# Patient Record
Sex: FEMALE | Race: WHITE | NOT HISPANIC OR LATINO | Employment: OTHER | URBAN - METROPOLITAN AREA
[De-identification: names, ages, dates, MRNs, and addresses within clinical notes are randomized per-mention and may not be internally consistent; named-entity substitution may affect disease eponyms.]

---

## 2017-02-08 ENCOUNTER — OFFICE VISIT (OUTPATIENT)
Dept: ORTHOPEDIC SURGERY | Facility: CLINIC | Age: 57
End: 2017-02-08

## 2017-02-08 VITALS — HEIGHT: 63 IN | BODY MASS INDEX: 42.88 KG/M2 | WEIGHT: 242 LBS

## 2017-02-08 DIAGNOSIS — M25.561 CHRONIC PAIN OF RIGHT KNEE: Primary | ICD-10-CM

## 2017-02-08 DIAGNOSIS — M17.11 ARTHRITIS OF RIGHT KNEE: ICD-10-CM

## 2017-02-08 DIAGNOSIS — G89.29 CHRONIC PAIN OF RIGHT KNEE: Primary | ICD-10-CM

## 2017-02-08 PROCEDURE — 73562 X-RAY EXAM OF KNEE 3: CPT | Performed by: ORTHOPAEDIC SURGERY

## 2017-02-08 RX ORDER — AMOXICILLIN 500 MG/1
CAPSULE ORAL
COMMUNITY
Start: 2017-02-04 | End: 2017-06-27

## 2017-02-13 PROCEDURE — 20610 DRAIN/INJ JOINT/BURSA W/O US: CPT | Performed by: ORTHOPAEDIC SURGERY

## 2017-02-13 RX ORDER — BUPIVACAINE HYDROCHLORIDE 5 MG/ML
2 INJECTION, SOLUTION PERINEURAL
Status: COMPLETED | OUTPATIENT
Start: 2017-02-13 | End: 2017-02-13

## 2017-02-13 RX ORDER — METHYLPREDNISOLONE ACETATE 80 MG/ML
80 INJECTION, SUSPENSION INTRA-ARTICULAR; INTRALESIONAL; INTRAMUSCULAR; SOFT TISSUE
Status: COMPLETED | OUTPATIENT
Start: 2017-02-13 | End: 2017-02-13

## 2017-02-13 RX ORDER — LIDOCAINE HYDROCHLORIDE 10 MG/ML
2 INJECTION, SOLUTION INFILTRATION; PERINEURAL
Status: COMPLETED | OUTPATIENT
Start: 2017-02-13 | End: 2017-02-13

## 2017-02-13 RX ADMIN — BUPIVACAINE HYDROCHLORIDE 2 ML: 5 INJECTION, SOLUTION PERINEURAL at 14:43

## 2017-02-13 RX ADMIN — METHYLPREDNISOLONE ACETATE 80 MG: 80 INJECTION, SUSPENSION INTRA-ARTICULAR; INTRALESIONAL; INTRAMUSCULAR; SOFT TISSUE at 14:43

## 2017-02-13 RX ADMIN — LIDOCAINE HYDROCHLORIDE 2 ML: 10 INJECTION, SOLUTION INFILTRATION; PERINEURAL at 14:43

## 2017-02-13 NOTE — PROGRESS NOTES
Knee Joint Injection      Patient: Kamini Orta  YOB: 1960    Chief Complaints: Knee pain right    Subjective:  This problem is not new to this examiner.     History of Present Illness: Pt gets intermittent  injections with good relief. Is here for repeat injection. Understands options. The pain is a generalized joint line tenderness.  It has been progressive in nature but remains intermittent.  Worsened by prolonged standing or walking and squatting activities. Has had improvement in the past with ice/heat, rest, and injections. TIMING:  The pain is described as CHRONIC.  LOCATION: medial joint line tenderness. AGGRAVATING FACTORS:  Is worsened by prolonged standing, sitting, walking and squatting activities.  ASSOCIATED SYMPTOMS:  swelling, tenderness, and aching.      Allergies:   Allergies   Allergen Reactions   • Codeine    • Erythromycin        Medications:   Home Medications:  Current Outpatient Prescriptions on File Prior to Visit   Medication Sig   • ALPRAZolam (XANAX) 0.5 MG tablet    • ALPRAZolam (XANAX) 0.5 MG tablet Take 0.5 mg by mouth Daily.   • atorvastatin (LIPITOR) 20 MG tablet    • clidinium-chlordiazepoxide (LIBRAX) 5-2.5 MG per capsule Take  by mouth.   • cyclobenzaprine (FLEXERIL) 10 MG tablet Take 10 mg by mouth 3 (Three) Times a Day.   • esomeprazole (NexIUM) 40 MG capsule Take 40 mg by mouth.   • estradiol (MINIVELLE, VIVELLE-DOT) 0.05 MG/24HR patch Place 1 patch on the skin.   • estradiol (MINIVELLE, VIVELLE-DOT) 0.075 MG/24HR patch    • HYDROcodone-acetaminophen (NORCO) 5-325 MG per tablet    • HYDROcodone-acetaminophen (NORCO) 7.5-325 MG per tablet TAKE ONE TABLET BY MOUTH TWICE DAILY AS NEEDED   • HYDROcodone-acetaminophen (VICODIN) 5-500 MG per tablet Take 1 tablet by mouth Every 6 (Six) Hours.   • levothyroxine (SYNTHROID, LEVOTHROID) 75 MCG tablet    • lidocaine (LIDODERM) 5 % Place 1 patch on the skin Daily.   • lidocaine (LIDODERM) 5 % APPLY 1 PATCH EVERY DAY AS NEEDED  "12 HOURS AND 12 HOURS OFF   • meloxicam (MOBIC) 15 MG tablet Take 15 mg by mouth.   • NEXIUM 40 MG capsule    • rosuvastatin (CRESTOR) 10 MG tablet Take 10 mg by mouth.   • sertraline (ZOLOFT) 100 MG tablet Take 100 mg by mouth.   • traMADol-acetaminophen (ULTRACET) 37.5-325 MG per tablet Take 1 tablet by mouth Every 6 (Six) Hours.   • venlafaxine XR (EFFEXOR-XR) 150 MG 24 hr capsule      No current facility-administered medications on file prior to visit.      Current Medications:  Scheduled Meds:  Continuous Infusions:  No current facility-administered medications for this visit.   PRN Meds:.    I have reviewed the patient's medical history in detail and updated the computerized patient record.  Review and summarization of old records include:    Past Medical History   Diagnosis Date   • Depression    • Diverticulitis    • Thyroid disease         Past Surgical History   Procedure Laterality Date   • Cholecystectomy     •  section       ALSO    • Hysterectomy     • Knee arthroscopy w/ meniscal repair Right    • Knee arthroscopy Right      \"CLEAN UP\"        Social History     Occupational History   • Not on file.     Social History Main Topics   • Smoking status: Former Smoker   • Smokeless tobacco: Not on file   • Alcohol use No   • Drug use: No   • Sexual activity: Not on file    Social History     Social History Narrative        Family History   Problem Relation Age of Onset   • Heart disease Other    • Lung disease Other        ROS: 14 point review of systems was performed and was negative except for documented findings in HPI and today's encounter.     Allergies:   Allergies   Allergen Reactions   • Codeine    • Erythromycin      Constitutional:  Denies fever, shaking or chills   Eyes:  Denies change in visual acuity   HENT:  Denies nasal congestion or sore throat   Respiratory:  Denies cough or shortness of breath   Cardiovascular:  Denies chest pain or severe LE edema   GI:  " Denies abdominal pain, nausea, vomiting, bloody stools or diarrhea   Musculoskeletal:  Numbness, tingling, or loss of motor function only as noted above in history of present illness.  : Denies painful urination or hematuria  Integument:  Denies rash, lesion or ulceration   Neurologic:  Denies headache or focal weakness  Endocrine:  Denies lymphadenopathy  Psych:  Denies confusion or change in mental status   Hem:  Denies active bleeding    Physical Exam:  Body mass index is 42.87 kg/(m^2).  There were no vitals filed for this visit.  Vital Signs:  reviewed  Constitutional: Awake alert and oriented x3, well developed, no acute distress, non-toxic appearance.  EYES: symmetric, sclera clear  ENT:  Normocephalic, Atraumatic.   Respiratory:  No respiratory distress, No wheezing  CV: pulse regular, no palpitations or pallor.  GI:  Abdomen soft, non-tender.   Vascular:  Intact distal pulses, No cyanosis, no signs or symptoms of DVT.  Neurologic: Sensation grossly intact to the involved extremity, No focal deficits noted.   Neck: No tenderness, Supple.  Integument: warm, dry, no ulcerations.   Psychiatric:  Oriented, no pathological affect.  Musculoskeletal:    Affected knee(s):  Painful gait with a subtle limp, positive for synovitis, swelling, joint effusion with crepitation.  Lachman negative  Posterior drawer negative  Glenn's negative  Patellofemoral grind +  Sensation grossly intact to light touch throughout the lower extremity  Skin is intact  Distal pulses are palpable  No signs or symptoms of DVT        Diagnostic Data:     Imaging was done today and discussed at length with the patient:    Indication: pain related symptoms,  Views: 3V AP, LAT & 40 degree PA right knee(s)   Findings: advanced arthritis  Comparison views: viewed last xray done in the office.     Procedure:  Large Joint Arthrocentesis  Date/Time: 2/13/2017 2:43 PM  Consent given by: patient  Site marked: site marked  Timeout: Immediately prior  to procedure a time out was called to verify the correct patient, procedure, equipment, support staff and site/side marked as required   Supporting Documentation  Indications: pain and joint swelling   Procedure Details  Location: knee - R knee  Preparation: Patient was prepped and draped in the usual sterile fashion  Needle size: 22 G  Approach: anterolateral  Medications administered: 2 mL bupivacaine; 2 mL lidocaine 1 %; 80 mg methylPREDNISolone acetate 80 MG/ML  Patient tolerance: patient tolerated the procedure well with no immediate complications          Assessment. Severe osteoarthritis right knee(s).      Plan: Is to proceed with injection  Follow up as indicated.  Ice, elevate, and rest as needed.  Additional interventions include: Cortisone Injection. See procedure note.    2/13/2017

## 2017-05-09 ENCOUNTER — CLINICAL SUPPORT (OUTPATIENT)
Dept: ORTHOPEDIC SURGERY | Facility: CLINIC | Age: 57
End: 2017-05-09

## 2017-05-09 VITALS — WEIGHT: 238.6 LBS | TEMPERATURE: 98.6 F | HEIGHT: 63 IN | BODY MASS INDEX: 42.28 KG/M2

## 2017-05-09 DIAGNOSIS — M17.0 ARTHRITIS OF BOTH KNEES: Primary | ICD-10-CM

## 2017-05-09 PROCEDURE — 20610 DRAIN/INJ JOINT/BURSA W/O US: CPT | Performed by: NURSE PRACTITIONER

## 2017-05-09 RX ORDER — BUPIVACAINE HYDROCHLORIDE 2.5 MG/ML
2 INJECTION, SOLUTION INFILTRATION; PERINEURAL
Status: COMPLETED | OUTPATIENT
Start: 2017-05-09 | End: 2017-05-09

## 2017-05-09 RX ORDER — LIDOCAINE HYDROCHLORIDE 10 MG/ML
2 INJECTION, SOLUTION INFILTRATION; PERINEURAL
Status: COMPLETED | OUTPATIENT
Start: 2017-05-09 | End: 2017-05-09

## 2017-05-09 RX ORDER — METHYLPREDNISOLONE ACETATE 80 MG/ML
80 INJECTION, SUSPENSION INTRA-ARTICULAR; INTRALESIONAL; INTRAMUSCULAR; SOFT TISSUE
Status: COMPLETED | OUTPATIENT
Start: 2017-05-09 | End: 2017-05-09

## 2017-05-09 RX ADMIN — METHYLPREDNISOLONE ACETATE 80 MG: 80 INJECTION, SUSPENSION INTRA-ARTICULAR; INTRALESIONAL; INTRAMUSCULAR; SOFT TISSUE at 13:45

## 2017-05-09 RX ADMIN — LIDOCAINE HYDROCHLORIDE 2 ML: 10 INJECTION, SOLUTION INFILTRATION; PERINEURAL at 13:45

## 2017-05-09 RX ADMIN — BUPIVACAINE HYDROCHLORIDE 2 ML: 2.5 INJECTION, SOLUTION INFILTRATION; PERINEURAL at 13:45

## 2017-06-27 ENCOUNTER — CLINICAL SUPPORT (OUTPATIENT)
Dept: ORTHOPEDIC SURGERY | Facility: CLINIC | Age: 57
End: 2017-06-27

## 2017-06-27 VITALS — TEMPERATURE: 97.4 F | WEIGHT: 225 LBS | BODY MASS INDEX: 39.87 KG/M2 | HEIGHT: 63 IN

## 2017-06-27 DIAGNOSIS — M25.561 CHRONIC PAIN OF RIGHT KNEE: Primary | ICD-10-CM

## 2017-06-27 DIAGNOSIS — G89.29 CHRONIC PAIN OF RIGHT KNEE: Primary | ICD-10-CM

## 2017-06-27 DIAGNOSIS — M17.11 ARTHRITIS OF RIGHT KNEE: ICD-10-CM

## 2017-06-27 PROCEDURE — 20610 DRAIN/INJ JOINT/BURSA W/O US: CPT | Performed by: NURSE PRACTITIONER

## 2017-06-27 PROCEDURE — 99212 OFFICE O/P EST SF 10 MIN: CPT | Performed by: NURSE PRACTITIONER

## 2017-06-27 RX ORDER — LIDOCAINE HYDROCHLORIDE 10 MG/ML
4 INJECTION, SOLUTION INFILTRATION; PERINEURAL
Status: COMPLETED | OUTPATIENT
Start: 2017-06-27 | End: 2017-06-27

## 2017-06-27 RX ADMIN — LIDOCAINE HYDROCHLORIDE 4 ML: 10 INJECTION, SOLUTION INFILTRATION; PERINEURAL at 13:34

## 2017-06-27 NOTE — PROGRESS NOTES
Knee Follow Up Visit      Patient: Kamini Orta  YOB: 1960    Chief Complaints: Knee pain    Subjective:    History of Present Illness: Pt gets intermittent  injections with good relief. Is here for repeat injection. Understands options. The pain is a generalized joint line tenderness.  It has been progressive in nature but remains intermittent.  Worsened by prolonged standing or walking and squatting activities. Has had improvement in the past with ice/heat, rest, and injections. KNEE: TIMING:  The pain is described as ACUTE on CHRONIC.  LOCATION: medial joint line tenderness. AGGRAVATING FACTORS:  Is worsened by prolonged standing, sitting, walking and squatting activities.  ASSOCIATED SYMPTOMS:  swelling, tenderness, and aching. OTHER SYMPTOMS denies popping, locking or catching. RELIEVING FACTORS:  Previous treatment has included cortisone injections  viscosupplementation  prescription NSAIDS  activtiy modification  ice/heat/rest.    This problem is not new to this examiner.     Allergies:   Allergies   Allergen Reactions   • Codeine    • Erythromycin        Medications:   Home Medications:  Current Outpatient Prescriptions on File Prior to Visit   Medication Sig   • ALPRAZolam (XANAX) 0.5 MG tablet Take 0.5 mg by mouth Daily.   • atorvastatin (LIPITOR) 20 MG tablet Take 20 mg by mouth Daily.   • esomeprazole (NexIUM) 40 MG capsule Take 40 mg by mouth.   • estradiol (MINIVELLE, VIVELLE-DOT) 0.075 MG/24HR patch    • HYDROcodone-acetaminophen (NORCO) 7.5-325 MG per tablet TAKE ONE TABLET BY MOUTH TWICE DAILY AS NEEDED   • levothyroxine (SYNTHROID, LEVOTHROID) 75 MCG tablet Take 75 mcg by mouth Daily.   • lidocaine (LIDODERM) 5 % APPLY 1 PATCH EVERY DAY AS NEEDED 12 HOURS AND 12 HOURS OFF   • venlafaxine XR (EFFEXOR-XR) 150 MG 24 hr capsule Take 150 mg by mouth Daily.   • [DISCONTINUED] ALPRAZolam (XANAX) 0.5 MG tablet    • [DISCONTINUED] amoxicillin (AMOXIL) 500 MG capsule    • [DISCONTINUED]  "clidinium-chlordiazepoxide (LIBRAX) 5-2.5 MG per capsule Take  by mouth.   • [DISCONTINUED] cyclobenzaprine (FLEXERIL) 10 MG tablet Take 10 mg by mouth 3 (Three) Times a Day.   • [DISCONTINUED] estradiol (MINIVELLE, VIVELLE-DOT) 0.05 MG/24HR patch Place 1 patch on the skin.   • [DISCONTINUED] HYDROcodone-acetaminophen (NORCO) 5-325 MG per tablet    • [DISCONTINUED] HYDROcodone-acetaminophen (VICODIN) 5-500 MG per tablet Take 1 tablet by mouth Every 6 (Six) Hours.   • [DISCONTINUED] lidocaine (LIDODERM) 5 % Place 1 patch on the skin Daily.   • [DISCONTINUED] meloxicam (MOBIC) 15 MG tablet Take 15 mg by mouth.   • [DISCONTINUED] NEXIUM 40 MG capsule    • [DISCONTINUED] rosuvastatin (CRESTOR) 10 MG tablet Take 10 mg by mouth.   • [DISCONTINUED] sertraline (ZOLOFT) 100 MG tablet Take 100 mg by mouth.   • [DISCONTINUED] traMADol-acetaminophen (ULTRACET) 37.5-325 MG per tablet Take 1 tablet by mouth Every 6 (Six) Hours.     No current facility-administered medications on file prior to visit.      Current Medications:  Scheduled Meds:  Continuous Infusions:  No current facility-administered medications for this visit.   PRN Meds:.    I have reviewed the patient's medical history in detail and updated the computerized patient record.  Review and summarization of old records include:    Past Medical History:   Diagnosis Date   • Depression    • Diverticulitis    • Thyroid disease         Past Surgical History:   Procedure Laterality Date   •  SECTION      ALSO    • CHOLECYSTECTOMY     • HYSTERECTOMY     • KNEE ARTHROSCOPY Right     \"CLEAN UP\"   • KNEE ARTHROSCOPY W/ MENISCAL REPAIR Right         Social History     Occupational History   • Not on file.     Social History Main Topics   • Smoking status: Former Smoker   • Smokeless tobacco: Never Used   • Alcohol use No   • Drug use: No   • Sexual activity: Not on file    Social History     Social History Narrative        Family History   Problem " Relation Age of Onset   • Heart disease Other    • Lung disease Other        ROS: 14 point review of systems was performed and was negative except for documented findings in HPI and today's encounter.     Allergies:   Allergies   Allergen Reactions   • Codeine    • Erythromycin      Constitutional:  Denies fever, shaking or chills   Eyes:  Denies change in visual acuity   HENT:  Denies nasal congestion or sore throat   Respiratory:  Denies cough or shortness of breath   Cardiovascular:  Denies chest pain or severe LE edema   GI:  Denies abdominal pain, nausea, vomiting, bloody stools or diarrhea   Musculoskeletal:  Numbness, tingling, or loss of motor function only as noted above in history of present illness.  : Denies painful urination or hematuria  Integument:  Denies rash, lesion or ulceration   Neurologic:  Denies headache or focal weakness  Endocrine:  Denies lymphadenopathy  Psych:  Denies confusion or change in mental status   Hem:  Denies active bleeding    Physical Exam:  Body mass index is 39.86 kg/(m^2).  Vitals:    06/27/17 1323   Temp: 97.4 °F (36.3 °C)     Vital Signs:  reviewed  Constitutional: Awake alert and oriented x3, well developed, no acute distress, non-toxic appearance.  EYES: symmetric, sclera clear  ENT:  Normocephalic, Atraumatic.   Respiratory:  No respiratory distress, No wheezing  CV: pulse regular, no palpitations or pallor.  GI:  Abdomen soft, non-tender.   Vascular:  Intact distal pulses, No cyanosis, no signs or symptoms of DVT.  Neurologic: Sensation grossly intact to the involved extremity, No focal deficits noted.   Neck: No tenderness, Supple.  Integument: warm, dry, no ulcerations.   Psychiatric:  Oriented, no pathological affect.  Musculoskeletal:    Affected knee(s):  Painful gait with a subtle limp, positive for synovitis, swelling, joint effusion with crepitation.  Lachman negative  Posterior drawer negative  Glenn's negative  Patellofemoral grind +  Sensation grossly  intact to light touch throughout the lower extremity  Skin is intact  Distal pulses are palpable  No signs or symptoms of DVT        Diagnostic Data:     Imaging was done today and discussed at length with the patient:    Indication: pain related symptoms,  Views: 3V AP, LAT & 40 degree PA bilateral knee(s)   Findings: severe end-stage arthritis (bone on bone, subchondral sclerosis/cysts, osteophytes)  Comparison views: viewed last xray done in the office.     Procedure:  Large Joint Arthrocentesis  Date/Time: 6/27/2017 1:34 PM  Consent given by: patient  Site marked: site marked  Timeout: Immediately prior to procedure a time out was called to verify the correct patient, procedure, equipment, support staff and site/side marked as required   Supporting Documentation  Indications: pain   Procedure Details  Location: knee - R knee  Needle size: 25 G  Approach: anteromedial  Medications administered: 4 mL lidocaine 1 %; 48 mg hylan 48 MG/6ML            Assessment. Severe arthritis right knee(s).      Plan: Is to proceed with injection  Follow up as indicated.  Ice, elevate, and rest as needed.  Additional interventions include: Biomechanics of pertinent body area discussed.  Risks, benefits, alternatives, comparisons, and complications of accepted medicines, injections, recommendations, surgical procedures, and therapies explained and education provided in laymen's terms. The patient was given the opportunity to ask questions and they were answerved to their satisfaction.   Natural history and expected course discussed. Questions answered.  Advice on benefits of, and types of regular/moderate exercise.  Lifestyle measures for weight loss with biomechanical explanations for weight loss and how this affects orthopedic condition.  OTC analgesics as needed with dosage warning and instructions given.  Cryotherapy/brachy therapy as indicated with instructions.   10 min spent face to face with patient >5 min spent counseling  about natural history and expected course of assessed complaint. Questions answered.  Viscosupplementation.  See procedure note.    6/27/2017  RACHEL

## 2017-06-27 NOTE — PATIENT INSTRUCTIONS
[facility]    KNEE HOME EXERCISES      It is important that you maintain and possibly improve your strength and range of motion of your knee.      •  Walk frequently for short intervals  •  Using a cane or walker to allow you to walk safely if needed  •  Avoid sitting for long periods of time to avoid cramping and swelling of your leg   •  Do exercises either on a bed or in a chair.  •  If any of the exercises cause you pain, either eliminate that exercise or decrease          the motion or repetitions      Start exercises with 10 repetitions and increase to 30 repetitions.  Do two sets of each exercise each day    ANKLE PUMPS    1. Move your feet up and down as if you are pumping on a gas pedal       STRAIGHT LEG RAISES    1. Lie flat with your injured leg straight.  Keep the other leg bent.  2. Tighten the injured leg's thigh muscle.  3. Lift leg, with knee locked in the straight position no higher than the other knee for  seconds  4. Lower leg slowly. Rest for 5 seconds      SHORT ARC QUAD    1. Lie with both knees bent over a pillow  2. Straighten both knees  3. Hold 5 seconds  4. Bend knees back to starting position and repeat sequence       QUADRICEPS     1. Lie flat with your injured let straight.  Keep the other leg bent.  2. Tighten the injured leg's thigh muscle by trying to move your kneecap toward the  thigh.  3. Make your leg as stiff as you can.  4. Hold for 5 seconds and relax for 5 seconds        HAMSTRING STRETCH    1. Lie flat with your injured leg straight. Keep the other leg bent  2. Press your heel of your injured leg into the floor for 5 seconds       OR  1. Sit with injured leg out straight.   2. Loop a sheet around the ball of foot and toes.  3. Gently pull on the sheet, keeping the leg straight  4. Hold for 10-30 seconds      KNEE FLEXION-EXTENSION SITTING     1. Sit with injured let bent as shown  2. Straighten leg at the knee  3. Hold 5 seconds  4. Bend knee back  to starting position   5. Rest for 2-5 seconds and repeat sequence       HEEL SLIDES     1. Lie on back with legs straight  2. Slide heels toward buttocks  3. Return to starting position       HEEL SLIDS WITH SHEET    1. Lie on your back with a sheet wrapped around your foot  2. Pull on the sheet to assist you in bending your knee and sliding your heel toward  your buttocks  3. Hold for 5 seconds        KNEE FLEXION STRETCH    1. Sit in a chair  2. Bend bad knee back as far as you can   3. Hold stretch for 5-10 seconds      CHAIR PUSH UPS    1. Sit in a chair with arm rests  2. Place hands on armrests  3. Straighten arms, raising buttocks up off of chair         WALL SLIDES    1. Stand with your back and head against a wall.    2. Keep your feet shoulder width apart and 6-12 inches from the wall  3. Slowly slide down the wall until your knees are slightly bent.    4. Hold for 5 seconds and then slowly slide back up  5. As you get stronger, then you can slowly increase the bend in your knees, but do  not drop your buttocks below the level of your knees       STEP UPS    1. Stand with your foot on your injured leg on a 3-5 inch support (such as a block of  wood)  2. Place other foot on the floor  3. Shift your weight onto the foot on the block and try to straighten the knee and  raising the other foot off of the floor  4. Slowly lower your foot until only the heel touches the floor

## 2017-12-11 ENCOUNTER — OFFICE VISIT (OUTPATIENT)
Dept: ORTHOPEDIC SURGERY | Facility: CLINIC | Age: 57
End: 2017-12-11

## 2017-12-11 VITALS — WEIGHT: 224 LBS | TEMPERATURE: 97.6 F | HEIGHT: 63 IN | BODY MASS INDEX: 39.69 KG/M2

## 2017-12-11 DIAGNOSIS — M25.561 CHRONIC PAIN OF RIGHT KNEE: Primary | ICD-10-CM

## 2017-12-11 DIAGNOSIS — M17.11 ARTHRITIS OF RIGHT KNEE: ICD-10-CM

## 2017-12-11 DIAGNOSIS — G89.29 CHRONIC PAIN OF RIGHT KNEE: Primary | ICD-10-CM

## 2017-12-11 PROCEDURE — 20610 DRAIN/INJ JOINT/BURSA W/O US: CPT | Performed by: ORTHOPAEDIC SURGERY

## 2017-12-11 PROCEDURE — 99212 OFFICE O/P EST SF 10 MIN: CPT | Performed by: ORTHOPAEDIC SURGERY

## 2017-12-11 PROCEDURE — 73562 X-RAY EXAM OF KNEE 3: CPT | Performed by: ORTHOPAEDIC SURGERY

## 2017-12-11 RX ORDER — LIDOCAINE HYDROCHLORIDE 10 MG/ML
2 INJECTION, SOLUTION EPIDURAL; INFILTRATION; INTRACAUDAL; PERINEURAL
Status: COMPLETED | OUTPATIENT
Start: 2017-12-11 | End: 2017-12-11

## 2017-12-11 RX ORDER — BUPIVACAINE HYDROCHLORIDE 5 MG/ML
2 INJECTION, SOLUTION EPIDURAL; INTRACAUDAL
Status: COMPLETED | OUTPATIENT
Start: 2017-12-11 | End: 2017-12-11

## 2017-12-11 RX ORDER — METHYLPREDNISOLONE ACETATE 80 MG/ML
80 INJECTION, SUSPENSION INTRA-ARTICULAR; INTRALESIONAL; INTRAMUSCULAR; SOFT TISSUE
Status: COMPLETED | OUTPATIENT
Start: 2017-12-11 | End: 2017-12-11

## 2017-12-11 RX ADMIN — BUPIVACAINE HYDROCHLORIDE 2 ML: 5 INJECTION, SOLUTION EPIDURAL; INTRACAUDAL at 10:21

## 2017-12-11 RX ADMIN — METHYLPREDNISOLONE ACETATE 80 MG: 80 INJECTION, SUSPENSION INTRA-ARTICULAR; INTRALESIONAL; INTRAMUSCULAR; SOFT TISSUE at 10:21

## 2017-12-11 RX ADMIN — LIDOCAINE HYDROCHLORIDE 2 ML: 10 INJECTION, SOLUTION EPIDURAL; INFILTRATION; INTRACAUDAL; PERINEURAL at 10:21

## 2017-12-11 NOTE — PROGRESS NOTES
"  Patient: Kamini Orta  YOB: 1960    Chief Complaints:  right knee pain    Subjective:    History of Present Illness: Here today for knee pain. The pain is a generalized joint tenderness.  It has been progressive in nature but remains intermittent.  Worsened by prolonged standing or walking and squatting activities. Has had improvement in the past with ice/heat, rest, and injections.     This problem is not new to this examiner.     Allergies:   Allergies   Allergen Reactions   • Codeine    • Erythromycin        Medications:   Home Medications:  Current Outpatient Prescriptions on File Prior to Visit   Medication Sig   • ALPRAZolam (XANAX) 0.5 MG tablet Take 0.5 mg by mouth Daily.   • atorvastatin (LIPITOR) 20 MG tablet Take 20 mg by mouth Daily.   • esomeprazole (NexIUM) 40 MG capsule Take 40 mg by mouth.   • estradiol (MINIVELLE, VIVELLE-DOT) 0.075 MG/24HR patch    • HYDROcodone-acetaminophen (NORCO) 7.5-325 MG per tablet TAKE ONE TABLET BY MOUTH TWICE DAILY AS NEEDED   • levothyroxine (SYNTHROID, LEVOTHROID) 75 MCG tablet Take 75 mcg by mouth Daily.   • lidocaine (LIDODERM) 5 % APPLY 1 PATCH EVERY DAY AS NEEDED 12 HOURS AND 12 HOURS OFF   • venlafaxine XR (EFFEXOR-XR) 150 MG 24 hr capsule Take 150 mg by mouth Daily.     No current facility-administered medications on file prior to visit.      Current Medications:  Scheduled Meds:  Continuous Infusions:  No current facility-administered medications for this visit.   PRN Meds:.    I have reviewed the patient's medical history in detail and updated the computerized patient record.  Review and summarization of old records include:    Past Medical History:   Diagnosis Date   • Depression    • Diverticulitis    • Thyroid disease         Past Surgical History:   Procedure Laterality Date   •  SECTION      ALSO    • CHOLECYSTECTOMY     • HYSTERECTOMY     • KNEE ARTHROSCOPY Right     \"CLEAN UP\"   • KNEE ARTHROSCOPY W/ MENISCAL " REPAIR Right 2004        Social History     Occupational History   • Not on file.     Social History Main Topics   • Smoking status: Former Smoker   • Smokeless tobacco: Never Used   • Alcohol use No   • Drug use: No   • Sexual activity: Not on file      Social History     Social History Narrative        Family History   Problem Relation Age of Onset   • Heart disease Other    • Lung disease Other        ROS: 14 point review of systems was performed and was negative except for documented findings in HPI and today's encounter.     Allergies:   Allergies   Allergen Reactions   • Codeine    • Erythromycin      Constitutional:  Denies fever, shaking or chills   Eyes:  Denies change in visual acuity   HENT:  Denies nasal congestion or sore throat   Respiratory:  Denies cough or shortness of breath   Cardiovascular:  Denies chest pain or severe LE edema   GI:  Denies abdominal pain, nausea, vomiting, bloody stools or diarrhea   Musculoskeletal:  Numbness, tingling, or loss of motor function only as noted above in history of present illness.  : Denies painful urination or hematuria  Integument:  Denies rash, lesion or ulceration   Neurologic:  Denies headache or focal weakness  Endocrine:  Denies lymphadenopathy  Psych:  Denies confusion or change in mental status   Hem:  Denies active bleeding    Physical Exam:  Body mass index is 39.68 kg/(m^2).  Vitals:    12/11/17 1016   Temp: 97.6 °F (36.4 °C)     Vital Signs:  reviewed  Constitutional: Awake alert and oriented x3, well developed, no acute distress, non-toxic appearance.  EYES: symmetric, sclera clear  ENT:  Normocephalic, Atraumatic.   Respiratory:  No respiratory distress, No wheezing  CV: pulse regular, no palpitations or pallor.  GI:  Abdomen soft, non-tender.   Vascular:  Intact distal pulses, No cyanosis, no signs or symptoms of DVT.  Neurologic: Sensation grossly intact to the involved extremity, No focal deficits noted.   Neck: No tenderness,  Supple.  Integument: warm, dry, no ulcerations.   Psychiatric:  Oriented, no pathological affect.  Musculoskeletal:    Affected knee(s):  Painful gait with a subtle limp, positive for synovitis, swelling, joint effusion with crepitation.  Lachman negative  Posterior drawer negative  Glenn's negative  Patellofemoral grind +  Sensation grossly intact to light touch throughout the lower extremity  Skin is intact  Distal pulses are palpable  No signs or symptoms of DVT        Diagnostic Data:     Imaging was done today, images were personally viewed and discussed at length with the patient:    Indication: pain related symptoms,  Views: 3V AP, LAT & 40 degree PA right knee(s)   Findings: severe end-stage arthritis (bone on bone, subchondral sclerosis/cysts, osteophytes)  Comparison views: viewed last xray done in the office.     Procedure:  Large Joint Arthrocentesis  Date/Time: 12/11/2017 10:21 AM  Consent given by: patient  Site marked: site marked  Timeout: Immediately prior to procedure a time out was called to verify the correct patient, procedure, equipment, support staff and site/side marked as required   Supporting Documentation  Indications: pain and joint swelling   Procedure Details  Location: knee - R knee  Preparation: Patient was prepped and draped in the usual sterile fashion  Needle size: 22 G  Approach: anterolateral  Medications administered: 80 mg methylPREDNISolone acetate 80 MG/ML; 2 mL bupivacaine (PF) 0.5 %; 2 mL lidocaine PF 1% 1 %  Patient tolerance: patient tolerated the procedure well with no immediate complications          Assessment:     ICD-10-CM ICD-9-CM   1. Chronic pain of right knee M25.561 719.46    G89.29 338.29   2. Arthritis of right knee M17.11 716.96           Plan: Is to proceed with injection  Follow up as indicated.  Ice, elevate, and rest as needed.  Additional interventions include:  15 min spent face to face with patient 9 min spent counseling about natural history and  expected course of assessed complaint and reviewed treatment options that have been tried and not tried and those currently available. Questions answered.    Natural history and expected course of this patient's diagnosis discussed along with evaluation of therapies. Questions answered.  Advice on benefits of, and types of regular/moderate exercise including biomechanical forces involved as it pertains to this complaint, with a goal of 5 min at least 7 times a week.    Address and update of wt loss, physical exercises, use of assistive devices, and monitoring of Medications per orders to address ortho complaints; Evaluation and discussion of safety, precautions, side effects, and warnings given especially of long term NSAID therapy.  Lifestyle measures for weight loss, suggest starting at 10lbs, with biomechanical explanations for weight loss and how this affects orthopedic condition.  Cortisone Injection. See procedure note.  OTC analgesics as needed with dosage warning and instructions given.  Cryotherapy/brachy therapy as indicated with instructions.   Advised on strengthening exercises, stressed importance of these with progression of arthritis, demonstrated exercises to patient with repeat demonstration and verb of understanding.     12/11/2017  RACHEL

## 2017-12-28 ENCOUNTER — CONVERSION ENCOUNTER (OUTPATIENT)
Dept: GENERAL RADIOLOGY | Facility: HOSPITAL | Age: 57
End: 2017-12-28

## 2018-03-14 ENCOUNTER — CLINICAL SUPPORT (OUTPATIENT)
Dept: ORTHOPEDIC SURGERY | Facility: CLINIC | Age: 58
End: 2018-03-14

## 2018-03-14 VITALS — BODY MASS INDEX: 39.69 KG/M2 | WEIGHT: 224 LBS | HEIGHT: 63 IN | TEMPERATURE: 97.7 F

## 2018-03-14 DIAGNOSIS — M17.0 ARTHRITIS OF BOTH KNEES: Primary | ICD-10-CM

## 2018-03-14 PROCEDURE — 99212 OFFICE O/P EST SF 10 MIN: CPT | Performed by: NURSE PRACTITIONER

## 2018-03-14 PROCEDURE — 20610 DRAIN/INJ JOINT/BURSA W/O US: CPT | Performed by: NURSE PRACTITIONER

## 2018-03-14 RX ORDER — LIDOCAINE HYDROCHLORIDE 10 MG/ML
4 INJECTION, SOLUTION EPIDURAL; INFILTRATION; INTRACAUDAL; PERINEURAL
Status: COMPLETED | OUTPATIENT
Start: 2018-03-14 | End: 2018-03-14

## 2018-03-14 RX ORDER — METHYLPREDNISOLONE ACETATE 80 MG/ML
80 INJECTION, SUSPENSION INTRA-ARTICULAR; INTRALESIONAL; INTRAMUSCULAR; SOFT TISSUE
Status: COMPLETED | OUTPATIENT
Start: 2018-03-14 | End: 2018-03-14

## 2018-03-14 RX ADMIN — METHYLPREDNISOLONE ACETATE 80 MG: 80 INJECTION, SUSPENSION INTRA-ARTICULAR; INTRALESIONAL; INTRAMUSCULAR; SOFT TISSUE at 13:25

## 2018-03-14 RX ADMIN — METHYLPREDNISOLONE ACETATE 80 MG: 80 INJECTION, SUSPENSION INTRA-ARTICULAR; INTRALESIONAL; INTRAMUSCULAR; SOFT TISSUE at 13:26

## 2018-03-14 RX ADMIN — LIDOCAINE HYDROCHLORIDE 4 ML: 10 INJECTION, SOLUTION EPIDURAL; INFILTRATION; INTRACAUDAL; PERINEURAL at 13:25

## 2018-03-14 RX ADMIN — LIDOCAINE HYDROCHLORIDE 4 ML: 10 INJECTION, SOLUTION EPIDURAL; INFILTRATION; INTRACAUDAL; PERINEURAL at 13:26

## 2018-03-14 NOTE — PROGRESS NOTES
Patient Name: Kamini Orta   YOB: 1960  Referring Primary Care Physician: Davin Hall MD  BMI: Body mass index is 39.68 kg/m².    Chief Complaint:    Chief Complaint   Patient presents with   • Left Knee - Follow-up, Pain   • Right Knee - Follow-up, Pain        Subjective:    HPI:   Kamini Orta is a pleasant 57 y.o. year old who presents today for evaluation of   Chief Complaint   Patient presents with   • Left Knee - Follow-up, Pain   • Right Knee - Follow-up, Pain   .  KNEE: TIMING:  The pain is described as ACUTE on CHRONIC.  LOCATION: medial joint line tenderness. AGGRAVATING FACTORS:  Is worsened by prolonged standing, sitting, walking and squatting activities.  ASSOCIATED SYMPTOMS:  swelling, tenderness, and aching. OTHER SYMPTOMS denies popping, locking or catching. RELIEVING FACTORS:  Previous treatment has included cortisone injections  OTC Tylenol  activtiy modification  weight loss  ice/heat/rest.    This problem is not new to this examiner.     Medications:   Home Medications:  Current Outpatient Prescriptions on File Prior to Visit   Medication Sig   • ALPRAZolam (XANAX) 0.5 MG tablet Take 0.5 mg by mouth Daily.   • atorvastatin (LIPITOR) 20 MG tablet Take 20 mg by mouth Daily.   • diclofenac sodium (VOTAREN XR) 100 MG 24 hr tablet TAKE ONE TABLET BY MOUTH EVERY DAY   • esomeprazole (NexIUM) 40 MG capsule Take 40 mg by mouth.   • estradiol (MINIVELLE, VIVELLE-DOT) 0.075 MG/24HR patch    • HYDROcodone-acetaminophen (NORCO) 7.5-325 MG per tablet TAKE ONE TABLET BY MOUTH TWICE DAILY AS NEEDED   • levothyroxine (SYNTHROID, LEVOTHROID) 75 MCG tablet Take 75 mcg by mouth Daily.   • lidocaine (LIDODERM) 5 % APPLY 1 PATCH EVERY DAY AS NEEDED 12 HOURS AND 12 HOURS OFF   • venlafaxine XR (EFFEXOR-XR) 150 MG 24 hr capsule Take 150 mg by mouth Daily.     No current facility-administered medications on file prior to visit.      Current Medications:  Scheduled Meds:  Continuous Infusions:  No  "current facility-administered medications for this visit.   PRN Meds:.    I have reviewed the patient's medical history in detail and updated the computerized patient record.  Review and summarization of old records includes:    Past Medical History:   Diagnosis Date   • Depression    • Diverticulitis    • Thyroid disease         Past Surgical History:   Procedure Laterality Date   •  SECTION      ALSO    • CHOLECYSTECTOMY     • HYSTERECTOMY     • KNEE ARTHROSCOPY Right     \"CLEAN UP\"   • KNEE ARTHROSCOPY W/ MENISCAL REPAIR Right         Social History     Occupational History   • Not on file.     Social History Main Topics   • Smoking status: Former Smoker   • Smokeless tobacco: Never Used   • Alcohol use No   • Drug use: No   • Sexual activity: Not on file    Social History     Social History Narrative   • No narrative on file        Family History   Problem Relation Age of Onset   • Heart disease Other    • Lung disease Other        ROS: 14 point review of systems was performed and all other systems were reviewed and are negative except for documented findings in HPI and today's encounter.     Allergies:   Allergies   Allergen Reactions   • Codeine    • Erythromycin      Constitutional:  Denies fever, shaking or chills   Eyes:  Denies change in visual acuity   HENT:  Denies nasal congestion or sore throat   Respiratory:  Denies cough or shortness of breath   Cardiovascular:  Denies chest pain or severe LE edema   GI:  Denies abdominal pain, nausea, vomiting, bloody stools or diarrhea   Musculoskeletal:  Numbness, tingling, pain, or loss of motor function only as noted above in history of present illness.  : Denies painful urination or hematuria  Integument:  Denies rash, lesion or ulceration   Neurologic:  Denies headache or focal weakness  Endocrine:  Denies lymphadenopathy  Psych:  Denies confusion or change in mental status   Hem:  Denies active bleeding    Subjective " "    Objective:    Physical Exam: 57 y.o. female  Wt Readings from Last 3 Encounters:   03/14/18 102 kg (224 lb)   12/11/17 102 kg (224 lb)   06/27/17 102 kg (225 lb)     Ht Readings from Last 3 Encounters:   03/14/18 160 cm (63\")   12/11/17 160 cm (63\")   06/27/17 160 cm (63\")     Body mass index is 39.68 kg/m².  Facility age limit for growth percentiles is 20 years.  Vitals:    03/14/18 1314   Temp: 97.7 °F (36.5 °C)       Vital signs reviewed.   General Appearance:    Alert, cooperative, in no acute distress                  Eyes: conjunctiva clear  ENT: external ears and nose atraumatic  CV: no peripheral edema  Resp: normal respiratory effort  Skin: no rashes or wounds; normal turgor  Psych: mood and affect appropriate  Lymph: no nodes appreciated  Neuro: gross sensation intact  Vascular:  Palpable peripheral pulse in noted extremity  Musculoskeletal Extremities: KNEE Exam: medial joint line tenderness with crepitation, synovitis, swelling, and joint effusion bilateral knee.    Radiology:   Imaging Results (last 72 hours)     ** No results found for the last 72 hours. **        Imaging done previously in the office, images were personally viewed and discussed at length with the patient:    Indication: pain related symptoms,  Views: 3V AP, LAT & 40 degree PA right knee(s)   Findings: severe end-stage arthritis (bone on bone, subchondral sclerosis/cysts, osteophytes)  Comparison views: viewed last xray done in the office.       Assessment:     ICD-10-CM ICD-9-CM   1. Arthritis of both knees M17.0 716.96        Large Joint Arthrocentesis  Date/Time: 3/14/2018 1:25 PM  Consent given by: patient  Site marked: site marked  Timeout: Immediately prior to procedure a time out was called to verify the correct patient, procedure, equipment, support staff and site/side marked as required   Supporting Documentation  Indications: pain and joint swelling   Procedure Details  Location: knee - L knee  Preparation: Patient was " prepped and draped in the usual sterile fashion  Needle size: 22 G  Approach: anterolateral  Medications administered: 80 mg methylPREDNISolone acetate 80 MG/ML; 4 mL lidocaine PF 1% 1 %  Patient tolerance: patient tolerated the procedure well with no immediate complications    Large Joint Arthrocentesis  Date/Time: 3/14/2018 1:26 PM  Consent given by: patient  Site marked: site marked  Timeout: Immediately prior to procedure a time out was called to verify the correct patient, procedure, equipment, support staff and site/side marked as required   Supporting Documentation  Indications: pain and joint swelling   Procedure Details  Location: knee - R knee  Preparation: Patient was prepped and draped in the usual sterile fashion  Needle size: 22 G  Approach: anterolateral  Medications administered: 80 mg methylPREDNISolone acetate 80 MG/ML; 4 mL lidocaine PF 1% 1 %  Patient tolerance: patient tolerated the procedure well with no immediate complications             Plan:  15 min spent face to face with patient 11 min spent counseling about natural history and expected course of assessed complaint and reviewed treatment options that have been tried and not tried and those currently available. Questions answered.    Natural history and expected course of this patient's diagnosis discussed along with evaluation of therapies. Questions answered.  Advice on benefits of, and types of regular/moderate exercise including biomechanical forces involved as it pertains to this complaint, with a goal of 5 min at least 7 times a week.    Address and update of wt loss, physical exercises, use of assistive devices, and monitoring of Medications per orders to address ortho complaints; Evaluation and discussion of safety, precautions, side effects, and warnings given especially of long term NSAID therapy.  Lifestyle measures for weight loss, suggest starting at 10lbs, with biomechanical explanations for weight loss and how this affects  orthopedic condition.  Cortisone Injection. See procedure note.  OTC analgesics as needed with dosage warning and instructions given.  Cryotherapy/brachy therapy as indicated with instructions.   Advised on strengthening exercises, stressed importance of these with progression of arthritis, demonstrated exercises to patient with repeat demonstration and verb of understanding.   Is working on wt loss and diet and is doing thigh exercises and has quit smoking this past year.  Is getting ready to go to [place] to visit her brother Ricky.     3/14/2018   RACHEL

## 2018-05-23 ENCOUNTER — TELEPHONE (OUTPATIENT)
Dept: ORTHOPEDIC SURGERY | Facility: CLINIC | Age: 58
End: 2018-05-23

## 2018-05-23 DIAGNOSIS — M19.90 ARTHRITIS: Primary | ICD-10-CM

## 2018-05-29 ENCOUNTER — TELEPHONE (OUTPATIENT)
Dept: ORTHOPEDIC SURGERY | Facility: CLINIC | Age: 58
End: 2018-05-29

## 2018-05-29 NOTE — TELEPHONE ENCOUNTER
CALLED AND LEFT MSG FOR PATIENT TO CALL ME ABOUT THE SYNVISC ONE INJECTION BECAUSE [facility] NO LONGER WILL COVER/DM

## 2018-05-29 NOTE — TELEPHONE ENCOUNTER
PATIENT CALLED ME BACK AND HAS BEEN MADE AWARE THAT SHE CANNOT HAVE SYNVISC ONE AT THIS TIME AND SHE IS GOING TO HAVE CORTISONE INSTEAD/DM

## 2018-06-08 ENCOUNTER — CLINICAL SUPPORT (OUTPATIENT)
Dept: ORTHOPEDIC SURGERY | Facility: CLINIC | Age: 58
End: 2018-06-08

## 2018-06-08 VITALS — HEIGHT: 63 IN | WEIGHT: 239.6 LBS | BODY MASS INDEX: 42.45 KG/M2 | TEMPERATURE: 98.5 F

## 2018-06-08 DIAGNOSIS — M17.11 PRIMARY OSTEOARTHRITIS OF RIGHT KNEE: Primary | ICD-10-CM

## 2018-06-08 PROCEDURE — 99212 OFFICE O/P EST SF 10 MIN: CPT | Performed by: NURSE PRACTITIONER

## 2018-06-08 PROCEDURE — 73562 X-RAY EXAM OF KNEE 3: CPT | Performed by: NURSE PRACTITIONER

## 2018-06-08 PROCEDURE — 20610 DRAIN/INJ JOINT/BURSA W/O US: CPT | Performed by: NURSE PRACTITIONER

## 2018-06-08 RX ORDER — METHYLPREDNISOLONE ACETATE 80 MG/ML
80 INJECTION, SUSPENSION INTRA-ARTICULAR; INTRALESIONAL; INTRAMUSCULAR; SOFT TISSUE
Status: COMPLETED | OUTPATIENT
Start: 2018-06-08 | End: 2018-06-08

## 2018-06-08 RX ORDER — LIDOCAINE HYDROCHLORIDE 20 MG/ML
4 INJECTION, SOLUTION EPIDURAL; INFILTRATION; INTRACAUDAL; PERINEURAL
Status: COMPLETED | OUTPATIENT
Start: 2018-06-08 | End: 2018-06-08

## 2018-06-08 RX ADMIN — METHYLPREDNISOLONE ACETATE 80 MG: 80 INJECTION, SUSPENSION INTRA-ARTICULAR; INTRALESIONAL; INTRAMUSCULAR; SOFT TISSUE at 11:12

## 2018-06-08 RX ADMIN — LIDOCAINE HYDROCHLORIDE 4 ML: 20 INJECTION, SOLUTION EPIDURAL; INFILTRATION; INTRACAUDAL; PERINEURAL at 11:12

## 2018-06-08 NOTE — PROGRESS NOTES
Patient: Kamini Orta  YOB: 1960    Chief Complaints:  right knee pain    Subjective:    History of Present Illness: Here today for badly flared knee pain. The pain is a generalized joint tenderness.  It has been progressive in nature but remains intermittent.  Worsened by prolonged standing or walking and squatting activities. Has had improvement in the past with ice/heat, rest, and injections.     This problem is not new to this examiner.     Allergies:   Allergies   Allergen Reactions   • Codeine    • Erythromycin        Medications:   Home Medications:  Current Outpatient Prescriptions on File Prior to Visit   Medication Sig   • ALPRAZolam (XANAX) 0.5 MG tablet Take 0.5 mg by mouth Daily.   • atorvastatin (LIPITOR) 20 MG tablet Take 20 mg by mouth Daily.   • diclofenac sodium (VOTAREN XR) 100 MG 24 hr tablet TAKE ONE TABLET BY MOUTH EVERY DAY   • esomeprazole (NexIUM) 40 MG capsule Take 40 mg by mouth.   • estradiol (MINIVELLE, VIVELLE-DOT) 0.075 MG/24HR patch    • HYDROcodone-acetaminophen (NORCO) 7.5-325 MG per tablet TAKE ONE TABLET BY MOUTH TWICE DAILY AS NEEDED   • levothyroxine (SYNTHROID, LEVOTHROID) 75 MCG tablet Take 75 mcg by mouth Daily.   • lidocaine (LIDODERM) 5 % APPLY 1 PATCH EVERY DAY AS NEEDED 12 HOURS AND 12 HOURS OFF   • venlafaxine XR (EFFEXOR-XR) 150 MG 24 hr capsule Take 150 mg by mouth Daily.     No current facility-administered medications on file prior to visit.      Current Medications:  Scheduled Meds:  Continuous Infusions:  No current facility-administered medications for this visit.   PRN Meds:.    I have reviewed the patient's medical history in detail and updated the computerized patient record.  Review and summarization of old records include:    Past Medical History:   Diagnosis Date   • Depression    • Diverticulitis    • Thyroid disease         Past Surgical History:   Procedure Laterality Date   •  SECTION      ALSO    • CHOLECYSTECTOMY    "  • HYSTERECTOMY  1994   • KNEE ARTHROSCOPY Right 2012    \"CLEAN UP\"   • KNEE ARTHROSCOPY W/ MENISCAL REPAIR Right 2004        Social History     Occupational History   • Not on file.     Social History Main Topics   • Smoking status: Former Smoker   • Smokeless tobacco: Never Used   • Alcohol use No   • Drug use: No   • Sexual activity: Not on file      Social History     Social History Narrative   • No narrative on file        Family History   Problem Relation Age of Onset   • Heart disease Other    • Lung disease Other        ROS: 14 point review of systems was performed and was negative except for documented findings in HPI and today's encounter.     Allergies:   Allergies   Allergen Reactions   • Codeine    • Erythromycin      Constitutional:  Denies fever, shaking or chills   Eyes:  Denies change in visual acuity   HENT:  Denies nasal congestion or sore throat   Respiratory:  Denies cough or shortness of breath   Cardiovascular:  Denies chest pain or severe LE edema   GI:  Denies abdominal pain, nausea, vomiting, bloody stools or diarrhea   Musculoskeletal:  Numbness, tingling, or loss of motor function only as noted above in history of present illness.  : Denies painful urination or hematuria  Integument:  Denies rash, lesion or ulceration   Neurologic:  Denies headache or focal weakness  Endocrine:  Denies lymphadenopathy  Psych:  Denies confusion or change in mental status   Hem:  Denies active bleeding    Physical Exam:  Wt Readings from Last 3 Encounters:   06/08/18 109 kg (239 lb 9.6 oz)   03/14/18 102 kg (224 lb)   12/11/17 102 kg (224 lb)     Ht Readings from Last 3 Encounters:   06/08/18 160 cm (63\")   03/14/18 160 cm (63\")   12/11/17 160 cm (63\")     Body mass index is 42.44 kg/m².  Facility age limit for growth percentiles is 20 years.  Vitals:    06/08/18 1150   Temp: 98.5 °F (36.9 °C)     Vital Signs:  reviewed  Constitutional: Awake alert and oriented x3, well developed, no acute distress, " non-toxic appearance.  EYES: symmetric, sclera clear  ENT:  Normocephalic, Atraumatic.   Respiratory:  No respiratory distress, No wheezing  CV: pulse regular, no palpitations or pallor.  GI:  Abdomen soft, non-tender.   Vascular:  Intact distal pulses, No cyanosis, no signs or symptoms of DVT.  Neurologic: Sensation grossly intact to the involved extremity, No focal deficits noted.   Neck: No tenderness, Supple.  Integument: warm, dry, no ulcerations.   Psychiatric:  Oriented, no pathological affect.  Musculoskeletal:    Affected knee(s):  Painful gait with a subtle limp, positive for synovitis, swelling, joint effusion with crepitation.  Lachman negative  Posterior drawer negative  Glenn's negative  Patellofemoral grind +  Sensation grossly intact to light touch throughout the lower extremity  Skin is intact  Distal pulses are palpable  No signs or symptoms of DVT        Diagnostic Data:     Imaging was done today, images were personally viewed and discussed with the patient:    Indication: pain related symptoms,  Views: 3V AP, LAT & 40 degree PA right knee(s)   Findings: severe end-stage arthritis (bone on bone, subchondral sclerosis/cysts, osteophytes)  Comparison views: viewed last xray done in the office.     Procedure:  Large Joint Arthrocentesis  Date/Time: 6/8/2018 11:12 AM  Consent given by: patient  Site marked: site marked  Timeout: Immediately prior to procedure a time out was called to verify the correct patient, procedure, equipment, support staff and site/side marked as required   Supporting Documentation  Indications: pain and joint swelling   Procedure Details  Location: knee - R knee  Preparation: Patient was prepped and draped in the usual sterile fashion  Needle size: 22 G  Approach: anterolateral  Medications administered: 80 mg methylPREDNISolone acetate 80 MG/ML; 4 mL lidocaine PF 2% 2 %  Patient tolerance: patient tolerated the procedure well with no immediate  complications          Assessment:     ICD-10-CM ICD-9-CM   1. Primary osteoarthritis of right knee M17.11 715.16           Plan: Is to proceed with injection  Follow up as indicated.  Ice, elevate, and rest as needed.  Additional interventions include:  15 min spent face to face with patient 9 min spent counseling about natural history and expected course of assessed complaint and reviewed treatment options that have been tried and not tried and those currently available. Questions answered.    Natural history and expected course of this patient's diagnosis discussed along with evaluation of therapies. Questions answered.  Advice on benefits of, and types of regular/moderate exercise including biomechanical forces involved as it pertains to this complaint, with a goal of 5 min at least 7 times a week.    Address and update of wt loss, physical exercises, use of assistive devices, and monitoring of Medications per orders to address ortho complaints; Evaluation and discussion of safety, precautions, side effects, and warnings given especially of long term NSAID therapy.  Lifestyle measures for weight loss, suggest starting at 10lbs, with biomechanical explanations for weight loss and how this affects orthopedic condition.  Cortisone Injection. See procedure note.  OTC analgesics as needed with dosage warning and instructions given.  Cryotherapy/brachy therapy as indicated with instructions.   Advised on strengthening exercises, stressed importance of these with progression of arthritis, demonstrated exercises to patient with repeat demonstration and verb of understanding.   Is starting on wt watchers on Monday and wants to get 80lbs off.   6/8/2018  MJR

## 2018-09-14 ENCOUNTER — CLINICAL SUPPORT (OUTPATIENT)
Dept: ORTHOPEDIC SURGERY | Facility: CLINIC | Age: 58
End: 2018-09-14

## 2018-09-14 VITALS — TEMPERATURE: 98.5 F | BODY MASS INDEX: 38.77 KG/M2 | WEIGHT: 218.8 LBS | HEIGHT: 63 IN

## 2018-09-14 DIAGNOSIS — M17.11 ARTHRITIS OF RIGHT KNEE: Primary | ICD-10-CM

## 2018-09-14 PROCEDURE — 20610 DRAIN/INJ JOINT/BURSA W/O US: CPT | Performed by: ORTHOPAEDIC SURGERY

## 2018-09-14 PROCEDURE — 99213 OFFICE O/P EST LOW 20 MIN: CPT | Performed by: ORTHOPAEDIC SURGERY

## 2018-09-14 RX ORDER — METHYLPREDNISOLONE ACETATE 80 MG/ML
80 INJECTION, SUSPENSION INTRA-ARTICULAR; INTRALESIONAL; INTRAMUSCULAR; SOFT TISSUE
Status: COMPLETED | OUTPATIENT
Start: 2018-09-14 | End: 2018-09-14

## 2018-09-14 RX ORDER — LIDOCAINE HYDROCHLORIDE 20 MG/ML
4 INJECTION, SOLUTION EPIDURAL; INFILTRATION; INTRACAUDAL; PERINEURAL
Status: COMPLETED | OUTPATIENT
Start: 2018-09-14 | End: 2018-09-14

## 2018-09-14 RX ADMIN — METHYLPREDNISOLONE ACETATE 80 MG: 80 INJECTION, SUSPENSION INTRA-ARTICULAR; INTRALESIONAL; INTRAMUSCULAR; SOFT TISSUE at 11:38

## 2018-09-14 RX ADMIN — LIDOCAINE HYDROCHLORIDE 4 ML: 20 INJECTION, SOLUTION EPIDURAL; INFILTRATION; INTRACAUDAL; PERINEURAL at 11:38

## 2018-09-14 NOTE — PROGRESS NOTES
Patient: Kamini Orta  YOB: 1960    Chief Complaints:  right knee pain    Subjective:    History of Present Illness: Here today for knee pain. The pain is a generalized joint tenderness.  It has been progressive in nature but remains intermittent.  Worsened by prolonged standing or walking and squatting activities. Has had improvement in the past with ice/heat, rest, and injections. Joined  and has lost 22 lbs.  Execising.  bmi at 38.8- discussed.  Does raised bed gardening.  Contemplating right TKA next yr and discussed    This problem is not new to this examiner.     Allergies:   Allergies   Allergen Reactions   • Codeine    • Erythromycin        Medications:   Home Medications:  Current Outpatient Prescriptions on File Prior to Visit   Medication Sig   • ALPRAZolam (XANAX) 0.5 MG tablet Take 0.5 mg by mouth Daily.   • atorvastatin (LIPITOR) 20 MG tablet Take 20 mg by mouth Daily.   • diclofenac sodium (VOTAREN XR) 100 MG 24 hr tablet TAKE ONE TABLET BY MOUTH EVERY DAY   • esomeprazole (NexIUM) 40 MG capsule Take 40 mg by mouth.   • estradiol (MINIVELLE, VIVELLE-DOT) 0.075 MG/24HR patch    • HYDROcodone-acetaminophen (NORCO) 7.5-325 MG per tablet TAKE ONE TABLET BY MOUTH TWICE DAILY AS NEEDED   • levothyroxine (SYNTHROID, LEVOTHROID) 75 MCG tablet Take 75 mcg by mouth Daily.   • lidocaine (LIDODERM) 5 % APPLY 1 PATCH EVERY DAY AS NEEDED 12 HOURS AND 12 HOURS OFF   • venlafaxine XR (EFFEXOR-XR) 150 MG 24 hr capsule Take 150 mg by mouth Daily.     No current facility-administered medications on file prior to visit.      Current Medications:  Scheduled Meds:  Continuous Infusions:  No current facility-administered medications for this visit.   PRN Meds:.    I have reviewed the patient's medical history in detail and updated the computerized patient record.  Review and summarization of old records include:    Past Medical History:   Diagnosis Date   • Depression    • Diverticulitis    • Thyroid  "disease         Past Surgical History:   Procedure Laterality Date   •  SECTION      ALSO 1987   • CHOLECYSTECTOMY     • HYSTERECTOMY     • KNEE ARTHROSCOPY Right     \"CLEAN UP\"   • KNEE ARTHROSCOPY W/ MENISCAL REPAIR Right         Social History     Occupational History   • Not on file.     Social History Main Topics   • Smoking status: Former Smoker   • Smokeless tobacco: Never Used   • Alcohol use No   • Drug use: No   • Sexual activity: Not on file      Social History     Social History Narrative   • No narrative on file        Family History   Problem Relation Age of Onset   • Heart disease Other    • Lung disease Other        ROS: 14 point review of systems was performed and was negative except for documented findings in HPI and today's encounter.     Allergies:   Allergies   Allergen Reactions   • Codeine    • Erythromycin      Constitutional:  Denies fever, shaking or chills   Eyes:  Denies change in visual acuity   HENT:  Denies nasal congestion or sore throat   Respiratory:  Denies cough or shortness of breath   Cardiovascular:  Denies chest pain or severe LE edema   GI:  Denies abdominal pain, nausea, vomiting, bloody stools or diarrhea   Musculoskeletal:  Numbness, tingling, or loss of motor function only as noted above in history of present illness.  : Denies painful urination or hematuria  Integument:  Denies rash, lesion or ulceration   Neurologic:  Denies headache or focal weakness  Endocrine:  Denies lymphadenopathy  Psych:  Denies confusion or change in mental status   Hem:  Denies active bleeding    Physical Exam:  Wt Readings from Last 3 Encounters:   18 99.2 kg (218 lb 12.8 oz)   18 109 kg (239 lb 9.6 oz)   18 102 kg (224 lb)     Ht Readings from Last 3 Encounters:   18 160 cm (63\")   18 160 cm (63\")   18 160 cm (63\")     Body mass index is 38.76 kg/m².  Facility age limit for growth percentiles is 20 years.  Vitals:    18 " 1136   Temp: 98.5 °F (36.9 °C)     Vital Signs:  reviewed  Constitutional: Awake alert and oriented x3, well developed, no acute distress, non-toxic appearance.  EYES: symmetric, sclera clear  ENT:  Normocephalic, Atraumatic.   Respiratory:  No respiratory distress, No wheezing  CV: pulse regular, no palpitations or pallor.  GI:  Abdomen soft, non-tender.   Vascular:  Intact distal pulses, No cyanosis, no signs or symptoms of DVT.  Neurologic: Sensation grossly intact to the involved extremity, No focal deficits noted.   Neck: No tenderness, Supple.  Integument: warm, dry, no ulcerations.   Psychiatric:  Oriented, no pathological affect.  Musculoskeletal:    Affected knee(s):  Painful gait with a subtle limp, positive for synovitis, swelling, joint effusion with crepitation.  Lachman negative  Posterior drawer negative  Glenn's negative  Patellofemoral grind +  Sensation grossly intact to light touch throughout the lower extremity  Skin is intact  Distal pulses are palpable  No signs or symptoms of DVT        Diagnostic Data:     Imaging was done previously in the office and discussed with the patient:    Indication: pain related symptoms,  Views: 3V AP, LAT & 40 degree PA right knee(s)   Findings: severe end-stage arthritis (bone on bone, subchondral sclerosis/cysts, osteophytes)  Comparison views: viewed last xray done in the office.     Procedure:  Large Joint Arthrocentesis  Date/Time: 9/14/2018 11:38 AM  Consent given by: patient  Site marked: site marked  Timeout: Immediately prior to procedure a time out was called to verify the correct patient, procedure, equipment, support staff and site/side marked as required   Supporting Documentation  Indications: pain and joint swelling   Procedure Details  Location: knee - R knee  Preparation: Patient was prepped and draped in the usual sterile fashion  Needle size: 22 G  Approach: anterolateral  Medications administered: 4 mL lidocaine PF 2% 2 %; 80 mg  methylPREDNISolone acetate 80 MG/ML  Patient tolerance: patient tolerated the procedure well with no immediate complications          Assessment:     ICD-10-CM ICD-9-CM   1. Arthritis of right knee M17.11 716.96           Plan: Is to proceed with injection  Follow up as indicated.  Ice, elevate, and rest as needed.  Additional interventions include: Biomechanics of pertinent body area discussed.  Risks, benefits, alternatives, comparisons, and complications of accepted medicines, injections, recommendations, surgical procedures, and therapies explained and education provided in laymen's terms. The patient was given the opportunity to ask questions and they were answerved to their satisfaction.   Natural history and expected course of this patient's diagnosis discussed along with evaluation of therapies. Questions answered.  Cortisone Injection. See procedure note.  Rest, ice, compression, and elevation (RICE) therapy.  Biomechanics and proper use of ambulatory aids:  crutches, walker, cane, &/or trekking poles.    9/14/2018

## 2018-12-18 ENCOUNTER — CLINICAL SUPPORT (OUTPATIENT)
Dept: ORTHOPEDIC SURGERY | Facility: CLINIC | Age: 58
End: 2018-12-18

## 2018-12-18 VITALS — BODY MASS INDEX: 37.17 KG/M2 | TEMPERATURE: 97.6 F | WEIGHT: 209.8 LBS | HEIGHT: 63 IN

## 2018-12-18 DIAGNOSIS — G89.29 CHRONIC PAIN OF RIGHT KNEE: Primary | ICD-10-CM

## 2018-12-18 DIAGNOSIS — M17.11 PRIMARY OSTEOARTHRITIS OF RIGHT KNEE: ICD-10-CM

## 2018-12-18 DIAGNOSIS — M25.561 CHRONIC PAIN OF RIGHT KNEE: Primary | ICD-10-CM

## 2018-12-18 PROCEDURE — 99213 OFFICE O/P EST LOW 20 MIN: CPT | Performed by: ORTHOPAEDIC SURGERY

## 2018-12-18 PROCEDURE — 20610 DRAIN/INJ JOINT/BURSA W/O US: CPT | Performed by: ORTHOPAEDIC SURGERY

## 2018-12-18 PROCEDURE — 73562 X-RAY EXAM OF KNEE 3: CPT | Performed by: ORTHOPAEDIC SURGERY

## 2018-12-18 RX ORDER — LIDOCAINE HYDROCHLORIDE 20 MG/ML
4 INJECTION, SOLUTION EPIDURAL; INFILTRATION; INTRACAUDAL; PERINEURAL
Status: COMPLETED | OUTPATIENT
Start: 2018-12-18 | End: 2018-12-18

## 2018-12-18 RX ORDER — METHYLPREDNISOLONE ACETATE 80 MG/ML
80 INJECTION, SUSPENSION INTRA-ARTICULAR; INTRALESIONAL; INTRAMUSCULAR; SOFT TISSUE
Status: COMPLETED | OUTPATIENT
Start: 2018-12-18 | End: 2018-12-18

## 2018-12-18 RX ADMIN — LIDOCAINE HYDROCHLORIDE 4 ML: 20 INJECTION, SOLUTION EPIDURAL; INFILTRATION; INTRACAUDAL; PERINEURAL at 14:01

## 2018-12-18 RX ADMIN — METHYLPREDNISOLONE ACETATE 80 MG: 80 INJECTION, SUSPENSION INTRA-ARTICULAR; INTRALESIONAL; INTRAMUSCULAR; SOFT TISSUE at 14:01

## 2018-12-18 NOTE — PROGRESS NOTES
Patient: Kamini Orta  YOB: 1960    Chief Complaints:  right knee pain    Subjective:    History of Present Illness: Here today for knee pain. The pain is a generalized joint tenderness.  It has been progressive in nature but remains intermittent.  Worsened by prolonged standing or walking and squatting activities. Has had improvement in the past with ice/heat, rest, and injections. Has lost 35 lbs on [employer].  Went over options    This problem is not new to this examiner.     Allergies:   Allergies   Allergen Reactions   • Codeine    • Erythromycin        Medications:   Home Medications:  Current Outpatient Medications on File Prior to Visit   Medication Sig   • ALPRAZolam (XANAX) 0.5 MG tablet Take 0.5 mg by mouth Daily.   • atorvastatin (LIPITOR) 20 MG tablet Take 20 mg by mouth Daily.   • diclofenac sodium (VOTAREN XR) 100 MG 24 hr tablet TAKE ONE TABLET BY MOUTH EVERY DAY   • esomeprazole (NexIUM) 40 MG capsule Take 40 mg by mouth.   • estradiol (MINIVELLE, VIVELLE-DOT) 0.075 MG/24HR patch    • HYDROcodone-acetaminophen (NORCO) 7.5-325 MG per tablet TAKE ONE TABLET BY MOUTH TWICE DAILY AS NEEDED   • levothyroxine (SYNTHROID, LEVOTHROID) 75 MCG tablet Take 75 mcg by mouth Daily.   • lidocaine (LIDODERM) 5 % APPLY 1 PATCH EVERY DAY AS NEEDED 12 HOURS AND 12 HOURS OFF   • venlafaxine XR (EFFEXOR-XR) 150 MG 24 hr capsule Take 150 mg by mouth Daily.     No current facility-administered medications on file prior to visit.      Current Medications:  Scheduled Meds:  Continuous Infusions:  No current facility-administered medications for this visit.   PRN Meds:.    I have reviewed the patient's medical history in detail and updated the computerized patient record.  Review and summarization of old records include:    Past Medical History:   Diagnosis Date   • Depression    • Diverticulitis    • Thyroid disease         Past Surgical History:   Procedure Laterality Date   •  SECTION      ALSO   "  • CHOLECYSTECTOMY  1986   • HYSTERECTOMY  1994   • KNEE ARTHROSCOPY Right 2012    \"CLEAN UP\"   • KNEE ARTHROSCOPY W/ MENISCAL REPAIR Right 2004        Social History     Occupational History   • Not on file   Tobacco Use   • Smoking status: Former Smoker   • Smokeless tobacco: Never Used   Substance and Sexual Activity   • Alcohol use: No   • Drug use: No   • Sexual activity: Not on file    Social History     Social History Narrative   • Not on file        Family History   Problem Relation Age of Onset   • Heart disease Other    • Lung disease Other        ROS: 14 point review of systems was performed and was negative except for documented findings in HPI and today's encounter.     Allergies:   Allergies   Allergen Reactions   • Codeine    • Erythromycin      Constitutional:  Denies fever, shaking or chills   Eyes:  Denies change in visual acuity   HENT:  Denies nasal congestion or sore throat   Respiratory:  Denies cough or shortness of breath   Cardiovascular:  Denies chest pain or severe LE edema   GI:  Denies abdominal pain, nausea, vomiting, bloody stools or diarrhea   Musculoskeletal:  Numbness, tingling, or loss of motor function only as noted above in history of present illness.  : Denies painful urination or hematuria  Integument:  Denies rash, lesion or ulceration   Neurologic:  Denies headache or focal weakness  Endocrine:  Denies lymphadenopathy  Psych:  Denies confusion or change in mental status   Hem:  Denies active bleeding    Physical Exam:  Wt Readings from Last 3 Encounters:   12/18/18 95.2 kg (209 lb 12.8 oz)   09/14/18 99.2 kg (218 lb 12.8 oz)   06/08/18 109 kg (239 lb 9.6 oz)     Ht Readings from Last 3 Encounters:   12/18/18 160 cm (63\")   09/14/18 160 cm (63\")   06/08/18 160 cm (63\")     Body mass index is 37.16 kg/m².  Facility age limit for growth percentiles is 20 years.  Vitals:    12/18/18 1358   Temp: 97.6 °F (36.4 °C)     Vital Signs:  reviewed  Constitutional: Awake alert and " oriented x3, well developed, no acute distress, non-toxic appearance.  EYES: symmetric, sclera clear  ENT:  Normocephalic, Atraumatic.   Respiratory:  No respiratory distress, No wheezing  CV: pulse regular, no palpitations or pallor.  GI:  Abdomen soft, non-tender.   Vascular:  Intact distal pulses, No cyanosis, no signs or symptoms of DVT.  Neurologic: Sensation grossly intact to the involved extremity, No focal deficits noted.   Neck: No tenderness, Supple.  Integument: warm, dry, no ulcerations.   Psychiatric:  Oriented, no pathological affect.  Musculoskeletal:    Affected knee(s):  Painful gait with a subtle limp, positive for synovitis, swelling, joint effusion with crepitation.  Lachman negative  Posterior drawer negative  Glenn's negative  Patellofemoral grind +  Sensation grossly intact to light touch throughout the lower extremity  Skin is intact  Distal pulses are palpable  No signs or symptoms of DVT        Diagnostic Data:     Imaging was done today and discussed with the patient:    Indication: pain related symptoms,  Views: 3V AP, LAT & 40 degree PA right knee(s)   Findings: severe end-stage arthritis (bone on bone, subchondral sclerosis/cysts, osteophytes)  Comparison views: viewed last xray done in the office.     Procedure:  Large Joint Arthrocentesis: R knee  Date/Time: 12/18/2018 2:01 PM  Consent given by: patient  Site marked: site marked  Timeout: Immediately prior to procedure a time out was called to verify the correct patient, procedure, equipment, support staff and site/side marked as required   Supporting Documentation  Indications: pain and joint swelling   Procedure Details  Location: knee - R knee  Preparation: Patient was prepped and draped in the usual sterile fashion  Needle size: 22 G  Approach: anterolateral  Medications administered: 80 mg methylPREDNISolone acetate 80 MG/ML; 4 mL lidocaine PF 2% 2 %  Patient tolerance: patient tolerated the procedure well with no immediate  complications          Assessment:     ICD-10-CM ICD-9-CM   1. Chronic pain of right knee M25.561 719.46    G89.29 338.29   2. Primary osteoarthritis of right knee M17.11 715.16           Plan: Is to proceed with injection  Follow up as indicated.  Ice, elevate, and rest as needed.  Additional interventions include: Biomechanics of pertinent body area discussed.  Risks, benefits, alternatives, comparisons, and complications of accepted medicines, injections, recommendations, surgical procedures, and therapies explained and education provided in laymen's terms. The patient was given the opportunity to ask questions and they were answerved to their satisfaction.   Natural history and expected course of this patient's diagnosis discussed along with evaluation of therapies. Questions answered.  Cortisone Injection. See procedure note.  Cryotherapy/brachy therapy as indicated with instructions.   Advised on strengthening exercises, stressed importance of these with progression of arthritis, demonstrated exercises to patient with repeat demonstration and verb of understanding.   Biomechanics and proper use of ambulatory aids:  crutches, walker, cane, &/or trekking poles.   Has arachnoiditis in back and therapy flares    12/18/2018

## 2018-12-31 ENCOUNTER — CONVERSION ENCOUNTER (OUTPATIENT)
Dept: GENERAL RADIOLOGY | Facility: HOSPITAL | Age: 58
End: 2018-12-31

## 2019-02-15 ENCOUNTER — HOSPITAL ENCOUNTER (OUTPATIENT)
Dept: LAB | Facility: HOSPITAL | Age: 59
Discharge: HOME | End: 2019-02-15
Attending: INTERNAL MEDICINE

## 2019-02-15 LAB
ALBUMIN SERPL-MCNC: 4 G/DL (ref 3.5–5)
ALBUMIN/GLOB SERPL: 1.4 {RATIO} (ref 1.4–2.6)
ALP SERPL-CCNC: 74 U/L (ref 53–141)
ALT SERPL-CCNC: 17 U/L (ref 10–40)
ANION GAP SERPL CALC-SCNC: 16 MMOL/L (ref 8–19)
AST SERPL-CCNC: 24 U/L (ref 15–50)
BASOPHILS # BLD AUTO: 0.05 10*3/UL (ref 0–0.2)
BASOPHILS NFR BLD AUTO: 0.5 % (ref 0–3)
BILIRUB SERPL-MCNC: 0.25 MG/DL (ref 0.2–1.3)
BUN SERPL-MCNC: 10 MG/DL (ref 5–25)
BUN/CREAT SERPL: 14 {RATIO} (ref 6–20)
CALCIUM SERPL-MCNC: 9.2 MG/DL (ref 8.7–10.4)
CHLORIDE SERPL-SCNC: 105 MMOL/L (ref 99–111)
CONV ABS IMM GRAN: 0.04 10*3/UL (ref 0–0.2)
CONV CO2: 28 MMOL/L (ref 22–32)
CONV IMMATURE GRAN: 0.4 % (ref 0–1.8)
CONV TOTAL PROTEIN: 6.9 G/DL (ref 6.3–8.2)
CREAT UR-MCNC: 0.71 MG/DL (ref 0.5–0.9)
DEPRECATED RDW RBC AUTO: 47.3 FL (ref 36.4–46.3)
EOSINOPHIL # BLD AUTO: 0.23 10*3/UL (ref 0–0.7)
EOSINOPHIL # BLD AUTO: 2.4 % (ref 0–7)
ERYTHROCYTE [DISTWIDTH] IN BLOOD BY AUTOMATED COUNT: 14 % (ref 11.7–14.4)
EST. AVERAGE GLUCOSE BLD GHB EST-MCNC: 117 MG/DL
GFR SERPLBLD BASED ON 1.73 SQ M-ARVRAT: >60 ML/MIN/{1.73_M2}
GLOBULIN UR ELPH-MCNC: 2.9 G/DL (ref 2–3.5)
GLUCOSE SERPL-MCNC: 81 MG/DL (ref 65–99)
HBA1C MFR BLD: 13.3 G/DL (ref 12–16)
HBA1C MFR BLD: 5.7 % (ref 3.5–5.7)
HCT VFR BLD AUTO: 42.7 % (ref 37–47)
LYMPHOCYTES # BLD AUTO: 3.95 10*3/UL (ref 1–5)
MCH RBC QN AUTO: 28.3 PG (ref 27–31)
MCHC RBC AUTO-ENTMCNC: 31.1 G/DL (ref 33–37)
MCV RBC AUTO: 90.9 FL (ref 81–99)
MONOCYTES # BLD AUTO: 0.52 10*3/UL (ref 0.2–1.2)
MONOCYTES NFR BLD AUTO: 5.5 % (ref 3–10)
NEUTROPHILS # BLD AUTO: 4.73 10*3/UL (ref 2–8)
NEUTROPHILS NFR BLD AUTO: 49.7 % (ref 30–85)
NRBC CBCN: 0 % (ref 0–0.7)
OSMOLALITY SERPL CALC.SUM OF ELEC: 296 MOSM/KG (ref 273–304)
PLATELET # BLD AUTO: 366 10*3/UL (ref 130–400)
PMV BLD AUTO: 10.6 FL (ref 9.4–12.3)
POTASSIUM SERPL-SCNC: 4.5 MMOL/L (ref 3.5–5.3)
RBC # BLD AUTO: 4.7 10*6/UL (ref 4.2–5.4)
SODIUM SERPL-SCNC: 144 MMOL/L (ref 135–147)
VARIANT LYMPHS NFR BLD MANUAL: 41.5 % (ref 20–45)
WBC # BLD AUTO: 9.52 10*3/UL (ref 4.8–10.8)

## 2019-03-26 ENCOUNTER — CLINICAL SUPPORT (OUTPATIENT)
Dept: ORTHOPEDIC SURGERY | Facility: CLINIC | Age: 59
End: 2019-03-26

## 2019-03-26 VITALS — TEMPERATURE: 98 F | HEIGHT: 63 IN | WEIGHT: 195 LBS | BODY MASS INDEX: 34.55 KG/M2

## 2019-03-26 DIAGNOSIS — M25.561 CHRONIC PAIN OF RIGHT KNEE: Primary | ICD-10-CM

## 2019-03-26 DIAGNOSIS — M17.0 ARTHRITIS OF BOTH KNEES: ICD-10-CM

## 2019-03-26 DIAGNOSIS — G89.29 CHRONIC PAIN OF RIGHT KNEE: Primary | ICD-10-CM

## 2019-03-26 PROCEDURE — 99212 OFFICE O/P EST SF 10 MIN: CPT | Performed by: NURSE PRACTITIONER

## 2019-03-26 PROCEDURE — 20610 DRAIN/INJ JOINT/BURSA W/O US: CPT | Performed by: NURSE PRACTITIONER

## 2019-03-26 RX ORDER — METHYLPREDNISOLONE ACETATE 80 MG/ML
80 INJECTION, SUSPENSION INTRA-ARTICULAR; INTRALESIONAL; INTRAMUSCULAR; SOFT TISSUE
Status: COMPLETED | OUTPATIENT
Start: 2019-03-26 | End: 2019-03-26

## 2019-03-26 RX ADMIN — METHYLPREDNISOLONE ACETATE 80 MG: 80 INJECTION, SUSPENSION INTRA-ARTICULAR; INTRALESIONAL; INTRAMUSCULAR; SOFT TISSUE at 14:58

## 2019-03-26 NOTE — PROGRESS NOTES
Patient: Kamini Orta  YOB: 1960    Chief Complaints:  bilateral knee pain    Subjective:    History of Present Illness: Here today for knee pain. Has lost 50lbs on [employer] since June 2018 and  has lost 65. This has helped her knee pain significantly.  The pain is a generalized joint tenderness.  It has been progressive in nature but remains intermittent.  Worsened by prolonged standing or walking and squatting activities. Has had improvement in the past with ice/heat, rest, and injections.       Allergies:   Allergies   Allergen Reactions   • Codeine    • Erythromycin        Medications:   Home Medications:  Current Outpatient Medications on File Prior to Visit   Medication Sig   • ALPRAZolam (XANAX) 0.5 MG tablet Take 0.5 mg by mouth Daily.   • atorvastatin (LIPITOR) 20 MG tablet Take 20 mg by mouth Daily.   • diclofenac sodium (VOTAREN XR) 100 MG 24 hr tablet TAKE ONE TABLET BY MOUTH EVERY DAY   • esomeprazole (NexIUM) 40 MG capsule Take 40 mg by mouth.   • estradiol (MINIVELLE, VIVELLE-DOT) 0.075 MG/24HR patch    • HYDROcodone-acetaminophen (NORCO) 7.5-325 MG per tablet TAKE ONE TABLET BY MOUTH TWICE DAILY AS NEEDED   • levothyroxine (SYNTHROID, LEVOTHROID) 75 MCG tablet Take 75 mcg by mouth Daily.   • lidocaine (LIDODERM) 5 % APPLY 1 PATCH EVERY DAY AS NEEDED 12 HOURS AND 12 HOURS OFF   • venlafaxine XR (EFFEXOR-XR) 150 MG 24 hr capsule Take 150 mg by mouth Daily.     No current facility-administered medications on file prior to visit.      Current Medications:  Scheduled Meds:  Continuous Infusions:  No current facility-administered medications for this visit.   PRN Meds:.    I have reviewed the patient's medical history in detail and updated the computerized patient record.  Review and summarization of old records include:    Past Medical History:   Diagnosis Date   • Depression    • Diverticulitis    • Thyroid disease         Past Surgical History:   Procedure Laterality Date   •  " SECTION      ALSO    • CHOLECYSTECTOMY     • HYSTERECTOMY     • KNEE ARTHROSCOPY Right     \"CLEAN UP\"   • KNEE ARTHROSCOPY W/ MENISCAL REPAIR Right         Social History     Occupational History   • Not on file   Tobacco Use   • Smoking status: Former Smoker   • Smokeless tobacco: Never Used   Substance and Sexual Activity   • Alcohol use: No   • Drug use: No   • Sexual activity: Not on file      Social History     Social History Narrative   • Not on file        Family History   Problem Relation Age of Onset   • Heart disease Other    • Lung disease Other        ROS: 14 point review of systems was performed and was negative except for documented findings in HPI and today's encounter.     Allergies:   Allergies   Allergen Reactions   • Codeine    • Erythromycin      Constitutional:  Denies fever, shaking or chills   Eyes:  Denies change in visual acuity   HENT:  Denies nasal congestion or sore throat   Respiratory:  Denies cough or shortness of breath   Cardiovascular:  Denies chest pain or severe LE edema   GI:  Denies abdominal pain, nausea, vomiting, bloody stools or diarrhea   Musculoskeletal:  Numbness, tingling, or loss of motor function only as noted above in history of present illness.  : Denies painful urination or hematuria  Integument:  Denies rash, lesion or ulceration   Neurologic:  Denies headache or focal weakness  Endocrine:  Denies lymphadenopathy  Psych:  Denies confusion or change in mental status   Hem:  Denies active bleeding    Physical Exam:  Wt Readings from Last 3 Encounters:   19 88.5 kg (195 lb)   18 95.2 kg (209 lb 12.8 oz)   18 99.2 kg (218 lb 12.8 oz)     Ht Readings from Last 3 Encounters:   19 160 cm (63\")   18 160 cm (63\")   18 160 cm (63\")     Body mass index is 34.54 kg/m².  Facility age limit for growth percentiles is 20 years.  Vitals:    19 1456   Temp: 98 °F (36.7 °C)     Vital Signs:  " reviewed  Constitutional: Awake alert and oriented x3, well developed, no acute distress, non-toxic appearance.  EYES: symmetric, sclera clear  ENT:  Normocephalic, Atraumatic.   Respiratory:  No respiratory distress, No wheezing  CV: pulse regular, no palpitations or pallor.  GI:  Abdomen soft, non-tender.   Vascular:  Intact distal pulses, No cyanosis, no signs or symptoms of DVT.  Neurologic: Sensation grossly intact to the involved extremity, No focal deficits noted.   Neck: No tenderness, Supple.  Integument: warm, dry, no ulcerations.   Psychiatric:  Oriented, no pathological affect.  Musculoskeletal:    Affected knee(s):  Painful gait with a subtle limp, positive for synovitis, swelling, joint effusion with crepitation.  Lachman negative  Posterior drawer negative  Glenn's negative  Patellofemoral grind +  Sensation grossly intact to light touch throughout the lower extremity  Skin is intact  Distal pulses are palpable  No signs or symptoms of DVT        Diagnostic Data:     Imaging was done previously in the office, images were personally viewed and discussed with the patient:    Indication: pain related symptoms,  Views: 3V AP, LAT & 40 degree PA bilateral knee(s)   Findings: severe end-stage arthritis (bone on bone, subchondral sclerosis/cysts, osteophytes)  Comparison views: viewed last xray done in the office.     Procedure:  Large Joint Arthrocentesis: R knee  Date/Time: 3/26/2019 2:58 PM  Consent given by: patient  Site marked: site marked  Timeout: Immediately prior to procedure a time out was called to verify the correct patient, procedure, equipment, support staff and site/side marked as required   Supporting Documentation  Indications: pain   Procedure Details  Location: knee - R knee  Needle size: 25 G  Approach: anteromedial  Medications administered: 80 mg methylPREDNISolone acetate 80 MG/ML; 2 mL lidocaine (cardiac) 20 MG/ML  Patient tolerance: patient tolerated the procedure well with no  immediate complications          Assessment:     ICD-10-CM ICD-9-CM   1. Chronic pain of right knee M25.561 719.46    G89.29 338.29   2. Arthritis of both knees M17.0 716.96           Plan: Is to proceed with injection  Follow up as indicated.  Ice, elevate, and rest as needed.  Additional interventions include:  15 min spent face to face with patient 11 min spent counseling about:  Biomechanics of pertinent body area discussed.  Risks, benefits, alternatives, comparisons, and complications of accepted medicines, injections, recommendations, surgical procedures, and therapies explained and education provided in laymen's terms. Natural history and expected course of this patient's diagnosis discussed along with evaluation of therapies. Questions answered. When appropriate I also discussed proper use of cane, walker, trekking poles.   BMI:  The concept of BMI body mass index and its importance and implications discussed.  BMI suggested to be < 40 or as low as possible. Lifestyle measures for weight loss and how this affects orthopedic condition.  EXERCISES:  Advice on benefits of, and types of regular/moderate exercise including biomechanical forces involved as it pertains to this complaint.  RICE: Rest, ice, compression, and elevation therapy, Cryotherapy/brachy therapy, and or OTC linaments as indicated with instructions.   Cortisone Injection. See procedure note.  has lost 50lbs on "[employer] since June 2018  3/26/2019  Carol Lopez, APRN

## 2019-06-14 ENCOUNTER — HOSPITAL ENCOUNTER (OUTPATIENT)
Dept: LAB | Facility: HOSPITAL | Age: 59
Discharge: HOME | End: 2019-06-14
Attending: INTERNAL MEDICINE

## 2019-06-14 LAB
ALBUMIN SERPL-MCNC: 4.1 G/DL (ref 3.5–5)
ALBUMIN/GLOB SERPL: 1.5 {RATIO} (ref 1.4–2.6)
ALP SERPL-CCNC: 61 U/L (ref 53–141)
ALT SERPL-CCNC: 21 U/L (ref 10–40)
ANION GAP SERPL CALC-SCNC: 16 MMOL/L (ref 8–19)
AST SERPL-CCNC: 26 U/L (ref 15–50)
BILIRUB SERPL-MCNC: 0.18 MG/DL (ref 0.2–1.3)
BUN SERPL-MCNC: 11 MG/DL (ref 5–25)
BUN/CREAT SERPL: 15 {RATIO} (ref 6–20)
CALCIUM SERPL-MCNC: 9 MG/DL (ref 8.7–10.4)
CHLORIDE SERPL-SCNC: 103 MMOL/L (ref 99–111)
CHOLEST SERPL-MCNC: 103 MG/DL (ref 107–200)
CHOLEST/HDLC SERPL: 2.6 {RATIO} (ref 3–6)
CONV CO2: 26 MMOL/L (ref 22–32)
CONV TOTAL PROTEIN: 6.9 G/DL (ref 6.3–8.2)
CREAT UR-MCNC: 0.72 MG/DL (ref 0.5–0.9)
GFR SERPLBLD BASED ON 1.73 SQ M-ARVRAT: >60 ML/MIN/{1.73_M2}
GLOBULIN UR ELPH-MCNC: 2.8 G/DL (ref 2–3.5)
GLUCOSE SERPL-MCNC: 96 MG/DL (ref 65–99)
HDLC SERPL-MCNC: 39 MG/DL (ref 40–60)
LDLC SERPL CALC-MCNC: 45 MG/DL (ref 70–100)
OSMOLALITY SERPL CALC.SUM OF ELEC: 291 MOSM/KG (ref 273–304)
POTASSIUM SERPL-SCNC: 4.3 MMOL/L (ref 3.5–5.3)
SODIUM SERPL-SCNC: 141 MMOL/L (ref 135–147)
T4 FREE SERPL-MCNC: 1.5 NG/DL (ref 0.9–1.8)
TRIGL SERPL-MCNC: 95 MG/DL (ref 40–150)
TSH SERPL-ACNC: 0.79 M[IU]/L (ref 0.27–4.2)
VLDLC SERPL-MCNC: 19 MG/DL (ref 5–37)

## 2019-07-16 ENCOUNTER — CLINICAL SUPPORT (OUTPATIENT)
Dept: ORTHOPEDIC SURGERY | Facility: CLINIC | Age: 59
End: 2019-07-16

## 2019-07-16 VITALS — WEIGHT: 195 LBS | HEIGHT: 63 IN | TEMPERATURE: 98.1 F | BODY MASS INDEX: 34.55 KG/M2

## 2019-07-16 DIAGNOSIS — G89.29 CHRONIC PAIN OF RIGHT KNEE: Primary | ICD-10-CM

## 2019-07-16 DIAGNOSIS — M25.561 CHRONIC PAIN OF RIGHT KNEE: Primary | ICD-10-CM

## 2019-07-16 DIAGNOSIS — M17.11 ARTHRITIS OF RIGHT KNEE: ICD-10-CM

## 2019-07-16 PROCEDURE — 73562 X-RAY EXAM OF KNEE 3: CPT | Performed by: NURSE PRACTITIONER

## 2019-07-16 PROCEDURE — 20610 DRAIN/INJ JOINT/BURSA W/O US: CPT | Performed by: NURSE PRACTITIONER

## 2019-07-16 PROCEDURE — 99213 OFFICE O/P EST LOW 20 MIN: CPT | Performed by: NURSE PRACTITIONER

## 2019-07-16 RX ORDER — METHYLPREDNISOLONE ACETATE 80 MG/ML
80 INJECTION, SUSPENSION INTRA-ARTICULAR; INTRALESIONAL; INTRAMUSCULAR; SOFT TISSUE
Status: COMPLETED | OUTPATIENT
Start: 2019-07-16 | End: 2019-07-16

## 2019-07-16 RX ORDER — FLUOXETINE 10 MG/1
10 CAPSULE ORAL DAILY
COMMUNITY
End: 2022-03-02

## 2019-07-16 RX ADMIN — METHYLPREDNISOLONE ACETATE 80 MG: 80 INJECTION, SUSPENSION INTRA-ARTICULAR; INTRALESIONAL; INTRAMUSCULAR; SOFT TISSUE at 13:24
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
## 2019-09-06 ENCOUNTER — CONVERSION ENCOUNTER (OUTPATIENT)
Dept: GASTROENTEROLOGY | Facility: CLINIC | Age: 59
End: 2019-09-06
Attending: INTERNAL MEDICINE

## 2019-10-15 ENCOUNTER — HOSPITAL ENCOUNTER (OUTPATIENT)
Dept: LAB | Facility: HOSPITAL | Age: 59
Discharge: HOME | End: 2019-10-15
Attending: INTERNAL MEDICINE

## 2019-10-15 LAB
ALBUMIN SERPL-MCNC: 4.3 G/DL (ref 3.5–5)
ALBUMIN/GLOB SERPL: 1.5 {RATIO} (ref 1.4–2.6)
ALP SERPL-CCNC: 71 U/L (ref 53–141)
ALT SERPL-CCNC: 14 U/L (ref 10–40)
AMPHETAMINES UR QL SCN: NEGATIVE
ANION GAP SERPL CALC-SCNC: 23 MMOL/L (ref 8–19)
AST SERPL-CCNC: 21 U/L (ref 15–50)
BARBITURATES UR QL SCN: NEGATIVE
BASOPHILS # BLD AUTO: 0.05 10*3/UL (ref 0–0.2)
BASOPHILS NFR BLD AUTO: 0.5 % (ref 0–3)
BENZODIAZ UR QL SCN: POSITIVE
BILIRUB SERPL-MCNC: 0.25 MG/DL (ref 0.2–1.3)
BUN SERPL-MCNC: 14 MG/DL (ref 5–25)
BUN/CREAT SERPL: 21 {RATIO} (ref 6–20)
CALCIUM SERPL-MCNC: 9.5 MG/DL (ref 8.7–10.4)
CHLORIDE SERPL-SCNC: 101 MMOL/L (ref 99–111)
CHOLEST SERPL-MCNC: 213 MG/DL (ref 107–200)
CHOLEST/HDLC SERPL: 4.5 {RATIO} (ref 3–6)
CONV ABS IMM GRAN: 0.03 10*3/UL (ref 0–0.2)
CONV CO2: 21 MMOL/L (ref 22–32)
CONV COCAINE, UR: NEGATIVE
CONV IMMATURE GRAN: 0.3 % (ref 0–1.8)
CONV TOTAL PROTEIN: 7.1 G/DL (ref 6.3–8.2)
CREAT UR-MCNC: 0.67 MG/DL (ref 0.5–0.9)
DEPRECATED RDW RBC AUTO: 46.8 FL (ref 36.4–46.3)
EOSINOPHIL # BLD AUTO: 0.22 10*3/UL (ref 0–0.7)
EOSINOPHIL # BLD AUTO: 2 % (ref 0–7)
ERYTHROCYTE [DISTWIDTH] IN BLOOD BY AUTOMATED COUNT: 14.1 % (ref 11.7–14.4)
GFR SERPLBLD BASED ON 1.73 SQ M-ARVRAT: >60 ML/MIN/{1.73_M2}
GLOBULIN UR ELPH-MCNC: 2.8 G/DL (ref 2–3.5)
GLUCOSE SERPL-MCNC: 89 MG/DL (ref 65–99)
HCT VFR BLD AUTO: 39 % (ref 37–47)
HDLC SERPL-MCNC: 47 MG/DL (ref 40–60)
HGB BLD-MCNC: 12.7 G/DL (ref 12–16)
LDLC SERPL CALC-MCNC: 134 MG/DL (ref 70–100)
LYMPHOCYTES # BLD AUTO: 2.97 10*3/UL (ref 1–5)
LYMPHOCYTES NFR BLD AUTO: 27.1 % (ref 20–45)
MCH RBC QN AUTO: 29.3 PG (ref 27–31)
MCHC RBC AUTO-ENTMCNC: 32.6 G/DL (ref 33–37)
MCV RBC AUTO: 90.1 FL (ref 81–99)
METHADONE UR QL SCN: NEGATIVE
MONOCYTES # BLD AUTO: 0.64 10*3/UL (ref 0.2–1.2)
MONOCYTES NFR BLD AUTO: 5.8 % (ref 3–10)
NEUTROPHILS # BLD AUTO: 7.06 10*3/UL (ref 2–8)
NEUTROPHILS NFR BLD AUTO: 64.3 % (ref 30–85)
NRBC CBCN: 0 % (ref 0–0.7)
OPIATES TESTED UR SCN: NEGATIVE
OSMOLALITY SERPL CALC.SUM OF ELEC: 292 MOSM/KG (ref 273–304)
OXYCODONE UR QL SCN: NEGATIVE
PCP UR QL: NEGATIVE
PLATELET # BLD AUTO: 282 10*3/UL (ref 130–400)
PMV BLD AUTO: 10.6 FL (ref 9.4–12.3)
POTASSIUM SERPL-SCNC: 4.4 MMOL/L (ref 3.5–5.3)
RBC # BLD AUTO: 4.33 10*6/UL (ref 4.2–5.4)
SODIUM SERPL-SCNC: 141 MMOL/L (ref 135–147)
T4 FREE SERPL-MCNC: 1.3 NG/DL (ref 0.9–1.8)
THC SERPLBLD CFM-MCNC: POSITIVE NG/ML
TRIGL SERPL-MCNC: 158 MG/DL (ref 40–150)
TSH SERPL-ACNC: 1.35 M[IU]/L (ref 0.27–4.2)
VLDLC SERPL-MCNC: 32 MG/DL (ref 5–37)
WBC # BLD AUTO: 10.97 10*3/UL (ref 4.8–10.8)

## 2019-10-16 ENCOUNTER — CLINICAL SUPPORT (OUTPATIENT)
Dept: ORTHOPEDIC SURGERY | Facility: CLINIC | Age: 59
End: 2019-10-16

## 2019-10-16 VITALS — BODY MASS INDEX: 33.1 KG/M2 | TEMPERATURE: 98.4 F | WEIGHT: 186.8 LBS | HEIGHT: 63 IN

## 2019-10-16 DIAGNOSIS — M17.11 ARTHRITIS OF RIGHT KNEE: Primary | ICD-10-CM

## 2019-10-16 PROCEDURE — 99213 OFFICE O/P EST LOW 20 MIN: CPT | Performed by: NURSE PRACTITIONER

## 2019-10-16 PROCEDURE — 20610 DRAIN/INJ JOINT/BURSA W/O US: CPT | Performed by: NURSE PRACTITIONER

## 2019-10-16 RX ORDER — MECLIZINE HYDROCHLORIDE 25 MG/1
TABLET ORAL
Refills: 0 | COMMUNITY
Start: 2019-09-11

## 2019-10-16 RX ORDER — METHYLPREDNISOLONE ACETATE 80 MG/ML
80 INJECTION, SUSPENSION INTRA-ARTICULAR; INTRALESIONAL; INTRAMUSCULAR; SOFT TISSUE
Status: COMPLETED | OUTPATIENT
Start: 2019-10-16 | End: 2019-10-16

## 2019-10-16 RX ADMIN — METHYLPREDNISOLONE ACETATE 80 MG: 80 INJECTION, SUSPENSION INTRA-ARTICULAR; INTRALESIONAL; INTRAMUSCULAR; SOFT TISSUE at 08:09

## 2019-10-16 NOTE — PROGRESS NOTES
Patient: Kamini Orta  YOB: 1960    Chief Complaints:  right knee pain    Subjective:    History of Present Illness: Here today for knee pain. The pain is a generalized joint tenderness.  It has been progressive in nature but remains intermittent.  Worsened by prolonged standing or walking and squatting activities. Has had improvement in the past with ice/heat, rest, and injections.     This problem is not new to this examiner.     Allergies:   Allergies   Allergen Reactions   • Codeine    • Erythromycin        Medications:   Home Medications:  Current Outpatient Medications on File Prior to Visit   Medication Sig   • ALPRAZolam (XANAX) 0.5 MG tablet Take 0.5 mg by mouth Daily.   • diclofenac sodium (VOTAREN XR) 100 MG 24 hr tablet TAKE ONE TABLET BY MOUTH EVERY DAY   • esomeprazole (NexIUM) 40 MG capsule Take 40 mg by mouth.   • estradiol (MINIVELLE, VIVELLE-DOT) 0.075 MG/24HR patch    • FLUoxetine (PROzac) 10 MG capsule Take 10 mg by mouth Daily.   • HYDROcodone-acetaminophen (NORCO) 7.5-325 MG per tablet TAKE ONE TABLET BY MOUTH TWICE DAILY AS NEEDED   • levothyroxine (SYNTHROID, LEVOTHROID) 75 MCG tablet Take 75 mcg by mouth Daily.   • lidocaine (LIDODERM) 5 % APPLY 1 PATCH EVERY DAY AS NEEDED 12 HOURS AND 12 HOURS OFF   • meclizine (ANTIVERT) 25 MG tablet TAKE ONE TABLET BY MOUTH THREE TIMES DAILY AS NEEDED FOR dizziness     No current facility-administered medications on file prior to visit.      Current Medications:  Scheduled Meds:  Continuous Infusions:  No current facility-administered medications for this visit.   PRN Meds:.    I have reviewed the patient's medical history in detail and updated the computerized patient record.  Review and summarization of old records include:    Past Medical History:   Diagnosis Date   • Depression    • Diverticulitis    • Thyroid disease         Past Surgical History:   Procedure Laterality Date   •  SECTION      ALSO    • CHOLECYSTECTOMY   "1986   • HYSTERECTOMY  1994   • KNEE ARTHROSCOPY Right 2012    \"CLEAN UP\"   • KNEE ARTHROSCOPY W/ MENISCAL REPAIR Right 2004        Social History     Occupational History   • Not on file   Tobacco Use   • Smoking status: Former Smoker   • Smokeless tobacco: Never Used   Substance and Sexual Activity   • Alcohol use: No   • Drug use: No   • Sexual activity: Defer      Social History     Social History Narrative   • Not on file        Family History   Problem Relation Age of Onset   • Heart disease Other    • Lung disease Other        ROS: 14 point review of systems was performed and was negative except for documented findings in HPI and today's encounter.     Allergies:   Allergies   Allergen Reactions   • Codeine    • Erythromycin      Constitutional:  Denies fever, shaking or chills   Eyes:  Denies change in visual acuity   HENT:  Denies nasal congestion or sore throat   Respiratory:  Denies cough or shortness of breath   Cardiovascular:  Denies chest pain or severe LE edema   GI:  Denies abdominal pain, nausea, vomiting, bloody stools or diarrhea   Musculoskeletal:  Numbness, tingling, or loss of motor function only as noted above in history of present illness.  : Denies painful urination or hematuria  Integument:  Denies rash, lesion or ulceration   Neurologic:  Denies headache or focal weakness  Endocrine:  Denies lymphadenopathy  Psych:  Denies confusion or change in mental status   Hem:  Denies active bleeding    Physical Exam: 58 y.o. female  Wt Readings from Last 3 Encounters:   10/16/19 84.7 kg (186 lb 12.8 oz)   07/16/19 88.5 kg (195 lb)   03/26/19 88.5 kg (195 lb)     Ht Readings from Last 1 Encounters:   10/16/19 160 cm (63\")     Body mass index is 33.09 kg/m².  Vitals:    10/16/19 1345   Temp: 98.4 °F (36.9 °C)     Vital signs reviewed.   Constitutional: Awake alert and oriented x3, well developed, no acute distress, non-toxic appearance.  EYES: symmetric, sclera clear  ENT:  Normocephalic, " Atraumatic.   Respiratory:  No respiratory distress, No wheezing  CV: pulse regular, no palpitations or pallor.  GI:  Abdomen soft, non-tender.   Vascular:  Intact distal pulses, No cyanosis, no signs or symptoms of DVT.  Neurologic: Sensation grossly intact to the involved extremity, No focal deficits noted.   Neck: No tenderness, Supple.  Integument: warm, dry, no ulcerations.   Psychiatric:  Oriented, no pathological affect.  Musculoskeletal:    Affected knee(s):  Painful gait with a subtle limp, positive for synovitis, swelling, joint effusion with crepitation.  Lachman negative  Posterior drawer negative  Glenn's negative  Patellofemoral grind +  Sensation grossly intact to light touch throughout the lower extremity  Skin is intact  Distal pulses are palpable  No signs or symptoms of DVT        Diagnostic Data:     Imaging was done previously in the office, images were personally viewed and discussed with the patient:    Indication: pain related symptoms,  Views: 3V AP, LAT & 40 degree PA right knee(s)   Findings: severe end-stage arthritis (bone on bone, subchondral sclerosis/cysts, osteophytes)  Comparison views: viewed last xray done in the office.     Procedure:  Large Joint Arthrocentesis: R knee  Date/Time: 10/16/2019 8:09 AM  Consent given by: patient  Site marked: site marked  Timeout: Immediately prior to procedure a time out was called to verify the correct patient, procedure, equipment, support staff and site/side marked as required   Supporting Documentation  Indications: pain and joint swelling   Procedure Details  Location: knee - R knee  Preparation: Patient was prepped and draped in the usual sterile fashion  Needle gauge: 21.  Approach: anterolateral  Medications administered: 4 mL lidocaine (cardiac); 80 mg methylPREDNISolone acetate 80 MG/ML  Patient tolerance: patient tolerated the procedure well with no immediate complications          Assessment:     ICD-10-CM ICD-9-CM   1. Arthritis of  right knee M17.11 716.96           Plan: Is to proceed with injection  Follow up as indicated.  Ice, elevate, and rest as needed.  Additional interventions include:  15 min spent face to face with patient 11 min spent counseling about:  Biomechanics of pertinent body area discussed.  Risks, benefits, alternatives, comparisons, and complications of accepted medicines, injections, recommendations, surgical procedures, and therapies explained and education provided in laymen's terms. Natural history and expected course of this patient's diagnosis discussed along with evaluation of therapies. Questions answered. When appropriate I also discussed proper use of cane, walker, trekking poles.   BMI:  The concept of BMI body mass index and its importance and implications discussed.  BMI suggested to be < 40 or as low as possible. Lifestyle measures for weight loss and how this affects orthopedic condition.  EXERCISES:  Advice on benefits of, and types of regular/moderate exercise including biomechanical forces involved as it pertains to this complaint.  RICE: Rest, ice, compression, and elevation therapy, Cryotherapy/brachy therapy, and or OTC linaments as indicated with instructions.   Cortisone Injection. See procedure note.  Her brother was just dx with colon CA and is just going to have surgery for this in [place].   She has lost 60lbs now with WW. Enc to continue.   10/16/2019  Carol Lopez, APRN

## 2019-11-19 ENCOUNTER — HOSPITAL ENCOUNTER (OUTPATIENT)
Dept: GASTROENTEROLOGY | Facility: HOSPITAL | Age: 59
Setting detail: HOSPITAL OUTPATIENT SURGERY
Discharge: HOME | End: 2019-11-19
Attending: INTERNAL MEDICINE

## 2020-01-14 ENCOUNTER — HOSPITAL ENCOUNTER (OUTPATIENT)
Dept: GENERAL RADIOLOGY | Facility: HOSPITAL | Age: 60
Discharge: HOME | End: 2020-01-14
Attending: OBSTETRICS & GYNECOLOGY

## 2020-01-17 ENCOUNTER — CLINICAL SUPPORT (OUTPATIENT)
Dept: ORTHOPEDIC SURGERY | Facility: CLINIC | Age: 60
End: 2020-01-17

## 2020-01-17 VITALS — HEIGHT: 63 IN | TEMPERATURE: 97.3 F | BODY MASS INDEX: 32.96 KG/M2 | WEIGHT: 186 LBS

## 2020-01-17 DIAGNOSIS — M17.11 ARTHRITIS OF RIGHT KNEE: Primary | ICD-10-CM

## 2020-01-17 PROCEDURE — 99213 OFFICE O/P EST LOW 20 MIN: CPT | Performed by: NURSE PRACTITIONER

## 2020-01-17 PROCEDURE — 20610 DRAIN/INJ JOINT/BURSA W/O US: CPT | Performed by: NURSE PRACTITIONER

## 2020-01-17 RX ORDER — ESTRADIOL 0.05 MG/D
FILM, EXTENDED RELEASE TRANSDERMAL
COMMUNITY
Start: 2017-05-11 | End: 2021-10-21 | Stop reason: SDUPTHER

## 2020-01-17 RX ORDER — ESOMEPRAZOLE MAGNESIUM 40 MG/1
CAPSULE, DELAYED RELEASE ORAL EVERY 24 HOURS
COMMUNITY
Start: 2017-06-23 | End: 2021-09-03 | Stop reason: ALTCHOICE

## 2020-01-17 RX ORDER — METHYLPREDNISOLONE ACETATE 80 MG/ML
80 INJECTION, SUSPENSION INTRA-ARTICULAR; INTRALESIONAL; INTRAMUSCULAR; SOFT TISSUE
Status: COMPLETED | OUTPATIENT
Start: 2020-01-17 | End: 2020-01-17

## 2020-01-17 RX ORDER — LEVOTHYROXINE SODIUM 75 UG/1
CAPSULE ORAL
COMMUNITY
End: 2021-10-21 | Stop reason: SDUPTHER

## 2020-01-17 RX ORDER — HYDROCODONE BITARTRATE AND ACETAMINOPHEN 5; 325 MG/1; MG/1
TABLET ORAL EVERY 12 HOURS SCHEDULED
COMMUNITY
Start: 2019-12-20 | End: 2021-10-21 | Stop reason: SDUPTHER

## 2020-01-17 RX ORDER — FLUOXETINE HYDROCHLORIDE 40 MG/1
40 CAPSULE ORAL DAILY
COMMUNITY
Start: 2019-12-28 | End: 2021-10-21 | Stop reason: SDUPTHER

## 2020-01-17 RX ORDER — FLUTICASONE PROPIONATE 50 MCG
2 SPRAY, SUSPENSION (ML) NASAL DAILY
COMMUNITY
Start: 2019-11-22 | End: 2021-11-09

## 2020-01-17 RX ADMIN — METHYLPREDNISOLONE ACETATE 80 MG: 80 INJECTION, SUSPENSION INTRA-ARTICULAR; INTRALESIONAL; INTRAMUSCULAR; SOFT TISSUE at 07:58

## 2020-01-17 NOTE — PROGRESS NOTES
Patient: Kamini Orta  YOB: 1960    Chief Complaints:  right knee pain    Subjective:    History of Present Illness: Here today for knee pain. Going to [place] tomorrow to help her brother with colon CA in [place] and help family. The pain is a generalized joint tenderness.  It has been progressive in nature but remains intermittent.  Worsened by prolonged standing or walking and squatting activities. Has had improvement in the past with ice/heat, rest, and injections.     This problem is not new to this examiner.     Allergies:   Allergies   Allergen Reactions   • Codeine Anaphylaxis     Chest pains   • Erythromycin Diarrhea     Cramps       Medications:   Home Medications:  Current Outpatient Medications on File Prior to Visit   Medication Sig   • ALPRAZolam (XANAX) 0.5 MG tablet Take 0.5 mg by mouth Daily.   • chlordiazePOXIDE-Clidinium (LIBRAX PO) prn   • diclofenac sodium (VOTAREN XR) 100 MG 24 hr tablet TAKE ONE TABLET BY MOUTH EVERY DAY   • diclofenac sodium (VOTAREN XR) 100 MG 24 hr tablet TAKE ONE TABLET BY MOUTH EVERY DAY   • esomeprazole (NexIUM) 40 MG capsule Take 40 mg by mouth.   • esomeprazole (nexIUM) 40 MG capsule Daily.   • estradiol (MINIVELLE, VIVELLE-DOT) 0.075 MG/24HR patch    • estradiol (VIVELLE-DOT) 0.05 MG/24HR patch one film extended release transdermal 2 times a week   • FLUoxetine (PROzac) 10 MG capsule Take 10 mg by mouth Daily.   • FLUoxetine (PROzac) 40 MG capsule Take 40 mg by mouth Daily.   • fluticasone (FLONASE) 50 MCG/ACT nasal spray 2 sprays by Each Nare route Daily.   • HYDROcodone-acetaminophen (NORCO) 5-325 MG per tablet Every 12 (Twelve) Hours.   • levothyroxine (SYNTHROID, LEVOTHROID) 75 MCG tablet Take 75 mcg by mouth Daily.   • levothyroxine sodium (TIROSINT) 75 MCG capsule once a day   • lidocaine (LIDODERM) 5 % APPLY 1 PATCH EVERY DAY AS NEEDED 12 HOURS AND 12 HOURS OFF   • meclizine (ANTIVERT) 25 MG tablet TAKE ONE TABLET BY MOUTH THREE TIMES DAILY AS  "NEEDED FOR dizziness   • HYDROcodone-acetaminophen (NORCO) 7.5-325 MG per tablet TAKE ONE TABLET BY MOUTH TWICE DAILY AS NEEDED     No current facility-administered medications on file prior to visit.      Current Medications:  Scheduled Meds:  Continuous Infusions:  No current facility-administered medications for this visit.   PRN Meds:.    I have reviewed the patient's medical history in detail and updated the computerized patient record.  Review and summarization of old records include:    Past Medical History:   Diagnosis Date   • Depression    • Diverticulitis    • Thyroid disease         Past Surgical History:   Procedure Laterality Date   •  SECTION      ALSO    • CHOLECYSTECTOMY     • HYSTERECTOMY     • KNEE ARTHROSCOPY Right     \"CLEAN UP\"   • KNEE ARTHROSCOPY W/ MENISCAL REPAIR Right         Social History     Occupational History   • Not on file   Tobacco Use   • Smoking status: Former Smoker   • Smokeless tobacco: Never Used   Substance and Sexual Activity   • Alcohol use: No   • Drug use: No   • Sexual activity: Defer      Social History     Social History Narrative   • Not on file        Family History   Problem Relation Age of Onset   • Heart disease Other    • Lung disease Other        ROS: 14 point review of systems was performed and was negative except for documented findings in HPI and today's encounter.     Allergies:   Allergies   Allergen Reactions   • Codeine Anaphylaxis     Chest pains   • Erythromycin Diarrhea     Cramps     Constitutional:  Denies fever, shaking or chills   Eyes:  Denies change in visual acuity   HENT:  Denies nasal congestion or sore throat   Respiratory:  Denies cough or shortness of breath   Cardiovascular:  Denies chest pain or severe LE edema   GI:  Denies abdominal pain, nausea, vomiting, bloody stools or diarrhea   Musculoskeletal:  Numbness, tingling, or loss of motor function only as noted above in history of present illness.  : " "Denies painful urination or hematuria  Integument:  Denies rash, lesion or ulceration   Neurologic:  Denies headache or focal weakness  Endocrine:  Denies lymphadenopathy  Psych:  Denies confusion or change in mental status   Hem:  Denies active bleeding    Physical Exam: 59 y.o. female  Wt Readings from Last 3 Encounters:   01/17/20 84.4 kg (186 lb)   10/16/19 84.7 kg (186 lb 12.8 oz)   07/16/19 88.5 kg (195 lb)     Ht Readings from Last 1 Encounters:   01/17/20 160 cm (63\")     Body mass index is 32.95 kg/m².  Vitals:    01/17/20 1109   Temp: 97.3 °F (36.3 °C)     Vital signs reviewed.   Constitutional: Awake alert and oriented x3, well developed, no acute distress, non-toxic appearance.  EYES: symmetric, sclera clear  ENT:  Normocephalic, Atraumatic.   Respiratory:  No respiratory distress, No wheezing  CV: pulse regular, no palpitations or pallor.  GI:  Abdomen soft, non-tender.   Vascular:  Intact distal pulses, No cyanosis, no signs or symptoms of DVT.  Neurologic: Sensation grossly intact to the involved extremity, No focal deficits noted.   Neck: No tenderness, Supple.  Integument: warm, dry, no ulcerations.   Psychiatric:  Oriented, no pathological affect.  Musculoskeletal:    Affected knee(s):  Painful gait with a subtle limp, positive for synovitis, swelling, joint effusion with crepitation.  Lachman negative  Posterior drawer negative  Glenn's negative  Patellofemoral grind +  Sensation grossly intact to light touch throughout the lower extremity  Skin is intact  Distal pulses are palpable  No signs or symptoms of DVT        Diagnostic Data:     Imaging was done previously in the office, images were personally viewed and discussed with the patient:    Indication: pain related symptoms,  Views: 3V AP, LAT & 40 degree PA right knee(s)   Findings: severe end-stage arthritis (bone on bone, subchondral sclerosis/cysts, osteophytes)  Comparison views: viewed last xray done in the office. "     Procedure:  Large Joint Arthrocentesis: R knee  Date/Time: 1/17/2020 7:58 AM  Consent given by: patient  Site marked: site marked  Timeout: Immediately prior to procedure a time out was called to verify the correct patient, procedure, equipment, support staff and site/side marked as required   Supporting Documentation  Indications: pain   Procedure Details  Location: knee - R knee  Preparation: Patient was prepped and draped in the usual sterile fashion  Needle gauge: 21.  Approach: anterolateral  Medications administered: 4 mL lidocaine (cardiac); 80 mg methylPREDNISolone acetate 80 MG/ML  Patient tolerance: patient tolerated the procedure well with no immediate complications          Assessment:     ICD-10-CM ICD-9-CM   1. Arthritis of right knee M17.11 716.96           Plan: Is to proceed with injection  Follow up as indicated.  Ice, elevate, and rest as needed.  Additional interventions include:  15 min spent face to face with patient 11 min spent counseling about:  Biomechanics of pertinent body area discussed.  Risks, benefits, alternatives, comparisons, and complications of accepted medicines, injections, recommendations, surgical procedures, and therapies explained and education provided in laymen's terms. Natural history and expected course of this patient's diagnosis discussed along with evaluation of therapies. Questions answered. When appropriate I also discussed proper use of cane, walker, trekking poles.   BMI:  The concept of BMI body mass index and its importance and implications discussed.  BMI suggested to be < 40 or as low as possible. Lifestyle measures for weight loss and how this affects orthopedic condition.  EXERCISES:  Advice on benefits of, and types of regular/moderate exercise including biomechanical forces involved as it pertains to this complaint.  RICE: Rest, ice, compression, and elevation therapy, Cryotherapy/brachy therapy, and or OTC linaments as indicated with instructions.    Cortisone Injection. See procedure note.  Keep exercising and working on wt loss, enc to continue.  Also recommend ankle pumps and support sox for her plane flight tomorrow.   1/17/2020  CAROLINE Chan

## 2020-02-14 ENCOUNTER — HOSPITAL ENCOUNTER (OUTPATIENT)
Dept: LAB | Facility: HOSPITAL | Age: 60
Discharge: HOME | End: 2020-02-14
Attending: INTERNAL MEDICINE

## 2020-02-14 LAB
ALBUMIN SERPL-MCNC: 3.9 G/DL (ref 3.5–5)
ALBUMIN/GLOB SERPL: 1.3 {RATIO} (ref 1.4–2.6)
ALP SERPL-CCNC: 65 U/L (ref 53–141)
ALT SERPL-CCNC: 19 U/L (ref 10–40)
AMPHETAMINES UR QL SCN: NEGATIVE
ANION GAP SERPL CALC-SCNC: 18 MMOL/L (ref 8–19)
AST SERPL-CCNC: 23 U/L (ref 15–50)
BARBITURATES UR QL SCN: NEGATIVE
BASOPHILS # BLD AUTO: 0.08 10*3/UL (ref 0–0.2)
BASOPHILS NFR BLD AUTO: 0.9 % (ref 0–3)
BENZODIAZ UR QL SCN: NEGATIVE
BILIRUB SERPL-MCNC: 0.3 MG/DL (ref 0.2–1.3)
BUN SERPL-MCNC: 15 MG/DL (ref 5–25)
BUN/CREAT SERPL: 21 {RATIO} (ref 6–20)
CALCIUM SERPL-MCNC: 9.3 MG/DL (ref 8.7–10.4)
CHLORIDE SERPL-SCNC: 98 MMOL/L (ref 99–111)
CONV ABS IMM GRAN: 0.02 10*3/UL (ref 0–0.2)
CONV CO2: 25 MMOL/L (ref 22–32)
CONV COCAINE, UR: NEGATIVE
CONV IMMATURE GRAN: 0.2 % (ref 0–1.8)
CONV TOTAL PROTEIN: 6.8 G/DL (ref 6.3–8.2)
CREAT UR-MCNC: 0.73 MG/DL (ref 0.5–0.9)
DEPRECATED RDW RBC AUTO: 47.9 FL (ref 36.4–46.3)
EOSINOPHIL # BLD AUTO: 0.18 10*3/UL (ref 0–0.7)
EOSINOPHIL # BLD AUTO: 2 % (ref 0–7)
ERYTHROCYTE [DISTWIDTH] IN BLOOD BY AUTOMATED COUNT: 14.4 % (ref 11.7–14.4)
EST. AVERAGE GLUCOSE BLD GHB EST-MCNC: 117 MG/DL
GFR SERPLBLD BASED ON 1.73 SQ M-ARVRAT: >60 ML/MIN/{1.73_M2}
GLOBULIN UR ELPH-MCNC: 2.9 G/DL (ref 2–3.5)
GLUCOSE SERPL-MCNC: 85 MG/DL (ref 65–99)
HBA1C MFR BLD: 5.7 % (ref 3.5–5.7)
HCT VFR BLD AUTO: 40 % (ref 37–47)
HGB BLD-MCNC: 12.8 G/DL (ref 12–16)
LYMPHOCYTES # BLD AUTO: 3.04 10*3/UL (ref 1–5)
LYMPHOCYTES NFR BLD AUTO: 34 % (ref 20–45)
MCH RBC QN AUTO: 29.1 PG (ref 27–31)
MCHC RBC AUTO-ENTMCNC: 32 G/DL (ref 33–37)
MCV RBC AUTO: 90.9 FL (ref 81–99)
METHADONE UR QL SCN: NEGATIVE
MONOCYTES # BLD AUTO: 0.65 10*3/UL (ref 0.2–1.2)
MONOCYTES NFR BLD AUTO: 7.3 % (ref 3–10)
NEUTROPHILS # BLD AUTO: 4.96 10*3/UL (ref 2–8)
NEUTROPHILS NFR BLD AUTO: 55.6 % (ref 30–85)
NRBC CBCN: 0 % (ref 0–0.7)
OPIATES TESTED UR SCN: NEGATIVE
OSMOLALITY SERPL CALC.SUM OF ELEC: 284 MOSM/KG (ref 273–304)
OXYCODONE UR QL SCN: NEGATIVE
PCP UR QL: NEGATIVE
PLATELET # BLD AUTO: 315 10*3/UL (ref 130–400)
PMV BLD AUTO: 10.3 FL (ref 9.4–12.3)
POTASSIUM SERPL-SCNC: 4.2 MMOL/L (ref 3.5–5.3)
RBC # BLD AUTO: 4.4 10*6/UL (ref 4.2–5.4)
SODIUM SERPL-SCNC: 137 MMOL/L (ref 135–147)
THC SERPLBLD CFM-MCNC: POSITIVE NG/ML
WBC # BLD AUTO: 8.93 10*3/UL (ref 4.8–10.8)

## 2020-05-11 ENCOUNTER — CLINICAL SUPPORT (OUTPATIENT)
Dept: ORTHOPEDIC SURGERY | Facility: CLINIC | Age: 60
End: 2020-05-11

## 2020-05-11 VITALS — WEIGHT: 205 LBS | BODY MASS INDEX: 36.32 KG/M2 | HEIGHT: 63 IN | TEMPERATURE: 98.2 F

## 2020-05-11 DIAGNOSIS — M17.11 ARTHRITIS OF RIGHT KNEE: Primary | ICD-10-CM

## 2020-05-11 PROCEDURE — 99212 OFFICE O/P EST SF 10 MIN: CPT | Performed by: ORTHOPAEDIC SURGERY

## 2020-05-11 PROCEDURE — 20610 DRAIN/INJ JOINT/BURSA W/O US: CPT | Performed by: ORTHOPAEDIC SURGERY

## 2020-05-11 RX ORDER — METHYLPREDNISOLONE ACETATE 80 MG/ML
80 INJECTION, SUSPENSION INTRA-ARTICULAR; INTRALESIONAL; INTRAMUSCULAR; SOFT TISSUE
Status: COMPLETED | OUTPATIENT
Start: 2020-05-11 | End: 2020-05-11

## 2020-05-11 RX ADMIN — METHYLPREDNISOLONE ACETATE 80 MG: 80 INJECTION, SUSPENSION INTRA-ARTICULAR; INTRALESIONAL; INTRAMUSCULAR; SOFT TISSUE at 08:39

## 2020-05-11 NOTE — PROGRESS NOTES
Patient Name: Kamini Orta   YOB: 1960  Referring Primary Care Physician: Davin Hall MD  BMI: Body mass index is 36.31 kg/m².    Chief Complaint:    Chief Complaint   Patient presents with   • Right Knee - Follow-up        HPI:     Kamini Orta is a 59 y.o. female who presents today for evaluation of   Chief Complaint   Patient presents with   • Right Knee - Follow-up   .  Patient follows up today on her right knee.  And has acute on chronic relapsing swelling stiffness crepitation and pain.  She is acting as a  to remodel her parents house for relative across the street.  She brings me a cigar today as a gift.    This problem is not new to this examiner.     Subjective   Medications:   Home Medications:  Current Outpatient Medications on File Prior to Visit   Medication Sig   • ALPRAZolam (XANAX) 0.5 MG tablet Take 0.5 mg by mouth Daily.   • chlordiazePOXIDE-Clidinium (LIBRAX PO) prn   • diclofenac sodium (VOTAREN XR) 100 MG 24 hr tablet TAKE ONE TABLET BY MOUTH EVERY DAY   • diclofenac sodium (VOTAREN XR) 100 MG 24 hr tablet TAKE ONE TABLET BY MOUTH EVERY DAY   • esomeprazole (NexIUM) 40 MG capsule Take 40 mg by mouth.   • esomeprazole (nexIUM) 40 MG capsule Daily.   • estradiol (MINIVELLE, VIVELLE-DOT) 0.075 MG/24HR patch    • estradiol (VIVELLE-DOT) 0.05 MG/24HR patch one film extended release transdermal 2 times a week   • FLUoxetine (PROzac) 10 MG capsule Take 10 mg by mouth Daily.   • FLUoxetine (PROzac) 40 MG capsule Take 40 mg by mouth Daily.   • fluticasone (FLONASE) 50 MCG/ACT nasal spray 2 sprays by Each Nare route Daily.   • HYDROcodone-acetaminophen (NORCO) 5-325 MG per tablet Every 12 (Twelve) Hours.   • HYDROcodone-acetaminophen (NORCO) 7.5-325 MG per tablet TAKE ONE TABLET BY MOUTH TWICE DAILY AS NEEDED   • levothyroxine (SYNTHROID, LEVOTHROID) 75 MCG tablet Take 75 mcg by mouth Daily.   • levothyroxine sodium (TIROSINT) 75 MCG capsule once a day   •  "lidocaine (LIDODERM) 5 % APPLY 1 PATCH EVERY DAY AS NEEDED 12 HOURS AND 12 HOURS OFF   • meclizine (ANTIVERT) 25 MG tablet TAKE ONE TABLET BY MOUTH THREE TIMES DAILY AS NEEDED FOR dizziness     No current facility-administered medications on file prior to visit.      Current Medications:  Scheduled Meds:  Continuous Infusions:  No current facility-administered medications for this visit.   PRN Meds:.    I have reviewed the patient's medical history in detail and updated the computerized patient record.  Review and summarization of old records includes:    Past Medical History:   Diagnosis Date   • Depression    • Diverticulitis    • Thyroid disease         Past Surgical History:   Procedure Laterality Date   •  SECTION      ALSO    • CHOLECYSTECTOMY     • HYSTERECTOMY     • KNEE ARTHROSCOPY Right     \"CLEAN UP\"   • KNEE ARTHROSCOPY W/ MENISCAL REPAIR Right         Social History     Occupational History   • Not on file   Tobacco Use   • Smoking status: Former Smoker   • Smokeless tobacco: Never Used   Substance and Sexual Activity   • Alcohol use: No   • Drug use: No   • Sexual activity: Defer      Social History     Social History Narrative   • Not on file        Family History   Problem Relation Age of Onset   • Heart disease Other    • Lung disease Other        ROS: 14 point review of systems was performed and all other systems were reviewed and are negative except for documented findings in HPI and today's encounter.     Allergies:   Allergies   Allergen Reactions   • Codeine Anaphylaxis     Chest pains   • Erythromycin Diarrhea     Cramps     Constitutional:  Denies fever, shaking or chills   Eyes:  Denies change in visual acuity   HENT:  Denies nasal congestion or sore throat   Respiratory:  Denies cough or shortness of breath   Cardiovascular:  Denies chest pain or severe LE edema   GI:  Denies abdominal pain, nausea, vomiting, bloody stools or diarrhea   Musculoskeletal:  " "Numbness, tingling, pain, or loss of motor function only as noted above in history of present illness.  : Denies painful urination or hematuria  Integument:  Denies rash, lesion or ulceration   Neurologic:  Denies headache or focal weakness  Endocrine:  Denies lymphadenopathy  Psych:  Denies confusion or change in mental status   Hem:  Denies active bleeding    OBJECTIVE:  Physical Exam: 59 y.o. female  Wt Readings from Last 3 Encounters:   05/11/20 93 kg (205 lb)   01/17/20 84.4 kg (186 lb)   10/16/19 84.7 kg (186 lb 12.8 oz)     Ht Readings from Last 1 Encounters:   05/11/20 160 cm (63\")     Body mass index is 36.31 kg/m².  Vitals:    05/11/20 1111   Temp: 98.2 °F (36.8 °C)     Vital signs reviewed.     General Appearance:    Alert, cooperative, in no acute distress                  Eyes: conjunctiva clear  ENT: external ears and nose atraumatic  CV: no peripheral edema  Resp: normal respiratory effort  Skin: no rashes or wounds; normal turgor  Psych: mood and affect appropriate  Lymph: no nodes appreciated  Neuro: gross sensation intact  Vascular:  Palpable peripheral pulse in noted extremity  Musculoskeletal Extremities: Exam today shows knee has swelling crepitation synovitis joint line tenderness with a slight start up limp and reduced range of motion    Radiology:   AP lateral 40 agree PA x-ray right knee from before reviewed at this time with comparison view shows arthritis    Assessment:     ICD-10-CM ICD-9-CM   1. Arthritis of right knee M17.11 716.96        Large Joint Arthrocentesis: R knee  Date/Time: 5/11/2020 8:39 AM  Consent given by: patient  Site marked: site marked  Timeout: Immediately prior to procedure a time out was called to verify the correct patient, procedure, equipment, support staff and site/side marked as required   Supporting Documentation  Indications: pain   Procedure Details  Location: knee - R knee  Preparation: Patient was prepped and draped in the usual sterile fashion  Needle " gauge: 21.  Approach: anterolateral  Medications administered: 4 mL lidocaine (cardiac); 80 mg methylPREDNISolone acetate 80 MG/ML  Patient tolerance: patient tolerated the procedure well with no immediate complications             Plan: RICE: Rest, ice, compression, and elevation therapy, Cryotherapy/brachy therapy, and or OTC linaments as indicated with instructions.   Cortisone Injection. See procedure note.  Mark recommendations answer questions and see her back in 3 months with x-rays if she needs an injection    5/11/2020    Much of this encounter note is an electronic transcription/translation of spoken language to printed text. The electronic translation of spoken language may permit erroneous, or at times, nonsensical words or phrases to be inadvertently transcribed; Although I have reviewed the note for such errors, some may still exist

## 2020-06-15 ENCOUNTER — HOSPITAL ENCOUNTER (OUTPATIENT)
Dept: LAB | Facility: HOSPITAL | Age: 60
Discharge: HOME | End: 2020-06-15
Attending: INTERNAL MEDICINE

## 2020-06-15 LAB
ALBUMIN SERPL-MCNC: 3.9 G/DL (ref 3.5–5)
ALBUMIN/GLOB SERPL: 1.3 {RATIO} (ref 1.4–2.6)
ALP SERPL-CCNC: 63 U/L (ref 53–141)
ALT SERPL-CCNC: 15 U/L (ref 10–40)
ANION GAP SERPL CALC-SCNC: 15 MMOL/L (ref 8–19)
AST SERPL-CCNC: 22 U/L (ref 15–50)
BILIRUB SERPL-MCNC: 0.23 MG/DL (ref 0.2–1.3)
BUN SERPL-MCNC: 14 MG/DL (ref 5–25)
BUN/CREAT SERPL: 18 {RATIO} (ref 6–20)
CALCIUM SERPL-MCNC: 9.4 MG/DL (ref 8.7–10.4)
CHLORIDE SERPL-SCNC: 100 MMOL/L (ref 99–111)
CHOLEST SERPL-MCNC: 270 MG/DL (ref 107–200)
CHOLEST/HDLC SERPL: 6.9 {RATIO} (ref 3–6)
CONV CO2: 27 MMOL/L (ref 22–32)
CONV TOTAL PROTEIN: 6.9 G/DL (ref 6.3–8.2)
CREAT UR-MCNC: 0.77 MG/DL (ref 0.5–0.9)
EST. AVERAGE GLUCOSE BLD GHB EST-MCNC: 126 MG/DL
GFR SERPLBLD BASED ON 1.73 SQ M-ARVRAT: >60 ML/MIN/{1.73_M2}
GLOBULIN UR ELPH-MCNC: 3 G/DL (ref 2–3.5)
GLUCOSE SERPL-MCNC: 92 MG/DL (ref 65–99)
HBA1C MFR BLD: 6 % (ref 3.5–5.7)
HDLC SERPL-MCNC: 39 MG/DL (ref 40–60)
LDLC SERPL CALC-MCNC: 179 MG/DL (ref 70–100)
OSMOLALITY SERPL CALC.SUM OF ELEC: 286 MOSM/KG (ref 273–304)
POTASSIUM SERPL-SCNC: 3.8 MMOL/L (ref 3.5–5.3)
SODIUM SERPL-SCNC: 138 MMOL/L (ref 135–147)
TRIGL SERPL-MCNC: 258 MG/DL (ref 40–150)
TSH SERPL-ACNC: 2.6 M[IU]/L (ref 0.27–4.2)
VLDLC SERPL-MCNC: 52 MG/DL (ref 5–37)

## 2020-09-21 ENCOUNTER — CLINICAL SUPPORT (OUTPATIENT)
Dept: ORTHOPEDIC SURGERY | Facility: CLINIC | Age: 60
End: 2020-09-21

## 2020-09-21 VITALS — BODY MASS INDEX: 33.66 KG/M2 | HEIGHT: 63 IN | TEMPERATURE: 97.7 F | WEIGHT: 190 LBS

## 2020-09-21 DIAGNOSIS — M25.561 CHRONIC PAIN OF RIGHT KNEE: Primary | ICD-10-CM

## 2020-09-21 DIAGNOSIS — G89.29 CHRONIC PAIN OF RIGHT KNEE: Primary | ICD-10-CM

## 2020-09-21 DIAGNOSIS — M17.11 PRIMARY OSTEOARTHRITIS OF RIGHT KNEE: ICD-10-CM

## 2020-09-21 PROCEDURE — 73562 X-RAY EXAM OF KNEE 3: CPT | Performed by: NURSE PRACTITIONER

## 2020-09-21 PROCEDURE — 20610 DRAIN/INJ JOINT/BURSA W/O US: CPT | Performed by: NURSE PRACTITIONER

## 2020-09-21 PROCEDURE — 99213 OFFICE O/P EST LOW 20 MIN: CPT | Performed by: NURSE PRACTITIONER

## 2020-09-21 RX ORDER — METHYLPREDNISOLONE ACETATE 80 MG/ML
80 INJECTION, SUSPENSION INTRA-ARTICULAR; INTRALESIONAL; INTRAMUSCULAR; SOFT TISSUE
Status: COMPLETED | OUTPATIENT
Start: 2020-09-21 | End: 2020-09-21

## 2020-09-21 RX ORDER — ATORVASTATIN CALCIUM 10 MG/1
10 TABLET, FILM COATED ORAL DAILY
COMMUNITY
Start: 2020-09-03 | End: 2020-12-30 | Stop reason: ALTCHOICE

## 2020-09-21 RX ADMIN — METHYLPREDNISOLONE ACETATE 80 MG: 80 INJECTION, SUSPENSION INTRA-ARTICULAR; INTRALESIONAL; INTRAMUSCULAR; SOFT TISSUE at 10:05

## 2020-09-21 NOTE — PROGRESS NOTES
Patient Name: Kamini Orta   YOB: 1960  Referring Primary Care Physician: Davin Hall MD  BMI: Body mass index is 33.66 kg/m².    Chief Complaint:    Chief Complaint   Patient presents with   • Right Knee - Follow-up        HPI: Pt of [facility] her for right knee pain wanting cortisone injections in the right knee they work for her and last about 3 months.  Mask were worn by everyone for the duration of the visit    Kamini Orta is a 59 y.o. female who presents today for evaluation of   Chief Complaint   Patient presents with   • Right Knee - Follow-up       This problem is new to this examiner.     Subjective   Medications:   Home Medications:  Current Outpatient Medications on File Prior to Visit   Medication Sig   • ALPRAZolam (XANAX) 0.5 MG tablet Take 0.5 mg by mouth Daily.   • atorvastatin (LIPITOR) 10 MG tablet Take 10 mg by mouth Daily.   • chlordiazePOXIDE-Clidinium (LIBRAX PO) prn   • diclofenac sodium (VOTAREN XR) 100 MG 24 hr tablet TAKE ONE TABLET BY MOUTH EVERY DAY   • esomeprazole (nexIUM) 40 MG capsule Daily.   • estradiol (VIVELLE-DOT) 0.05 MG/24HR patch one film extended release transdermal 2 times a week   • FLUoxetine (PROzac) 40 MG capsule Take 40 mg by mouth Daily.   • fluticasone (FLONASE) 50 MCG/ACT nasal spray 2 sprays by Each Nare route Daily.   • HYDROcodone-acetaminophen (NORCO) 5-325 MG per tablet Every 12 (Twelve) Hours.   • levothyroxine (SYNTHROID, LEVOTHROID) 75 MCG tablet Take 75 mcg by mouth Daily.   • lidocaine (LIDODERM) 5 % APPLY 1 PATCH EVERY DAY AS NEEDED 12 HOURS AND 12 HOURS OFF   • meclizine (ANTIVERT) 25 MG tablet TAKE ONE TABLET BY MOUTH THREE TIMES DAILY AS NEEDED FOR dizziness   • diclofenac sodium (VOTAREN XR) 100 MG 24 hr tablet TAKE ONE TABLET BY MOUTH EVERY DAY   • esomeprazole (NexIUM) 40 MG capsule Take 40 mg by mouth.   • estradiol (MINIVELLE, VIVELLE-DOT) 0.075 MG/24HR patch    • FLUoxetine (PROzac) 10 MG capsule Take 10 mg by mouth Daily.   •  "HYDROcodone-acetaminophen (NORCO) 7.5-325 MG per tablet TAKE ONE TABLET BY MOUTH TWICE DAILY AS NEEDED   • levothyroxine sodium (TIROSINT) 75 MCG capsule once a day     No current facility-administered medications on file prior to visit.      Current Medications:  Scheduled Meds:  Continuous Infusions:No current facility-administered medications for this visit.     PRN Meds:.    I have reviewed the patient's medical history in detail and updated the computerized patient record.  Review and summarization of old records includes:    Past Medical History:   Diagnosis Date   • Depression    • Diverticulitis    • Thyroid disease         Past Surgical History:   Procedure Laterality Date   •  SECTION      ALSO    • CHOLECYSTECTOMY     • HYSTERECTOMY     • KNEE ARTHROSCOPY Right     \"CLEAN UP\"   • KNEE ARTHROSCOPY W/ MENISCAL REPAIR Right         Social History     Occupational History   • Not on file   Tobacco Use   • Smoking status: Former Smoker   • Smokeless tobacco: Never Used   Substance and Sexual Activity   • Alcohol use: No   • Drug use: No   • Sexual activity: Defer      Social History     Social History Narrative   • Not on file        Family History   Problem Relation Age of Onset   • Heart disease Other    • Lung disease Other        ROS: 14 point review of systems was performed and all other systems were reviewed and are negative except for documented findings in HPI and today's encounter.     Allergies:   Allergies   Allergen Reactions   • Codeine Anaphylaxis     Chest pains   • Erythromycin Diarrhea     Cramps     Constitutional:  Denies fever, shaking or chills   Eyes:  Denies change in visual acuity   HENT:  Denies nasal congestion or sore throat   Respiratory:  Denies cough or shortness of breath   Cardiovascular:  Denies chest pain or severe LE edema   GI:  Denies abdominal pain, nausea, vomiting, bloody stools or diarrhea   Musculoskeletal:  Numbness, tingling, pain, or " "loss of motor function only as noted above in history of present illness.  : Denies painful urination or hematuria  Integument:  Denies rash, lesion or ulceration   Neurologic:  Denies headache or focal weakness  Endocrine:  Denies lymphadenopathy  Psych:  Denies confusion or change in mental status   Hem:  Denies active bleeding    OBJECTIVE:  Physical Exam:   Temp 97.7 °F (36.5 °C)   Ht 160 cm (63\")   Wt 86.2 kg (190 lb)   BMI 33.66 kg/m²     General Appearance:    Alert, cooperative, in no acute distress                  Eyes: conjunctiva clear  ENT: external ears and nose atraumatic  CV: no peripheral edema  Resp: normal respiratory effort  Skin: no rashes or wounds; normal turgor  Psych: mood and affect appropriate  Lymph: no nodes appreciated  Neuro: gross sensation intact  Vascular:  Palpable peripheral pulse in noted extremity  Musculoskeletal Extremities: Skin is warm dry and intact with good pulses and sensation right knee has medial joint line tenderness with effusion calves are soft and nontender ambulates without any assistive devices    Radiology:   3 views right knee with tricompartmental osteoarthritis with osteophytes comparison views 7/19 no change and medial DJD severe     Assessment:     ICD-10-CM ICD-9-CM   1. Chronic pain of right knee  M25.561 719.46    G89.29 338.29   2. Primary osteoarthritis of right knee  M17.11 715.16        Large Joint Arthrocentesis: R knee  Date/Time: 9/21/2020 10:05 AM  Consent given by: patient  Site marked: site marked  Timeout: Immediately prior to procedure a time out was called to verify the correct patient, procedure, equipment, support staff and site/side marked as required   Supporting Documentation  Indications: pain and joint swelling   Procedure Details  Location: knee - R knee  Preparation: Patient was prepped and draped in the usual sterile fashion  Needle gauge: 21 G.  Approach: anterolateral  Medications administered: 2 mL lidocaine (cardiac); 80 " mg methylPREDNISolone acetate 80 MG/ML  Patient tolerance: patient tolerated the procedure well with no immediate complications             Plan: Biomechanics of pertinent body area discussed.  Risks, benefits, alternatives, comparisons, and complications of accepted medicines, injections, recommendations, surgical procedures, and therapies explained and education provided in laymen's terms. Natural history and expected course of this patient's diagnosis discussed along with evaluation of therapies. Questions answered. When appropriate I also discussed proper use of cane, walker, trekking poles.   BMI:  The concept of BMI body mass index and its importance and implications discussed.  BMI suggested to be < 40 or as low as possible. Lifestyle measures for weight loss and how this affects orthopedic condition.  EXERCISES:  Advice on benefits of, and types of regular/moderate exercise including biomechanical forces involved as it pertains to this complaint.  MEDICATIONS:  Prescription, OTC and Monitoring of Medications per orders to address ortho complaints; Evaluation and discussion of safety, precautions, side effects, and warnings given especially of long term NSAID or steroid therapy.    RICE: Rest, ice, compression, and elevation therapy, Cryotherapy/brachy therapy, and or OTC linaments as indicated with instructions.   Cortisone Injection. See procedure note.      9/21/2020    Much of this encounter note is an electronic transcription/translation of spoken language to printed text. The electronic translation of spoken language may permit erroneous, or at times, nonsensical words or phrases to be inadvertently transcribed; Although I have reviewed the note for such errors, some may still exist

## 2020-10-16 ENCOUNTER — HOSPITAL ENCOUNTER (OUTPATIENT)
Dept: LAB | Facility: HOSPITAL | Age: 60
Discharge: HOME | End: 2020-10-16
Attending: INTERNAL MEDICINE

## 2020-10-16 LAB
ALBUMIN SERPL-MCNC: 4.1 G/DL (ref 3.5–5)
ALBUMIN/GLOB SERPL: 1.5 {RATIO} (ref 1.4–2.6)
ALP SERPL-CCNC: 71 U/L (ref 53–141)
ALT SERPL-CCNC: 18 U/L (ref 10–40)
AMPHETAMINES UR QL SCN: NEGATIVE
ANION GAP SERPL CALC-SCNC: 17 MMOL/L (ref 8–19)
AST SERPL-CCNC: 25 U/L (ref 15–50)
BARBITURATES UR QL SCN: NEGATIVE
BENZODIAZ UR QL SCN: NEGATIVE
BILIRUB SERPL-MCNC: 0.23 MG/DL (ref 0.2–1.3)
BUN SERPL-MCNC: 11 MG/DL (ref 5–25)
BUN/CREAT SERPL: 14 {RATIO} (ref 6–20)
CALCIUM SERPL-MCNC: 9.8 MG/DL (ref 8.7–10.4)
CHLORIDE SERPL-SCNC: 99 MMOL/L (ref 99–111)
CHOLEST SERPL-MCNC: 259 MG/DL (ref 107–200)
CHOLEST/HDLC SERPL: 7.4 {RATIO} (ref 3–6)
CONV CO2: 27 MMOL/L (ref 22–32)
CONV COCAINE, UR: NEGATIVE
CONV TOTAL PROTEIN: 6.9 G/DL (ref 6.3–8.2)
CREAT UR-MCNC: 0.79 MG/DL (ref 0.5–0.9)
EST. AVERAGE GLUCOSE BLD GHB EST-MCNC: 123 MG/DL
GFR SERPLBLD BASED ON 1.73 SQ M-ARVRAT: >60 ML/MIN/{1.73_M2}
GLOBULIN UR ELPH-MCNC: 2.8 G/DL (ref 2–3.5)
GLUCOSE SERPL-MCNC: 93 MG/DL (ref 65–99)
HBA1C MFR BLD: 5.9 % (ref 3.5–5.7)
HDLC SERPL-MCNC: 35 MG/DL (ref 40–60)
LDLC SERPL CALC-MCNC: 96 MG/DL (ref 70–100)
METHADONE UR QL SCN: NEGATIVE
OPIATES TESTED UR SCN: POSITIVE
OSMOLALITY SERPL CALC.SUM OF ELEC: 285 MOSM/KG (ref 273–304)
OXYCODONE UR QL SCN: NEGATIVE
PCP UR QL: NEGATIVE
POTASSIUM SERPL-SCNC: 4.6 MMOL/L (ref 3.5–5.3)
SODIUM SERPL-SCNC: 138 MMOL/L (ref 135–147)
THC SERPLBLD CFM-MCNC: POSITIVE NG/ML
TRIGL SERPL-MCNC: 585 MG/DL (ref 40–150)

## 2020-10-26 ENCOUNTER — TELEPHONE (OUTPATIENT)
Dept: ORTHOPEDIC SURGERY | Facility: CLINIC | Age: 60
End: 2020-10-26

## 2020-10-26 NOTE — TELEPHONE ENCOUNTER
Caller: LATASHA KURTZ    Relationship to patient: SELF    Best call back number: [phone]    Patient is needing: PT RECEIVED A CALL ABOUT NEEDING TO RESCHEDULE WITH DR. SELF AS HE WILL BE OUT OF TOWN DURING THE TIME OF THE PT APPT. WAS NOT ABLE TO FIND AN APPT IN THE TIME FRAME (3 MO INJECTION) PLEASE ADVISE.

## 2020-10-26 NOTE — TELEPHONE ENCOUNTER
Caller: LATASHA KURTZ     Relationship to patient: SELF     Best call back number: [phone]     Patient is needing: PT RECEIVED A CALL ABOUT NEEDING TO RESCHEDULE WITH DR. SELF AS HE WILL BE OUT OF TOWN DURING THE TIME OF THE PT APPT. WAS NOT ABLE TO FIND AN APPT IN THE TIME FRAME (3 MO INJECTION) PLEASE ADVISE.

## 2020-12-08 ENCOUNTER — OFFICE VISIT CONVERTED (OUTPATIENT)
Dept: GASTROENTEROLOGY | Facility: CLINIC | Age: 60
End: 2020-12-08
Attending: NURSE PRACTITIONER

## 2020-12-21 ENCOUNTER — HOSPITAL ENCOUNTER (OUTPATIENT)
Dept: GENERAL RADIOLOGY | Facility: HOSPITAL | Age: 60
Discharge: HOME | End: 2020-12-21
Attending: INTERNAL MEDICINE

## 2020-12-21 ENCOUNTER — HOSPITAL ENCOUNTER (OUTPATIENT)
Dept: LAB | Facility: HOSPITAL | Age: 60
Discharge: HOME | End: 2020-12-21
Attending: INTERNAL MEDICINE

## 2020-12-21 LAB
CRP SERPL HS-MCNC: 0.56 MG/DL (ref 0–0.5)
ERYTHROCYTE [SEDIMENTATION RATE] IN BLOOD: 12 MM/H (ref 0–30)

## 2020-12-22 LAB
ASO AB SERPL-ACNC: 34 [IU]/ML (ref 0–200)
CONV RHEUMATOID FACTOR IGM: <10 [IU]/ML (ref 0–14)
CRP SERPL-MCNC: 6 MG/L (ref 0–5)
DSDNA AB SER-ACNC: NEGATIVE [IU]/ML
ENA AB SER IA-ACNC: NEGATIVE {RATIO}
HCV AB SER DONR QL: <0.1 S/CO RATIO (ref 0–0.9)
URATE SERPL-MCNC: 4.2 MG/DL (ref 2.5–7.5)

## 2020-12-23 LAB — CCP IGA+IGG SERPL IA-ACNC: 6 UNITS (ref 0–19)

## 2020-12-30 ENCOUNTER — CLINICAL SUPPORT (OUTPATIENT)
Dept: ORTHOPEDIC SURGERY | Facility: CLINIC | Age: 60
End: 2020-12-30

## 2020-12-30 VITALS — HEIGHT: 63 IN | BODY MASS INDEX: 35.44 KG/M2 | WEIGHT: 200 LBS | TEMPERATURE: 96 F

## 2020-12-30 DIAGNOSIS — M17.11 PRIMARY OSTEOARTHRITIS OF RIGHT KNEE: Primary | ICD-10-CM

## 2020-12-30 PROCEDURE — 99213 OFFICE O/P EST LOW 20 MIN: CPT | Performed by: NURSE PRACTITIONER

## 2020-12-30 PROCEDURE — 20610 DRAIN/INJ JOINT/BURSA W/O US: CPT | Performed by: NURSE PRACTITIONER

## 2020-12-30 RX ORDER — METHYLPREDNISOLONE ACETATE 80 MG/ML
80 INJECTION, SUSPENSION INTRA-ARTICULAR; INTRALESIONAL; INTRAMUSCULAR; SOFT TISSUE
Status: COMPLETED | OUTPATIENT
Start: 2020-12-30 | End: 2020-12-30

## 2020-12-30 RX ORDER — ROSUVASTATIN CALCIUM 10 MG/1
10 TABLET, COATED ORAL DAILY
COMMUNITY
Start: 2020-12-12

## 2020-12-30 RX ADMIN — METHYLPREDNISOLONE ACETATE 80 MG: 80 INJECTION, SUSPENSION INTRA-ARTICULAR; INTRALESIONAL; INTRAMUSCULAR; SOFT TISSUE at 10:05

## 2020-12-30 NOTE — PROGRESS NOTES
Patient Name: Kamini Orta   YOB: 1960  Referring Primary Care Physician: Davin Hall MD  BMI: Body mass index is 35.43 kg/m².    Chief Complaint:    Chief Complaint   Patient presents with   • Right Knee - Follow-up        HPI: Pt of [facility] her for right knee pain wanting cortisone injections in the right knee they work for her and last about 3 months.  Mask were worn by everyone for the duration of the visit       Kamini Orta is a 60 y.o. female who presents today for evaluation of   Chief Complaint   Patient presents with   • Right Knee - Follow-up         Subjective   Medications:   Home Medications:  Current Outpatient Medications on File Prior to Visit   Medication Sig   • ALPRAZolam (XANAX) 0.5 MG tablet Take 0.5 mg by mouth Daily.   • chlordiazePOXIDE-Clidinium (LIBRAX PO) prn   • diclofenac sodium (VOTAREN XR) 100 MG 24 hr tablet TAKE ONE TABLET BY MOUTH EVERY DAY   • diclofenac sodium (VOTAREN XR) 100 MG 24 hr tablet TAKE ONE TABLET BY MOUTH EVERY DAY   • esomeprazole (NexIUM) 40 MG capsule Take 40 mg by mouth.   • esomeprazole (nexIUM) 40 MG capsule Daily.   • estradiol (MINIVELLE, VIVELLE-DOT) 0.075 MG/24HR patch    • estradiol (VIVELLE-DOT) 0.05 MG/24HR patch one film extended release transdermal 2 times a week   • FLUoxetine (PROzac) 40 MG capsule Take 40 mg by mouth Daily.   • fluticasone (FLONASE) 50 MCG/ACT nasal spray 2 sprays by Each Nare route Daily.   • HYDROcodone-acetaminophen (NORCO) 5-325 MG per tablet Every 12 (Twelve) Hours.   • levothyroxine (SYNTHROID, LEVOTHROID) 75 MCG tablet Take 75 mcg by mouth Daily.   • levothyroxine sodium (TIROSINT) 75 MCG capsule once a day   • lidocaine (LIDODERM) 5 % APPLY 1 PATCH EVERY DAY AS NEEDED 12 HOURS AND 12 HOURS OFF   • meclizine (ANTIVERT) 25 MG tablet TAKE ONE TABLET BY MOUTH THREE TIMES DAILY AS NEEDED FOR dizziness   • rosuvastatin (CRESTOR) 10 MG tablet Take 10 mg by mouth Daily.   • FLUoxetine (PROzac) 10 MG capsule Take 10  "mg by mouth Daily.   • HYDROcodone-acetaminophen (NORCO) 7.5-325 MG per tablet TAKE ONE TABLET BY MOUTH TWICE DAILY AS NEEDED   • [DISCONTINUED] atorvastatin (LIPITOR) 10 MG tablet Take 10 mg by mouth Daily.     No current facility-administered medications on file prior to visit.      Current Medications:  Scheduled Meds:  Continuous Infusions:No current facility-administered medications for this visit.     PRN Meds:.    I have reviewed the patient's medical history in detail and updated the computerized patient record.  Review and summarization of old records includes:    Past Medical History:   Diagnosis Date   • Depression    • Diverticulitis    • Thyroid disease         Past Surgical History:   Procedure Laterality Date   •  SECTION      ALSO    • CHOLECYSTECTOMY     • HYSTERECTOMY     • KNEE ARTHROSCOPY Right     \"CLEAN UP\"   • KNEE ARTHROSCOPY W/ MENISCAL REPAIR Right         Social History     Occupational History   • Not on file   Tobacco Use   • Smoking status: Former Smoker   • Smokeless tobacco: Never Used   Substance and Sexual Activity   • Alcohol use: No   • Drug use: No   • Sexual activity: Defer      Social History     Social History Narrative   • Not on file        Family History   Problem Relation Age of Onset   • Heart disease Other    • Lung disease Other        ROS: 14 point review of systems was performed and all other systems were reviewed and are negative except for documented findings in HPI and today's encounter.     Allergies:   Allergies   Allergen Reactions   • Codeine Anaphylaxis     Chest pains   • Erythromycin Diarrhea     Cramps     Constitutional:  Denies fever, shaking or chills   Eyes:  Denies change in visual acuity   HENT:  Denies nasal congestion or sore throat   Respiratory:  Denies cough or shortness of breath   Cardiovascular:  Denies chest pain or severe LE edema   GI:  Denies abdominal pain, nausea, vomiting, bloody stools or diarrhea " "  Musculoskeletal:  Numbness, tingling, pain, or loss of motor function only as noted above in history of present illness.  : Denies painful urination or hematuria  Integument:  Denies rash, lesion or ulceration   Neurologic:  Denies headache or focal weakness  Endocrine:  Denies lymphadenopathy  Psych:  Denies confusion or change in mental status   Hem:  Denies active bleeding    OBJECTIVE:  Physical Exam: 60 y.o. female  Wt Readings from Last 3 Encounters:   12/30/20 90.7 kg (200 lb)   09/21/20 86.2 kg (190 lb)   05/11/20 93 kg (205 lb)     Ht Readings from Last 1 Encounters:   12/30/20 160 cm (63\")     Body mass index is 35.43 kg/m².  Vitals:    12/30/20 1005   Temp: 96 °F (35.6 °C)     Vital signs reviewed.     General Appearance:    Alert, cooperative, in no acute distress                  Eyes: conjunctiva clear  ENT: external ears and nose atraumatic  CV: no peripheral edema  Resp: normal respiratory effort  Skin: no rashes or wounds; normal turgor  Psych: mood and affect appropriate  Lymph: no nodes appreciated  Neuro: gross sensation intact  Vascular:  Palpable peripheral pulse in noted extremity  Musculoskeletal Extremities: Skin is warm dry and intact with good pulses and sensation right knee has medial joint line tenderness with effusion calves are soft and nontender ambulates without any assistive devices    Radiology:   X-rays reviewed from 9/21/2020 tricompartmental osteoarthritic changes    Assessment: No diagnosis found.     Large Joint Arthrocentesis: R knee  Date/Time: 12/30/2020 10:05 AM  Consent given by: patient  Site marked: site marked  Timeout: Immediately prior to procedure a time out was called to verify the correct patient, procedure, equipment, support staff and site/side marked as required   Supporting Documentation  Indications: pain   Procedure Details  Location: knee - R knee  Needle size: 25 G  Approach: anteromedial  Medications administered: 4 mL lidocaine (cardiac); 80 mg " methylPREDNISolone acetate 80 MG/ML  Patient tolerance: patient tolerated the procedure well with no immediate complications             Plan: The diagnosis(es), natural history, pathophysiology and treatment for diagnosis(es) were discussed. Opportunity given and questions answered.  Biomechanics of pertinent body areas discussed.  When appropriate, the use of ambulatory aids discussed.  BMI:  The concept of BMI body mass index and its importance and implications discussed.    EXERCISES:  Advice on benefits of, and types of regular/moderate exercise pertaining to orthopedic diagnosis(es).  MEDICATIONS:  The risks, benefits, warnings,side effects and alternatives of medications discussed.  Inflammation/pain control; with cold, heat, elevation and/or liniments discussed as appropriate  Cortisone Injection. See procedure note.  MEDICAL RECORDS reviewed from other provider(s) for past and current medical history pertinent to this complaint.      12/30/2020    Much of this encounter note is an electronic transcription/translation of spoken language to printed text. The electronic translation of spoken language may permit erroneous, or at times, nonsensical words or phrases to be inadvertently transcribed; Although I have reviewed the note for such errors, some may still exist

## 2020-12-30 NOTE — PATIENT INSTRUCTIONS
Knee Injection  A knee injection is a procedure to get medicine into your knee joint to relieve the pain, swelling, and stiffness of arthritis. Your health care provider uses a needle to inject medicine, which may also help to lubricate and cushion your knee joint. You may need more than one injection.  Tell a health care provider about:  · Any allergies you have.  · All medicines you are taking, including vitamins, herbs, eye drops, creams, and over-the-counter medicines.  · Any problems you or family members have had with anesthetic medicines.  · Any blood disorders you have.  · Any surgeries you have had.  · Any medical conditions you have.  · Whether you are pregnant or may be pregnant.  What are the risks?  Generally, this is a safe procedure. However, problems may occur, including:  · Infection.  · Bleeding.  · Symptoms that get worse.  · Damage to the area around your knee.  · Allergic reaction to any of the medicines.  · Skin reactions from repeated injections.  What happens before the procedure?  · Ask your health care provider about changing or stopping your regular medicines. This is especially important if you are taking diabetes medicines or blood thinners.  · Plan to have someone take you home from the hospital or clinic.  What happens during the procedure?    · You will sit or lie down in a position for your knee to be treated.  · The skin over your kneecap will be cleaned with a germ-killing soap.  · You will be given a medicine that numbs the area (local anesthetic). You may feel some stinging.  · The medicine will be injected into your knee. The needle is carefully placed between your kneecap and your knee. The medicine is injected into the joint space.  · The needle will be removed at the end of the procedure.  · A bandage (dressing) may be placed over the injection site.  The procedure may vary among health care providers and hospitals.  What can I expect after the procedure?  · Your blood  pressure, heart rate, breathing rate, and blood oxygen level will be monitored until you leave the hospital or clinic.  · You may have to move your knee through its full range of motion. This helps to get all the medicine into your joint space.  · You will be watched to make sure that you do not have a reaction to the injected medicine.  · You may feel more pain, swelling, and warmth than you did before the injection. This reaction may last about 1-2 days.  Follow these instructions at home:  Medicines  · Take over-the-counter and prescription medicines only as told by your doctor.  · Do not drive or use heavy machinery while taking prescription pain medicine.  · Do not take medicines such as aspirin and ibuprofen unless your health care provider tells you to take them.  Injection site care  · Follow instructions from your health care provider about:  ? How to take care of your puncture site.  ? When and how you should change your dressing.  ? When you should remove your dressing.  · Check your injection area every day for signs of infection. Check for:  ? More redness, swelling, or pain after 2 days.  ? Fluid or blood.  ? Pus or a bad smell.  ? Warmth.  Managing pain, stiffness, and swelling    · If directed, put ice on the injection area:  ? Put ice in a plastic bag.  ? Place a towel between your skin and the bag.  ? Leave the ice on for 20 minutes, 2-3 times per day.  · Do not apply heat to your knee.  · Raise (elevate) the injection area above the level of your heart while you are sitting or lying down.  General instructions  · If you were given a dressing, keep it dry until your health care provider says it can be removed. Ask your health care provider when you can start showering or taking a bath.  · Avoid strenuous activities for as long as directed by your health care provider. Ask your health care provider when you can return to your normal activities.  · Keep all follow-up visits as told by your health  care provider. This is important. You may need more injections.  Contact a health care provider if you have:  · A fever.  · Warmth in your injection area.  · Fluid, blood, or pus coming from your injection site.  · Symptoms at your injection site that last longer than 2 days after your procedure.  Get help right away if:  · Your knee:  ? Turns very red.  ? Becomes very swollen.  ? Is in severe pain.  Summary  · A knee injection is a procedure to get medicine into your knee joint to relieve the pain, swelling, and stiffness of arthritis.  · A needle is carefully placed between your kneecap and your knee to inject medicine into the joint space.  · Before the procedure, ask your health care provider about changing or stopping your regular medicines, especially if you are taking diabetes medicines or blood thinners.  · Contact your health care provider if you have any problems or questions after your procedure.  This information is not intended to replace advice given to you by your health care provider. Make sure you discuss any questions you have with your health care provider.  Document Revised: 01/07/2019 Document Reviewed: 01/07/2019  Elsevier Patient Education © 2020 [employer]

## 2021-02-09 ENCOUNTER — OFFICE VISIT (OUTPATIENT)
Dept: ORTHOPEDIC SURGERY | Facility: CLINIC | Age: 61
End: 2021-02-09

## 2021-02-09 VITALS — BODY MASS INDEX: 35.44 KG/M2 | WEIGHT: 200 LBS | HEIGHT: 63 IN | TEMPERATURE: 97.2 F

## 2021-02-09 DIAGNOSIS — R52 PAIN: ICD-10-CM

## 2021-02-09 DIAGNOSIS — M17.12 ARTHRITIS OF LEFT KNEE: Primary | ICD-10-CM

## 2021-02-09 PROCEDURE — 20610 DRAIN/INJ JOINT/BURSA W/O US: CPT | Performed by: NURSE PRACTITIONER

## 2021-02-09 PROCEDURE — 73562 X-RAY EXAM OF KNEE 3: CPT | Performed by: NURSE PRACTITIONER

## 2021-02-09 PROCEDURE — 99213 OFFICE O/P EST LOW 20 MIN: CPT | Performed by: NURSE PRACTITIONER

## 2021-02-09 RX ORDER — METHYLPREDNISOLONE ACETATE 80 MG/ML
80 INJECTION, SUSPENSION INTRA-ARTICULAR; INTRALESIONAL; INTRAMUSCULAR; SOFT TISSUE
Status: COMPLETED | OUTPATIENT
Start: 2021-02-09 | End: 2021-02-09

## 2021-02-09 RX ADMIN — METHYLPREDNISOLONE ACETATE 80 MG: 80 INJECTION, SUSPENSION INTRA-ARTICULAR; INTRALESIONAL; INTRAMUSCULAR; SOFT TISSUE at 11:15

## 2021-02-09 NOTE — PROGRESS NOTES
Patient Name: Kamini Orta   YOB: 1960  Referring Primary Care Physician: Davin Hall MD  BMI: Body mass index is 35.43 kg/m².    Chief Complaint:    Chief Complaint   Patient presents with   • Left Knee - Initial Evaluation, Pain        HPI:     Kamini Orta is a 60 y.o. female who presents today for evaluation of   Chief Complaint   Patient presents with   • Left Knee - Initial Evaluation, Pain   .  Patient reports for evaluation of acute left knee pain. She has been receiving cortisone injections in her right knee for osteoarthritis every 3 months but her left knee has not bothered her.  She was recently in [place] helping take care of her brother who was diagnosed with cancer.  On Friday when she flew home and was walking longer distances in the airport, etc her left knee started hurting.  She reports an achy, stiffness throughout the entire knee.  It intermittently swells.  She has been using heat and compression with minimal relief.  She is also taking 2 Aleve twice a day.  She denies any acute injury/trauma.  She denies any locking or catching of the knee.      Subjective   Medications:   Home Medications:  Current Outpatient Medications on File Prior to Visit   Medication Sig   • ALPRAZolam (XANAX) 0.5 MG tablet Take 0.5 mg by mouth Daily.   • chlordiazePOXIDE-Clidinium (LIBRAX PO) prn   • diclofenac sodium (VOTAREN XR) 100 MG 24 hr tablet TAKE ONE TABLET BY MOUTH EVERY DAY   • diclofenac sodium (VOTAREN XR) 100 MG 24 hr tablet TAKE ONE TABLET BY MOUTH EVERY DAY   • esomeprazole (NexIUM) 40 MG capsule Take 40 mg by mouth.   • esomeprazole (nexIUM) 40 MG capsule Daily.   • estradiol (MINIVELLE, VIVELLE-DOT) 0.075 MG/24HR patch    • estradiol (VIVELLE-DOT) 0.05 MG/24HR patch one film extended release transdermal 2 times a week   • FLUoxetine (PROzac) 10 MG capsule Take 10 mg by mouth Daily.   • FLUoxetine (PROzac) 40 MG capsule Take 40 mg by mouth Daily.   • fluticasone (FLONASE) 50  "MCG/ACT nasal spray 2 sprays by Each Nare route Daily.   • HYDROcodone-acetaminophen (NORCO) 5-325 MG per tablet Every 12 (Twelve) Hours.   • levothyroxine (SYNTHROID, LEVOTHROID) 75 MCG tablet Take 75 mcg by mouth Daily.   • levothyroxine sodium (TIROSINT) 75 MCG capsule once a day   • lidocaine (LIDODERM) 5 % APPLY 1 PATCH EVERY DAY AS NEEDED 12 HOURS AND 12 HOURS OFF   • meclizine (ANTIVERT) 25 MG tablet TAKE ONE TABLET BY MOUTH THREE TIMES DAILY AS NEEDED FOR dizziness   • rosuvastatin (CRESTOR) 10 MG tablet Take 10 mg by mouth Daily.   • HYDROcodone-acetaminophen (NORCO) 7.5-325 MG per tablet TAKE ONE TABLET BY MOUTH TWICE DAILY AS NEEDED     No current facility-administered medications on file prior to visit.      Current Medications:  Scheduled Meds:  Continuous Infusions:No current facility-administered medications for this visit.     PRN Meds:.    I have reviewed the patient's medical history in detail and updated the computerized patient record.  Review and summarization of old records includes:    Past Medical History:   Diagnosis Date   • Depression    • Diverticulitis    • Thyroid disease         Past Surgical History:   Procedure Laterality Date   •  SECTION      ALSO    • CHOLECYSTECTOMY     • HYSTERECTOMY     • KNEE ARTHROSCOPY Right     \"CLEAN UP\"   • KNEE ARTHROSCOPY W/ MENISCAL REPAIR Right         Social History     Occupational History   • Not on file   Tobacco Use   • Smoking status: Former Smoker   • Smokeless tobacco: Never Used   Substance and Sexual Activity   • Alcohol use: No   • Drug use: No   • Sexual activity: Defer      Social History     Social History Narrative   • Not on file        Family History   Problem Relation Age of Onset   • Heart disease Other    • Lung disease Other        ROS: 14 point review of systems was performed and all other systems were reviewed and are negative except for documented findings in HPI and today's encounter. " "    Allergies:   Allergies   Allergen Reactions   • Codeine Anaphylaxis     Chest pains   • Erythromycin Diarrhea     Cramps     Constitutional:  Denies fever, shaking or chills   Eyes:  Denies change in visual acuity   HENT:  Denies nasal congestion or sore throat   Respiratory:  Denies cough or shortness of breath   Cardiovascular:  Denies chest pain or severe LE edema   GI:  Denies abdominal pain, nausea, vomiting, bloody stools or diarrhea   Musculoskeletal:  Numbness, tingling, pain, or loss of motor function only as noted above in history of present illness.  : Denies painful urination or hematuria  Integument:  Denies rash, lesion or ulceration   Neurologic:  Denies headache or focal weakness  Endocrine:  Denies lymphadenopathy  Psych:  Denies confusion or change in mental status   Hem:  Denies active bleeding    OBJECTIVE:  Physical Exam: 60 y.o. female  Wt Readings from Last 3 Encounters:   02/09/21 90.7 kg (200 lb)   12/30/20 90.7 kg (200 lb)   09/21/20 86.2 kg (190 lb)     Ht Readings from Last 1 Encounters:   02/09/21 160 cm (63\")     Body mass index is 35.43 kg/m².  Vitals:    02/09/21 1101   Temp: 97.2 °F (36.2 °C)     Vital signs reviewed.     General Appearance:    Alert, cooperative, in no acute distress                  Eyes: conjunctiva clear  ENT: external ears and nose atraumatic  CV: no peripheral edema  Resp: normal respiratory effort  Skin: no rashes or wounds; normal turgor  Psych: mood and affect appropriate  Lymph: no nodes appreciated  Neuro: gross sensation intact  Vascular:  Palpable peripheral pulse in noted extremity  Musculoskeletal Extremities: Swelling, synovitis, medial lateral joint line tenderness.  Small Wilkinson's cyst.  Glenn negative.  No ligamentous instability.  Calf soft, nontender.  Skin clean, intact with no rash or lesions.    Radiology:   AP, lateral, 40 degree PA of left knee obtained in office today due to pain with prior comparison shows moderate arthritis of " left knee with no acute fracture    Assessment:     ICD-10-CM ICD-9-CM   1. Arthritis of left knee  M17.12 716.96   2. Pain  R52 780.96        Large Joint Arthrocentesis: L knee  Date/Time: 2/9/2021 11:15 AM  Consent given by: patient  Site marked: site marked  Timeout: Immediately prior to procedure a time out was called to verify the correct patient, procedure, equipment, support staff and site/side marked as required   Supporting Documentation  Indications: pain   Procedure Details  Location: knee - L knee  Preparation: Patient was prepped and draped in the usual sterile fashion  Needle gauge: 21G.  Approach: anterolateral  Medications administered: 4 mL lidocaine (cardiac); 80 mg methylPREDNISolone acetate 80 MG/ML               Plan: The diagnosis(es), natural history, pathophysiology and treatment for diagnosis(es) were discussed. Opportunity given and questions answered.  Biomechanics of pertinent body areas discussed.  When appropriate, the use of ambulatory aids discussed.  EXERCISES:  Advice on benefits of, and types of regular/moderate exercise pertaining to orthopedic diagnosis(es).  MEDICATIONS:  The risks, benefits, warnings,side effects and alternatives of medications discussed.  Inflammation/pain control; with cold, heat, elevation and/or liniments discussed as appropriate  Cortisone Injection. See procedure note.  Discussed with patient that her pain is likely due to an arthritic flare.  Proceeded with cortisone injection today.  Encouraged to use ice, elevation, and topical liniments as needed.  Continue Aleve twice daily.  Instructed to schedule follow-up in 4 to 6 weeks for reevaluation.  If she is doing okay at that point she can cancel appointment and follow-up as needed.    2/9/2021    Much of this encounter note is an electronic transcription/translation of spoken language to printed text. The electronic translation of spoken language may permit erroneous, or at times, nonsensical words or  phrases to be inadvertently transcribed; Although I have reviewed the note for such errors, some may still exist

## 2021-03-18 ENCOUNTER — HOSPITAL ENCOUNTER (OUTPATIENT)
Dept: PREADMISSION TESTING | Facility: HOSPITAL | Age: 61
Discharge: HOME | End: 2021-03-18
Attending: INTERNAL MEDICINE

## 2021-03-19 ENCOUNTER — OFFICE VISIT (OUTPATIENT)
Dept: ORTHOPEDIC SURGERY | Facility: CLINIC | Age: 61
End: 2021-03-19

## 2021-03-19 VITALS — BODY MASS INDEX: 35.44 KG/M2 | HEIGHT: 63 IN | WEIGHT: 200 LBS | TEMPERATURE: 97.2 F

## 2021-03-19 DIAGNOSIS — M17.12 ARTHRITIS OF LEFT KNEE: Primary | ICD-10-CM

## 2021-03-19 DIAGNOSIS — M17.0 ARTHRITIS OF BOTH KNEES: ICD-10-CM

## 2021-03-19 LAB — SARS-COV-2 RNA SPEC QL NAA+PROBE: NOT DETECTED

## 2021-03-19 PROCEDURE — 99213 OFFICE O/P EST LOW 20 MIN: CPT | Performed by: ORTHOPAEDIC SURGERY

## 2021-03-19 RX ORDER — HYDROCODONE BITARTRATE AND ACETAMINOPHEN 5; 325 MG/1; MG/1
TABLET ORAL EVERY 12 HOURS SCHEDULED
COMMUNITY
Start: 2021-02-17 | End: 2021-10-21 | Stop reason: SDUPTHER

## 2021-03-19 RX ORDER — METHYLPREDNISOLONE 4 MG/1
TABLET ORAL
Qty: 21 TABLET | Refills: 0 | Status: SHIPPED | OUTPATIENT
Start: 2021-03-19 | End: 2021-10-21

## 2021-03-19 NOTE — PROGRESS NOTES
Patient Name: Kamini Orta   YOB: 1960  Referring Primary Care Physician: Davin Hall MD  BMI: Body mass index is 35.43 kg/m².    Chief Complaint:    Chief Complaint   Patient presents with   • Left Knee - Follow-up, Pain        HPI:     Kamini Orta is a 60 y.o. female who presents today for evaluation of   Chief Complaint   Patient presents with   • Left Knee - Follow-up, Pain   . Ms. Orta presents today for left knee pain. Approximately 5 weeks ago, she received a cortisone injection which provided relief for approximately 4 weeks. However, the pain has since returned. She denies experiencing right knee pain today. She describes a significant pop in her knee as well as a shifting sensation yesterday, and is concerned she may have dislocated her knee. The patient reports that she is unable to bear weight on it. Ms. Orta has been working on exercises at home; however, she is not participating in formal physical therapy. The patient reports that she has been travelling back and forth to [place] every 3 weeks to help take care of her ailing brother, and is concerned that this may interfere with her ability to participate in formal physical therapy.     Ms. Orta has a neurological disease and her back flares with exercise. She denies any history of diabetes. She was previously taking a double dose of Aleve; however, she was instructed to stop taking so much Aleve. She has been prescribed a Medrol Dosepak in the past.       Subjective   Medications:   Home Medications:  Current Outpatient Medications on File Prior to Visit   Medication Sig   • ALPRAZolam (XANAX) 0.5 MG tablet Take 0.5 mg by mouth Daily.   • diclofenac sodium (VOTAREN XR) 100 MG 24 hr tablet TAKE ONE TABLET BY MOUTH EVERY DAY   • estradiol (VIVELLE-DOT) 0.05 MG/24HR patch one film extended release transdermal 2 times a week   • FLUoxetine (PROzac) 40 MG capsule Take 40 mg by mouth Daily.   • HYDROcodone-acetaminophen (NORCO) 5-325  "MG per tablet Every 12 (Twelve) Hours.   • levothyroxine (SYNTHROID, LEVOTHROID) 75 MCG tablet Take 75 mcg by mouth Daily.   • lidocaine (LIDODERM) 5 % APPLY 1 PATCH EVERY DAY AS NEEDED 12 HOURS AND 12 HOURS OFF   • meclizine (ANTIVERT) 25 MG tablet TAKE ONE TABLET BY MOUTH THREE TIMES DAILY AS NEEDED FOR dizziness   • rosuvastatin (CRESTOR) 10 MG tablet Take 10 mg by mouth Daily.   • chlordiazePOXIDE-Clidinium (LIBRAX PO) prn   • diclofenac sodium (VOTAREN XR) 100 MG 24 hr tablet TAKE ONE TABLET BY MOUTH EVERY DAY   • esomeprazole (NexIUM) 40 MG capsule Take 40 mg by mouth.   • esomeprazole (nexIUM) 40 MG capsule Daily.   • estradiol (MINIVELLE, VIVELLE-DOT) 0.075 MG/24HR patch    • FLUoxetine (PROzac) 10 MG capsule Take 10 mg by mouth Daily.   • fluticasone (FLONASE) 50 MCG/ACT nasal spray 2 sprays by Each Nare route Daily.   • HYDROcodone-acetaminophen (NORCO) 5-325 MG per tablet Every 12 (Twelve) Hours.   • HYDROcodone-acetaminophen (NORCO) 7.5-325 MG per tablet TAKE ONE TABLET BY MOUTH TWICE DAILY AS NEEDED   • levothyroxine sodium (TIROSINT) 75 MCG capsule once a day     No current facility-administered medications on file prior to visit.     Current Medications:  Scheduled Meds:  Continuous Infusions:No current facility-administered medications for this visit.    PRN Meds:.    I have reviewed the patient's medical history in detail and updated the computerized patient record.  Review and summarization of old records includes:    Past Medical History:   Diagnosis Date   • Depression    • Diverticulitis    • Thyroid disease         Past Surgical History:   Procedure Laterality Date   •  SECTION      ALSO    • CHOLECYSTECTOMY     • HYSTERECTOMY     • KNEE ARTHROSCOPY Right     \"CLEAN UP\"   • KNEE ARTHROSCOPY W/ MENISCAL REPAIR Right         Social History     Occupational History   • Not on file   Tobacco Use   • Smoking status: Former Smoker   • Smokeless tobacco: Never Used " "  Substance and Sexual Activity   • Alcohol use: No   • Drug use: No   • Sexual activity: Defer      Social History     Social History Narrative   • Not on file        Family History   Problem Relation Age of Onset   • Heart disease Other    • Lung disease Other        ROS: 14 point review of systems was performed and all other systems were reviewed and are negative except for documented findings in HPI and today's encounter.     Allergies:   Allergies   Allergen Reactions   • Codeine Anaphylaxis     Chest pains   • Erythromycin Diarrhea     Cramps     Constitutional:  Denies fever, shaking or chills   Eyes:  Denies change in visual acuity   HENT:  Denies nasal congestion or sore throat   Respiratory:  Denies cough or shortness of breath   Cardiovascular:  Denies chest pain or severe LE edema   GI:  Denies abdominal pain, nausea, vomiting, bloody stools or diarrhea   Musculoskeletal:  Numbness, tingling, pain, or loss of motor function only as noted above in history of present illness.  : Denies painful urination or hematuria  Integument:  Denies rash, lesion or ulceration   Neurologic:  Denies headache or focal weakness  Endocrine:  Denies lymphadenopathy  Psych:  Denies confusion or change in mental status   Hem:  Denies active bleeding    OBJECTIVE:  Physical Exam: 60 y.o. female  Wt Readings from Last 3 Encounters:   03/19/21 90.7 kg (200 lb)   02/09/21 90.7 kg (200 lb)   12/30/20 90.7 kg (200 lb)     Ht Readings from Last 1 Encounters:   03/19/21 160 cm (63\")     Body mass index is 35.43 kg/m².  Vitals:    03/19/21 0836   Temp: 97.2 °F (36.2 °C)     Vital signs reviewed.     General Appearance:    Alert, cooperative, in no acute distress                  Eyes: conjunctiva clear  ENT: external ears and nose atraumatic  CV: no peripheral edema  Resp: normal respiratory effort  Skin: no rashes or wounds; normal turgor  Psych: mood and affect appropriate  Lymph: no nodes appreciated  Neuro: gross sensation " intact  Vascular:  Palpable peripheral pulse in noted extremity  Musculoskeletal Extremities:  Left knee- Extension is to -5 degrees. She is very slow with flexion. She is globally tender and stiff. There is synovitis and an effusion present. No real erythema noted. She has a Baker cyst.     Radiology:   None today      Procedures      Assessment:     ICD-10-CM ICD-9-CM   1. Arthritis of left knee  M17.12 716.96   2. Arthritis of both knees  M17.0 716.96        MDM/Plan:   The diagnosis(es), natural history, pathophysiology and treatment for diagnosis(es) were discussed. Opportunity given and questions answered.  Biomechanics of pertinent body areas discussed.  When appropriate, the use of ambulatory aids discussed.    Ms. Orta presented today for left knee pain. Her left knee is globally tender and stiff with flexion with synovitis and effusion present. I recommended using ice, heat, and liniments for pain relief. I have provided her with a Medrol Dosepak. We discussed initiating formal physical therapy; however, the patient is concerned that her frequent trips to [place] will interfere with her ability to participate in and benefit from formal physical therapy at this time. I have recommended that she continue working on her exercises at home.         Inflammation/pain control; with cold, heat, elevation and/or liniments discussed as appropriate    3/19/2021    Scribed for Fabian Moore MD by KAREN MAGANA.  03/19/21   15:33 EDT    I have personally performed the services described in this document as scribed by the above individual, and it is both accurate and complete.  Fabian Moore MD  3/22/2021  12:47 EDT

## 2021-03-23 ENCOUNTER — HOSPITAL ENCOUNTER (OUTPATIENT)
Dept: GASTROENTEROLOGY | Facility: HOSPITAL | Age: 61
Setting detail: HOSPITAL OUTPATIENT SURGERY
Discharge: HOME | End: 2021-03-23
Attending: INTERNAL MEDICINE

## 2021-04-02 ENCOUNTER — CLINICAL SUPPORT (OUTPATIENT)
Dept: ORTHOPEDIC SURGERY | Facility: CLINIC | Age: 61
End: 2021-04-02

## 2021-04-02 VITALS — BODY MASS INDEX: 35.44 KG/M2 | WEIGHT: 200 LBS | TEMPERATURE: 98.4 F | HEIGHT: 63 IN

## 2021-04-02 DIAGNOSIS — M25.561 RIGHT KNEE PAIN, UNSPECIFIED CHRONICITY: ICD-10-CM

## 2021-04-02 DIAGNOSIS — M17.11 ARTHRITIS OF RIGHT KNEE: Primary | ICD-10-CM

## 2021-04-02 PROCEDURE — 73562 X-RAY EXAM OF KNEE 3: CPT | Performed by: NURSE PRACTITIONER

## 2021-04-02 PROCEDURE — 20610 DRAIN/INJ JOINT/BURSA W/O US: CPT | Performed by: NURSE PRACTITIONER

## 2021-04-02 RX ORDER — DICYCLOMINE HYDROCHLORIDE 10 MG/1
10 CAPSULE ORAL 2 TIMES DAILY
COMMUNITY
Start: 2021-03-19 | End: 2022-05-17

## 2021-04-02 RX ORDER — PANTOPRAZOLE SODIUM 40 MG/1
TABLET, DELAYED RELEASE ORAL
COMMUNITY
Start: 2021-03-23 | End: 2021-09-03

## 2021-04-02 RX ORDER — METHYLPREDNISOLONE ACETATE 80 MG/ML
80 INJECTION, SUSPENSION INTRA-ARTICULAR; INTRALESIONAL; INTRAMUSCULAR; SOFT TISSUE
Status: COMPLETED | OUTPATIENT
Start: 2021-04-02 | End: 2021-04-02

## 2021-04-02 RX ADMIN — METHYLPREDNISOLONE ACETATE 80 MG: 80 INJECTION, SUSPENSION INTRA-ARTICULAR; INTRALESIONAL; INTRAMUSCULAR; SOFT TISSUE at 10:51

## 2021-04-02 NOTE — PROGRESS NOTES
4/2/2021    Kamini Orta is here today for worsening knee pain. Pt has undergone injection of the knee in the past with good resolution of symptoms. Pt is requesting a repeat injection.   Radiology: AP, lateral, 40 degree PA of right knee obtained in office today due to pain with prior comparison shows severe, end-stage osteoarthritis of the right knee, worse in medial and patellofemoral compartments with varus pattern    KNEE Injection Procedure Note:  Large Joint Arthrocentesis: R knee  Date/Time: 4/2/2021 10:51 AM  Consent given by: patient  Site marked: site marked  Timeout: Immediately prior to procedure a time out was called to verify the correct patient, procedure, equipment, support staff and site/side marked as required   Supporting Documentation  Indications: pain   Procedure Details  Location: knee - R knee  Preparation: Patient was prepped and draped in the usual sterile fashion  Needle gauge: 21G.  Approach: anterolateral  Medications administered: 80 mg methylPREDNISolone acetate 80 MG/ML; 4 mL lidocaine (cardiac)  Patient tolerance: patient tolerated the procedure well with no immediate complications            At the conclusion of the injection I discussed the importance of continued quad strengthening exercises on a daily basis. I will see the patient back if the symptoms should fail to improve or worsen.    CAROLINE Hein  4/2/2021

## 2021-04-23 ENCOUNTER — TELEPHONE (OUTPATIENT)
Dept: ORTHOPEDIC SURGERY | Facility: CLINIC | Age: 61
End: 2021-04-23

## 2021-04-23 NOTE — TELEPHONE ENCOUNTER
Provider:  DR. COLIN SELF    Caller: KARRIE THOMSON PA-C OFFICE    Relationship to Patient: PCP      Phone Number:[phone]    Reason for Call: PRISCA THOMSON PA-C IS ASKING FOR SPM TO LOOK AT THE LEFT KNEE MRI SCANNED IN [employer]. DONE AT [facility] ON 4/21/21. ASKING FOR SPM TO SEE THIS TODAY.

## 2021-04-26 ENCOUNTER — HOSPITAL ENCOUNTER (OUTPATIENT)
Dept: GENERAL RADIOLOGY | Facility: HOSPITAL | Age: 61
Discharge: HOME | End: 2021-04-26
Attending: OBSTETRICS & GYNECOLOGY

## 2021-04-26 ENCOUNTER — TELEPHONE (OUTPATIENT)
Dept: ORTHOPEDIC SURGERY | Facility: CLINIC | Age: 61
End: 2021-04-26

## 2021-04-26 NOTE — TELEPHONE ENCOUNTER
Provider: COLIN SELF/RYAN GOLDEN  Caller: KARRIE AT PRISCA THOMSON'S OFFICE  Relationship to Patient: ANTONIETA    Phone Number: [phone]  Reason for Call:   PRISCA THOMSON PA-C IS ASKING FOR SPM TO LOOK AT THE LEFT KNEE MRI SCANNED IN [place]. DONE AT [facility] ON 4/21/21.   RYAN GOLDEN RETURNED CALL AND LEFT MESSAGE.  KARRIE WITH PRISCA THOMSON'S OFFICE CALLED AND SAID IF RYAN GOLDEN OR DR SELF COULD PLEASE CALL TODAY 1:00 TO 1:15 OR AFTER 2 PM PRISCA THOMSON WOULD BE AVAILABLE

## 2021-04-26 NOTE — TELEPHONE ENCOUNTER
I discussed MRI results with patients KAM Andrade. Also spoke with patient and she would like to scheduled an appointment with [facility] to discuss TKA. She will call the office to schedule when her and her  are both available. We briefly discussed surgery and I informed her that she would have to schedule 3 months out from last cortisone injection.

## 2021-05-14 VITALS
WEIGHT: 211 LBS | HEIGHT: 63 IN | BODY MASS INDEX: 37.39 KG/M2 | HEART RATE: 60 BPM | SYSTOLIC BLOOD PRESSURE: 156 MMHG | TEMPERATURE: 98.1 F | DIASTOLIC BLOOD PRESSURE: 93 MMHG

## 2021-06-21 ENCOUNTER — TRANSCRIBE ORDERS (OUTPATIENT)
Dept: LAB | Facility: HOSPITAL | Age: 61
End: 2021-06-21

## 2021-06-21 ENCOUNTER — LAB (OUTPATIENT)
Dept: LAB | Facility: HOSPITAL | Age: 61
End: 2021-06-21

## 2021-06-21 DIAGNOSIS — E03.9 HYPOTHYROIDISM, UNSPECIFIED TYPE: ICD-10-CM

## 2021-06-21 DIAGNOSIS — R73.01 IMPAIRED FASTING GLUCOSE: ICD-10-CM

## 2021-06-21 DIAGNOSIS — Z79.899 ENCOUNTER FOR LONG-TERM (CURRENT) USE OF OTHER MEDICATIONS: ICD-10-CM

## 2021-06-21 DIAGNOSIS — R73.01 IMPAIRED FASTING GLUCOSE: Primary | ICD-10-CM

## 2021-06-21 DIAGNOSIS — E78.5 HYPERLIPIDEMIA, UNSPECIFIED HYPERLIPIDEMIA TYPE: ICD-10-CM

## 2021-06-21 LAB
ALBUMIN SERPL-MCNC: 4.2 G/DL (ref 3.5–5.2)
ALBUMIN/GLOB SERPL: 1.8 G/DL
ALP SERPL-CCNC: 62 U/L (ref 39–117)
ALT SERPL W P-5'-P-CCNC: 21 U/L (ref 1–33)
AMPHET+METHAMPHET UR QL: NEGATIVE
ANION GAP SERPL CALCULATED.3IONS-SCNC: 7.5 MMOL/L (ref 5–15)
AST SERPL-CCNC: 27 U/L (ref 1–32)
BARBITURATES UR QL SCN: NEGATIVE
BASOPHILS # BLD AUTO: 0.07 10*3/MM3 (ref 0–0.2)
BASOPHILS NFR BLD AUTO: 0.9 % (ref 0–1.5)
BENZODIAZ UR QL SCN: POSITIVE
BILIRUB SERPL-MCNC: 0.2 MG/DL (ref 0–1.2)
BUN SERPL-MCNC: 11 MG/DL (ref 6–20)
BUN/CREAT SERPL: 15.9 (ref 7–25)
CALCIUM SPEC-SCNC: 8.8 MG/DL (ref 8.6–10.5)
CANNABINOIDS SERPL QL: POSITIVE
CHLORIDE SERPL-SCNC: 102 MMOL/L (ref 98–107)
CHOLEST SERPL-MCNC: 163 MG/DL (ref 0–200)
CO2 SERPL-SCNC: 26.5 MMOL/L (ref 22–29)
COCAINE UR QL: NEGATIVE
CREAT SERPL-MCNC: 0.69 MG/DL (ref 0.57–1)
DEPRECATED RDW RBC AUTO: 46.6 FL (ref 37–54)
EOSINOPHIL # BLD AUTO: 0.27 10*3/MM3 (ref 0–0.4)
EOSINOPHIL NFR BLD AUTO: 3.5 % (ref 0.3–6.2)
ERYTHROCYTE [DISTWIDTH] IN BLOOD BY AUTOMATED COUNT: 14.1 % (ref 12.3–15.4)
GFR SERPL CREATININE-BSD FRML MDRD: 87 ML/MIN/1.73
GLOBULIN UR ELPH-MCNC: 2.3 GM/DL
GLUCOSE SERPL-MCNC: 88 MG/DL (ref 65–99)
HBA1C MFR BLD: 5.69 % (ref 4.8–5.6)
HCT VFR BLD AUTO: 41.6 % (ref 34–46.6)
HDLC SERPL-MCNC: 35 MG/DL (ref 40–60)
HGB BLD-MCNC: 13.4 G/DL (ref 12–15.9)
LDLC SERPL CALC-MCNC: 74 MG/DL (ref 0–100)
LDLC/HDLC SERPL: 1.75 {RATIO}
LYMPHOCYTES # BLD AUTO: 3.05 10*3/MM3 (ref 0.7–3.1)
LYMPHOCYTES NFR BLD AUTO: 40 % (ref 19.6–45.3)
MCH RBC QN AUTO: 29.1 PG (ref 26.6–33)
MCHC RBC AUTO-ENTMCNC: 32.2 G/DL (ref 31.5–35.7)
MCV RBC AUTO: 90.2 FL (ref 79–97)
METHADONE UR QL SCN: NEGATIVE
MONOCYTES # BLD AUTO: 0.52 10*3/MM3 (ref 0.1–0.9)
MONOCYTES NFR BLD AUTO: 6.8 % (ref 5–12)
NEUTROPHILS NFR BLD AUTO: 3.69 10*3/MM3 (ref 1.7–7)
NEUTROPHILS NFR BLD AUTO: 48.4 % (ref 42.7–76)
OPIATES UR QL: POSITIVE
OXYCODONE UR QL SCN: NEGATIVE
PLATELET # BLD AUTO: 284 10*3/MM3 (ref 140–450)
PMV BLD AUTO: 10.4 FL (ref 6–12)
POTASSIUM SERPL-SCNC: 4.2 MMOL/L (ref 3.5–5.2)
PROT SERPL-MCNC: 6.5 G/DL (ref 6–8.5)
RBC # BLD AUTO: 4.61 10*6/MM3 (ref 3.77–5.28)
SODIUM SERPL-SCNC: 136 MMOL/L (ref 136–145)
T4 FREE SERPL-MCNC: 1.34 NG/DL (ref 0.93–1.7)
TRIGL SERPL-MCNC: 334 MG/DL (ref 0–150)
TSH SERPL DL<=0.05 MIU/L-ACNC: 1.64 UIU/ML (ref 0.27–4.2)
VLDLC SERPL-MCNC: 54 MG/DL (ref 5–40)
WBC # BLD AUTO: 7.63 10*3/MM3 (ref 3.4–10.8)

## 2021-06-21 PROCEDURE — 84443 ASSAY THYROID STIM HORMONE: CPT

## 2021-06-21 PROCEDURE — 80307 DRUG TEST PRSMV CHEM ANLYZR: CPT

## 2021-06-21 PROCEDURE — 36415 COLL VENOUS BLD VENIPUNCTURE: CPT

## 2021-06-21 PROCEDURE — 84439 ASSAY OF FREE THYROXINE: CPT

## 2021-06-21 PROCEDURE — 83036 HEMOGLOBIN GLYCOSYLATED A1C: CPT

## 2021-06-21 PROCEDURE — 85025 COMPLETE CBC W/AUTO DIFF WBC: CPT

## 2021-06-21 PROCEDURE — 80061 LIPID PANEL: CPT

## 2021-06-21 PROCEDURE — 80053 COMPREHEN METABOLIC PANEL: CPT

## 2021-06-25 ENCOUNTER — OFFICE VISIT (OUTPATIENT)
Dept: ORTHOPEDIC SURGERY | Facility: CLINIC | Age: 61
End: 2021-06-25

## 2021-06-25 ENCOUNTER — PREP FOR SURGERY (OUTPATIENT)
Dept: OTHER | Facility: HOSPITAL | Age: 61
End: 2021-06-25

## 2021-06-25 VITALS — BODY MASS INDEX: 35.44 KG/M2 | WEIGHT: 200 LBS | TEMPERATURE: 98.4 F | HEIGHT: 63 IN

## 2021-06-25 DIAGNOSIS — M17.11 ARTHRITIS OF RIGHT KNEE: Primary | ICD-10-CM

## 2021-06-25 DIAGNOSIS — M17.0 ARTHRITIS OF BOTH KNEES: Primary | ICD-10-CM

## 2021-06-25 PROCEDURE — 20610 DRAIN/INJ JOINT/BURSA W/O US: CPT | Performed by: NURSE PRACTITIONER

## 2021-06-25 PROCEDURE — 99214 OFFICE O/P EST MOD 30 MIN: CPT | Performed by: NURSE PRACTITIONER

## 2021-06-25 RX ORDER — METHYLPREDNISOLONE ACETATE 80 MG/ML
80 INJECTION, SUSPENSION INTRA-ARTICULAR; INTRALESIONAL; INTRAMUSCULAR; SOFT TISSUE
Status: COMPLETED | OUTPATIENT
Start: 2021-06-25 | End: 2021-06-25

## 2021-06-25 RX ORDER — ACETAMINOPHEN 500 MG
1000 TABLET ORAL ONCE
Status: CANCELLED | OUTPATIENT
Start: 2022-02-15 | End: 2021-06-25

## 2021-06-25 RX ORDER — PREGABALIN 75 MG/1
150 CAPSULE ORAL ONCE
Status: CANCELLED | OUTPATIENT
Start: 2022-02-15 | End: 2021-06-25

## 2021-06-25 RX ORDER — CEFAZOLIN SODIUM 2 G/100ML
2 INJECTION, SOLUTION INTRAVENOUS ONCE
Status: CANCELLED | OUTPATIENT
Start: 2022-02-15 | End: 2021-06-25

## 2021-06-25 RX ORDER — MELOXICAM 15 MG/1
15 TABLET ORAL ONCE
Status: CANCELLED | OUTPATIENT
Start: 2022-02-15 | End: 2021-06-25

## 2021-06-25 RX ORDER — CHLORHEXIDINE GLUCONATE 500 MG/1
1 CLOTH TOPICAL TAKE AS DIRECTED
Status: CANCELLED | OUTPATIENT
Start: 2021-06-25

## 2021-06-25 RX ADMIN — METHYLPREDNISOLONE ACETATE 80 MG: 80 INJECTION, SUSPENSION INTRA-ARTICULAR; INTRALESIONAL; INTRAMUSCULAR; SOFT TISSUE at 13:53

## 2021-07-26 RX ORDER — LEVOTHYROXINE SODIUM 0.07 MG/1
TABLET ORAL
Qty: 90 TABLET | Refills: 1 | Status: SHIPPED | OUTPATIENT
Start: 2021-07-26 | End: 2022-01-17

## 2021-08-03 DIAGNOSIS — M54.50 CHRONIC BILATERAL LOW BACK PAIN, UNSPECIFIED WHETHER SCIATICA PRESENT: Primary | ICD-10-CM

## 2021-08-03 DIAGNOSIS — G89.29 CHRONIC BILATERAL LOW BACK PAIN, UNSPECIFIED WHETHER SCIATICA PRESENT: Primary | ICD-10-CM

## 2021-08-03 RX ORDER — HYDROCODONE BITARTRATE AND ACETAMINOPHEN 5; 325 MG/1; MG/1
TABLET ORAL
Qty: 60 TABLET | Refills: 0 | Status: SHIPPED | OUTPATIENT
Start: 2021-08-03 | End: 2021-09-01

## 2021-08-03 RX ORDER — ALPRAZOLAM 0.5 MG/1
TABLET ORAL
Qty: 60 TABLET | Refills: 0 | Status: SHIPPED | OUTPATIENT
Start: 2021-08-03 | End: 2021-09-01

## 2021-09-01 DIAGNOSIS — G89.29 CHRONIC BILATERAL LOW BACK PAIN, UNSPECIFIED WHETHER SCIATICA PRESENT: ICD-10-CM

## 2021-09-01 DIAGNOSIS — M54.50 CHRONIC BILATERAL LOW BACK PAIN, UNSPECIFIED WHETHER SCIATICA PRESENT: ICD-10-CM

## 2021-09-01 RX ORDER — ALPRAZOLAM 0.5 MG/1
TABLET ORAL
Qty: 60 TABLET | Refills: 5 | Status: SHIPPED | OUTPATIENT
Start: 2021-09-01 | End: 2022-02-14

## 2021-09-01 RX ORDER — HYDROCODONE BITARTRATE AND ACETAMINOPHEN 5; 325 MG/1; MG/1
TABLET ORAL
Qty: 60 TABLET | Refills: 0 | Status: SHIPPED | OUTPATIENT
Start: 2021-09-01 | End: 2021-10-21 | Stop reason: SDUPTHER

## 2021-09-03 RX ORDER — PANTOPRAZOLE SODIUM 40 MG/1
TABLET, DELAYED RELEASE ORAL
Qty: 30 TABLET | Refills: 0 | Status: SHIPPED | OUTPATIENT
Start: 2021-09-03 | End: 2021-10-28

## 2021-10-12 ENCOUNTER — LAB (OUTPATIENT)
Dept: LAB | Facility: HOSPITAL | Age: 61
End: 2021-10-12

## 2021-10-12 ENCOUNTER — TRANSCRIBE ORDERS (OUTPATIENT)
Dept: LAB | Facility: HOSPITAL | Age: 61
End: 2021-10-12

## 2021-10-12 DIAGNOSIS — Z11.52 ENCOUNTER FOR SCREENING FOR COVID-19: Primary | ICD-10-CM

## 2021-10-12 DIAGNOSIS — Z11.52 ENCOUNTER FOR SCREENING FOR COVID-19: ICD-10-CM

## 2021-10-12 PROCEDURE — U0004 COV-19 TEST NON-CDC HGH THRU: HCPCS

## 2021-10-12 PROCEDURE — C9803 HOPD COVID-19 SPEC COLLECT: HCPCS

## 2021-10-13 LAB — SARS-COV-2 RNA NOSE QL NAA+PROBE: NOT DETECTED

## 2021-10-21 ENCOUNTER — OFFICE VISIT (OUTPATIENT)
Dept: INTERNAL MEDICINE | Facility: CLINIC | Age: 61
End: 2021-10-21

## 2021-10-21 ENCOUNTER — HOSPITAL ENCOUNTER (OUTPATIENT)
Dept: GENERAL RADIOLOGY | Facility: HOSPITAL | Age: 61
Discharge: HOME | End: 2021-10-21

## 2021-10-21 ENCOUNTER — LAB (OUTPATIENT)
Dept: LAB | Facility: HOSPITAL | Age: 61
End: 2021-10-21

## 2021-10-21 VITALS
DIASTOLIC BLOOD PRESSURE: 72 MMHG | OXYGEN SATURATION: 98 % | TEMPERATURE: 97.5 F | SYSTOLIC BLOOD PRESSURE: 120 MMHG | HEART RATE: 71 BPM | WEIGHT: 209.8 LBS | BODY MASS INDEX: 37.17 KG/M2 | RESPIRATION RATE: 16 BRPM | HEIGHT: 63 IN

## 2021-10-21 DIAGNOSIS — R06.02 SHORTNESS OF BREATH: ICD-10-CM

## 2021-10-21 DIAGNOSIS — J01.40 ACUTE NON-RECURRENT PANSINUSITIS: Primary | ICD-10-CM

## 2021-10-21 DIAGNOSIS — J01.40 ACUTE NON-RECURRENT PANSINUSITIS: ICD-10-CM

## 2021-10-21 PROCEDURE — U0004 COV-19 TEST NON-CDC HGH THRU: HCPCS

## 2021-10-21 PROCEDURE — 99213 OFFICE O/P EST LOW 20 MIN: CPT

## 2021-10-21 PROCEDURE — 71046 X-RAY EXAM CHEST 2 VIEWS: CPT

## 2021-10-21 RX ORDER — HYDROCODONE BITARTRATE AND ACETAMINOPHEN 5; 325 MG/1; MG/1
1 TABLET ORAL 2 TIMES DAILY PRN
COMMUNITY
End: 2021-11-09

## 2021-10-21 RX ORDER — AMOXICILLIN AND CLAVULANATE POTASSIUM 875; 125 MG/1; MG/1
1 TABLET, FILM COATED ORAL 2 TIMES DAILY
Qty: 20 TABLET | Refills: 0 | Status: SHIPPED | OUTPATIENT
Start: 2021-10-21 | End: 2022-03-02

## 2021-10-21 RX ORDER — FLUCONAZOLE 100 MG/1
TABLET ORAL
Qty: 4 TABLET | Refills: 0 | OUTPATIENT
Start: 2021-10-21

## 2021-10-21 RX ORDER — FLUCONAZOLE 150 MG/1
150 TABLET ORAL ONCE
Qty: 2 TABLET | Refills: 0 | Status: SHIPPED | OUTPATIENT
Start: 2021-10-21 | End: 2021-10-21

## 2021-10-21 RX ORDER — PREDNISONE 20 MG/1
TABLET ORAL
Qty: 11 TABLET | Refills: 0 | Status: SHIPPED | OUTPATIENT
Start: 2021-10-21 | End: 2022-03-02

## 2021-10-21 NOTE — PROGRESS NOTES
"Chief Complaint  Cough (congestion, ears, headache, can feel it in chest. had rapid test for COVId and it was negative.)    Subjective          History of Present Illness  Kamini Orta presents to [facility]  Patient has been feeling bad for about a week and a half. Patient has a cough, productive yellow-green sputum, sinus pressure, congestion, headaches, drainage, body aches, a little SOA.    Rapid Covid was negative, completed last week.      No nausea, fevers, vomiting or diarrhea. Admits to a loss of smell/taste for a couple of days. Taste and smell have returned.     Has taken mucinex DM, flonase, and zyrtec.  Flonase caused nose bleeds.     Objective   Vital Signs:   /72 (BP Location: Right arm, Patient Position: Sitting, Cuff Size: Adult)   Pulse 71   Temp 97.5 °F (36.4 °C) (Temporal)   Resp 16   Ht 160 cm (63\")   Wt 95.2 kg (209 lb 12.8 oz)   SpO2 98%   BMI 37.16 kg/m²     Physical Exam  Constitutional:       Appearance: She is normal weight. She is ill-appearing.   HENT:      Right Ear: A middle ear effusion is present.      Left Ear: A middle ear effusion is present.      Nose:      Right Turbinates: Enlarged.      Left Turbinates: Enlarged.      Right Sinus: Maxillary sinus tenderness and frontal sinus tenderness present.      Left Sinus: Maxillary sinus tenderness and frontal sinus tenderness present.      Mouth/Throat:      Mouth: Mucous membranes are moist.      Pharynx: Posterior oropharyngeal erythema present. No oropharyngeal exudate.   Eyes:      Extraocular Movements: Extraocular movements intact.      Conjunctiva/sclera: Conjunctivae normal.   Cardiovascular:      Rate and Rhythm: Normal rate and regular rhythm.   Pulmonary:      Effort: Pulmonary effort is normal. No respiratory distress.      Breath sounds: Rhonchi present. No wheezing or rales.   Musculoskeletal:         General: Normal range of motion.      Cervical back: Normal range of " motion.   Skin:     General: Skin is warm and dry.   Neurological:      General: No focal deficit present.   Psychiatric:         Mood and Affect: Mood normal.         Behavior: Behavior normal.         Thought Content: Thought content normal.         Judgment: Judgment normal.        Result Review :                 Assessment and Plan    Diagnoses and all orders for this visit:    1. Acute non-recurrent pansinusitis (Primary)  Assessment & Plan:  Patient has been complaining of chest congestion, sinus pressure, prod cough, body aches, headaches, nasal drainage, body aches, and some mild soa for the last week and a half. She states she was tested for covid last week and it was negative. She denies any fever, nausea, vomiting, diarrhea. She is on zyrtec, mucinex, and has taken flonase some but it caused nose bleeds.  Patient does have maxillary and frontal sinus tenderness to palpation. Lungs sound tight, rhonchi with cough.   Plan: Start augmentin, prednisone. Chest x-ray and recheck covid test. Continue zyrtec, mucinex and flonase if tolerated. Discussed adding in a saline nasal spray as well if she does the flonase.     Orders:  -     COVID-19 PCR, [facility], NP SWAB IN [facility]/ORAL SWISH 24-30 HR TAT - Swab, Nasopharynx; Future    2. Shortness of breath  -     COVID-19 PCR, [facility], NP SWAB IN [facility]/ORAL SWISH 24-30 HR TAT - Swab, Nasopharynx; Future  -     XR Chest PA & Lateral    Other orders  -     predniSONE (DELTASONE) 20 MG tablet; Take 2 tabs daily for 3 days, then take 1 tab daily for 3 days, then take 1/2 tab daily for 3 days  Dispense: 11 tablet; Refill: 0  -     amoxicillin-clavulanate (Augmentin) 875-125 MG per tablet; Take 1 tablet by mouth 2 (Two) Times a Day.  Dispense: 20 tablet; Refill: 0  -     fluconazole (Diflucan) 150 MG tablet; Take 1 tablet by mouth 1 (One) Time for 1 dose. Then may take another dose in 3 days if needed.  Dispense: 2 tablet;  Refill: 0      Follow Up   Return if symptoms worsen or fail to improve.  Patient was given instructions and counseling regarding her condition or for health maintenance advice. Please see specific information pulled into the AVS if appropriate.

## 2021-10-21 NOTE — ASSESSMENT & PLAN NOTE
Patient has been complaining of chest congestion, sinus pressure, prod cough, body aches, headaches, nasal drainage, body aches, and some mild soa for the last week and a half. She states she was tested for covid last week and it was negative. She denies any fever, nausea, vomiting, diarrhea. She is on zyrtec, mucinex, and has taken flonase some but it caused nose bleeds.  Patient does have maxillary and frontal sinus tenderness to palpation. Lungs sound tight, rhonchi with cough.   Plan: Start augmentin, prednisone. Chest x-ray and recheck covid test. Continue zyrtec, mucinex and flonase if tolerated. Discussed adding in a saline nasal spray as well if she does the flonase.

## 2021-10-21 NOTE — TELEPHONE ENCOUNTER
This patient saw you this morning and forgot to tell you that any time she takes an antibiotic she gets an UTI, wanting to know if you can prescribe her something different. I think she recommended declofenac

## 2021-10-22 LAB — SARS-COV-2 RNA NOSE QL NAA+PROBE: NOT DETECTED

## 2021-10-25 RX ORDER — PANTOPRAZOLE SODIUM 40 MG/1
TABLET, DELAYED RELEASE ORAL
Qty: 30 TABLET | Refills: 0 | OUTPATIENT
Start: 2021-10-25

## 2021-10-26 RX ORDER — PANTOPRAZOLE SODIUM 40 MG/1
TABLET, DELAYED RELEASE ORAL
Qty: 30 TABLET | Refills: 0 | OUTPATIENT
Start: 2021-10-26

## 2021-10-26 NOTE — TELEPHONE ENCOUNTER
Patient has not been seen here since December 2020 and has no follow-up here, she should get refills from her primary care

## 2021-10-28 RX ORDER — PANTOPRAZOLE SODIUM 40 MG/1
TABLET, DELAYED RELEASE ORAL
Qty: 90 TABLET | Refills: 3 | Status: SHIPPED | OUTPATIENT
Start: 2021-10-28 | End: 2022-02-23

## 2021-10-28 NOTE — TELEPHONE ENCOUNTER
----- Message from Jade Torres sent at 10/28/2021 11:48 AM EDT -----  Regarding: FW: Pantoprazole Sod DR 40 mg tablet  Incorrect pool  ----- Message -----  From: Kamini Orta  Sent: 10/28/2021  11:12 AM EDT  To: [facility]  Subject: Pantoprazole Sod DR 40 mg tablet                 Dr. Siddiqi,  My prescription ran out on my Protonix, and I really, really need it. It was from   Dr. Tejada's office, they prescribed it after that surgery on my throat and I havent been able to go back for a follow-up, that's when I was coming and going to [place] monthly. Please, Dr. Devang Orta

## 2021-11-03 ENCOUNTER — TELEPHONE (OUTPATIENT)
Dept: ORTHOPEDIC SURGERY | Facility: CLINIC | Age: 61
End: 2021-11-03

## 2021-11-03 NOTE — TELEPHONE ENCOUNTER
Caller: LATASHA KURTZ    Relationship to patient: SELF     Best call back number:     Chief complaint: RIGHT KNEE PAIN    Type of visit: CORTISONE INJECTION    Requested date: ASAP    If rescheduling, when is the original appointment:     Additional notes: THE PATIENT WOULD LIKE TO SCHEDULE SURGERY, BUT IF SHE WONT BE ABLE TO GET HER KNEE REPLACED WITHIN THE NEXT COUPLE MONTHS, SHE WOULD LIKE TO HAVE INJECTIONS NOW BECAUSE SHE IS IN A LOT OF PAIN

## 2021-11-08 ENCOUNTER — CLINICAL SUPPORT (OUTPATIENT)
Dept: ORTHOPEDIC SURGERY | Facility: CLINIC | Age: 61
End: 2021-11-08

## 2021-11-08 VITALS — BODY MASS INDEX: 35.44 KG/M2 | WEIGHT: 200 LBS | HEIGHT: 63 IN | TEMPERATURE: 96.9 F

## 2021-11-08 DIAGNOSIS — M17.11 PRIMARY OSTEOARTHRITIS OF RIGHT KNEE: ICD-10-CM

## 2021-11-08 DIAGNOSIS — M47.26 OSTEOARTHRITIS OF SPINE WITH RADICULOPATHY, LUMBAR REGION: ICD-10-CM

## 2021-11-08 DIAGNOSIS — M25.561 RIGHT KNEE PAIN, UNSPECIFIED CHRONICITY: Primary | ICD-10-CM

## 2021-11-08 DIAGNOSIS — M54.50 LUMBAR SPINE PAIN: ICD-10-CM

## 2021-11-08 PROCEDURE — 99214 OFFICE O/P EST MOD 30 MIN: CPT | Performed by: NURSE PRACTITIONER

## 2021-11-08 PROCEDURE — 20610 DRAIN/INJ JOINT/BURSA W/O US: CPT | Performed by: NURSE PRACTITIONER

## 2021-11-08 PROCEDURE — 72100 X-RAY EXAM L-S SPINE 2/3 VWS: CPT | Performed by: NURSE PRACTITIONER

## 2021-11-08 PROCEDURE — 73562 X-RAY EXAM OF KNEE 3: CPT | Performed by: NURSE PRACTITIONER

## 2021-11-08 RX ADMIN — METHYLPREDNISOLONE ACETATE 80 MG: 80 INJECTION, SUSPENSION INTRA-ARTICULAR; INTRALESIONAL; INTRAMUSCULAR; SOFT TISSUE at 14:35

## 2021-11-08 NOTE — PROGRESS NOTES
Patient Name: Kamini Orta   YOB: 1960  Referring Primary Care Physician: Davin Hall MD  BMI: Body mass index is 35.43 kg/m².    Chief Complaint:    Chief Complaint   Patient presents with   • Right Knee - Follow-up        HPI: Pt of SPM that has severe DJD to her knee and also low back pain. She was helping her brother move in July  and had low back pain and it has progressed to radiating down her left leg. Right knee with known arthritis and conservative treatment helps and desires TKA next year.   Pt not vaccinated for covid. She [place] from [place] and wanted her back checked out as well. She desires conservative treatment of her knee.     Kamini Orta is a 60 y.o. female who presents today for evaluation of   Chief Complaint   Patient presents with   • Right Knee - Follow-up       This problem is new to this examiner.     Subjective   Medications:   Home Medications:  Current Outpatient Medications on File Prior to Visit   Medication Sig   • ALPRAZolam (XANAX) 0.5 MG tablet TAKE ONE TABLET BY MOUTH TWICE DAILY   • amoxicillin-clavulanate (Augmentin) 875-125 MG per tablet Take 1 tablet by mouth 2 (Two) Times a Day.   • dicyclomine (BENTYL) 10 MG capsule Take 10 mg by mouth 2 (Two) Times a Day.   • estradiol (MINIVELLE, VIVELLE-DOT) 0.075 MG/24HR patch    • FLUoxetine (PROzac) 10 MG capsule Take 10 mg by mouth Daily.   • levothyroxine (SYNTHROID, LEVOTHROID) 75 MCG tablet TAKE ONE TABLET BY MOUTH EVERY DAY   • meclizine (ANTIVERT) 25 MG tablet TAKE ONE TABLET BY MOUTH THREE TIMES DAILY AS NEEDED FOR dizziness   • pantoprazole (PROTONIX) 40 MG EC tablet TAKE ONE TABLET BY MOUTH EVERY DAY   • predniSONE (DELTASONE) 20 MG tablet Take 2 tabs daily for 3 days, then take 1 tab daily for 3 days, then take 1/2 tab daily for 3 days   • rosuvastatin (CRESTOR) 10 MG tablet Take 10 mg by mouth Daily.     No current facility-administered medications on file prior to visit.     Current  "Medications:  Scheduled Meds:  Continuous Infusions:No current facility-administered medications for this visit.    PRN Meds:.    I have reviewed the patient's medical history in detail and updated the computerized patient record.  Review and summarization of old records includes:    Past Medical History:   Diagnosis Date   • Depression    • Diverticulitis    • Thyroid disease         Past Surgical History:   Procedure Laterality Date   •  SECTION      ALSO    • CHOLECYSTECTOMY     • HYSTERECTOMY     • KNEE ARTHROSCOPY Right     \"CLEAN UP\"   • KNEE ARTHROSCOPY W/ MENISCAL REPAIR Right         Social History     Occupational History   • Not on file   Tobacco Use   • Smoking status: Former Smoker   • Smokeless tobacco: Never Used   Vaping Use   • Vaping Use: Never used   Substance and Sexual Activity   • Alcohol use: No   • Drug use: No   • Sexual activity: Defer      Social History     Social History Narrative   • Not on file        Family History   Problem Relation Age of Onset   • Heart disease Other    • Lung disease Other        ROS: 14 point review of systems was performed and all other systems were reviewed and are negative except for documented findings in HPI and today's encounter.     Allergies:   Allergies   Allergen Reactions   • Codeine Anaphylaxis     Chest pains   • Erythromycin Diarrhea     Cramps     Constitutional:  Denies fever, shaking or chills   Eyes:  Denies change in visual acuity   HENT:  Denies nasal congestion or sore throat   Respiratory:  Denies cough or shortness of breath   Cardiovascular:  Denies chest pain or severe LE edema   GI:  Denies abdominal pain, nausea, vomiting, bloody stools or diarrhea   Musculoskeletal:  Numbness, tingling, pain, or loss of motor function only as noted above in history of present illness.  : Denies painful urination or hematuria  Integument:  Denies rash, lesion or ulceration   Neurologic:  Denies headache or focal " "weakness  Endocrine:  Denies lymphadenopathy  Psych:  Denies confusion or change in mental status   Hem:  Denies active bleeding    OBJECTIVE:  Physical Exam:   Temp 96.9 °F (36.1 °C) (Temporal)   Ht 160 cm (63\")   Wt 90.7 kg (200 lb)   BMI 35.43 kg/m²     General Appearance:    Alert, cooperative, in no acute distress                  Eyes: conjunctiva clear  ENT: external ears and nose atraumatic  CV: no peripheral edema  Resp: normal respiratory effort  Skin: no rashes or wounds; normal turgor  Psych: mood and affect appropriate  Lymph: no nodes appreciated  Neuro: gross sensation intact  Vascular:  Palpable peripheral pulse in noted extremity  Musculoskeletal Extremities: skin warm dry intact with medial joint line tenderness and patella femoral crepitation. Diffuse ttp lumbar spine and straight leg raise + right lower leg, radicular pain to right buttocks.     Radiology:   LS spine 3 views done for pain with no comparison views that show degenerative changes at L4-L5   Right knee 3 views done for pain with comparison views that shows tricompartmental DJD     Assessment:     ICD-10-CM ICD-9-CM   1. Right knee pain, unspecified chronicity  M25.561 719.46   2. Lumbar spine pain  M54.50 724.2   3. Primary osteoarthritis of right knee  M17.11 715.16   4. Osteoarthritis of spine with radiculopathy, lumbar region  M47.26 721.3        Large Joint Arthrocentesis: R knee  Date/Time: 11/8/2021 2:35 PM  Consent given by: patient  Site marked: site marked  Timeout: Immediately prior to procedure a time out was called to verify the correct patient, procedure, equipment, support staff and site/side marked as required   Supporting Documentation  Indications: pain and joint swelling   Procedure Details  Location: knee - R knee  Preparation: Patient was prepped and draped in the usual sterile fashion  Needle gauge: 21 G.  Approach: anterolateral  Medications administered: 2 mL lidocaine (cardiac); 80 mg methylPREDNISolone " acetate 80 MG/ML  Patient tolerance: patient tolerated the procedure well with no immediate complications             Plan: The diagnosis(es), natural history, pathophysiology and treatment for diagnosis(es) were discussed. Opportunity given and questions answered.  Biomechanics of pertinent body areas discussed.  When appropriate, the use of ambulatory aids discussed.  BMI:  The concept of BMI body mass index and its importance and implications discussed.    EXERCISES:  Advice on benefits of, and types of regular/moderate exercise pertaining to orthopedic diagnosis(es).  MEDICATIONS:  The risks, benefits, warnings,side effects and alternatives of medications discussed.  Inflammation/pain control; with cold, heat, elevation and/or liniments discussed as appropriate  Cortisone Injection. See procedure note.  PT referral.  HOME EXERCISE/PT program encouraged  SPECIALTY REFERRAL  MRI.  MEDICAL RECORDS reviewed from other provider(s) for past and current medical history pertinent to this complaint.      11/10/2021    Much of this encounter note is an electronic transcription/translation of spoken language to printed text. The electronic translation of spoken language may permit erroneous, or at times, nonsensical words or phrases to be inadvertently transcribed; Although I have reviewed the note for such errors, some may still exist

## 2021-11-09 DIAGNOSIS — G89.29 CHRONIC BILATERAL LOW BACK PAIN WITHOUT SCIATICA: Primary | ICD-10-CM

## 2021-11-09 DIAGNOSIS — M54.50 CHRONIC BILATERAL LOW BACK PAIN WITHOUT SCIATICA: Primary | ICD-10-CM

## 2021-11-09 RX ORDER — FLUTICASONE PROPIONATE 50 MCG
SPRAY, SUSPENSION (ML) NASAL
Qty: 16 G | Refills: 0 | Status: SHIPPED | OUTPATIENT
Start: 2021-11-09 | End: 2022-02-23

## 2021-11-09 RX ORDER — HYDROCODONE BITARTRATE AND ACETAMINOPHEN 5; 325 MG/1; MG/1
TABLET ORAL
Qty: 60 TABLET | Refills: 0 | Status: SHIPPED | OUTPATIENT
Start: 2021-11-09 | End: 2022-01-03

## 2021-11-10 RX ORDER — METHYLPREDNISOLONE ACETATE 80 MG/ML
80 INJECTION, SUSPENSION INTRA-ARTICULAR; INTRALESIONAL; INTRAMUSCULAR; SOFT TISSUE
Status: COMPLETED | OUTPATIENT
Start: 2021-11-08 | End: 2021-11-08

## 2021-11-17 ENCOUNTER — TELEPHONE (OUTPATIENT)
Dept: ORTHOPEDIC SURGERY | Facility: CLINIC | Age: 61
End: 2021-11-17

## 2021-11-17 NOTE — TELEPHONE ENCOUNTER
Pt informed of results & recommendations.  She is already scheduled for PT and will follow up with JJAYLONW as scheduled.

## 2021-11-17 NOTE — TELEPHONE ENCOUNTER
----- Message from CAROLINE Gonzalez sent at 11/17/2021 12:00 PM EST -----  L5S1 disc bulge/herniation- followup with [facility] and I can set up PT   ----- Message -----  From: Kota Campos Incoming  Sent: 11/16/2021   2:53 PM EST  To: CAROLINE Gonzalez

## 2021-12-01 ENCOUNTER — APPOINTMENT (OUTPATIENT)
Dept: CT IMAGING | Facility: HOSPITAL | Age: 61
End: 2021-12-01

## 2021-12-01 ENCOUNTER — APPOINTMENT (OUTPATIENT)
Dept: MRI IMAGING | Facility: HOSPITAL | Age: 61
End: 2021-12-01

## 2021-12-01 ENCOUNTER — HOSPITAL ENCOUNTER (EMERGENCY)
Facility: HOSPITAL | Age: 61
Discharge: HOME | End: 2021-12-01
Attending: EMERGENCY MEDICINE | Admitting: EMERGENCY MEDICINE

## 2021-12-01 ENCOUNTER — APPOINTMENT (OUTPATIENT)
Dept: GENERAL RADIOLOGY | Facility: HOSPITAL | Age: 61
End: 2021-12-01

## 2021-12-01 VITALS
HEART RATE: 75 BPM | TEMPERATURE: 97.8 F | HEIGHT: 63 IN | BODY MASS INDEX: 35.44 KG/M2 | DIASTOLIC BLOOD PRESSURE: 67 MMHG | WEIGHT: 200 LBS | RESPIRATION RATE: 18 BRPM | OXYGEN SATURATION: 92 % | SYSTOLIC BLOOD PRESSURE: 146 MMHG

## 2021-12-01 DIAGNOSIS — R42 VERTIGO: Primary | ICD-10-CM

## 2021-12-01 DIAGNOSIS — I10 UNCONTROLLED HYPERTENSION: ICD-10-CM

## 2021-12-01 LAB
ALBUMIN SERPL-MCNC: 4 G/DL (ref 3.5–5.2)
ALBUMIN/GLOB SERPL: 1.7 G/DL
ALP SERPL-CCNC: 58 U/L (ref 39–117)
ALT SERPL W P-5'-P-CCNC: 16 U/L (ref 1–33)
ANION GAP SERPL CALCULATED.3IONS-SCNC: 13.4 MMOL/L (ref 5–15)
AST SERPL-CCNC: 25 U/L (ref 1–32)
BASOPHILS # BLD AUTO: 0.04 10*3/MM3 (ref 0–0.2)
BASOPHILS NFR BLD AUTO: 0.5 % (ref 0–1.5)
BILIRUB SERPL-MCNC: 0.2 MG/DL (ref 0–1.2)
BILIRUB UR QL STRIP: NEGATIVE
BUN SERPL-MCNC: 14 MG/DL (ref 6–20)
BUN/CREAT SERPL: 15.2 (ref 7–25)
CALCIUM SPEC-SCNC: 8.9 MG/DL (ref 8.6–10.5)
CHLORIDE SERPL-SCNC: 100 MMOL/L (ref 98–107)
CLARITY UR: CLEAR
CO2 SERPL-SCNC: 23.6 MMOL/L (ref 22–29)
COLOR UR: YELLOW
CREAT SERPL-MCNC: 0.92 MG/DL (ref 0.57–1)
DEPRECATED RDW RBC AUTO: 47.3 FL (ref 37–54)
EOSINOPHIL # BLD AUTO: 0.18 10*3/MM3 (ref 0–0.4)
EOSINOPHIL NFR BLD AUTO: 2.3 % (ref 0.3–6.2)
ERYTHROCYTE [DISTWIDTH] IN BLOOD BY AUTOMATED COUNT: 14.6 % (ref 12.3–15.4)
GFR SERPL CREATININE-BSD FRML MDRD: 62 ML/MIN/1.73
GLOBULIN UR ELPH-MCNC: 2.3 GM/DL
GLUCOSE SERPL-MCNC: 136 MG/DL (ref 65–99)
GLUCOSE UR STRIP-MCNC: NEGATIVE MG/DL
HCT VFR BLD AUTO: 35.4 % (ref 34–46.6)
HGB BLD-MCNC: 12.1 G/DL (ref 12–15.9)
HGB UR QL STRIP.AUTO: NEGATIVE
HOLD SPECIMEN: NORMAL
HOLD SPECIMEN: NORMAL
IMM GRANULOCYTES # BLD AUTO: 0.02 10*3/MM3 (ref 0–0.05)
IMM GRANULOCYTES NFR BLD AUTO: 0.3 % (ref 0–0.5)
KETONES UR QL STRIP: NEGATIVE
LEUKOCYTE ESTERASE UR QL STRIP.AUTO: NEGATIVE
LYMPHOCYTES # BLD AUTO: 3.35 10*3/MM3 (ref 0.7–3.1)
LYMPHOCYTES NFR BLD AUTO: 42.5 % (ref 19.6–45.3)
MAGNESIUM SERPL-MCNC: 1.9 MG/DL (ref 1.6–2.6)
MCH RBC QN AUTO: 30 PG (ref 26.6–33)
MCHC RBC AUTO-ENTMCNC: 34.2 G/DL (ref 31.5–35.7)
MCV RBC AUTO: 87.8 FL (ref 79–97)
MONOCYTES # BLD AUTO: 0.74 10*3/MM3 (ref 0.1–0.9)
MONOCYTES NFR BLD AUTO: 9.4 % (ref 5–12)
NEUTROPHILS NFR BLD AUTO: 3.56 10*3/MM3 (ref 1.7–7)
NEUTROPHILS NFR BLD AUTO: 45 % (ref 42.7–76)
NITRITE UR QL STRIP: NEGATIVE
NRBC BLD AUTO-RTO: 0 /100 WBC (ref 0–0.2)
PH UR STRIP.AUTO: 7 [PH] (ref 5–8)
PLATELET # BLD AUTO: 240 10*3/MM3 (ref 140–450)
PMV BLD AUTO: 9.6 FL (ref 6–12)
POTASSIUM SERPL-SCNC: 3.1 MMOL/L (ref 3.5–5.2)
PROT SERPL-MCNC: 6.3 G/DL (ref 6–8.5)
PROT UR QL STRIP: NEGATIVE
RBC # BLD AUTO: 4.03 10*6/MM3 (ref 3.77–5.28)
SODIUM SERPL-SCNC: 137 MMOL/L (ref 136–145)
SP GR UR STRIP: 1.01 (ref 1–1.03)
TROPONIN T SERPL-MCNC: <0.01 NG/ML (ref 0–0.03)
UROBILINOGEN UR QL STRIP: NORMAL
WBC NRBC COR # BLD: 7.89 10*3/MM3 (ref 3.4–10.8)
WHOLE BLOOD HOLD SPECIMEN: NORMAL
WHOLE BLOOD HOLD SPECIMEN: NORMAL

## 2021-12-01 PROCEDURE — 96376 TX/PRO/DX INJ SAME DRUG ADON: CPT

## 2021-12-01 PROCEDURE — 25010000002 ONDANSETRON PER 1 MG: Performed by: EMERGENCY MEDICINE

## 2021-12-01 PROCEDURE — 96375 TX/PRO/DX INJ NEW DRUG ADDON: CPT

## 2021-12-01 PROCEDURE — 85025 COMPLETE CBC W/AUTO DIFF WBC: CPT | Performed by: EMERGENCY MEDICINE

## 2021-12-01 PROCEDURE — 96374 THER/PROPH/DIAG INJ IV PUSH: CPT

## 2021-12-01 PROCEDURE — 25010000002 DIAZEPAM PER 5 MG: Performed by: EMERGENCY MEDICINE

## 2021-12-01 PROCEDURE — 80053 COMPREHEN METABOLIC PANEL: CPT | Performed by: EMERGENCY MEDICINE

## 2021-12-01 PROCEDURE — 81003 URINALYSIS AUTO W/O SCOPE: CPT | Performed by: EMERGENCY MEDICINE

## 2021-12-01 PROCEDURE — 83735 ASSAY OF MAGNESIUM: CPT | Performed by: EMERGENCY MEDICINE

## 2021-12-01 PROCEDURE — 71045 X-RAY EXAM CHEST 1 VIEW: CPT

## 2021-12-01 PROCEDURE — 84484 ASSAY OF TROPONIN QUANT: CPT | Performed by: EMERGENCY MEDICINE

## 2021-12-01 PROCEDURE — 93005 ELECTROCARDIOGRAM TRACING: CPT | Performed by: EMERGENCY MEDICINE

## 2021-12-01 PROCEDURE — 99284 EMERGENCY DEPT VISIT MOD MDM: CPT

## 2021-12-01 PROCEDURE — 93010 ELECTROCARDIOGRAM REPORT: CPT | Performed by: INTERNAL MEDICINE

## 2021-12-01 PROCEDURE — 70551 MRI BRAIN STEM W/O DYE: CPT

## 2021-12-01 PROCEDURE — 70450 CT HEAD/BRAIN W/O DYE: CPT

## 2021-12-01 RX ORDER — MECLIZINE HYDROCHLORIDE 25 MG/1
25 TABLET ORAL EVERY 6 HOURS PRN
Qty: 28 TABLET | Refills: 0 | Status: SHIPPED | OUTPATIENT
Start: 2021-12-01 | End: 2021-12-08

## 2021-12-01 RX ORDER — DIAZEPAM 5 MG/ML
2.5 INJECTION, SOLUTION INTRAMUSCULAR; INTRAVENOUS ONCE
Status: COMPLETED | OUTPATIENT
Start: 2021-12-01 | End: 2021-12-01

## 2021-12-01 RX ORDER — SODIUM CHLORIDE 0.9 % (FLUSH) 0.9 %
10 SYRINGE (ML) INJECTION AS NEEDED
Status: DISCONTINUED | OUTPATIENT
Start: 2021-12-01 | End: 2021-12-01 | Stop reason: HOSPADM

## 2021-12-01 RX ORDER — MECLIZINE HYDROCHLORIDE 25 MG/1
25 TABLET ORAL ONCE
Status: COMPLETED | OUTPATIENT
Start: 2021-12-01 | End: 2021-12-01

## 2021-12-01 RX ORDER — ONDANSETRON 2 MG/ML
4 INJECTION INTRAMUSCULAR; INTRAVENOUS ONCE
Status: COMPLETED | OUTPATIENT
Start: 2021-12-01 | End: 2021-12-01

## 2021-12-01 RX ADMIN — MECLIZINE HYDROCHLORIDE 25 MG: 25 TABLET ORAL at 03:13

## 2021-12-01 RX ADMIN — DIAZEPAM 2.5 MG: 5 INJECTION, SOLUTION INTRAMUSCULAR; INTRAVENOUS at 04:57

## 2021-12-01 RX ADMIN — DIAZEPAM 2.5 MG: 5 INJECTION, SOLUTION INTRAMUSCULAR; INTRAVENOUS at 03:12

## 2021-12-01 RX ADMIN — ONDANSETRON 4 MG: 2 INJECTION INTRAMUSCULAR; INTRAVENOUS at 03:12

## 2021-12-01 NOTE — ED PROVIDER NOTES
Time: 3:08 AM EST  Arrived by: ambulance  Chief Complaint: Dizziness  History provided by: Patient  History is limited by: N/A     History of Present Illness:  Patient is a 60 y.o. year old female that presents to the emergency department with acute onset of dizziness.  Patient states that she was in bed she rolled over and became acutely dizzy.  Patient states that she became instantly nauseated and vomited.  The patient tried to walk to the bathroom because she was nauseated and fell due to the dizziness.  The patient denies any injury.  Patient states that she has a very mild headache.  The patient denies any change in vision.  The patient denies any change in speech.  The patient denies any change in swallowing.  The patient denies any numbness or weakness in her extremities.  Patient denies any lack of coordination.  The patient states that she does have some chronic tingling in her left foot due to a herniated disc in her back.  The patient states that is chronic and unchanged.  The patient's dizziness worsens with head movement or rotation.  It was acute in onset.  The patient's dizziness does resolve or improve when she closes her eyes or does not move her head.  The patient denies any chest pain.  The patient denies any chest pressure.  The patient denies any palpitations.  The patient denies any near-syncope.  The patient denies any syncope.  The patient denies any abdominal pain.  The patient denies any history of melena or hematochezia.      Similar Symptoms Previously: The patient has had symptoms like this previously.  The patient has previously been diagnosed with vertigo.  The patient has been placed on Antivert in the past which is helped.    Recently seen: No      Patient Care Team  Primary Care Provider: Davin Hall MD    Past Medical History:     Allergies   Allergen Reactions   • Codeine Anaphylaxis     Chest pains   • Erythromycin Diarrhea     Cramps     Past Medical History:  "  Diagnosis Date   • Arachnoiditis    • Depression    • Diverticulitis    • Thyroid disease    • Vertigo      Past Surgical History:   Procedure Laterality Date   •  SECTION      ALSO    • CHOLECYSTECTOMY     • HYSTERECTOMY     • KNEE ARTHROSCOPY Right     \"CLEAN UP\"   • KNEE ARTHROSCOPY W/ MENISCAL REPAIR Right      Family History   Problem Relation Age of Onset   • Heart disease Other    • Lung disease Other        Home Medications:  Prior to Admission medications    Medication Sig Start Date End Date Taking? Authorizing Provider   ALPRAZolam (XANAX) 0.5 MG tablet TAKE ONE TABLET BY MOUTH TWICE DAILY 21   Davin Hall MD   amoxicillin-clavulanate (Augmentin) 875-125 MG per tablet Take 1 tablet by mouth 2 (Two) Times a Day. 10/21/21   Lissette Benitez APRN   diclofenac sodium (VOTAREN XR) 100 MG 24 hr tablet TAKE ONE TABLET BY MOUTH EVERY DAY 21   Davin Hall MD   dicyclomine (BENTYL) 10 MG capsule Take 10 mg by mouth 2 (Two) Times a Day. 3/19/21   Remedios Corona MD   estradiol (MINIVELLE, VIVELLE-DOT) 0.075 MG/24HR patch  3/28/16   Remedios Corona MD   FLUoxetine (PROzac) 10 MG capsule Take 10 mg by mouth Daily.    Remedios Corona MD   fluticasone (FLONASE) 50 MCG/ACT nasal spray USE 2 SPRAYS IN EACH NOSTRIL EVERY DAY 21   Davin Hall MD   HYDROcodone-acetaminophen (NORCO) 5-325 MG per tablet TAKE ONE TABLET BY MOUTH TWICE DAILY 21   Davin Hall MD   levothyroxine (SYNTHROID, LEVOTHROID) 75 MCG tablet TAKE ONE TABLET BY MOUTH EVERY DAY 21   Davin Hall MD   meclizine (ANTIVERT) 25 MG tablet TAKE ONE TABLET BY MOUTH THREE TIMES DAILY AS NEEDED FOR dizziness 19   Remedios Corona MD   pantoprazole (PROTONIX) 40 MG EC tablet TAKE ONE TABLET BY MOUTH EVERY DAY 10/28/21   Davin Hall MD   predniSONE (DELTASONE) 20 MG tablet Take 2 tabs daily for 3 days, then take 1 tab " "daily for 3 days, then take 1/2 tab daily for 3 days 10/21/21   Lissette Benitez APRN   rosuvastatin (CRESTOR) 10 MG tablet Take 10 mg by mouth Daily. 12/12/20   Provider, MD Remedios        Social History:   Social History     Tobacco Use   • Smoking status: Former Smoker   • Smokeless tobacco: Never Used   Vaping Use   • Vaping Use: Never used   Substance Use Topics   • Alcohol use: No   • Drug use: No          Record Review:  I have reviewed the patient's records in [facility].     Review of Systems:  Review of Systems   Constitutional: Negative for appetite change, chills, diaphoresis, fatigue and fever.   HENT: Positive for nosebleeds. Negative for congestion, ear discharge, ear pain, facial swelling, postnasal drip, rhinorrhea, sinus pressure, sinus pain, sore throat and tinnitus.    Eyes: Negative for photophobia.   Respiratory: Negative for apnea, cough, chest tightness and shortness of breath.    Cardiovascular: Negative for chest pain and palpitations.   Gastrointestinal: Positive for nausea and vomiting. Negative for abdominal pain and diarrhea.   Endocrine: Negative.    Genitourinary: Negative for difficulty urinating, dysuria, frequency, hematuria and urgency.   Musculoskeletal: Positive for back pain. Negative for gait problem, joint swelling, myalgias and neck pain.   Skin: Negative for pallor.   Neurological: Positive for dizziness and numbness. Negative for tremors, seizures, syncope, weakness, light-headedness and headaches.        Chronic chronic in the left foot and unchanged due to disc herniation of the lumbar spine   Hematological: Negative.    Psychiatric/Behavioral: Negative.            Physical Exam:  /67   Pulse 75   Temp 97.8 °F (36.6 °C) (Oral)   Resp 18   Ht 160 cm (63\")   Wt 90.7 kg (200 lb)   SpO2 92%   BMI 35.43 kg/m²     Physical Exam  Vitals and nursing note reviewed.   Constitutional:       General: She is not in acute distress.  HENT:      Head: Normocephalic and " atraumatic.      Mouth/Throat:      Mouth: Mucous membranes are moist.   Eyes:      General: No visual field deficit.        Right eye: No discharge.         Left eye: No discharge.      Extraocular Movements: Extraocular movements intact.      Pupils: Pupils are equal, round, and reactive to light.      Comments: Nystagmus present when looking to the left   Cardiovascular:      Rate and Rhythm: Normal rate and regular rhythm.      Pulses: Normal pulses.      Heart sounds: Normal heart sounds. No murmur heard.      Pulmonary:      Effort: Pulmonary effort is normal. No accessory muscle usage, respiratory distress or retractions.      Breath sounds: Normal breath sounds. No wheezing, rhonchi or rales.   Abdominal:      General: Abdomen is flat. There is no distension.      Palpations: Abdomen is soft. There is no mass.      Tenderness: There is no abdominal tenderness. There is no right CVA tenderness, left CVA tenderness, guarding or rebound.      Comments: No rigidity   Musculoskeletal:         General: Normal range of motion.      Cervical back: Normal range of motion and neck supple. No rigidity or tenderness.      Right lower leg: No edema.      Left lower leg: No edema.   Skin:     General: Skin is warm and dry.      Capillary Refill: Capillary refill takes less than 2 seconds.   Neurological:      General: No focal deficit present.      Mental Status: She is alert and oriented to person, place, and time. Mental status is at baseline.      Cranial Nerves: No cranial nerve deficit, dysarthria or facial asymmetry.      Sensory: No sensory deficit.      Motor: No weakness.      Coordination: Coordination normal.      Comments: NIH Stroke Scale/Score (NIHSS) - MDCalc  Calculated on Dec 01 2021 3:12 AM  0 points -> NIH Stroke Scale   Psychiatric:         Mood and Affect: Mood normal.         Behavior: Behavior normal.                Medications in the Emergency Department:  Medications   ondansetron (ZOFRAN)  injection 4 mg (4 mg Intravenous Given 12/1/21 0312)   meclizine (ANTIVERT) tablet 25 mg (25 mg Oral Given 12/1/21 0313)   diazePAM (VALIUM) injection 2.5 mg (2.5 mg Intravenous Given 12/1/21 0312)   diazePAM (VALIUM) injection 2.5 mg (2.5 mg Intravenous Given 12/1/21 0457)        Labs  Lab Results (last 24 hours)     ** No results found for the last 24 hours. **           Imaging:  MRI Brain Without Contrast   Final Result      XR Chest 1 View   Final Result      CT Head Without Contrast   Final Result          Procedures:  Procedures    Progress  ED Course as of 12/02/21 0631   Wed Dec 01, 2021   0316 EKG:    Rhythm: Sinus bradycardia  Rate: 57  Intervals: First-degree AV block normal QT interval  T-wave: There is diffuse nonspecific T wave flattening  Isolated Q wave in III  ST Segment: There is no obvious pathological ST elevation or ST depression    EKG Comparison: Unavailable    Interpreted by me   [SD]      ED Course User Index  [SD] Ten Hector DO                            Medical Decision Making:  MDM  Number of Diagnoses or Management Options  Uncontrolled hypertension  Vertigo  Diagnosis management comments:     The patient is resting comfortably and feels better.  The patient is alert talkative and in no distress.  The repeat examination is unremarkable and benign.  The patient symptoms are consistent with vertigo without focal neurological deficit.  The patient is neurologically intact, has a normal mental status and is ambulatory in the emergency department.  The history, exam, diagnostic testing and the patient's current condition does not suggest meningitis, stroke, sepsis, subarachnoid hemorrhage, intracranial bleeding, encephalitis or other significant pathology that would warrant further testing, continued emergency department treatment, admission, neurological consultation or other specialty evaluation at this point.  The patient's MRI was negative for acute stroke.   vital signs have been  stable.  The patient's blood pressure has improved.  The patient's condition is stable and appropriate for discharge.  The patient will pursue further outpatient evaluation with the primary care physician or other designated or consulting physician as indicated in the discharge instructions.       Amount and/or Complexity of Data Reviewed  Clinical lab tests: reviewed  Tests in the radiology section of CPT®: reviewed  Tests in the medicine section of CPT®: reviewed               Final diagnoses:   Vertigo   Uncontrolled hypertension        Disposition:  ED Disposition     ED Disposition Condition Comment    Discharge Stable           This medical record created using voice recognition software and may contain unintended errors.    DO Tawny Roberts Scott, DO  12/02/21 0631

## 2021-12-01 NOTE — DISCHARGE INSTRUCTIONS
Please follow-up with your primary care physician tomorrow for reevaluation.    Please check your blood pressure twice a day, record discusses readings with your primary care physician    Please return to the emergency room for headache, intractable vomiting, change in vision, change in speech, numbness or weakness in your extremities, lack of coordination, recurrent falls, chest pain, chest pressure, shortness of breath, fever or any new symptoms that you may be concerned with    Please discuss possible need for ENT referral due to the vertigo

## 2021-12-03 LAB
QT INTERVAL: 421 MS
QT INTERVAL: 480 MS

## 2021-12-06 ENCOUNTER — OFFICE VISIT (OUTPATIENT)
Dept: INTERNAL MEDICINE | Facility: CLINIC | Age: 61
End: 2021-12-06

## 2021-12-06 ENCOUNTER — TELEPHONE (OUTPATIENT)
Dept: INTERNAL MEDICINE | Facility: CLINIC | Age: 61
End: 2021-12-06

## 2021-12-06 VITALS
DIASTOLIC BLOOD PRESSURE: 87 MMHG | TEMPERATURE: 97.2 F | BODY MASS INDEX: 37.32 KG/M2 | OXYGEN SATURATION: 94 % | HEIGHT: 63 IN | SYSTOLIC BLOOD PRESSURE: 163 MMHG | HEART RATE: 72 BPM | WEIGHT: 210.6 LBS

## 2021-12-06 DIAGNOSIS — R42 VERTIGO: Primary | ICD-10-CM

## 2021-12-06 DIAGNOSIS — I10 HYPERTENSION, ESSENTIAL: ICD-10-CM

## 2021-12-06 DIAGNOSIS — R51.9 WORSENING HEADACHES: ICD-10-CM

## 2021-12-06 DIAGNOSIS — R11.12 PROJECTILE VOMITING WITH NAUSEA: ICD-10-CM

## 2021-12-06 PROCEDURE — 99214 OFFICE O/P EST MOD 30 MIN: CPT | Performed by: INTERNAL MEDICINE

## 2021-12-06 RX ORDER — VALSARTAN AND HYDROCHLOROTHIAZIDE 80; 12.5 MG/1; MG/1
1 TABLET, FILM COATED ORAL DAILY
Qty: 30 TABLET | Refills: 5 | Status: SHIPPED | OUTPATIENT
Start: 2021-12-06 | End: 2022-05-31

## 2021-12-06 RX ORDER — FLUCONAZOLE 150 MG/1
TABLET ORAL
COMMUNITY
Start: 2021-10-21 | End: 2022-03-02

## 2021-12-06 RX ORDER — FLUOXETINE HYDROCHLORIDE 40 MG/1
40 CAPSULE ORAL DAILY
COMMUNITY
Start: 2021-11-30 | End: 2022-02-23

## 2021-12-06 RX ORDER — MECLIZINE HYDROCHLORIDE 25 MG/1
25 TABLET ORAL 3 TIMES DAILY PRN
Qty: 90 TABLET | Refills: 5 | Status: SHIPPED | OUTPATIENT
Start: 2021-12-06 | End: 2022-03-02

## 2021-12-06 RX ORDER — SCOLOPAMINE TRANSDERMAL SYSTEM 1 MG/1
1 PATCH, EXTENDED RELEASE TRANSDERMAL
Qty: 4 PATCH | Refills: 0 | Status: SHIPPED | OUTPATIENT
Start: 2021-12-06 | End: 2022-03-02

## 2021-12-06 NOTE — PROGRESS NOTES
"Chief Complaint/ HPI: f/u with er visit with vertigo, and went to ER AND CT SCAN AND MRI BRAIN DONE  BP UP ALSO  HEADACHES , STILL DULL      Objective   Vital Signs  Vitals:    12/06/21 1549   BP: 163/87   Pulse: 72   Temp: 97.2 °F (36.2 °C)   SpO2: 94%   Weight: 95.5 kg (210 lb 9.6 oz)   Height: 160 cm (62.99\")      Body mass index is 37.32 kg/m².  Review of Systems STILL WITH DIZZINESS, AND SOME NAUSEA  HEADACHES SOME     Physical Exam  Constitutional:       General: She is not in acute distress.     Appearance: Normal appearance.   HENT:      Head: Normocephalic.      Mouth/Throat:      Mouth: Mucous membranes are moist.   Eyes:      Conjunctiva/sclera: Conjunctivae normal.      Pupils: Pupils are equal, round, and reactive to light.   Cardiovascular:      Rate and Rhythm: Normal rate and regular rhythm.      Pulses: Normal pulses.      Heart sounds: Normal heart sounds.   Pulmonary:      Effort: Pulmonary effort is normal.      Breath sounds: Normal breath sounds.   Abdominal:      General: Abdomen is flat. Bowel sounds are normal.      Palpations: Abdomen is soft.   Musculoskeletal:         General: No swelling. Normal range of motion.      Cervical back: Neck supple.   Skin:     General: Skin is warm and dry.      Coloration: Skin is not jaundiced.   Neurological:      General: No focal deficit present.      Mental Status: She is alert and oriented to person, place, and time. Mental status is at baseline.   Psychiatric:         Mood and Affect: Mood normal.         Behavior: Behavior normal.         Thought Content: Thought content normal.         Judgment: Judgment normal.        Result Review :   No results found for: PROBNP, BNP  CMP    CMP 6/21/21 12/1/21   Glucose 88 136 (A)   BUN 11 14   Creatinine 0.69 0.92   eGFR Non African Am 87 62   Sodium 136 137   Potassium 4.2 3.1 (A)   Chloride 102 100   Calcium 8.8 8.9   Albumin 4.20 4.00   Total Bilirubin 0.2 0.2   Alkaline Phosphatase 62 58   AST (SGOT) 27 25 "   ALT (SGPT) 21 16   (A) Abnormal value            CBC w/diff    CBC w/Diff 6/21/21 12/1/21   WBC 7.63 7.89   RBC 4.61 4.03   Hemoglobin 13.4 12.1   Hematocrit 41.6 35.4   MCV 90.2 87.8   MCH 29.1 30.0   MCHC 32.2 34.2   RDW 14.1 14.6   Platelets 284 240   Neutrophil Rel % 48.4 45.0   Immature Granulocyte Rel %  0.3   Lymphocyte Rel % 40.0 42.5   Monocyte Rel % 6.8 9.4   Eosinophil Rel % 3.5 2.3   Basophil Rel % 0.9 0.5            Lipid Panel    Lipid Panel 6/21/21   Total Cholesterol 163   Triglycerides 334 (A)   HDL Cholesterol 35 (A)   VLDL Cholesterol 54 (A)   LDL Cholesterol  74   LDL/HDL Ratio 1.75   (A) Abnormal value             Lab Results   Component Value Date    TSH 1.640 06/21/2021    TSH 2.600 06/15/2020    TSH 1.350 10/15/2019      Lab Results   Component Value Date    FREET4 1.34 06/21/2021    FREET4 1.3 10/15/2019    FREET4 1.5 06/14/2019      A1C Last 3 Results    HGBA1C Last 3 Results 6/21/21   Hemoglobin A1C 5.69 (A)   (A) Abnormal value                              Visit Diagnoses:    ICD-10-CM ICD-9-CM   1. Vertigo  R42 780.4   2. Hypertension, essential  I10 401.9   3. Worsening headaches  R51.9 784.0   4. Projectile vomiting with nausea  R11.12 787.01       Assessment and Plan   Diagnoses and all orders for this visit:    1. Vertigo (Primary)    2. Hypertension, essential    3. Worsening headaches    4. Projectile vomiting with nausea    Other orders  -     valsartan-hydrochlorothiazide (Diovan HCT) 80-12.5 MG per tablet; Take 1 tablet by mouth Daily.  Dispense: 30 tablet; Refill: 5  -     Scopolamine 1 MG/3DAYS patch; Place 1 patch on the skin as directed by provider Every 72 (Seventy-Two) Hours.  Dispense: 4 patch; Refill: 0  -     meclizine (ANTIVERT) 25 MG tablet; Take 1 tablet by mouth 3 (Three) Times a Day As Needed for Dizziness.  Dispense: 90 tablet; Refill: 5      Acute vertigo approximately 1 week ago with nausea vomiting, headaches, work-up in the emergency room included MRI of the  brain CT of the brain did not show any acute abnormality, chest x-ray no active disease--- she will continue meclizine 3-4 times per day and add scopolamine patch December 6, 2021,    Elevated blood pressures hypertension etiology unclear possibly contributing to headaches,, will try valsartan HCT 80/12.5 mg daily,    Hypokalemia in the emergency room December 1, 2021,,, patient will continue over-the-counter potassium supplements    PULM NODULE ON CXR SEPT 2017, CT SCAN OCT 2017, NO CHANGE IN CT APRIL 2018, (DONE [facility] TRAIL DIAG, --CT CHEST OCT 2019 , NO CHANGE IN PULM NODULE OVER 2 YRS , SO NO MORE F/U     LIFE STRESSORS DISCUSSED, OCT 2019, ANNA MARIE AND URINE DRUG SCREEN REIVIEWED OCT 2020    IFG, HPR5S=9. 9 October 2020    HEART MURUMUR, ECHO 11/2018, ESSENTIALLY NL     obesity --Great job with weight loss of 50 pounds in the past 6 months, As of October 2019---WGT UP AGAIN JUNE 2020, discussed dietary weight loss issues again October 2020    LLQ Abdominal Pain/Diverticulitis--6/18/16.--Had colonoscopy Dr. Alarcon-- FOR F/U NOV, 2019, [facility] STAY, 3/22-3/24/16--, WITH RECTAL BLEEDING PRESUMED TO BE DIVERTICULITIS, RX WITH IV ABX     L BREAST MASS--f/u U/S WAS NEG,. 8/2015, , Yearly mammograms December 2018 Dr. Sreedhar Munoz    Patient quit smoking 9 years ago    CHRONIC PAIN ISSUES DISCUSSED --WITH PAIN IN NECK , BACK PAIN- ON PRN NORCO-- WE DISCUSSED RISK OF DRIVING AND ADDICTION WITH MED--OCT 2020     DEPRESSION,--continues Xanax 0.5 twice a day, Prozac 40 mg daily,    ELEVATED CHOL, TRIG,-continues Crestor,    HYPOTHYROIDISM-continues LEVOTHYROID 0.075 MG QD   GERD--OTC NEXIUM   ELI, BSO 1994   OBESITY      Follow Up   No follow-ups on file.  Patient was given instructions and counseling regarding her condition or for health maintenance advice. Please see specific information pulled into the AVS if appropriate.

## 2022-01-03 DIAGNOSIS — M54.50 CHRONIC BILATERAL LOW BACK PAIN WITHOUT SCIATICA: ICD-10-CM

## 2022-01-03 DIAGNOSIS — G89.29 CHRONIC BILATERAL LOW BACK PAIN WITHOUT SCIATICA: ICD-10-CM

## 2022-01-03 RX ORDER — HYDROCODONE BITARTRATE AND ACETAMINOPHEN 5; 325 MG/1; MG/1
TABLET ORAL
Qty: 60 TABLET | Refills: 0 | Status: SHIPPED | OUTPATIENT
Start: 2022-01-03 | End: 2022-02-23

## 2022-01-04 ENCOUNTER — TRANSCRIBE ORDERS (OUTPATIENT)
Dept: INTERNAL MEDICINE | Facility: CLINIC | Age: 62
End: 2022-01-04

## 2022-01-04 ENCOUNTER — LAB (OUTPATIENT)
Dept: LAB | Facility: HOSPITAL | Age: 62
End: 2022-01-04

## 2022-01-04 DIAGNOSIS — Z79.899 ENCOUNTER FOR LONG-TERM (CURRENT) USE OF OTHER MEDICATIONS: ICD-10-CM

## 2022-01-04 DIAGNOSIS — R73.01 IMPAIRED FASTING GLUCOSE: ICD-10-CM

## 2022-01-04 DIAGNOSIS — E03.9 HYPOTHYROIDISM, UNSPECIFIED TYPE: ICD-10-CM

## 2022-01-04 DIAGNOSIS — G89.4 CHRONIC PAIN SYNDROME: Primary | ICD-10-CM

## 2022-01-04 DIAGNOSIS — G89.4 CHRONIC PAIN SYNDROME: ICD-10-CM

## 2022-01-04 LAB
ALBUMIN SERPL-MCNC: 4.4 G/DL (ref 3.5–5.2)
ALBUMIN/GLOB SERPL: 1.8 G/DL
ALP SERPL-CCNC: 65 U/L (ref 39–117)
ALT SERPL W P-5'-P-CCNC: 18 U/L (ref 1–33)
ANION GAP SERPL CALCULATED.3IONS-SCNC: 11.7 MMOL/L (ref 5–15)
AST SERPL-CCNC: 27 U/L (ref 1–32)
BASOPHILS # BLD AUTO: 0.07 10*3/MM3 (ref 0–0.2)
BASOPHILS NFR BLD AUTO: 0.6 % (ref 0–1.5)
BILIRUB SERPL-MCNC: 0.3 MG/DL (ref 0–1.2)
BUN SERPL-MCNC: 11 MG/DL (ref 8–23)
BUN/CREAT SERPL: 11.1 (ref 7–25)
CALCIUM SPEC-SCNC: 9.5 MG/DL (ref 8.6–10.5)
CHLORIDE SERPL-SCNC: 102 MMOL/L (ref 98–107)
CHOLEST SERPL-MCNC: 139 MG/DL (ref 0–200)
CO2 SERPL-SCNC: 27.3 MMOL/L (ref 22–29)
CREAT SERPL-MCNC: 0.99 MG/DL (ref 0.57–1)
DEPRECATED RDW RBC AUTO: 45.6 FL (ref 37–54)
EOSINOPHIL # BLD AUTO: 0.2 10*3/MM3 (ref 0–0.4)
EOSINOPHIL NFR BLD AUTO: 1.6 % (ref 0.3–6.2)
ERYTHROCYTE [DISTWIDTH] IN BLOOD BY AUTOMATED COUNT: 14 % (ref 12.3–15.4)
GFR SERPL CREATININE-BSD FRML MDRD: 57 ML/MIN/1.73
GLOBULIN UR ELPH-MCNC: 2.5 GM/DL
GLUCOSE SERPL-MCNC: 99 MG/DL (ref 65–99)
HCT VFR BLD AUTO: 40.2 % (ref 34–46.6)
HDLC SERPL-MCNC: 39 MG/DL (ref 40–60)
HGB BLD-MCNC: 13.4 G/DL (ref 12–15.9)
IMM GRANULOCYTES # BLD AUTO: 0.03 10*3/MM3 (ref 0–0.05)
IMM GRANULOCYTES NFR BLD AUTO: 0.2 % (ref 0–0.5)
LDLC SERPL CALC-MCNC: 64 MG/DL (ref 0–100)
LDLC/HDLC SERPL: 1.44 {RATIO}
LYMPHOCYTES # BLD AUTO: 2.45 10*3/MM3 (ref 0.7–3.1)
LYMPHOCYTES NFR BLD AUTO: 20.1 % (ref 19.6–45.3)
MCH RBC QN AUTO: 29.6 PG (ref 26.6–33)
MCHC RBC AUTO-ENTMCNC: 33.3 G/DL (ref 31.5–35.7)
MCV RBC AUTO: 88.9 FL (ref 79–97)
MONOCYTES # BLD AUTO: 0.65 10*3/MM3 (ref 0.1–0.9)
MONOCYTES NFR BLD AUTO: 5.3 % (ref 5–12)
NEUTROPHILS NFR BLD AUTO: 72.2 % (ref 42.7–76)
NEUTROPHILS NFR BLD AUTO: 8.78 10*3/MM3 (ref 1.7–7)
NRBC BLD AUTO-RTO: 0 /100 WBC (ref 0–0.2)
PLATELET # BLD AUTO: 267 10*3/MM3 (ref 140–450)
PMV BLD AUTO: 10.5 FL (ref 6–12)
POTASSIUM SERPL-SCNC: 4.3 MMOL/L (ref 3.5–5.2)
PROT SERPL-MCNC: 6.9 G/DL (ref 6–8.5)
RBC # BLD AUTO: 4.52 10*6/MM3 (ref 3.77–5.28)
SODIUM SERPL-SCNC: 141 MMOL/L (ref 136–145)
T4 FREE SERPL-MCNC: 1.47 NG/DL (ref 0.93–1.7)
TRIGL SERPL-MCNC: 219 MG/DL (ref 0–150)
TSH SERPL DL<=0.05 MIU/L-ACNC: 1.17 UIU/ML (ref 0.27–4.2)
VLDLC SERPL-MCNC: 36 MG/DL (ref 5–40)
WBC NRBC COR # BLD: 12.18 10*3/MM3 (ref 3.4–10.8)

## 2022-01-04 PROCEDURE — 80061 LIPID PANEL: CPT

## 2022-01-04 PROCEDURE — 84439 ASSAY OF FREE THYROXINE: CPT

## 2022-01-04 PROCEDURE — 85025 COMPLETE CBC W/AUTO DIFF WBC: CPT

## 2022-01-04 PROCEDURE — 36415 COLL VENOUS BLD VENIPUNCTURE: CPT

## 2022-01-04 PROCEDURE — 84443 ASSAY THYROID STIM HORMONE: CPT

## 2022-01-04 PROCEDURE — 80053 COMPREHEN METABOLIC PANEL: CPT

## 2022-01-10 ENCOUNTER — OFFICE VISIT (OUTPATIENT)
Dept: INTERNAL MEDICINE | Facility: CLINIC | Age: 62
End: 2022-01-10

## 2022-01-10 ENCOUNTER — TELEPHONE (OUTPATIENT)
Dept: ORTHOPEDIC SURGERY | Facility: CLINIC | Age: 62
End: 2022-01-10

## 2022-01-10 VITALS
TEMPERATURE: 98.4 F | HEART RATE: 64 BPM | WEIGHT: 210.4 LBS | SYSTOLIC BLOOD PRESSURE: 147 MMHG | OXYGEN SATURATION: 93 % | BODY MASS INDEX: 37.28 KG/M2 | HEIGHT: 63 IN | DIASTOLIC BLOOD PRESSURE: 88 MMHG

## 2022-01-10 DIAGNOSIS — I10 HYPERTENSION, ESSENTIAL: Primary | ICD-10-CM

## 2022-01-10 DIAGNOSIS — R42 VERTIGO: ICD-10-CM

## 2022-01-10 DIAGNOSIS — J01.00 ACUTE NON-RECURRENT MAXILLARY SINUSITIS: ICD-10-CM

## 2022-01-10 PROCEDURE — 99214 OFFICE O/P EST MOD 30 MIN: CPT | Performed by: INTERNAL MEDICINE

## 2022-01-10 RX ORDER — AMOXICILLIN AND CLAVULANATE POTASSIUM 875; 125 MG/1; MG/1
1 TABLET, FILM COATED ORAL 2 TIMES DAILY
Qty: 20 TABLET | Refills: 0 | Status: SHIPPED | OUTPATIENT
Start: 2022-01-10 | End: 2022-03-02

## 2022-01-10 RX ORDER — SCOLOPAMINE TRANSDERMAL SYSTEM 1 MG/1
1 PATCH, EXTENDED RELEASE TRANSDERMAL
Qty: 4 PATCH | Refills: 5 | Status: SHIPPED | OUTPATIENT
Start: 2022-01-10

## 2022-01-10 RX ORDER — FLUCONAZOLE 100 MG/1
100 TABLET ORAL DAILY
Qty: 5 TABLET | Refills: 0 | Status: SHIPPED | OUTPATIENT
Start: 2022-01-10 | End: 2022-03-02

## 2022-01-10 NOTE — TELEPHONE ENCOUNTER
Caller: PATIENT    Relationship to patient: SELF     Best call back number: [phone]    Patient is needing: PATIENT CALLED TO CANCEL HER RIGHT KNEE SURGERY THAT IS SCHEDULED WITH DR. SELF ON 02.15.22. PATIENT WOULD ALSO LIKE TO DISCUSS SCHEDULING AN INJECTION FOR HER LEFT KNEE. I TRIED TO WARM TRANSFER CALL TO THE OFFICE BUT RECEIVED NO ANSWER. THANK YOU!

## 2022-01-10 NOTE — PROGRESS NOTES
"Answers for HPI/ROS submitted by the patient on 1/10/2022  Please describe your symptoms.: Dr Siddiqi, I cannot reach you by phone. I am supposed to have an appointment with you at 10:30 today, I have called 10 times. I am sick with vertigo again, If we can somehow do the appointment on here, that would sure help.  Have you had these symptoms before?: Yes  How long have you been having these symptoms?: 1-4 days  Please list any medications you are currently taking for this condition.: Meclazine and a patch that went behind my ear, but im out of those  What is the primary reason for your visit?: Other    Chief Complaint/ HPI: Blood pressure and vertigo, patient says she has been checking her blood pressure at home still having some vertigo symptoms, taking meclizine    Planes of sinus congestion pressure in her face, bloody nasal drainage, left hip and leg pain going down her leg, supposed to have surgery but concerned because of the pain going down her leg    Objective   Vital Signs  Vitals:    01/10/22 1048   BP: 147/88   Pulse: 64   Temp: 98.4 °F (36.9 °C)   SpO2: 93%   Weight: 95.4 kg (210 lb 6.4 oz)   Height: 160 cm (62.99\")      Body mass index is 37.28 kg/m².  Review of Systems   HENT: Positive for postnasal drip, rhinorrhea, sinus pressure and swollen glands.    Neurological: Positive for dizziness.      Physical Exam  Constitutional:       General: She is not in acute distress.     Appearance: Normal appearance.   HENT:      Head: Normocephalic.      Mouth/Throat:      Mouth: Mucous membranes are moist.   Eyes:      Conjunctiva/sclera: Conjunctivae normal.      Pupils: Pupils are equal, round, and reactive to light.   Cardiovascular:      Rate and Rhythm: Normal rate and regular rhythm.      Pulses: Normal pulses.      Heart sounds: Normal heart sounds.   Pulmonary:      Effort: Pulmonary effort is normal.      Breath sounds: Normal breath sounds.   Abdominal:      General: Abdomen is flat. Bowel sounds are " normal.      Palpations: Abdomen is soft.   Musculoskeletal:         General: No swelling. Normal range of motion.      Cervical back: Neck supple.   Skin:     General: Skin is warm and dry.      Coloration: Skin is not jaundiced.   Neurological:      General: No focal deficit present.      Mental Status: She is alert and oriented to person, place, and time. Mental status is at baseline.   Psychiatric:         Mood and Affect: Mood normal.         Behavior: Behavior normal.         Thought Content: Thought content normal.         Judgment: Judgment normal.        Result Review :   No results found for: PROBNP, BNP  CMP    CMP 6/21/21 12/1/21 1/4/22   Glucose 88 136 (A) 99   BUN 11 14 11   Creatinine 0.69 0.92 0.99   eGFR Non African Am 87 62 57 (A)   Sodium 136 137 141   Potassium 4.2 3.1 (A) 4.3   Chloride 102 100 102   Calcium 8.8 8.9 9.5   Albumin 4.20 4.00 4.40   Total Bilirubin 0.2 0.2 0.3   Alkaline Phosphatase 62 58 65   AST (SGOT) 27 25 27   ALT (SGPT) 21 16 18   (A) Abnormal value            CBC w/diff    CBC w/Diff 6/21/21 12/1/21 1/4/22   WBC 7.63 7.89 12.18 (A)   RBC 4.61 4.03 4.52   Hemoglobin 13.4 12.1 13.4   Hematocrit 41.6 35.4 40.2   MCV 90.2 87.8 88.9   MCH 29.1 30.0 29.6   MCHC 32.2 34.2 33.3   RDW 14.1 14.6 14.0   Platelets 284 240 267   Neutrophil Rel % 48.4 45.0 72.2   Immature Granulocyte Rel %  0.3 0.2   Lymphocyte Rel % 40.0 42.5 20.1   Monocyte Rel % 6.8 9.4 5.3   Eosinophil Rel % 3.5 2.3 1.6   Basophil Rel % 0.9 0.5 0.6   (A) Abnormal value             Lipid Panel    Lipid Panel 6/21/21 1/4/22   Total Cholesterol 163 139   Triglycerides 334 (A) 219 (A)   HDL Cholesterol 35 (A) 39 (A)   VLDL Cholesterol 54 (A) 36   LDL Cholesterol  74 64   LDL/HDL Ratio 1.75 1.44   (A) Abnormal value             Lab Results   Component Value Date    TSH 1.170 01/04/2022    TSH 1.640 06/21/2021    TSH 2.600 06/15/2020      Lab Results   Component Value Date    FREET4 1.47 01/04/2022    FREET4 1.34 06/21/2021     FREET4 1.3 10/15/2019      A1C Last 3 Results    HGBA1C Last 3 Results 6/21/21   Hemoglobin A1C 5.69 (A)   (A) Abnormal value                              Visit Diagnoses:    ICD-10-CM ICD-9-CM   1. Hypertension, essential  I10 401.9   2. Vertigo  R42 780.4   3. Acute non-recurrent maxillary sinusitis  J01.00 461.0       Assessment and Plan   Diagnoses and all orders for this visit:    1. Hypertension, essential (Primary)  -     CBC & Differential; Future  -     Comprehensive Metabolic Panel; Future  -     CBC & Differential; Future    2. Vertigo  -     CBC & Differential; Future  -     Comprehensive Metabolic Panel; Future  -     CBC & Differential; Future    3. Acute non-recurrent maxillary sinusitis  -     CBC & Differential; Future  -     Comprehensive Metabolic Panel; Future  -     CBC & Differential; Future    Other orders  -     Scopolamine 1 MG/3DAYS patch; Place 1 patch on the skin as directed by provider Every 72 (Seventy-Two) Hours.  Dispense: 4 patch; Refill: 5  -     amoxicillin-clavulanate (Augmentin) 875-125 MG per tablet; Take 1 tablet by mouth 2 (Two) Times a Day.  Dispense: 20 tablet; Refill: 0  -     fluconazole (Diflucan) 100 MG tablet; Take 1 tablet by mouth Daily.  Dispense: 5 tablet; Refill: 0         Sciatica left leg and f/u soon jan 2022,     Acute sinusitis, will treat with Augmentin January 2022,    Acute vertigo --recurrent kishore 10/ 2022 --- with nausea vomiting, headaches, work-up in the emergency room included MRI of the brain CT of the brain did not show any acute abnormality, chest x-ray no active disease--- she will continue meclizine 3-4 times per day and add scopolamine patch December 6, 2021, and again January 10, 2022,    hypertension  continues ---valsartan HCT 80/12.5 mg daily,, improved Home blood pressure numbers reviewed,    Hypokalemia in the emergency room December 1, 2021,,, patient will continue over-the-counter potassium supplements    PULM NODULE ON CXR SEPT 2017,  CT SCAN OCT 2017, NO CHANGE IN CT APRIL 2018, (DONE ADIA ROMERO, --CT CHEST OCT 2019 , NO CHANGE IN PULM NODULE OVER 2 YRS , SO NO MORE F/U     LIFE STRESSORS DISCUSSED, OCT 2019, ANNA MARIE AND URINE DRUG SCREEN REIVIEWED OCT 2020    IFG, KPZ8N=0. 9 October 2020    HEART MURUMUR, ECHO 11/2018, ESSENTIALLY NL     obesity --Great job with weight loss of 50 pounds in the past 6 months, As of October 2019---WGT UP AGAIN JUNE 2020, discussed dietary weight loss issues again October 2020    LLQ Abdominal Pain/Diverticulitis--6/18/16.--Had colonoscopy Dr. Alarcon-- FOR F/U NOV, 2019, [facility]     HOSP STAY, 3/22-3/24/16--, WITH RECTAL BLEEDING PRESUMED TO BE DIVERTICULITIS, RX WITH IV ABX     L BREAST MASS--f/u U/S WAS NEG,. 8/2015, , Yearly mammograms December 2018 Dr. Sreedhar Munoz    Patient quit smoking 9 years ago    CHRONIC PAIN ISSUES DISCUSSED --WITH PAIN IN NECK , BACK PAIN- ON PRN NORCO-- WE DISCUSSED RISK OF DRIVING AND ADDICTION WITH MED--OCT 2020     DEPRESSION,--continues Xanax 0.5 twice a day, Prozac 40 mg daily,    ELEVATED CHOL, TRIG,-continues Crestor,    HYPOTHYROIDISM-continues LEVOTHYROID 0.075 MG QD   GERD--OTC NEXIUM   ELI, BSO 1994   OBESITY            Follow Up   Return in about 4 months (around 5/10/2022).  Patient was given instructions and counseling regarding her condition or for health maintenance advice. Please see specific information pulled into the AVS if appropriate.

## 2022-01-10 NOTE — TELEPHONE ENCOUNTER
LEFT MESSAGE FOR PATIENT TO CALL ME BACK TO SCHEDULE HER INJECTION.  I SPOKE WITH JAMMIE TO CXL SURGERY FOR 2/15/22. DONE

## 2022-01-17 RX ORDER — LEVOTHYROXINE SODIUM 0.07 MG/1
TABLET ORAL
Qty: 90 TABLET | Refills: 1 | Status: SHIPPED | OUTPATIENT
Start: 2022-01-17 | End: 2022-07-12

## 2022-01-17 RX ORDER — ESTRADIOL 0.05 MG/D
PATCH, EXTENDED RELEASE TRANSDERMAL
Qty: 24 PATCH | Refills: 3 | OUTPATIENT
Start: 2022-01-17

## 2022-01-31 RX ORDER — CIPROFLOXACIN 500 MG/1
500 TABLET, FILM COATED ORAL 2 TIMES DAILY
Qty: 20 TABLET | Refills: 0 | Status: SHIPPED | OUTPATIENT
Start: 2022-01-31 | End: 2022-03-02

## 2022-01-31 RX ORDER — METRONIDAZOLE 500 MG/1
500 TABLET ORAL 3 TIMES DAILY
Qty: 30 TABLET | Refills: 0 | Status: SHIPPED | OUTPATIENT
Start: 2022-01-31 | End: 2022-03-02

## 2022-02-02 ENCOUNTER — CLINICAL SUPPORT (OUTPATIENT)
Dept: ORTHOPEDIC SURGERY | Facility: CLINIC | Age: 62
End: 2022-02-02

## 2022-02-02 DIAGNOSIS — M17.12 ARTHRITIS OF LEFT KNEE: Primary | ICD-10-CM

## 2022-02-02 DIAGNOSIS — R52 PAIN: ICD-10-CM

## 2022-02-02 PROCEDURE — 73562 X-RAY EXAM OF KNEE 3: CPT | Performed by: NURSE PRACTITIONER

## 2022-02-02 PROCEDURE — 20610 DRAIN/INJ JOINT/BURSA W/O US: CPT | Performed by: NURSE PRACTITIONER

## 2022-02-02 RX ORDER — METHYLPREDNISOLONE ACETATE 80 MG/ML
80 INJECTION, SUSPENSION INTRA-ARTICULAR; INTRALESIONAL; INTRAMUSCULAR; SOFT TISSUE
Status: COMPLETED | OUTPATIENT
Start: 2022-02-02 | End: 2022-02-02

## 2022-02-02 RX ADMIN — METHYLPREDNISOLONE ACETATE 80 MG: 80 INJECTION, SUSPENSION INTRA-ARTICULAR; INTRALESIONAL; INTRAMUSCULAR; SOFT TISSUE at 13:12

## 2022-02-02 NOTE — PROGRESS NOTES
2/2/2022    Kamini Orta is here today for worsening knee pain. Pt has undergone injection of the knee in the past with good resolution of symptoms. Pt is requesting a repeat injection.   Radiology: AP, lateral, 40 degree PA of left knee taken in the office today due to pain without prior comparison shows advanced, end stage osteoarthritis of the left knee    KNEE Injection Procedure Note:    Large Joint Arthrocentesis: L knee  Date/Time: 2/2/2022 1:12 PM  Consent given by: patient  Site marked: site marked  Timeout: Immediately prior to procedure a time out was called to verify the correct patient, procedure, equipment, support staff and site/side marked as required   Supporting Documentation  Indications: pain   Procedure Details  Location: knee - L knee  Preparation: Patient was prepped and draped in the usual sterile fashion  Needle gauge: 21G.  Approach: anterolateral  Medications administered: 4 mL lidocaine (cardiac); 80 mg methylPREDNISolone acetate 80 MG/ML  Patient tolerance: patient tolerated the procedure well with no immediate complications        At the conclusion of the injection I discussed the importance of continued quad strengthening exercises on a daily basis. I will see the patient back if the symptoms should fail to improve or worsen.    Korina Goddard, APRN  2/2/2022

## 2022-02-12 DIAGNOSIS — M54.50 CHRONIC BILATERAL LOW BACK PAIN, UNSPECIFIED WHETHER SCIATICA PRESENT: ICD-10-CM

## 2022-02-12 DIAGNOSIS — G89.29 CHRONIC BILATERAL LOW BACK PAIN, UNSPECIFIED WHETHER SCIATICA PRESENT: ICD-10-CM

## 2022-02-14 RX ORDER — ALPRAZOLAM 0.5 MG/1
TABLET ORAL
Qty: 60 TABLET | Refills: 1 | Status: SHIPPED | OUTPATIENT
Start: 2022-02-14 | End: 2022-04-13

## 2022-02-16 ENCOUNTER — OFFICE VISIT (OUTPATIENT)
Dept: ORTHOPEDIC SURGERY | Facility: CLINIC | Age: 62
End: 2022-02-16

## 2022-02-16 VITALS — HEIGHT: 63 IN | BODY MASS INDEX: 35.79 KG/M2 | WEIGHT: 202 LBS | TEMPERATURE: 97.3 F

## 2022-02-16 DIAGNOSIS — M51.26 HERNIATED LUMBAR INTERVERTEBRAL DISC: Primary | ICD-10-CM

## 2022-02-16 PROCEDURE — 99214 OFFICE O/P EST MOD 30 MIN: CPT | Performed by: ORTHOPAEDIC SURGERY

## 2022-02-18 NOTE — PROGRESS NOTES
"New patient or new problem visit    CC: Low back pain, left leg pain    HPI: She lifted something heavy in June 2021 and felt burning back pain and radiating left leg pain.  She has noted some lateral foot numbness.  She actually had some saddle numbness and what sounds like cauda equina syndrome with some your urinary symptoms.  Sounds like it was treated with an epidural injection which she thinks worsened her pain and \"ruined her back \".  Somewhere along the way of blood patch was applied and I believe she she had a myelogram and ultimately developed arachnoiditis.  She is seen today at request of Christine lubin.    PFSH: See attached    ROS: As above    PE: The back is mildly tender to palpation in the lumbar region.  Good strength in the legs bilaterally and they appear sensate straight leg raise test is negative.    XRAY: Plain film x-rays of the lumbar spine show 3 level degenerative changes at L3-4, L4-5 and L5-S1.  She has a slight well-balanced scoliosis.  L5-1 shows a spondylolisthesis of about 8 or 9 mm.  Degeneration and flattening of disc space 3 4 and 4 5 are noted.  I reviewed her MRI scan at the time she was here and I am reviewing the report thereof now.  It demonstrated mostly normal findings above although the radiologist saw a 3 to 4 mm protruding disc at L2-3.  A right-sided bulge at L3-4 was noted along with slight bulging of the 4 5 disc which markedly narrows the left foramen and less so on the right.  There is a extruded disc fragment in the left paracentral aspect of the space between 4 5 and 5 1 intermediates not clear from which disc this emanates.  The radiologist thinks it is from L5-S1 but I am not that sure.  There is some foraminal stenosis on the left side at 5 1 as well.  I do not see any obvious evidence of nerve root clumping but there is some failure of the roots to settle on some of the lower lumbar images.  I would be hesitant to cause this arachnoiditis the radiologist did not " "call this arachnoiditis.  Knee x-rays obtained sequentially over many visits by Dr. Mccarty show severe medial DJD bilaterally.    Impression: Lumbar radiculopathy probably from L5-S1 extruded herniated disc which is causing L5 and S1 symptoms.  There might be an element of L4 as well above.  She has a spondylolisthesis at 5 1.  She has failed to improve with epidural injections and I do not remember asking her about physical therapy.  Bilateral knee DJD.    Plan: The disc extrusion is clearly new and would likely benefit from surgery.  There could be a role for doing laminectomy at 5 1 on the left and bilaterally at 4 5 and we could probably do this without destabilizing her back.  I do not know what kind of scarring we would encounter and there is always a chance that we could not mobilize the dura adequately and safely to remove the extruded fragment.  The other option would be to to do a wide decompression which might be safer from a neurologic standpoint but then we would be required at concomitant fusion and I think we might be looking at going up to L4 for sure and possibly even L3.  Tough choices.  I think we kind of left it at that.  I am going to run this by Dr. Pato Roy and see if that something he thinks we can do without fusion, in which case I may invite him to take over.  Otherwise we might work together on a decompression and fusion approach.    She was pretty angry because I was running behind to begin with, and delayed at getting her x-ray.  She was about to leave the office.  I apologized for running late and asked her if we could start over, and she agreed to stay.  During the early interview she had described the epidural is having ruined her life, and another procedure, I think a myelogram as having been \"bungled\".  I do not know all the circumstances, but I did tell her that sometimes medical procedures have complications, and sometimes they just do not work and the problem gets worse.  As we " may be looking at surgery in a scarred bed it is highly possible that she could have a complication from that, and ultimately be worse from the procedure.  I told her that it is important that she be comfortable with this concept before we proceed with anything invasive.  I do however feel she has had a recent change in her condition i.e. the extruded disc which could improve with surgical treatment.

## 2022-02-23 DIAGNOSIS — G89.29 CHRONIC BILATERAL LOW BACK PAIN WITHOUT SCIATICA: ICD-10-CM

## 2022-02-23 DIAGNOSIS — M54.50 CHRONIC BILATERAL LOW BACK PAIN WITHOUT SCIATICA: ICD-10-CM

## 2022-02-23 RX ORDER — HYDROCODONE BITARTRATE AND ACETAMINOPHEN 5; 325 MG/1; MG/1
TABLET ORAL
Qty: 60 TABLET | Refills: 0 | Status: SHIPPED | OUTPATIENT
Start: 2022-02-23 | End: 2022-04-13

## 2022-02-23 RX ORDER — PANTOPRAZOLE SODIUM 40 MG/1
TABLET, DELAYED RELEASE ORAL
Qty: 90 TABLET | Refills: 2 | Status: SHIPPED | OUTPATIENT
Start: 2022-02-23 | End: 2022-11-21 | Stop reason: SDUPTHER

## 2022-02-23 RX ORDER — FLUOXETINE HYDROCHLORIDE 40 MG/1
CAPSULE ORAL
Qty: 90 CAPSULE | Refills: 2 | Status: SHIPPED | OUTPATIENT
Start: 2022-02-23 | End: 2022-11-21 | Stop reason: SDUPTHER

## 2022-02-23 RX ORDER — FLUTICASONE PROPIONATE 50 MCG
SPRAY, SUSPENSION (ML) NASAL
Qty: 16 G | Refills: 1 | Status: SHIPPED | OUTPATIENT
Start: 2022-02-23 | End: 2022-08-23

## 2022-03-01 ENCOUNTER — TELEPHONE (OUTPATIENT)
Dept: INTERNAL MEDICINE | Facility: CLINIC | Age: 62
End: 2022-03-01

## 2022-03-01 NOTE — TELEPHONE ENCOUNTER
----- Message from Michelle Patrick MA sent at 3/1/2022 11:38 AM EST -----  Regarding: FW: pain in lower left side    ----- Message -----  From: Kamini Orta  Sent: 3/1/2022  11:21 AM EST  To: [facility] Clinical Pool  Subject: pain in lower left side                          Dr. Siddiqi,  I think you wanted me to go for more bloodwork to check my white cell count, I have no paperwork. I took my prescriptions of Cipro and Flagyl, however I was also taking a very powerful probiotic, and I'm still having issues, what would you suggest?  Thank-you,  Kamini Orta

## 2022-03-02 ENCOUNTER — LAB (OUTPATIENT)
Dept: LAB | Facility: HOSPITAL | Age: 62
End: 2022-03-02

## 2022-03-02 ENCOUNTER — OFFICE VISIT (OUTPATIENT)
Dept: INTERNAL MEDICINE | Facility: CLINIC | Age: 62
End: 2022-03-02

## 2022-03-02 VITALS
TEMPERATURE: 97.9 F | SYSTOLIC BLOOD PRESSURE: 108 MMHG | OXYGEN SATURATION: 94 % | HEIGHT: 63 IN | DIASTOLIC BLOOD PRESSURE: 54 MMHG | WEIGHT: 201.4 LBS | HEART RATE: 79 BPM | BODY MASS INDEX: 35.68 KG/M2

## 2022-03-02 DIAGNOSIS — K57.32 DIVERTICULITIS OF LARGE INTESTINE WITHOUT PERFORATION OR ABSCESS WITHOUT BLEEDING: ICD-10-CM

## 2022-03-02 DIAGNOSIS — I10 HYPERTENSION, ESSENTIAL: ICD-10-CM

## 2022-03-02 DIAGNOSIS — J01.00 ACUTE NON-RECURRENT MAXILLARY SINUSITIS: Primary | ICD-10-CM

## 2022-03-02 DIAGNOSIS — J01.00 ACUTE NON-RECURRENT MAXILLARY SINUSITIS: ICD-10-CM

## 2022-03-02 LAB
ALBUMIN SERPL-MCNC: 4.4 G/DL (ref 3.5–5.2)
ALBUMIN/GLOB SERPL: 1.5 G/DL
ALP SERPL-CCNC: 67 U/L (ref 39–117)
ALT SERPL W P-5'-P-CCNC: 19 U/L (ref 1–33)
AMYLASE SERPL-CCNC: 43 U/L (ref 28–100)
ANION GAP SERPL CALCULATED.3IONS-SCNC: 10 MMOL/L (ref 5–15)
AST SERPL-CCNC: 26 U/L (ref 1–32)
BASOPHILS # BLD AUTO: 0.05 10*3/MM3 (ref 0–0.2)
BASOPHILS NFR BLD AUTO: 0.6 % (ref 0–1.5)
BILIRUB SERPL-MCNC: 0.3 MG/DL (ref 0–1.2)
BUN SERPL-MCNC: 11 MG/DL (ref 8–23)
BUN/CREAT SERPL: 12.4 (ref 7–25)
CALCIUM SPEC-SCNC: 9.7 MG/DL (ref 8.6–10.5)
CHLORIDE SERPL-SCNC: 101 MMOL/L (ref 98–107)
CO2 SERPL-SCNC: 30 MMOL/L (ref 22–29)
CREAT SERPL-MCNC: 0.89 MG/DL (ref 0.57–1)
DEPRECATED RDW RBC AUTO: 44.1 FL (ref 37–54)
EGFRCR SERPLBLD CKD-EPI 2021: 74.3 ML/MIN/1.73
EOSINOPHIL # BLD AUTO: 0.19 10*3/MM3 (ref 0–0.4)
EOSINOPHIL NFR BLD AUTO: 2.2 % (ref 0.3–6.2)
ERYTHROCYTE [DISTWIDTH] IN BLOOD BY AUTOMATED COUNT: 13.6 % (ref 12.3–15.4)
ERYTHROCYTE [SEDIMENTATION RATE] IN BLOOD: 11 MM/HR (ref 0–30)
GLOBULIN UR ELPH-MCNC: 3 GM/DL
GLUCOSE SERPL-MCNC: 90 MG/DL (ref 65–99)
HCT VFR BLD AUTO: 39.4 % (ref 34–46.6)
HGB BLD-MCNC: 13 G/DL (ref 12–15.9)
IMM GRANULOCYTES # BLD AUTO: 0.03 10*3/MM3 (ref 0–0.05)
IMM GRANULOCYTES NFR BLD AUTO: 0.3 % (ref 0–0.5)
LIPASE SERPL-CCNC: 18 U/L (ref 13–60)
LYMPHOCYTES # BLD AUTO: 2.98 10*3/MM3 (ref 0.7–3.1)
LYMPHOCYTES NFR BLD AUTO: 34.6 % (ref 19.6–45.3)
MCH RBC QN AUTO: 29.3 PG (ref 26.6–33)
MCHC RBC AUTO-ENTMCNC: 33 G/DL (ref 31.5–35.7)
MCV RBC AUTO: 88.9 FL (ref 79–97)
MONOCYTES # BLD AUTO: 0.71 10*3/MM3 (ref 0.1–0.9)
MONOCYTES NFR BLD AUTO: 8.2 % (ref 5–12)
NEUTROPHILS NFR BLD AUTO: 4.66 10*3/MM3 (ref 1.7–7)
NEUTROPHILS NFR BLD AUTO: 54.1 % (ref 42.7–76)
NRBC BLD AUTO-RTO: 0 /100 WBC (ref 0–0.2)
PLATELET # BLD AUTO: 301 10*3/MM3 (ref 140–450)
PMV BLD AUTO: 10.1 FL (ref 6–12)
POTASSIUM SERPL-SCNC: 4.6 MMOL/L (ref 3.5–5.2)
PROT SERPL-MCNC: 7.4 G/DL (ref 6–8.5)
RBC # BLD AUTO: 4.43 10*6/MM3 (ref 3.77–5.28)
SODIUM SERPL-SCNC: 141 MMOL/L (ref 136–145)
WBC NRBC COR # BLD: 8.62 10*3/MM3 (ref 3.4–10.8)

## 2022-03-02 PROCEDURE — 80053 COMPREHEN METABOLIC PANEL: CPT

## 2022-03-02 PROCEDURE — 36415 COLL VENOUS BLD VENIPUNCTURE: CPT

## 2022-03-02 PROCEDURE — 85652 RBC SED RATE AUTOMATED: CPT

## 2022-03-02 PROCEDURE — 99214 OFFICE O/P EST MOD 30 MIN: CPT | Performed by: INTERNAL MEDICINE

## 2022-03-02 PROCEDURE — 82150 ASSAY OF AMYLASE: CPT

## 2022-03-02 PROCEDURE — 83690 ASSAY OF LIPASE: CPT

## 2022-03-02 PROCEDURE — 85025 COMPLETE CBC W/AUTO DIFF WBC: CPT

## 2022-03-02 RX ORDER — DICYCLOMINE HYDROCHLORIDE 10 MG/1
10 CAPSULE ORAL
Qty: 60 CAPSULE | Refills: 2 | Status: ON HOLD | OUTPATIENT
Start: 2022-03-02 | End: 2022-08-17

## 2022-03-02 RX ORDER — METRONIDAZOLE 500 MG/1
500 TABLET ORAL 3 TIMES DAILY
Qty: 30 TABLET | Refills: 0 | Status: SHIPPED | OUTPATIENT
Start: 2022-03-02 | End: 2022-05-17

## 2022-03-02 RX ORDER — CIPROFLOXACIN 500 MG/1
500 TABLET, FILM COATED ORAL 2 TIMES DAILY
Qty: 20 TABLET | Refills: 0 | Status: SHIPPED | OUTPATIENT
Start: 2022-03-02 | End: 2022-05-17

## 2022-03-02 NOTE — PROGRESS NOTES
"Chief Complaint/ HPI:  Still with lower abd pain   Stomach upset   Diarrhea  Taking probiotics    Not eating   Reflux worse         Objective   Vital Signs  Vitals:    03/02/22 1059   BP: 108/54   Pulse: 79   Temp: 97.9 °F (36.6 °C)   SpO2: 94%   Weight: 91.4 kg (201 lb 6.4 oz)   Height: 160 cm (62.99\")      Body mass index is 35.69 kg/m².  Review of Systems as above   Physical Exam  Constitutional:       General: She is not in acute distress.     Appearance: Normal appearance.   HENT:      Head: Normocephalic.      Mouth/Throat:      Mouth: Mucous membranes are moist.   Eyes:      Conjunctiva/sclera: Conjunctivae normal.      Pupils: Pupils are equal, round, and reactive to light.   Cardiovascular:      Rate and Rhythm: Normal rate and regular rhythm.      Pulses: Normal pulses.      Heart sounds: Normal heart sounds.   Pulmonary:      Effort: Pulmonary effort is normal.      Breath sounds: Normal breath sounds.   Abdominal:      General: Abdomen is flat. Bowel sounds are normal.      Palpations: Abdomen is soft.   Musculoskeletal:         General: No swelling. Normal range of motion.      Cervical back: Neck supple.   Skin:     General: Skin is warm and dry.      Coloration: Skin is not jaundiced.   Neurological:      General: No focal deficit present.      Mental Status: She is alert and oriented to person, place, and time. Mental status is at baseline.   Psychiatric:         Mood and Affect: Mood normal.         Behavior: Behavior normal.         Thought Content: Thought content normal.         Judgment: Judgment normal.        Result Review :   No results found for: PROBNP, BNP  CMP    CMP 6/21/21 12/1/21 1/4/22   Glucose 88 136 (A) 99   BUN 11 14 11   Creatinine 0.69 0.92 0.99   eGFR Non African Am 87 62 57 (A)   Sodium 136 137 141   Potassium 4.2 3.1 (A) 4.3   Chloride 102 100 102   Calcium 8.8 8.9 9.5   Albumin 4.20 4.00 4.40   Total Bilirubin 0.2 0.2 0.3   Alkaline Phosphatase 62 58 65   AST (SGOT) 27 25 27 "   ALT (SGPT) 21 16 18   (A) Abnormal value            CBC w/diff    CBC w/Diff 6/21/21 12/1/21 1/4/22   WBC 7.63 7.89 12.18 (A)   RBC 4.61 4.03 4.52   Hemoglobin 13.4 12.1 13.4   Hematocrit 41.6 35.4 40.2   MCV 90.2 87.8 88.9   MCH 29.1 30.0 29.6   MCHC 32.2 34.2 33.3   RDW 14.1 14.6 14.0   Platelets 284 240 267   Neutrophil Rel % 48.4 45.0 72.2   Immature Granulocyte Rel %  0.3 0.2   Lymphocyte Rel % 40.0 42.5 20.1   Monocyte Rel % 6.8 9.4 5.3   Eosinophil Rel % 3.5 2.3 1.6   Basophil Rel % 0.9 0.5 0.6   (A) Abnormal value             Lipid Panel    Lipid Panel 6/21/21 1/4/22   Total Cholesterol 163 139   Triglycerides 334 (A) 219 (A)   HDL Cholesterol 35 (A) 39 (A)   VLDL Cholesterol 54 (A) 36   LDL Cholesterol  74 64   LDL/HDL Ratio 1.75 1.44   (A) Abnormal value             Lab Results   Component Value Date    TSH 1.170 01/04/2022    TSH 1.640 06/21/2021    TSH 2.600 06/15/2020      Lab Results   Component Value Date    FREET4 1.47 01/04/2022    FREET4 1.34 06/21/2021    FREET4 1.3 10/15/2019      A1C Last 3 Results    HGBA1C Last 3 Results 6/21/21   Hemoglobin A1C 5.69 (A)   (A) Abnormal value                              Visit Diagnoses:    ICD-10-CM ICD-9-CM   1. Acute non-recurrent maxillary sinusitis  J01.00 461.0   2. Hypertension, essential  I10 401.9   3. Diverticulitis of large intestine without perforation or abscess without bleeding  K57.32 562.11       Assessment and Plan   Diagnoses and all orders for this visit:    1. Acute non-recurrent maxillary sinusitis (Primary)  -     CT Abdomen Pelvis With Contrast; Future  -     CBC & Differential; Future  -     Comprehensive Metabolic Panel; Future  -     Sedimentation Rate; Future  -     Amylase; Future  -     Lipase; Future  -     dicyclomine (Bentyl) 10 MG capsule; Take 1 capsule by mouth 4 (Four) Times a Day Before Meals & at Bedtime.  Dispense: 60 capsule; Refill: 2  -     metroNIDAZOLE (Flagyl) 500 MG tablet; Take 1 tablet by mouth 3 (Three) Times a  Day.  Dispense: 30 tablet; Refill: 0  -     ciprofloxacin (Cipro) 500 MG tablet; Take 1 tablet by mouth 2 (Two) Times a Day.  Dispense: 20 tablet; Refill: 0    2. Hypertension, essential  -     CT Abdomen Pelvis With Contrast; Future  -     CBC & Differential; Future  -     Comprehensive Metabolic Panel; Future  -     Sedimentation Rate; Future  -     Amylase; Future  -     Lipase; Future  -     dicyclomine (Bentyl) 10 MG capsule; Take 1 capsule by mouth 4 (Four) Times a Day Before Meals & at Bedtime.  Dispense: 60 capsule; Refill: 2  -     metroNIDAZOLE (Flagyl) 500 MG tablet; Take 1 tablet by mouth 3 (Three) Times a Day.  Dispense: 30 tablet; Refill: 0  -     ciprofloxacin (Cipro) 500 MG tablet; Take 1 tablet by mouth 2 (Two) Times a Day.  Dispense: 20 tablet; Refill: 0    3. Diverticulitis of large intestine without perforation or abscess without bleeding  -     CT Abdomen Pelvis With Contrast; Future  -     CBC & Differential; Future  -     Comprehensive Metabolic Panel; Future  -     Sedimentation Rate; Future  -     Amylase; Future  -     Lipase; Future  -     dicyclomine (Bentyl) 10 MG capsule; Take 1 capsule by mouth 4 (Four) Times a Day Before Meals & at Bedtime.  Dispense: 60 capsule; Refill: 2  -     metroNIDAZOLE (Flagyl) 500 MG tablet; Take 1 tablet by mouth 3 (Three) Times a Day.  Dispense: 30 tablet; Refill: 0  -     ciprofloxacin (Cipro) 500 MG tablet; Take 1 tablet by mouth 2 (Two) Times a Day.  Dispense: 20 tablet; Refill: 0         Patient with abdominal pain possible recurrent diverticulitis January 31, called in Cipro and Flagyl has finished that course of antibiotics now with continued upset stomach nausea, abdominal pains loose stools diarrhea mucus, concerned about this and addition of probiotics recently,--- March 2, 2022---will do ct scan and refer back to dr oshea , recommend continue Protonix in the morning and Pepcid at bedtime--- consider another round of antibiotics, Bentyl,    Acute  vertigo --recurrent kishore 10/ 2022 --- with nausea vomiting, headaches, work-up in the emergency room included MRI of the brain CT of the brain did not show any acute abnormality, chest x-ray no active disease--- she will continue meclizine 3-4 times per day and add scopolamine patch December 6, 2021, and again January 10, 2022,    hypertension  continues ---valsartan HCT 80/12.5 mg daily,, improved Home blood pressure numbers reviewed,    PULM NODULE ON CXR SEPT 2017, CT SCAN OCT 2017, NO CHANGE IN CT APRIL 2018, (DONE ADIA TRAIL DIAG, --CT CHEST OCT 2019 , NO CHANGE IN PULM NODULE OVER 2 YRS , SO NO MORE F/U     LIFE STRESSORS DISCUSSED, OCT 2019, ANNA MARIE AND URINE DRUG SCREEN REIVIEWED OCT 2020    IFG, QCE4Z=4. 9 October 2020    HEART MURUMUR, ECHO 11/2018, ESSENTIALLY NL     obesity --Great job with weight loss of 50 pounds in the past 6 months, As of October 2019---WGT UP AGAIN JUNE 2020, discussed dietary weight loss issues again October 2020    LLQ Abdominal Pain/Diverticulitis--6/18/16.--Had colonoscopy Dr. Alarcon-- FOR F/U NOV, 2019, THI     L BREAST MASS--f/u U/S WAS NEG,. 8/2015, , Yearly mammograms December 2018 Dr. Sreedhar Munzo    Patient quit smoking 9 years ago    CHRONIC PAIN ISSUES DISCUSSED --WITH PAIN IN NECK , BACK PAIN- ON PRN NORCO-- WE DISCUSSED RISK OF DRIVING AND ADDICTION WITH MED--OCT 2020     DEPRESSION,--continues Xanax 0.5 twice a day, Prozac 40 mg daily,    ELEVATED CHOL, TRIG,-continues Crestor,    HYPOTHYROIDISM-continues LEVOTHYROID 0.075 MG QD   GERD--OTC NEXIUM   ELI, BSO 1994   OBESITY  ----- Message from Michelle Patrick MA sent at 3/1/2022 11:38 AM EST -----  Regarding: FW: pain in lower left side    ----- Message -----  From: Kamini Orta  Sent: 3/1/2022  11:21 AM EST  To: [facility] Clinical Pool  Subject: pain in lower left side                          Dr. Siddiqi,  I think you wanted me to go for more bloodwork to check my white cell count, I have no paperwork. I  took my prescriptions of Cipro and Flagyl, however I was also taking a very powerful probiotic, and I'm still having issues, what would you suggest?  Thank-you,  Kamini Orta            Follow Up   Return for Next scheduled follow up.  Patient was given instructions and counseling regarding her condition or for health maintenance advice. Please see specific information pulled into the AVS if appropriate.

## 2022-03-10 ENCOUNTER — TELEPHONE (OUTPATIENT)
Dept: ORTHOPEDIC SURGERY | Facility: CLINIC | Age: 62
End: 2022-03-10

## 2022-03-10 ENCOUNTER — TELEPHONE (OUTPATIENT)
Dept: INTERNAL MEDICINE | Facility: CLINIC | Age: 62
End: 2022-03-10

## 2022-03-10 RX ORDER — FLUCONAZOLE 100 MG/1
100 TABLET ORAL DAILY
Qty: 3 TABLET | Refills: 1 | Status: SHIPPED | OUTPATIENT
Start: 2022-03-10 | End: 2022-05-17

## 2022-03-10 NOTE — TELEPHONE ENCOUNTER
Call patient and tell her we sent this in and send in Diflucan 100 mg, oral, daily for 3 days, #3 with 1 refill, send it back to me to sign

## 2022-03-10 NOTE — TELEPHONE ENCOUNTER
----- Message from Carmen Kamara MA sent at 3/10/2022  9:50 AM EST -----  Regarding: FW: Diflucan  SENT TO THE WRONG OFFICE.   ----- Message -----  From: Kamini Orta  Sent: 3/10/2022   9:28 AM EST  To: [facility] Clinical Pool  Subject: Diflucan                                         Dr. Siddiqi, I didn't get and Diflucan with this course of antibiotics, and I'm needing it badly.    Thank you for your time,  Kamini Orta

## 2022-03-10 NOTE — TELEPHONE ENCOUNTER
Please tell her I am sorry to take so long to get back to her, but I have spoken with one of my [employer] neurosurgical colleagues and we have reviewed the tests.  There is too much scarring around the nerve to expect that we can improve her pain with surgery, all things considered.  That being the case there is not anything I can do to help her.  My best suggestion is to follow-up with her surgeon.

## 2022-03-31 ENCOUNTER — HOSPITAL ENCOUNTER (OUTPATIENT)
Dept: CT IMAGING | Facility: HOSPITAL | Age: 62
Discharge: HOME | End: 2022-03-31
Admitting: INTERNAL MEDICINE

## 2022-03-31 DIAGNOSIS — K57.32 DIVERTICULITIS OF LARGE INTESTINE WITHOUT PERFORATION OR ABSCESS WITHOUT BLEEDING: ICD-10-CM

## 2022-03-31 DIAGNOSIS — J01.00 ACUTE NON-RECURRENT MAXILLARY SINUSITIS: ICD-10-CM

## 2022-03-31 DIAGNOSIS — I10 HYPERTENSION, ESSENTIAL: ICD-10-CM

## 2022-03-31 PROCEDURE — 0 IOPAMIDOL PER 1 ML: Performed by: INTERNAL MEDICINE

## 2022-03-31 PROCEDURE — 74177 CT ABD & PELVIS W/CONTRAST: CPT

## 2022-03-31 RX ADMIN — IOPAMIDOL 100 ML: 755 INJECTION, SOLUTION INTRAVENOUS at 08:57

## 2022-04-13 DIAGNOSIS — G89.29 CHRONIC BILATERAL LOW BACK PAIN, UNSPECIFIED WHETHER SCIATICA PRESENT: ICD-10-CM

## 2022-04-13 DIAGNOSIS — M54.50 CHRONIC BILATERAL LOW BACK PAIN WITHOUT SCIATICA: ICD-10-CM

## 2022-04-13 DIAGNOSIS — G89.29 CHRONIC BILATERAL LOW BACK PAIN WITHOUT SCIATICA: ICD-10-CM

## 2022-04-13 DIAGNOSIS — M54.50 CHRONIC BILATERAL LOW BACK PAIN, UNSPECIFIED WHETHER SCIATICA PRESENT: ICD-10-CM

## 2022-04-13 RX ORDER — HYDROCODONE BITARTRATE AND ACETAMINOPHEN 5; 325 MG/1; MG/1
TABLET ORAL
Qty: 60 TABLET | Refills: 0 | Status: SHIPPED | OUTPATIENT
Start: 2022-04-13 | End: 2022-06-27

## 2022-04-13 RX ORDER — ALPRAZOLAM 0.5 MG/1
TABLET ORAL
Qty: 60 TABLET | Refills: 5 | Status: SHIPPED | OUTPATIENT
Start: 2022-04-13 | End: 2022-10-06

## 2022-04-18 ENCOUNTER — OFFICE VISIT (OUTPATIENT)
Dept: OBSTETRICS AND GYNECOLOGY | Facility: CLINIC | Age: 62
End: 2022-04-18

## 2022-04-18 VITALS
DIASTOLIC BLOOD PRESSURE: 70 MMHG | HEART RATE: 65 BPM | WEIGHT: 207 LBS | SYSTOLIC BLOOD PRESSURE: 110 MMHG | BODY MASS INDEX: 36.68 KG/M2

## 2022-04-18 DIAGNOSIS — Z12.31 ENCOUNTER FOR SCREENING MAMMOGRAM FOR MALIGNANT NEOPLASM OF BREAST: ICD-10-CM

## 2022-04-18 DIAGNOSIS — Z01.419 WELL WOMAN EXAM: Primary | ICD-10-CM

## 2022-04-18 PROCEDURE — 99396 PREV VISIT EST AGE 40-64: CPT | Performed by: NURSE PRACTITIONER

## 2022-04-18 RX ORDER — ESTRADIOL 0.04 MG/D
1 FILM, EXTENDED RELEASE TRANSDERMAL 2 TIMES WEEKLY
Qty: 24 PATCH | Refills: 3 | Status: SHIPPED | OUTPATIENT
Start: 2022-04-18 | End: 2022-09-12 | Stop reason: DRUGHIGH

## 2022-05-13 ENCOUNTER — LAB (OUTPATIENT)
Dept: LAB | Facility: HOSPITAL | Age: 62
End: 2022-05-13

## 2022-05-13 DIAGNOSIS — R42 VERTIGO: ICD-10-CM

## 2022-05-13 DIAGNOSIS — I10 HYPERTENSION, ESSENTIAL: ICD-10-CM

## 2022-05-13 DIAGNOSIS — J01.00 ACUTE NON-RECURRENT MAXILLARY SINUSITIS: ICD-10-CM

## 2022-05-13 LAB
ALBUMIN SERPL-MCNC: 4.1 G/DL (ref 3.5–5.2)
ALBUMIN/GLOB SERPL: 1.5 G/DL
ALP SERPL-CCNC: 63 U/L (ref 39–117)
ALT SERPL W P-5'-P-CCNC: 20 U/L (ref 1–33)
ANION GAP SERPL CALCULATED.3IONS-SCNC: 10.6 MMOL/L (ref 5–15)
AST SERPL-CCNC: 27 U/L (ref 1–32)
BASOPHILS # BLD AUTO: 0.06 10*3/MM3 (ref 0–0.2)
BASOPHILS NFR BLD AUTO: 0.8 % (ref 0–1.5)
BILIRUB SERPL-MCNC: 0.3 MG/DL (ref 0–1.2)
BUN SERPL-MCNC: 11 MG/DL (ref 8–23)
BUN/CREAT SERPL: 13.3 (ref 7–25)
CALCIUM SPEC-SCNC: 9.4 MG/DL (ref 8.6–10.5)
CHLORIDE SERPL-SCNC: 100 MMOL/L (ref 98–107)
CO2 SERPL-SCNC: 26.4 MMOL/L (ref 22–29)
CREAT SERPL-MCNC: 0.83 MG/DL (ref 0.57–1)
DEPRECATED RDW RBC AUTO: 45.2 FL (ref 37–54)
EGFRCR SERPLBLD CKD-EPI 2021: 80.8 ML/MIN/1.73
EOSINOPHIL # BLD AUTO: 0.19 10*3/MM3 (ref 0–0.4)
EOSINOPHIL NFR BLD AUTO: 2.5 % (ref 0.3–6.2)
ERYTHROCYTE [DISTWIDTH] IN BLOOD BY AUTOMATED COUNT: 13.8 % (ref 12.3–15.4)
GLOBULIN UR ELPH-MCNC: 2.7 GM/DL
GLUCOSE SERPL-MCNC: 93 MG/DL (ref 65–99)
HCT VFR BLD AUTO: 37.5 % (ref 34–46.6)
HGB BLD-MCNC: 12.4 G/DL (ref 12–15.9)
IMM GRANULOCYTES # BLD AUTO: 0.02 10*3/MM3 (ref 0–0.05)
IMM GRANULOCYTES NFR BLD AUTO: 0.3 % (ref 0–0.5)
LYMPHOCYTES # BLD AUTO: 2.62 10*3/MM3 (ref 0.7–3.1)
LYMPHOCYTES NFR BLD AUTO: 34.8 % (ref 19.6–45.3)
MCH RBC QN AUTO: 29.7 PG (ref 26.6–33)
MCHC RBC AUTO-ENTMCNC: 33.1 G/DL (ref 31.5–35.7)
MCV RBC AUTO: 89.7 FL (ref 79–97)
MONOCYTES # BLD AUTO: 0.69 10*3/MM3 (ref 0.1–0.9)
MONOCYTES NFR BLD AUTO: 9.2 % (ref 5–12)
NEUTROPHILS NFR BLD AUTO: 3.95 10*3/MM3 (ref 1.7–7)
NEUTROPHILS NFR BLD AUTO: 52.4 % (ref 42.7–76)
NRBC BLD AUTO-RTO: 0 /100 WBC (ref 0–0.2)
PLATELET # BLD AUTO: 285 10*3/MM3 (ref 140–450)
PMV BLD AUTO: 10.2 FL (ref 6–12)
POTASSIUM SERPL-SCNC: 3.7 MMOL/L (ref 3.5–5.2)
PROT SERPL-MCNC: 6.8 G/DL (ref 6–8.5)
RBC # BLD AUTO: 4.18 10*6/MM3 (ref 3.77–5.28)
SODIUM SERPL-SCNC: 137 MMOL/L (ref 136–145)
WBC NRBC COR # BLD: 7.53 10*3/MM3 (ref 3.4–10.8)

## 2022-05-13 PROCEDURE — 36415 COLL VENOUS BLD VENIPUNCTURE: CPT

## 2022-05-13 PROCEDURE — 80053 COMPREHEN METABOLIC PANEL: CPT

## 2022-05-13 PROCEDURE — 85025 COMPLETE CBC W/AUTO DIFF WBC: CPT

## 2022-05-17 ENCOUNTER — OFFICE VISIT (OUTPATIENT)
Dept: INTERNAL MEDICINE | Facility: CLINIC | Age: 62
End: 2022-05-17

## 2022-05-17 VITALS
SYSTOLIC BLOOD PRESSURE: 124 MMHG | OXYGEN SATURATION: 95 % | WEIGHT: 206.8 LBS | DIASTOLIC BLOOD PRESSURE: 62 MMHG | BODY MASS INDEX: 36.64 KG/M2 | HEART RATE: 65 BPM | HEIGHT: 63 IN | TEMPERATURE: 98 F

## 2022-05-17 DIAGNOSIS — I10 HYPERTENSION, ESSENTIAL: ICD-10-CM

## 2022-05-17 DIAGNOSIS — M17.11 PRIMARY OSTEOARTHRITIS OF RIGHT KNEE: ICD-10-CM

## 2022-05-17 DIAGNOSIS — G89.29 CHRONIC BILATERAL LOW BACK PAIN, UNSPECIFIED WHETHER SCIATICA PRESENT: ICD-10-CM

## 2022-05-17 DIAGNOSIS — G89.29 CHRONIC PAIN OF RIGHT KNEE: Primary | ICD-10-CM

## 2022-05-17 DIAGNOSIS — G03.9 ADHESIVE ARACHNOIDITIS: ICD-10-CM

## 2022-05-17 DIAGNOSIS — M51.36 LUMBAR DEGENERATIVE DISC DISEASE: ICD-10-CM

## 2022-05-17 DIAGNOSIS — K57.32 DIVERTICULITIS OF LARGE INTESTINE WITHOUT PERFORATION OR ABSCESS WITHOUT BLEEDING: ICD-10-CM

## 2022-05-17 DIAGNOSIS — M54.50 CHRONIC BILATERAL LOW BACK PAIN, UNSPECIFIED WHETHER SCIATICA PRESENT: ICD-10-CM

## 2022-05-17 DIAGNOSIS — R42 VERTIGO: ICD-10-CM

## 2022-05-17 DIAGNOSIS — M25.561 CHRONIC PAIN OF RIGHT KNEE: Primary | ICD-10-CM

## 2022-05-17 PROCEDURE — 99214 OFFICE O/P EST MOD 30 MIN: CPT | Performed by: INTERNAL MEDICINE

## 2022-05-17 NOTE — PROGRESS NOTES
"Chief Complaint/ HPI: f/u with anxiety and recent abd pain , ct march noted   F/u htn   Chronic pain issues ---, back issues,, is concerned and wants to get a second opinion on her back will send to [facility] spine [place],  And labs ---to be reviewed  Fell last Friday, injured her right elbow and knee,    Objective   Vital Signs  Vitals:    05/17/22 1538   BP: 124/62   Pulse: 65   Temp: 98 °F (36.7 °C)   SpO2: 95%   Weight: 93.8 kg (206 lb 12.8 oz)   Height: 160 cm (62.99\")      Body mass index is 36.64 kg/m².  Review of Systems   Constitutional: Negative.    HENT: Negative.    Eyes: Negative.    Respiratory: Negative.    Cardiovascular: Negative.    Gastrointestinal: Negative.    Endocrine: Negative.    Genitourinary: Negative.    Musculoskeletal: Negative.    Allergic/Immunologic: Negative.    Neurological: Negative.    Hematological: Negative.    Psychiatric/Behavioral: Negative.       Physical Exam  Constitutional:       General: She is not in acute distress.     Appearance: Normal appearance. She is obese.   HENT:      Head: Normocephalic.      Mouth/Throat:      Mouth: Mucous membranes are moist.   Eyes:      Conjunctiva/sclera: Conjunctivae normal.      Pupils: Pupils are equal, round, and reactive to light.   Cardiovascular:      Rate and Rhythm: Normal rate and regular rhythm.      Pulses: Normal pulses.      Heart sounds: Normal heart sounds.   Pulmonary:      Effort: Pulmonary effort is normal.      Breath sounds: Normal breath sounds.   Abdominal:      General: Bowel sounds are normal.      Palpations: Abdomen is soft.   Musculoskeletal:         General: No swelling. Normal range of motion.      Cervical back: Neck supple.   Skin:     General: Skin is warm and dry.      Coloration: Skin is not jaundiced.   Neurological:      General: No focal deficit present.      Mental Status: She is alert and oriented to person, place, and time. Mental status is at baseline.   Psychiatric:         Mood and Affect: " Mood normal.         Behavior: Behavior normal.         Thought Content: Thought content normal.         Judgment: Judgment normal.      neg straight leg b/l   Bruises noted to the anterior right knee, and the right olecranon area of the elbow,  Result Review :   No results found for: PROBNP, BNP  CMP    CMP 1/4/22 3/2/22 5/13/22   Glucose 99 90 93   BUN 11 11 11   Creatinine 0.99 0.89 0.83   eGFR Non African Am 57 (A)     Sodium 141 141 137   Potassium 4.3 4.6 3.7   Chloride 102 101 100   Calcium 9.5 9.7 9.4   Albumin 4.40 4.40 4.10   Total Bilirubin 0.3 0.3 0.3   Alkaline Phosphatase 65 67 63   AST (SGOT) 27 26 27   ALT (SGPT) 18 19 20   (A) Abnormal value            CBC w/diff    CBC w/Diff 1/4/22 3/2/22 5/13/22   WBC 12.18 (A) 8.62 7.53   RBC 4.52 4.43 4.18   Hemoglobin 13.4 13.0 12.4   Hematocrit 40.2 39.4 37.5   MCV 88.9 88.9 89.7   MCH 29.6 29.3 29.7   MCHC 33.3 33.0 33.1   RDW 14.0 13.6 13.8   Platelets 267 301 285   Neutrophil Rel % 72.2 54.1 52.4   Immature Granulocyte Rel % 0.2 0.3 0.3   Lymphocyte Rel % 20.1 34.6 34.8   Monocyte Rel % 5.3 8.2 9.2   Eosinophil Rel % 1.6 2.2 2.5   Basophil Rel % 0.6 0.6 0.8   (A) Abnormal value             Lipid Panel    Lipid Panel 6/21/21 1/4/22   Total Cholesterol 163 139   Triglycerides 334 (A) 219 (A)   HDL Cholesterol 35 (A) 39 (A)   VLDL Cholesterol 54 (A) 36   LDL Cholesterol  74 64   LDL/HDL Ratio 1.75 1.44   (A) Abnormal value             Lab Results   Component Value Date    TSH 1.170 01/04/2022    TSH 1.640 06/21/2021    TSH 2.600 06/15/2020      Lab Results   Component Value Date    FREET4 1.47 01/04/2022    FREET4 1.34 06/21/2021    FREET4 1.3 10/15/2019      A1C Last 3 Results    HGBA1C Last 3 Results 6/21/21   Hemoglobin A1C 5.69 (A)   (A) Abnormal value                              Visit Diagnoses:    ICD-10-CM ICD-9-CM   1. Chronic pain of right knee  M25.561 719.46    G89.29 338.29   2. Adhesive arachnoiditis  G03.9 322.9   3. Lumbar degenerative disc  disease  M51.36 722.52   4. Primary osteoarthritis of right knee  M17.11 715.16   5. Hypertension, essential  I10 401.9   6. Diverticulitis of large intestine without perforation or abscess without bleeding  K57.32 562.11   7. Vertigo  R42 780.4   8. Chronic bilateral low back pain, unspecified whether sciatica present  M54.50 724.2    G89.29 338.29       Assessment and Plan   Diagnoses and all orders for this visit:    1. Chronic pain of right knee (Primary)  -     Ambulatory Referral to Neurosurgery    2. Adhesive arachnoiditis  -     Ambulatory Referral to Neurosurgery    3. Lumbar degenerative disc disease  -     Ambulatory Referral to Neurosurgery    4. Primary osteoarthritis of right knee  -     Ambulatory Referral to Neurosurgery    5. Hypertension, essential  -     Ambulatory Referral to Neurosurgery    6. Diverticulitis of large intestine without perforation or abscess without bleeding  -     Ambulatory Referral to Neurosurgery    7. Vertigo  -     Ambulatory Referral to Neurosurgery    8. Chronic bilateral low back pain, unspecified whether sciatica present  -     Ambulatory Referral to Neurosurgery         Back pain, sciatica, r leg, --MRI November 2021 showed a left L5-S1 disc paracentral extrusion with multiple levels of foraminal stenosis on both sides throughout the spine, with degenerative disc disease noted, patient did go see Ortho spine surgeon in [place] was possibly going to have surgery but then they called and said they would not do it due to her increased risk and back issues and stability, discussed May 2022--- we will make referral to [facility] spine [place] for second opinion given the severity of the discs and the continued back pain that she is having, May 2022    abdominal pain possible recurrent diverticulitis January 31, 2022--, called in Cipro and Flagyl has finished that course of antibiotics now with continued upset stomach nausea, abdominal pains loose stools diarrhea mucus,  concerned about this and addition of probiotics recently,--- March 2, 2022---will do ct scan and refer back to dr oshea , recommend continue Protonix in the morning and Pepcid at bedtime--- consider another round of antibiotics, Bentyl,---ct reviewed march 2022--    Acute vertigo --recurrent kishore 10/ 2022 --- with nausea vomiting, headaches, work-up in the emergency room included MRI of the brain CT of the brain did not show any acute abnormality, chest x-ray no active disease--- she will continue meclizine 3-4 times per day and add scopolamine patch December 6, 2021, and again January 10, 2022,    hypertension  continues ---valsartan HCT 80/12.5 mg daily,,     PULM NODULE ON CXR SEPT 2017, CT SCAN OCT 2017, NO CHANGE IN CT APRIL 2018, (DONE ADIA TRAIL DIAG, --CT CHEST OCT 2019 , NO CHANGE IN PULM NODULE OVER 2 YRS , SO NO MORE F/U     LIFE STRESSORS DISCUSSED, OCT 2019, ANNA MARIE AND URINE DRUG SCREEN REIVIEWED OCT 2020    HEART MURUMUR, ECHO 11/2018, ESSENTIALLY NL     obesity --Great job with weight loss of 50 pounds in the past 6 months, As of October 2019---WGT UP AGAIN JUNE 2020, discussed dietary weight loss issues again October 2020    LLQ Abdominal Pain/Diverticulitis--6/18/16.--Had colonoscopy Dr. Alarcon-- FOR F/U NOV, 2019, THI BARR BREAST MASS--f/u U/S WAS NEG,. 8/2015, , Yearly mammograms December 2018 Dr. Sreedhar Munoz    Patient quit smoking 9 years ago    CHRONIC PAIN ISSUES DISCUSSED --WITH PAIN IN NECK , BACK PAIN- ON PRN NORCO-- WE DISCUSSED RISK OF DRIVING AND ADDICTION WITH MED--OCT 2020     DEPRESSION,--continues Xanax 0.5 twice a day, Prozac 40 mg daily,    ELEVATED CHOL, TRIG,-continues Crestor,          Follow Up   Return in about 6 months (around 11/17/2022).  Patient was given instructions and counseling regarding her condition or for health maintenance advice. Please see specific information pulled into the AVS if appropriate.

## 2022-05-24 ENCOUNTER — TELEPHONE (OUTPATIENT)
Dept: INTERNAL MEDICINE | Facility: CLINIC | Age: 62
End: 2022-05-24

## 2022-05-24 RX ORDER — ROSUVASTATIN CALCIUM 10 MG/1
10 TABLET, COATED ORAL DAILY
Qty: 90 TABLET | Refills: 3 | Status: ON HOLD | OUTPATIENT
Start: 2022-05-24 | End: 2022-08-17

## 2022-05-24 NOTE — TELEPHONE ENCOUNTER
Call patient, tell her she is supposed to be taking Crestor 10 mg daily and we will send in a new prescription, tell her I sent it in myself

## 2022-05-24 NOTE — TELEPHONE ENCOUNTER
----- Message from Gillian Morales MA sent at 5/24/2022  4:38 PM EDT -----  Regarding: FW: Crestor    ----- Message -----  From: Kamini Orta  Sent: 5/24/2022   4:38 PM EDT  To: [facility]  Subject: Crestor                                          Dr. Siddiqi,   Am I still supposed to be taking 10 mg. Crestor? If so, I am completely out

## 2022-05-31 RX ORDER — VALSARTAN AND HYDROCHLOROTHIAZIDE 80; 12.5 MG/1; MG/1
TABLET, FILM COATED ORAL
Qty: 30 TABLET | Refills: 5 | Status: SHIPPED | OUTPATIENT
Start: 2022-05-31 | End: 2022-11-21 | Stop reason: SDUPTHER

## 2022-06-13 PROCEDURE — U0004 COV-19 TEST NON-CDC HGH THRU: HCPCS | Performed by: NURSE PRACTITIONER

## 2022-06-14 ENCOUNTER — TELEPHONE (OUTPATIENT)
Dept: INTERNAL MEDICINE | Facility: CLINIC | Age: 62
End: 2022-06-14

## 2022-06-14 ENCOUNTER — TELEPHONE (OUTPATIENT)
Dept: URGENT CARE | Facility: CLINIC | Age: 62
End: 2022-06-14

## 2022-06-14 NOTE — TELEPHONE ENCOUNTER
Patient notified of positive covid result. She has already been in touch with her primary care provider's office regarding her positive result. She would like to wait on antiviral treatment for Dr. Hall's opinion on this. She has sent him a message and will await his response.

## 2022-06-14 NOTE — TELEPHONE ENCOUNTER
Caller: Kamini Orta    Relationship: Self    Best call back number: [phone]    What medication are you requesting: PAXLOVID    What are your current symptoms: CHILLS, COUGH     How long have you been experiencing symptoms:   ONE DAY  Have you had these symptoms before:    [] Yes  [x] No    Have you been treated for these symptoms before:   [] Yes  [x] No    If a prescription is needed, what is your preferred pharmacy and phone number: [facility] [address]  - [phone]  - [phone]     Additional notes:   Dr. CLARK,   They just called and said I'm posititve for Covid and to isolate for 7-10 days. Raj was positive on Saturday morning at urgent care and they told him 5 days, but he's still very sick and they have him going back to work on Thursday? Should he also be isolating with me - the same time frame? I would also like the anti-viral drug they put him on.. Thank-you!   Kamini Orta  PLEASE CONTACT PATIENT AND NOTIFY IF PRESCRIPTION IS BEING SENT OR NOT

## 2022-06-15 ENCOUNTER — TELEMEDICINE (OUTPATIENT)
Dept: INTERNAL MEDICINE | Facility: CLINIC | Age: 62
End: 2022-06-15

## 2022-06-15 DIAGNOSIS — U07.1 COVID-19 VIRUS INFECTION: Primary | ICD-10-CM

## 2022-06-15 PROCEDURE — 99213 OFFICE O/P EST LOW 20 MIN: CPT | Performed by: INTERNAL MEDICINE

## 2022-06-15 NOTE — PROGRESS NOTES
Patient is here for telehealth visit, consents to visit, patient is being seen by video and total time spent on visit    Chief Complaint/ HPI:   Dx with covid infection  Chills, ache  Diarrhea  N and v  No soa, no wheezing           Objective   Vital Signs    133/71  There were no vitals filed for this visit.   There is no height or weight on file to calculate BMI.  Review of Systems as above   Physical Exam alert and ox 3  Skin no rashes on face  Ambulatory in home     Result Review :   No results found for: PROBNP, BNP  CMP    CMP 1/4/22 3/2/22 5/13/22   Glucose 99 90 93   BUN 11 11 11   Creatinine 0.99 0.89 0.83   eGFR Non African Am 57 (A)     Sodium 141 141 137   Potassium 4.3 4.6 3.7   Chloride 102 101 100   Calcium 9.5 9.7 9.4   Albumin 4.40 4.40 4.10   Total Bilirubin 0.3 0.3 0.3   Alkaline Phosphatase 65 67 63   AST (SGOT) 27 26 27   ALT (SGPT) 18 19 20   (A) Abnormal value            CBC w/diff    CBC w/Diff 1/4/22 3/2/22 5/13/22   WBC 12.18 (A) 8.62 7.53   RBC 4.52 4.43 4.18   Hemoglobin 13.4 13.0 12.4   Hematocrit 40.2 39.4 37.5   MCV 88.9 88.9 89.7   MCH 29.6 29.3 29.7   MCHC 33.3 33.0 33.1   RDW 14.0 13.6 13.8   Platelets 267 301 285   Neutrophil Rel % 72.2 54.1 52.4   Immature Granulocyte Rel % 0.2 0.3 0.3   Lymphocyte Rel % 20.1 34.6 34.8   Monocyte Rel % 5.3 8.2 9.2   Eosinophil Rel % 1.6 2.2 2.5   Basophil Rel % 0.6 0.6 0.8   (A) Abnormal value             Lipid Panel    Lipid Panel 6/21/21 1/4/22   Total Cholesterol 163 139   Triglycerides 334 (A) 219 (A)   HDL Cholesterol 35 (A) 39 (A)   VLDL Cholesterol 54 (A) 36   LDL Cholesterol  74 64   LDL/HDL Ratio 1.75 1.44   (A) Abnormal value             Lab Results   Component Value Date    TSH 1.170 01/04/2022    TSH 1.640 06/21/2021    TSH 2.600 06/15/2020      Lab Results   Component Value Date    FREET4 1.47 01/04/2022    FREET4 1.34 06/21/2021    FREET4 1.3 10/15/2019      A1C Last 3 Results    HGBA1C Last 3 Results 6/21/21   Hemoglobin A1C 5.69  (A)   (A) Abnormal value                              Visit Diagnoses:    ICD-10-CM ICD-9-CM   1. COVID-19 virus infection  U07.1 079.89       Assessment and Plan   Diagnoses and all orders for this visit:    1. COVID-19 virus infection (Primary)    Other orders  -     Nirmatrelvir & Ritonavir (PAXLOVID) 20 x 150 MG & 10 x 100MG tablet therapy pack tablet; Take 3 tablets by mouth 2 (Two) Times a Day for 5 days.  Dispense: 30 tablet; Refill: 0        covid infection --will rx with paxlovid , risk and benefits and drug interaction issues discussed ,, patient will hold Crestor for 2 weeks, hold her Xanax if she can for the next 3 to 5 days due to its potential interactions her blood pressure medicines okay, based on my review of the literature, she will just have to monitor closely, and she is not on any absolute contraindication medicines except for the Crestor at this point in time.    Back pain, sciatica, r leg, --MRI November 2021 showed a left L5-S1 disc paracentral extrusion with multiple levels of foraminal stenosis on both sides throughout the spine, with degenerative disc disease noted, patient did go see Ortho spine surgeon in [place] was possibly going to have surgery but then they called and said they would not do it due to her increased risk and back issues and stability, discussed May 2022--- we will make referral to [facility] spine [place] for second opinion given the severity of the discs and the continued back pain that she is having, May 2022    abdominal pain possible recurrent diverticulitis January 31, 2022--, called in Cipro and Flagyl has finished that course of antibiotics now with continued upset stomach nausea, abdominal pains loose stools diarrhea mucus, concerned about this and addition of probiotics recently,--- March 2, 2022---will do ct scan and refer back to dr oshea , recommend continue Protonix in the morning and Pepcid at bedtime--- consider another round of antibiotics,  Bentyl,---ct reviewed march 2022--    Acute vertigo --recurrent kishore 10/ 2022 --- with nausea vomiting, headaches, work-up in the emergency room included MRI of the brain CT of the brain did not show any acute abnormality, chest x-ray no active disease--- she will continue meclizine 3-4 times per day and add scopolamine patch December 6, 2021, and again January 10, 2022,    hypertension  continues ---valsartan HCT 80/12.5 mg daily,,     PULM NODULE ON CXR SEPT 2017, CT SCAN OCT 2017, NO CHANGE IN CT APRIL 2018, (DONE ADIA TRAIL DIAG, --CT CHEST OCT 2019 , NO CHANGE IN PULM NODULE OVER 2 YRS , SO NO MORE F/U     LIFE STRESSORS DISCUSSED, OCT 2019, ANNA MARIE AND URINE DRUG SCREEN REIVIEWED OCT 2020    Hypothyroidism, cont levothyroxine 0.075 mg qd     HEART MURUMUR, ECHO 11/2018, ESSENTIALLY NL     obesity --Great job with weight loss of 50 pounds in the past 6 months, As of October 2019---WGT UP AGAIN JUNE 2020, discussed dietary weight loss issues again October 2020    LLQ Abdominal Pain/Diverticulitis--6/18/16.--Had colonoscopy Dr. Alarcon-- FOR F/U NOV, 2019, THICANDACE BARR BREAST MASS--f/u U/S WAS NEG,. 8/2015, , Yearly mammograms December 2018 Dr. Sreedhar Munoz    Patient quit smoking 9 years ago    CHRONIC PAIN ISSUES DISCUSSED --WITH PAIN IN NECK , BACK PAIN- ON PRN NORCO-- WE DISCUSSED RISK OF DRIVING AND ADDICTION WITH MED--OCT 2020     DEPRESSION,--continues Xanax 0.5 twice a day, Prozac 40 mg daily,    ELEVATED CHOL, TRIG,-continues Crestor,            Follow Up   No follow-ups on file.  Patient was given instructions and counseling regarding her condition or for health maintenance advice. Please see specific information pulled into the AVS if appropriate.

## 2022-06-21 ENCOUNTER — APPOINTMENT (OUTPATIENT)
Dept: MAMMOGRAPHY | Facility: HOSPITAL | Age: 62
End: 2022-06-21

## 2022-06-24 ENCOUNTER — HOSPITAL ENCOUNTER (EMERGENCY)
Facility: HOSPITAL | Age: 62
Discharge: HOME | End: 2022-06-25
Attending: EMERGENCY MEDICINE | Admitting: EMERGENCY MEDICINE

## 2022-06-24 ENCOUNTER — APPOINTMENT (OUTPATIENT)
Dept: CT IMAGING | Facility: HOSPITAL | Age: 62
End: 2022-06-24

## 2022-06-24 ENCOUNTER — TELEPHONE (OUTPATIENT)
Dept: INTERNAL MEDICINE | Facility: CLINIC | Age: 62
End: 2022-06-24

## 2022-06-24 VITALS
HEIGHT: 63 IN | DIASTOLIC BLOOD PRESSURE: 67 MMHG | SYSTOLIC BLOOD PRESSURE: 132 MMHG | RESPIRATION RATE: 18 BRPM | WEIGHT: 201.5 LBS | OXYGEN SATURATION: 93 % | TEMPERATURE: 97.6 F | HEART RATE: 65 BPM | BODY MASS INDEX: 35.7 KG/M2

## 2022-06-24 DIAGNOSIS — R10.32 LEFT LOWER QUADRANT ABDOMINAL PAIN: Primary | ICD-10-CM

## 2022-06-24 DIAGNOSIS — R07.89 TIGHTNESS IN CHEST: ICD-10-CM

## 2022-06-24 DIAGNOSIS — K62.5 RECTAL BLEEDING: ICD-10-CM

## 2022-06-24 LAB
ABO GROUP BLD: NORMAL
ABO GROUP BLD: NORMAL
ALBUMIN SERPL-MCNC: 4.6 G/DL (ref 3.5–5.2)
ALBUMIN/GLOB SERPL: 1.7 G/DL
ALP SERPL-CCNC: 71 U/L (ref 39–117)
ALT SERPL W P-5'-P-CCNC: 24 U/L (ref 1–33)
ANION GAP SERPL CALCULATED.3IONS-SCNC: 10.7 MMOL/L (ref 5–15)
AST SERPL-CCNC: 26 U/L (ref 1–32)
BASOPHILS # BLD AUTO: 0.03 10*3/MM3 (ref 0–0.2)
BASOPHILS NFR BLD AUTO: 0.4 % (ref 0–1.5)
BILIRUB SERPL-MCNC: 0.2 MG/DL (ref 0–1.2)
BLD GP AB SCN SERPL QL: NEGATIVE
BUN SERPL-MCNC: 12 MG/DL (ref 8–23)
BUN/CREAT SERPL: 14.1 (ref 7–25)
CALCIUM SPEC-SCNC: 10 MG/DL (ref 8.6–10.5)
CHLORIDE SERPL-SCNC: 102 MMOL/L (ref 98–107)
CO2 SERPL-SCNC: 29.3 MMOL/L (ref 22–29)
CREAT SERPL-MCNC: 0.85 MG/DL (ref 0.57–1)
DEPRECATED RDW RBC AUTO: 43.1 FL (ref 37–54)
EGFRCR SERPLBLD CKD-EPI 2021: 78.5 ML/MIN/1.73
EOSINOPHIL # BLD AUTO: 0.15 10*3/MM3 (ref 0–0.4)
EOSINOPHIL NFR BLD AUTO: 1.8 % (ref 0.3–6.2)
ERYTHROCYTE [DISTWIDTH] IN BLOOD BY AUTOMATED COUNT: 13.2 % (ref 12.3–15.4)
GLOBULIN UR ELPH-MCNC: 2.7 GM/DL
GLUCOSE SERPL-MCNC: 99 MG/DL (ref 65–99)
HCT VFR BLD AUTO: 38.9 % (ref 34–46.6)
HEMOCCULT STL QL IA: POSITIVE
HGB BLD-MCNC: 12.9 G/DL (ref 12–15.9)
HOLD SPECIMEN: NORMAL
HOLD SPECIMEN: NORMAL
IMM GRANULOCYTES # BLD AUTO: 0.04 10*3/MM3 (ref 0–0.05)
IMM GRANULOCYTES NFR BLD AUTO: 0.5 % (ref 0–0.5)
LYMPHOCYTES # BLD AUTO: 2.52 10*3/MM3 (ref 0.7–3.1)
LYMPHOCYTES NFR BLD AUTO: 30.5 % (ref 19.6–45.3)
MCH RBC QN AUTO: 29.2 PG (ref 26.6–33)
MCHC RBC AUTO-ENTMCNC: 33.2 G/DL (ref 31.5–35.7)
MCV RBC AUTO: 88 FL (ref 79–97)
MONOCYTES # BLD AUTO: 0.56 10*3/MM3 (ref 0.1–0.9)
MONOCYTES NFR BLD AUTO: 6.8 % (ref 5–12)
NEUTROPHILS NFR BLD AUTO: 4.97 10*3/MM3 (ref 1.7–7)
NEUTROPHILS NFR BLD AUTO: 60 % (ref 42.7–76)
NRBC BLD AUTO-RTO: 0 /100 WBC (ref 0–0.2)
PLATELET # BLD AUTO: 305 10*3/MM3 (ref 140–450)
PMV BLD AUTO: 9.7 FL (ref 6–12)
POTASSIUM SERPL-SCNC: 4 MMOL/L (ref 3.5–5.2)
PROT SERPL-MCNC: 7.3 G/DL (ref 6–8.5)
RBC # BLD AUTO: 4.42 10*6/MM3 (ref 3.77–5.28)
RH BLD: NEGATIVE
RH BLD: NEGATIVE
SODIUM SERPL-SCNC: 142 MMOL/L (ref 136–145)
T&S EXPIRATION DATE: NORMAL
TROPONIN I SERPL-MCNC: 0.01 NG/ML (ref 0–0.6)
TROPONIN T SERPL-MCNC: <0.01 NG/ML (ref 0–0.03)
WBC NRBC COR # BLD: 8.27 10*3/MM3 (ref 3.4–10.8)
WHOLE BLOOD HOLD COAG: NORMAL
WHOLE BLOOD HOLD SPECIMEN: NORMAL

## 2022-06-24 PROCEDURE — 74177 CT ABD & PELVIS W/CONTRAST: CPT

## 2022-06-24 PROCEDURE — 25010000002 ONDANSETRON PER 1 MG: Performed by: EMERGENCY MEDICINE

## 2022-06-24 PROCEDURE — 36415 COLL VENOUS BLD VENIPUNCTURE: CPT

## 2022-06-24 PROCEDURE — 96374 THER/PROPH/DIAG INJ IV PUSH: CPT

## 2022-06-24 PROCEDURE — 99283 EMERGENCY DEPT VISIT LOW MDM: CPT

## 2022-06-24 PROCEDURE — 86901 BLOOD TYPING SEROLOGIC RH(D): CPT

## 2022-06-24 PROCEDURE — 86900 BLOOD TYPING SEROLOGIC ABO: CPT

## 2022-06-24 PROCEDURE — 80053 COMPREHEN METABOLIC PANEL: CPT

## 2022-06-24 PROCEDURE — 93010 ELECTROCARDIOGRAM REPORT: CPT | Performed by: SPECIALIST

## 2022-06-24 PROCEDURE — 85025 COMPLETE CBC W/AUTO DIFF WBC: CPT

## 2022-06-24 PROCEDURE — 0 IOPAMIDOL PER 1 ML: Performed by: EMERGENCY MEDICINE

## 2022-06-24 PROCEDURE — 84484 ASSAY OF TROPONIN QUANT: CPT | Performed by: EMERGENCY MEDICINE

## 2022-06-24 PROCEDURE — 86850 RBC ANTIBODY SCREEN: CPT

## 2022-06-24 PROCEDURE — 84484 ASSAY OF TROPONIN QUANT: CPT

## 2022-06-24 PROCEDURE — 96375 TX/PRO/DX INJ NEW DRUG ADDON: CPT

## 2022-06-24 PROCEDURE — 25010000002 MORPHINE PER 10 MG: Performed by: EMERGENCY MEDICINE

## 2022-06-24 PROCEDURE — 82274 ASSAY TEST FOR BLOOD FECAL: CPT | Performed by: NURSE PRACTITIONER

## 2022-06-24 PROCEDURE — 93005 ELECTROCARDIOGRAM TRACING: CPT | Performed by: EMERGENCY MEDICINE

## 2022-06-24 RX ORDER — DICYCLOMINE HCL 20 MG
20 TABLET ORAL EVERY 8 HOURS PRN
Qty: 30 TABLET | Refills: 0 | Status: SHIPPED | OUTPATIENT
Start: 2022-06-24

## 2022-06-24 RX ORDER — SODIUM CHLORIDE 0.9 % (FLUSH) 0.9 %
10 SYRINGE (ML) INJECTION AS NEEDED
Status: DISCONTINUED | OUTPATIENT
Start: 2022-06-24 | End: 2022-06-25 | Stop reason: HOSPADM

## 2022-06-24 RX ORDER — ONDANSETRON 2 MG/ML
4 INJECTION INTRAMUSCULAR; INTRAVENOUS ONCE
Status: COMPLETED | OUTPATIENT
Start: 2022-06-24 | End: 2022-06-24

## 2022-06-24 RX ORDER — ONDANSETRON 4 MG/1
4 TABLET, ORALLY DISINTEGRATING ORAL EVERY 8 HOURS PRN
Qty: 15 TABLET | Refills: 0 | Status: SHIPPED | OUTPATIENT
Start: 2022-06-24 | End: 2022-11-21

## 2022-06-24 RX ADMIN — IOPAMIDOL 100 ML: 755 INJECTION, SOLUTION INTRAVENOUS at 21:15

## 2022-06-24 RX ADMIN — MORPHINE SULFATE 4 MG: 4 INJECTION, SOLUTION INTRAMUSCULAR; INTRAVENOUS at 21:25

## 2022-06-24 RX ADMIN — ONDANSETRON 4 MG: 2 INJECTION INTRAMUSCULAR; INTRAVENOUS at 21:26

## 2022-06-24 NOTE — TELEPHONE ENCOUNTER
Caller: Kamini Orta    Relationship to patient: Self    Best call back number: [phone]    Patient is needing: PATIENT CALLED IN TO UPDATE DR GARCIA THAT SHE HAS HAD ANOTHER BOWEL MOVEMENT WITH EXCESSIVE AMOUNT OF BLOOD. PATIENT STATES SHE IS GOING TO  [place] AS DR GARCIA SUGGESTED. PATIENT WANTED DR GARCIA UPDATED.

## 2022-06-25 LAB
QT INTERVAL: 422 MS
QT INTERVAL: 447 MS

## 2022-06-25 NOTE — ED PROVIDER NOTES
Time: 22:50 EDT  Arrived by: Private vehicle  Chief Complaint: rectal bleeding  History provided by: Patient  History is limited by: N/A    History of Present Illness:  Patient is a 61 y.o. year old female that presents to the emergency department with rectal bleeding      History provided by:  Patient  Rectal Bleeding  Quality:  Maroon  Amount:  Unable to specify  Duration:  2 days  Timing:  Intermittent  Chronicity:  New  Context: spontaneously    Context comment:  Patient had 1 episode when she actually had stool today but then had another when there was no stool  Similar prior episodes: yes (Had blood before when had diverticulitis but appearance was different)    Relieved by:  Nothing  Worsened by:  Nothing  Ineffective treatments:  None tried  Associated symptoms: abdominal pain ( On and off since Wednesday and left side)    Associated symptoms: no dizziness, no epistaxis, no fever, no hematemesis, no light-headedness, no loss of consciousness, no recent illness and no vomiting    Risk factors: no anticoagulant use        Similar Symptoms Previously: In the past had diverticulitis    Recently seen: No      Patient Care Team  Primary Care Provider: Devang Hall    Past Medical History:     Allergies   Allergen Reactions   • Codeine Anaphylaxis     Chest pains   • Erythromycin Diarrhea     Cramps     Past Medical History:   Diagnosis Date   • Ankle sprain 40 years ago   • Arachnoiditis    • Arthritis of back hard to pinpoint   • CTS (carpal tunnel syndrome)    • Depression    • Diverticulitis    • Hip arthrosis    • Knee swelling    • Low back strain years   • Lumbosacral disc disease this last time,    • Scoliosis    • Tear of meniscus of knee    • 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   • Thyroid disease    • Vertigo      Past Surgical History:   Procedure Laterality Date   •  SECTION      ALSO    • CHOLECYSTECTOMY     • HYSTERECTOMY      menorrhagia   • KNEE  "ARTHROSCOPY Right 2012    \"CLEAN UP\"   • KNEE ARTHROSCOPY W/ MENISCAL REPAIR Right 2004   • TRIGGER POINT INJECTION  CRESENCIO's for back from 1992    WILL NOT AGAIN     Family History   Problem Relation Age of Onset   • Cancer Father         Mesothelioma   • Cancer Brother         Prostate   • Cancer Brother         Colon & Liver   • Scoliosis Sister    • Heart disease Other    • Lung disease Other        Home Medications:  Prior to Admission medications    Medication Sig Start Date End Date Taking? Authorizing Provider   ALPRAZolam (XANAX) 0.5 MG tablet TAKE ONE TABLET BY MOUTH TWICE DAILY 4/13/22   Davin Hall MD   diclofenac sodium (VOTAREN XR) 100 MG 24 hr tablet TAKE ONE TABLET BY MOUTH EVERY DAY 2/7/22   Davin Hall MD   dicyclomine (Bentyl) 10 MG capsule Take 1 capsule by mouth 4 (Four) Times a Day Before Meals & at Bedtime.  Patient taking differently: Take 10 mg by mouth As Needed. 3/2/22   Davin Hall MD   estradiol (VIVELLE-DOT) 0.0375 MG/24HR patch Place 1 patch on the skin as directed by provider 2 (Two) Times a Week for 8 doses. 4/18/22 5/13/22  Johanna Burton APRN   FLUoxetine (PROzac) 40 MG capsule TAKE ONE CAPSULE BY MOUTH ONCE DAILY 2/23/22   Davin Hall MD   fluticasone (FLONASE) 50 MCG/ACT nasal spray USE 2 SPRAYS IN EACH NOSTRIL EVERY DAY 2/23/22   Davin Hall MD   HYDROcodone-acetaminophen (NORCO) 5-325 MG per tablet TAKE ONE TABLET BY MOUTH TWICE DAILY 4/13/22   Davin Hall MD   levothyroxine (SYNTHROID, LEVOTHROID) 75 MCG tablet TAKE ONE TABLET BY MOUTH EVERY DAY 1/17/22   Davin Hall MD   meclizine (ANTIVERT) 25 MG tablet TAKE ONE TABLET BY MOUTH THREE TIMES DAILY AS NEEDED FOR dizziness 9/11/19   Remedios Corona MD   pantoprazole (PROTONIX) 40 MG EC tablet TAKE ONE TABLET BY MOUTH EVERY DAY 2/23/22   Davin Hall MD   rosuvastatin (CRESTOR) 10 MG tablet Take 10 mg by mouth Daily. 12/12/20   " "Provider, Remedios, MD   rosuvastatin (Crestor) 10 MG tablet Take 1 tablet by mouth Daily. 5/24/22   Davin Hall MD   Scopolamine 1 MG/3DAYS patch Place 1 patch on the skin as directed by provider Every 72 (Seventy-Two) Hours. 1/10/22   Davin Hall MD   valsartan-hydrochlorothiazide (DIOVAN-HCT) 80-12.5 MG per tablet TAKE ONE TABLET BY MOUTH EVERY DAY 5/31/22   Davin Hall MD        Social History:   PT  reports that she has quit smoking. She quit smokeless tobacco use about 10 years ago. She reports that she does not drink alcohol and does not use drugs.    Record Review:  I have reviewed the patient's records in [place].     Review of Systems  Review of Systems   Constitutional: Negative for chills and fever.   HENT: Negative for congestion, ear pain, nosebleeds and sore throat.    Eyes: Negative for pain.   Respiratory: Negative for cough, chest tightness and shortness of breath.    Cardiovascular: Negative for chest pain.   Gastrointestinal: Positive for abdominal pain ( On and off since Wednesday and left side), anal bleeding, blood in stool and hematochezia. Negative for diarrhea, hematemesis, nausea and vomiting.   Genitourinary: Negative for flank pain and hematuria.   Musculoskeletal: Negative for back pain and joint swelling.   Skin: Negative for pallor.   Neurological: Negative for dizziness, seizures, loss of consciousness, light-headedness and headaches.   Hematological: Negative.  Does not bruise/bleed easily.   Psychiatric/Behavioral: Negative.    All other systems reviewed and are negative.       Physical Exam  /95   Pulse 75   Temp 97.6 °F (36.4 °C) (Oral)   Resp 18   Ht 160 cm (63\")   Wt 91.4 kg (201 lb 8 oz)   SpO2 98%   BMI 35.69 kg/m²     Physical Exam  Vitals and nursing note reviewed.   Constitutional:       General: She is not in acute distress.     Appearance: Normal appearance. She is not toxic-appearing.   HENT:      Head: Normocephalic and " "atraumatic.      Mouth/Throat:      Mouth: Mucous membranes are moist.   Eyes:      General: No scleral icterus.  Cardiovascular:      Rate and Rhythm: Normal rate and regular rhythm.      Pulses: Normal pulses.      Heart sounds: Normal heart sounds.   Pulmonary:      Effort: Pulmonary effort is normal. No respiratory distress.      Breath sounds: Normal breath sounds.   Abdominal:      General: Bowel sounds are normal.      Palpations: Abdomen is soft.      Tenderness: There is no abdominal tenderness ( Left lower quadrant moderate). There is no right CVA tenderness or left CVA tenderness.   Musculoskeletal:         General: Normal range of motion.      Cervical back: Normal range of motion and neck supple.   Skin:     General: Skin is warm and dry.   Neurological:      Mental Status: She is alert and oriented to person, place, and time.   Psychiatric:         Mood and Affect: Mood normal.         Behavior: Behavior normal.          ED Course  /95   Pulse 75   Temp 97.6 °F (36.4 °C) (Oral)   Resp 18   Ht 160 cm (63\")   Wt 91.4 kg (201 lb 8 oz)   SpO2 98%   BMI 35.69 kg/m²   Results for orders placed or performed during the hospital encounter of 06/24/22   Comprehensive Metabolic Panel    Specimen: Blood   Result Value Ref Range    Glucose 99 65 - 99 mg/dL    BUN 12 8 - 23 mg/dL    Creatinine 0.85 0.57 - 1.00 mg/dL    Sodium 142 136 - 145 mmol/L    Potassium 4.0 3.5 - 5.2 mmol/L    Chloride 102 98 - 107 mmol/L    CO2 29.3 (H) 22.0 - 29.0 mmol/L    Calcium 10.0 8.6 - 10.5 mg/dL    Total Protein 7.3 6.0 - 8.5 g/dL    Albumin 4.60 3.50 - 5.20 g/dL    ALT (SGPT) 24 1 - 33 U/L    AST (SGOT) 26 1 - 32 U/L    Alkaline Phosphatase 71 39 - 117 U/L    Total Bilirubin 0.2 0.0 - 1.2 mg/dL    Globulin 2.7 gm/dL    A/G Ratio 1.7 g/dL    BUN/Creatinine Ratio 14.1 7.0 - 25.0    Anion Gap 10.7 5.0 - 15.0 mmol/L    eGFR 78.5 >60.0 mL/min/1.73   CBC Auto Differential    Specimen: Blood   Result Value Ref Range    WBC " 8.27 3.40 - 10.80 10*3/mm3    RBC 4.42 3.77 - 5.28 10*6/mm3    Hemoglobin 12.9 12.0 - 15.9 g/dL    Hematocrit 38.9 34.0 - 46.6 %    MCV 88.0 79.0 - 97.0 fL    MCH 29.2 26.6 - 33.0 pg    MCHC 33.2 31.5 - 35.7 g/dL    RDW 13.2 12.3 - 15.4 %    RDW-SD 43.1 37.0 - 54.0 fl    MPV 9.7 6.0 - 12.0 fL    Platelets 305 140 - 450 10*3/mm3    Neutrophil % 60.0 42.7 - 76.0 %    Lymphocyte % 30.5 19.6 - 45.3 %    Monocyte % 6.8 5.0 - 12.0 %    Eosinophil % 1.8 0.3 - 6.2 %    Basophil % 0.4 0.0 - 1.5 %    Immature Grans % 0.5 0.0 - 0.5 %    Neutrophils, Absolute 4.97 1.70 - 7.00 10*3/mm3    Lymphocytes, Absolute 2.52 0.70 - 3.10 10*3/mm3    Monocytes, Absolute 0.56 0.10 - 0.90 10*3/mm3    Eosinophils, Absolute 0.15 0.00 - 0.40 10*3/mm3    Basophils, Absolute 0.03 0.00 - 0.20 10*3/mm3    Immature Grans, Absolute 0.04 0.00 - 0.05 10*3/mm3    nRBC 0.0 0.0 - 0.2 /100 WBC   Occult Blood, Fecal By Immunoassay - Stool, Per Rectum    Specimen: Per Rectum; Stool   Result Value Ref Range    Occult Blood, Fecal by Immunoassay Positive (A) Negative   POC Troponin I    Specimen: Blood   Result Value Ref Range    Troponin I 0.01 0.00 - 0.60 ng/mL   ECG 12 Lead   Result Value Ref Range    QT Interval 447 ms   Type & Screen    Specimen: Blood   Result Value Ref Range    ABO Type A     RH type Negative     Antibody Screen Negative     T&S Expiration Date 6/27/2022 11:59:59 PM    ABO RH Specimen Verification    Specimen: Blood   Result Value Ref Range    ABO Type A     RH type Negative    Green Top (Gel)   Result Value Ref Range    Extra Tube Hold for add-ons.    Lavender Top   Result Value Ref Range    Extra Tube hold for add-on    Gold Top - SST   Result Value Ref Range    Extra Tube Hold for add-ons.    Light Blue Top   Result Value Ref Range    Extra Tube Hold for add-ons.      Medications   sodium chloride 0.9 % flush 10 mL (has no administration in time range)   morphine injection 4 mg (4 mg Intravenous Given 6/24/22 2125)   ondansetron  (ZOFRAN) injection 4 mg (4 mg Intravenous Given 6/24/22 2126)   iopamidol (ISOVUE-370) 76 % injection 100 mL (100 mL Intravenous Given 6/24/22 2115)     CT Abdomen Pelvis With Contrast    Result Date: 6/24/2022  Narrative: PROCEDURE: CT ABDOMEN PELVIS W CONTRAST  COMPARISONS: [facility], CT, CT ABDOMEN CT PELVIS ORAL CONTRAST ONLY, 3/22/2016, 21:08.   [facility], CT, CT ABDOMEN PELVIS W CONTRAST, 3/31/2022, 9:00.  INDICATIONS: Since that abdominal pain/rectal bleeding.  TECHNIQUE: After obtaining the patient's consent, 619 CT images were created with non-ionic intravenous contrast material.  No oral contrast agent was administered for the study.  PROTOCOL:   Standard imaging protocol performed    RADIATION:   DLP: 1144.9mGy*cm   Automated exposure control was utilized to minimize radiation dose. CONTRAST: 100cc Isovue 370 I.V.  FINDINGS: Diverticulosis is seen, especially involving the left colon.  No definite acute diverticulitis is suggested.  No acute colitis is appreciated.  No pericolonic fluid collections are seen.  No mechanical bowel obstruction.  No pneumoperitoneum or pneumatosis.  No portal or mesenteric venous gas.  No acute appendicitis.  There is diffuse hepatic steatosis without hepatomegaly.  No splenomegaly.  There may be a small hiatal hernia.  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  Calcified injection granulomas involve the subcutaneous gluteal regions bilaterally.  The patient has undergone cholecystectomy and hysterectomy.  No acute pancreatitis.  No adrenal mass.  No renal/ureteral stones or hydronephrosis or obstructive uropathy.  No acute pyelonephritis.  There are pelvic phleboliths.  No urinary bladder calculi are seen.  Bilateral L5 spondylolysis is present with grade 1 spondylolisthesis of L5 upon S1, estimated at about 7 mm or less.  Degenerative changes are seen throughout the imaged spine.  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  Mild degenerative changes of the bilateral sacroiliac joints are possible.  No acute fracture or aggressive osseous lesion is seen.      Impression:  Diffuse colonic diverticulosis is present.  No definite acute diverticulitis.  No acute findings are appreciated.  Please see above comments for further detail.     COMMENT:  Part of this note is an electronic transcription of spoken language to printed text. The electronic translation/transcription may permit erroneous, or at times, nonsensical (or even sensical) words or phrases to be inadvertently transcribed or omitted; this  has reviewed the note for such errors (as well as additional errors); however, some may still exist.  MICHELE FOLWER JR, MD       Electronically Signed and Approved By: MICHELE FOWLER JR, MD on 6/24/2022 at 22:36                  Medical Decision Making:      HEART Score: 2                MDM  Number of Diagnoses or Management Options  Left lower quadrant abdominal pain  Rectal bleeding  Tightness in chest  Diagnosis management comments: I have spoken with the patient. I have explained the patient´s condition, diagnoses and treatment plan based on the information available to me at this time. I have answered the patient's questions and addressed any concerns. The patient has a good  understanding of the patient´s diagnosis, condition, and treatment plan as can be expected at this point. The vital signs have been stable. The patient´s condition is stable and appropriate for discharge from the emergency department.      The patient will pursue further outpatient evaluation with the primary care physician or other designated or consulting physician as outlined in the discharge instructions. They are agreeable to this plan of care and follow-up instructions have been explained in detail. The  patient has received these instructions in written format and have expressed an understanding of the discharge instructions. The patient is aware that any significant change in condition or worsening of symptoms should prompt an immediate return to this or the closest emergency department or call to 911.         Amount and/or Complexity of Data Reviewed  Clinical lab tests: reviewed and ordered  Tests in the radiology section of CPT®: reviewed and ordered  Tests in the medicine section of CPT®: reviewed and ordered    Risk of Complications, Morbidity, and/or Mortality  Presenting problems: moderate  Diagnostic procedures: low  Management options: low    Patient Progress  Patient progress: stable       Final diagnoses:   Left lower quadrant abdominal pain   Rectal bleeding   Tightness in chest        Disposition:  ED Disposition     ED Disposition   Discharge    Condition   Stable    Comment   --              Aliyah Joyner, APRN  06/24/22 [id number]

## 2022-06-25 NOTE — ED NOTES
"Pt called out with \"chest pain\" after talking to pt she reports its her epigastric area and she feels like she is having spasms. EKG was done and istat trop. Aliyah Joyner NP was notified.   "
Per np- needed ekg prior to dc- pt had two trops already  
Pt in CT  
Pt remains pain free at this time  
Report to DAMON Obregon  
23

## 2022-06-25 NOTE — DISCHARGE INSTRUCTIONS
CT scan was negative today and did not show any acute signs of infection or intra-abdominal abnormality.  Although blood levels are stable currently, with your rectal bleeding we need to get you in with a gastroenterologist and set up colonoscopy for further evaluation    Medications as prescribed for symptomatic treatment of pain and nausea

## 2022-06-27 DIAGNOSIS — G89.29 CHRONIC BILATERAL LOW BACK PAIN WITHOUT SCIATICA: ICD-10-CM

## 2022-06-27 DIAGNOSIS — M54.50 CHRONIC BILATERAL LOW BACK PAIN WITHOUT SCIATICA: ICD-10-CM

## 2022-06-27 RX ORDER — HYDROCODONE BITARTRATE AND ACETAMINOPHEN 5; 325 MG/1; MG/1
TABLET ORAL
Qty: 60 TABLET | Refills: 0 | Status: SHIPPED | OUTPATIENT
Start: 2022-06-27 | End: 2022-08-23

## 2022-07-08 ENCOUNTER — PREP FOR SURGERY (OUTPATIENT)
Dept: OTHER | Facility: HOSPITAL | Age: 62
End: 2022-07-08

## 2022-07-08 ENCOUNTER — OFFICE VISIT (OUTPATIENT)
Dept: GASTROENTEROLOGY | Facility: CLINIC | Age: 62
End: 2022-07-08

## 2022-07-08 VITALS
OXYGEN SATURATION: 95 % | HEART RATE: 74 BPM | SYSTOLIC BLOOD PRESSURE: 121 MMHG | BODY MASS INDEX: 36.46 KG/M2 | HEIGHT: 63 IN | DIASTOLIC BLOOD PRESSURE: 45 MMHG | WEIGHT: 205.8 LBS

## 2022-07-08 DIAGNOSIS — K59.00 CONSTIPATION, UNSPECIFIED CONSTIPATION TYPE: ICD-10-CM

## 2022-07-08 DIAGNOSIS — Z80.0 FAMILY HISTORY OF COLON CANCER: ICD-10-CM

## 2022-07-08 DIAGNOSIS — K62.5 RECTAL BLEEDING: ICD-10-CM

## 2022-07-08 DIAGNOSIS — R19.4 CHANGE IN BOWEL HABITS: Primary | ICD-10-CM

## 2022-07-08 DIAGNOSIS — Z86.010 HISTORY OF COLON POLYPS: ICD-10-CM

## 2022-07-08 PROBLEM — Z86.0100 HISTORY OF COLON POLYPS: Status: ACTIVE | Noted: 2022-07-08

## 2022-07-08 PROCEDURE — 99214 OFFICE O/P EST MOD 30 MIN: CPT

## 2022-07-08 RX ORDER — POLYETHYLENE GLYCOL 3350, SODIUM CHLORIDE, SODIUM BICARBONATE, POTASSIUM CHLORIDE 420; 11.2; 5.72; 1.48 G/4L; G/4L; G/4L; G/4L
4000 POWDER, FOR SOLUTION ORAL ONCE
Qty: 4000 ML | Refills: 0 | Status: SHIPPED | OUTPATIENT
Start: 2022-07-08 | End: 2022-07-08

## 2022-07-08 NOTE — PROGRESS NOTES
"Chief Complaint  Abdominal Pain (-LLQ ), Rectal Bleeding (Dark red/- ER Visit  ), and colon recall ()    Kamini Orta is a 61 y.o. female who presents to [facility] [place] for rectal bleeding.    History of present Illness  Patient presents to the office for rectal bleeding. She went to the ED on  due to blood in her stool that started on . There was a significant amount of blood in the toilet. Bleeding resolved and then it reoccurred on Tuesday of this week. Described blood as maroon in color. She has become more constipated since bleeding has occurred. Denies upper GI symptoms.     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 Tejada - small hiatal hernia, Schatzki's ring dilated to 18 mm, normal stomach and duodenum. Biopsies show mild chronic inflammation.     Colonoscopy 2019 by Dr. Tejada - 18 mm sessile serrated polyp in ascending colon, 3 mm hyperplastic polyp in the sigmoid colon, diverticulosis in sigmoid colon, and grade 1 internal hemorrhoids. Repeat colonoscopy in 3 years.     Past Medical History:   Diagnosis Date   • Ankle sprain 40 years ago   • Arachnoiditis    • Arthritis of back hard to pinpoint   • CTS (carpal tunnel syndrome)    • Depression    • Diverticulitis    • Hip arthrosis    • Knee swelling    • Low back strain years   • Lumbosacral disc disease this last time,    • Scoliosis    • Tear of meniscus of knee    • 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   • Thyroid disease    • Vertigo        Past Surgical History:   Procedure Laterality Date   •  SECTION      ALSO    • CHOLECYSTECTOMY     • COLONOSCOPY     • HYSTERECTOMY      menorrhagia   • KNEE ARTHROSCOPY Right     \"CLEAN UP\"   • KNEE ARTHROSCOPY W/ MENISCAL REPAIR Right    • TRIGGER POINT INJECTION  " CRESENCIO's for back from 1992    WILL NOT AGAIN   • UPPER GASTROINTESTINAL ENDOSCOPY           Current Outpatient Medications:   •  ALPRAZolam (XANAX) 0.5 MG tablet, TAKE ONE TABLET BY MOUTH TWICE DAILY, Disp: 60 tablet, Rfl: 5  •  diclofenac sodium (VOTAREN XR) 100 MG 24 hr tablet, TAKE ONE TABLET BY MOUTH EVERY DAY, Disp: 90 tablet, Rfl: 3  •  dicyclomine (Bentyl) 10 MG capsule, Take 1 capsule by mouth 4 (Four) Times a Day Before Meals & at Bedtime. (Patient taking differently: Take 10 mg by mouth As Needed.), Disp: 60 capsule, Rfl: 2  •  dicyclomine (BENTYL) 20 MG tablet, Take 1 tablet by mouth Every 8 (Eight) Hours As Needed (abdominal pain)., Disp: 30 tablet, Rfl: 0  •  FLUoxetine (PROzac) 40 MG capsule, TAKE ONE CAPSULE BY MOUTH ONCE DAILY, Disp: 90 capsule, Rfl: 2  •  fluticasone (FLONASE) 50 MCG/ACT nasal spray, USE 2 SPRAYS IN EACH NOSTRIL EVERY DAY, Disp: 16 g, Rfl: 1  •  HYDROcodone-acetaminophen (NORCO) 5-325 MG per tablet, TAKE ONE TABLET BY MOUTH TWICE DAILY, Disp: 60 tablet, Rfl: 0  •  levothyroxine (SYNTHROID, LEVOTHROID) 75 MCG tablet, TAKE ONE TABLET BY MOUTH EVERY DAY, Disp: 90 tablet, Rfl: 1  •  meclizine (ANTIVERT) 25 MG tablet, TAKE ONE TABLET BY MOUTH THREE TIMES DAILY AS NEEDED FOR dizziness, Disp: , Rfl: 0  •  pantoprazole (PROTONIX) 40 MG EC tablet, TAKE ONE TABLET BY MOUTH EVERY DAY, Disp: 90 tablet, Rfl: 2  •  rosuvastatin (CRESTOR) 10 MG tablet, Take 10 mg by mouth Daily., Disp: , Rfl:   •  rosuvastatin (Crestor) 10 MG tablet, Take 1 tablet by mouth Daily., Disp: 90 tablet, Rfl: 3  •  Scopolamine 1 MG/3DAYS patch, Place 1 patch on the skin as directed by provider Every 72 (Seventy-Two) Hours., Disp: 4 patch, Rfl: 5  •  valsartan-hydrochlorothiazide (DIOVAN-HCT) 80-12.5 MG per tablet, TAKE ONE TABLET BY MOUTH EVERY DAY, Disp: 30 tablet, Rfl: 5  •  estradiol (VIVELLE-DOT) 0.0375 MG/24HR patch, Place 1 patch on the skin as directed by provider 2 (Two) Times a Week for 8 doses., Disp: 24 patch,  "Rfl: 3  •  ondansetron ODT (ZOFRAN-ODT) 4 MG disintegrating tablet, Place 1 tablet on the tongue Every 8 (Eight) Hours As Needed for Nausea or Vomiting., Disp: 15 tablet, Rfl: 0  •  polyethylene glycol-electrolytes (Nulytely with Flavor Packs) 420 g solution, Take 4,000 mL by mouth 1 (One) Time for 1 dose., Disp: 4000 mL, Rfl: 0     Allergies   Allergen Reactions   • Codeine Anaphylaxis     Chest pains   • Erythromycin Diarrhea     Cramps   • Morphine Unknown - High Severity     Chest pain        Family History   Problem Relation Age of Onset   • Cancer Father         Mesothelioma   • Colon polyps Father    • Scoliosis Sister    • Cancer Brother         Prostate   • Cancer Brother         Colon & Liver   • Colon cancer Brother 64   • Heart disease Other    • Lung disease Other         Social History     Social History Narrative   • Not on file       Objective       Vital Signs:   /45 (BP Location: Left arm, Patient Position: Sitting, Cuff Size: Adult)   Pulse 74   Ht 160 cm (62.99\")   Wt 93.4 kg (205 lb 12.8 oz)   SpO2 95%   BMI 36.47 kg/m²       Physical Exam  Constitutional:       Appearance: Normal appearance.   HENT:      Head: Normocephalic.   Cardiovascular:      Rate and Rhythm: Normal rate and regular rhythm.      Heart sounds: Normal heart sounds.   Pulmonary:      Effort: Pulmonary effort is normal.      Breath sounds: Normal breath sounds.   Abdominal:      General: Bowel sounds are normal.      Palpations: Abdomen is soft.   Skin:     General: Skin is warm and dry.   Neurological:      Mental Status: She is alert and oriented to person, place, and time. Mental status is at baseline.   Psychiatric:         Mood and Affect: Mood normal.         Behavior: Behavior normal.         Thought Content: Thought content normal.         Judgment: Judgment normal.         Result Review :       CBC w/diff    CBC w/Diff 3/2/22 5/13/22 6/24/22   WBC 8.62 7.53 8.27   RBC 4.43 4.18 4.42   Hemoglobin 13.0 12.4 " 12.9   Hematocrit 39.4 37.5 38.9   MCV 88.9 89.7 88.0   MCH 29.3 29.7 29.2   MCHC 33.0 33.1 33.2   RDW 13.6 13.8 13.2   Platelets 301 285 305   Neutrophil Rel % 54.1 52.4 60.0   Immature Granulocyte Rel % 0.3 0.3 0.5   Lymphocyte Rel % 34.6 34.8 30.5   Monocyte Rel % 8.2 9.2 6.8   Eosinophil Rel % 2.2 2.5 1.8   Basophil Rel % 0.6 0.8 0.4           CMP    CMP 3/2/22 5/13/22 6/24/22   Glucose 90 93 99   BUN 11 11 12   Creatinine 0.89 0.83 0.85   Sodium 141 137 142   Potassium 4.6 3.7 4.0   Chloride 101 100 102   Calcium 9.7 9.4 10.0   Albumin 4.40 4.10 4.60   Total Bilirubin 0.3 0.3 0.2   Alkaline Phosphatase 67 63 71   AST (SGOT) 26 27 26   ALT (SGPT) 19 20 24                     Assessment and Plan    Diagnoses and all orders for this visit:    1. Change in bowel habits (Primary)    2. Family history of colon cancer  Comments:  brother diagnosed when he was 63    3. Rectal bleeding    4. Constipation, unspecified constipation type    Other orders  -     polyethylene glycol-electrolytes (Nulytely with Flavor Packs) 420 g solution; Take 4,000 mL by mouth 1 (One) Time for 1 dose.  Dispense: 4000 mL; Refill: 0      Add a daily fiber supplement to help with constipation. If constipation still persists then she will add Miralax as needed.   Reviewed most recent CBC that showed normal hemoglobin - advised patient that if she has dizziness, shortness of breath, and palpitations she needs to go the ED.   COLONOSCOPY Surgical Risk and Benefits: Possible risk/complications, benefits, and alternatives to surgical or invasive procedure have been explained to patient and/or legal guardian. Risks include bleeding, infection, and perforation. Patient has been evaluated and can tolerate anesthesia and/or sedation. Risk, benefits, and alternatives to anesthesia and sedation have been explained to patient and/or legal guardian.     Follow Up   No follow-ups on file.  Patient was given instructions and counseling regarding her  condition or for health maintenance advice. Please see specific information pulled into the AVS if appropriate.

## 2022-07-12 RX ORDER — LEVOTHYROXINE SODIUM 0.07 MG/1
TABLET ORAL
Qty: 90 TABLET | Refills: 1 | Status: SHIPPED | OUTPATIENT
Start: 2022-07-12 | End: 2022-11-21 | Stop reason: SDUPTHER

## 2022-07-20 ENCOUNTER — TRANSCRIBE ORDERS (OUTPATIENT)
Dept: PHYSICAL THERAPY | Facility: CLINIC | Age: 62
End: 2022-07-20

## 2022-07-20 DIAGNOSIS — M79.605 LUMBAR PAIN WITH RADIATION DOWN LEFT LEG: Primary | ICD-10-CM

## 2022-07-20 DIAGNOSIS — M43.17 SPONDYLOLISTHESIS AT L5-S1 LEVEL: ICD-10-CM

## 2022-07-20 DIAGNOSIS — M54.50 LUMBAR PAIN WITH RADIATION DOWN LEFT LEG: Primary | ICD-10-CM

## 2022-08-01 ENCOUNTER — TELEPHONE (OUTPATIENT)
Dept: PHYSICAL THERAPY | Facility: CLINIC | Age: 62
End: 2022-08-01

## 2022-08-17 ENCOUNTER — ANESTHESIA EVENT (OUTPATIENT)
Dept: GASTROENTEROLOGY | Facility: HOSPITAL | Age: 62
End: 2022-08-17

## 2022-08-17 ENCOUNTER — HOSPITAL ENCOUNTER (OUTPATIENT)
Facility: HOSPITAL | Age: 62
Setting detail: HOSPITAL OUTPATIENT SURGERY
Discharge: HOME | End: 2022-08-17
Attending: INTERNAL MEDICINE | Admitting: INTERNAL MEDICINE

## 2022-08-17 ENCOUNTER — ANESTHESIA (OUTPATIENT)
Dept: GASTROENTEROLOGY | Facility: HOSPITAL | Age: 62
End: 2022-08-17

## 2022-08-17 VITALS
HEIGHT: 63 IN | RESPIRATION RATE: 16 BRPM | BODY MASS INDEX: 35.39 KG/M2 | WEIGHT: 199.74 LBS | TEMPERATURE: 97.5 F | HEART RATE: 59 BPM | SYSTOLIC BLOOD PRESSURE: 110 MMHG | OXYGEN SATURATION: 98 % | DIASTOLIC BLOOD PRESSURE: 72 MMHG

## 2022-08-17 PROCEDURE — 45378 DIAGNOSTIC COLONOSCOPY: CPT | Performed by: INTERNAL MEDICINE

## 2022-08-17 PROCEDURE — 25010000002 PROPOFOL 10 MG/ML EMULSION: Performed by: NURSE ANESTHETIST, CERTIFIED REGISTERED

## 2022-08-17 RX ORDER — SODIUM CHLORIDE, SODIUM LACTATE, POTASSIUM CHLORIDE, CALCIUM CHLORIDE 600; 310; 30; 20 MG/100ML; MG/100ML; MG/100ML; MG/100ML
30 INJECTION, SOLUTION INTRAVENOUS CONTINUOUS
Status: DISCONTINUED | OUTPATIENT
Start: 2022-08-17 | End: 2022-08-17 | Stop reason: HOSPADM

## 2022-08-17 RX ORDER — LIDOCAINE HYDROCHLORIDE 20 MG/ML
INJECTION, SOLUTION EPIDURAL; INFILTRATION; INTRACAUDAL; PERINEURAL AS NEEDED
Status: DISCONTINUED | OUTPATIENT
Start: 2022-08-17 | End: 2022-08-17 | Stop reason: SURG

## 2022-08-17 RX ORDER — PROPOFOL 10 MG/ML
VIAL (ML) INTRAVENOUS AS NEEDED
Status: DISCONTINUED | OUTPATIENT
Start: 2022-08-17 | End: 2022-08-17 | Stop reason: SURG

## 2022-08-17 RX ADMIN — SODIUM CHLORIDE, POTASSIUM CHLORIDE, SODIUM LACTATE AND CALCIUM CHLORIDE 30 ML/HR: 600; 310; 30; 20 INJECTION, SOLUTION INTRAVENOUS at 11:20

## 2022-08-17 RX ADMIN — LIDOCAINE HYDROCHLORIDE 50 MG: 20 INJECTION, SOLUTION EPIDURAL; INFILTRATION; INTRACAUDAL; PERINEURAL at 12:52

## 2022-08-17 RX ADMIN — PROPOFOL 100 MG: 10 INJECTION, EMULSION INTRAVENOUS at 12:52

## 2022-08-17 RX ADMIN — PROPOFOL 175 MCG/KG/MIN: 10 INJECTION, EMULSION INTRAVENOUS at 12:52

## 2022-08-17 NOTE — H&P
"Pre Procedure History & Physical    Chief Complaint:   Rectal bleeding, family history colon cancer    Subjective     HPI:   Rectal bleeding, family history colon cancer past history colon polyps    Past Medical History:   Past Medical History:   Diagnosis Date   • Ankle sprain 40 years ago   • Arachnoiditis    • Arthritis of back hard to pinpoint   • CTS (carpal tunnel syndrome)    • Depression    • Diverticulitis    • Hip arthrosis    • Hyperlipidemia    • Hypertension    • Knee swelling    • Low back strain years   • Lumbosacral disc disease this last time,    • Scoliosis    • Tear of meniscus of knee    • 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   • Thyroid disease    • Vertigo        Past Surgical History:  Past Surgical History:   Procedure Laterality Date   •  SECTION      ALSO    • CHOLECYSTECTOMY     • COLONOSCOPY     • ESOPHAGEAL DILATATION     • HYSTERECTOMY      menorrhagia   • KNEE ARTHROSCOPY Right     \"CLEAN UP\"   • KNEE ARTHROSCOPY W/ MENISCAL REPAIR Right    • TRIGGER POINT INJECTION  CRESENCIO's for back from     WILL NOT AGAIN   • UPPER GASTROINTESTINAL ENDOSCOPY         Family History:  Family History   Problem Relation Age of Onset   • Cancer Father         Mesothelioma   • Colon polyps Father    • Scoliosis Sister    • Cancer Brother         Prostate   • Cancer Brother         Colon & Liver   • Colon cancer Brother 64   • Heart disease Other    • Lung disease Other    • Malig Hyperthermia Neg Hx        Social History:   reports that she quit smoking about 11 years ago. She quit smokeless tobacco use about 10 years ago. She reports that she does not drink alcohol and does not use drugs.    Medications:   Medications Prior to Admission   Medication Sig Dispense Refill Last Dose   • ALPRAZolam (XANAX) 0.5 MG tablet TAKE ONE TABLET BY MOUTH TWICE DAILY 60 tablet 5 2022 at Unknown time   • diclofenac sodium (VOTAREN XR) 100 MG 24 hr tablet " TAKE ONE TABLET BY MOUTH EVERY DAY 90 tablet 3 Past Week at Unknown time   • dicyclomine (BENTYL) 20 MG tablet Take 1 tablet by mouth Every 8 (Eight) Hours As Needed (abdominal pain). 30 tablet 0 Past Month at Unknown time   • estradiol (VIVELLE-DOT) 0.0375 MG/24HR patch Place 1 patch on the skin as directed by provider 2 (Two) Times a Week for 8 doses. 24 patch 3 Past Week at Unknown time   • FLUoxetine (PROzac) 40 MG capsule TAKE ONE CAPSULE BY MOUTH ONCE DAILY 90 capsule 2 8/17/2022 at Unknown time   • fluticasone (FLONASE) 50 MCG/ACT nasal spray USE 2 SPRAYS IN EACH NOSTRIL EVERY DAY 16 g 1 Past Month at Unknown time   • HYDROcodone-acetaminophen (NORCO) 5-325 MG per tablet TAKE ONE TABLET BY MOUTH TWICE DAILY 60 tablet 0 Past Week at Unknown time   • levothyroxine (SYNTHROID, LEVOTHROID) 75 MCG tablet TAKE ONE TABLET BY MOUTH EVERY DAY 90 tablet 1 8/17/2022 at Unknown time   • meclizine (ANTIVERT) 25 MG tablet TAKE ONE TABLET BY MOUTH THREE TIMES DAILY AS NEEDED FOR dizziness  0 Past Month at Unknown time   • pantoprazole (PROTONIX) 40 MG EC tablet TAKE ONE TABLET BY MOUTH EVERY DAY 90 tablet 2 8/16/2022 at Unknown time   • rosuvastatin (CRESTOR) 10 MG tablet Take 10 mg by mouth Daily.   8/16/2022 at Unknown time   • valsartan-hydrochlorothiazide (DIOVAN-HCT) 80-12.5 MG per tablet TAKE ONE TABLET BY MOUTH EVERY DAY (Patient taking differently: Take 1 tablet by mouth Every Evening.) 30 tablet 5 Past Week at Unknown time   • ondansetron ODT (ZOFRAN-ODT) 4 MG disintegrating tablet Place 1 tablet on the tongue Every 8 (Eight) Hours As Needed for Nausea or Vomiting. 15 tablet 0 More than a month at Unknown time   • Scopolamine 1 MG/3DAYS patch Place 1 patch on the skin as directed by provider Every 72 (Seventy-Two) Hours. 4 patch 5 More than a month at Unknown time       Allergies:  Codeine, Erythromycin, Erythromycin base, and Morphine        Objective     Blood pressure 142/75, pulse 72, temperature 97.3 °F (36.3  "°C), temperature source Temporal, resp. rate 18, height 160.1 cm (63.03\"), weight 90.6 kg (199 lb 11.8 oz), SpO2 94 %, not currently breastfeeding.    Physical Exam   Constitutional: Pt is oriented to person, place, and time and well-developed, well-nourished, and in no distress.   Mouth/Throat: Oropharynx is clear and moist.   Neck: Normal range of motion.   Cardiovascular: Normal rate, regular rhythm and normal heart sounds.    Pulmonary/Chest: Effort normal and breath sounds normal.   Abdominal: Soft. Nontender  Skin: Skin is warm and dry.   Psychiatric: Mood, memory, affect and judgment normal.     Assessment & Plan     Diagnosis:  Rectal bleeding    Anticipated Surgical Procedure:  Colonoscopy    The risks, benefits, and alternatives of this procedure have been discussed with the patient or the responsible party- the patient understands and agrees to proceed.            "

## 2022-08-17 NOTE — ANESTHESIA PREPROCEDURE EVALUATION
Anesthesia Evaluation     Patient summary reviewed and Nursing notes reviewed   no history of anesthetic complications:  NPO Solid Status: > 8 hours  NPO Liquid Status: > 2 hours           Airway   Mallampati: II  TM distance: >3 FB  Neck ROM: full  No difficulty expected  Dental    (+) partials    Pulmonary - negative pulmonary ROS and normal exam    breath sounds clear to auscultation  Cardiovascular - normal exam  Exercise tolerance: good (4-7 METS)    Rhythm: regular  Rate: normal    (+) hypertension, hyperlipidemia,       Neuro/Psych  (+) headaches, dizziness/light headedness, numbness, psychiatric history,    GI/Hepatic/Renal/Endo    (+)  GI bleeding , thyroid problem     Musculoskeletal     Abdominal    Substance History - negative use     OB/GYN negative ob/gyn ROS         Other   arthritis,                      Anesthesia Plan    ASA 2     general     (Total IV Anesthesia    Patient understands anesthesia not responsible for dental damage.  )  intravenous induction     Anesthetic plan, risks, benefits, and alternatives have been provided, discussed and informed consent has been obtained with: patient.    Plan discussed with CRNA.        CODE STATUS:

## 2022-08-17 NOTE — ANESTHESIA POSTPROCEDURE EVALUATION
Patient: Kamini Orta    Procedure Summary     Date: 08/17/22 Room / Location: [facility] ENDOSCOPY 4 / [facility] ENDOSCOPY    Anesthesia Start: 1249 Anesthesia Stop: 1314    Procedure: COLONOSCOPY (N/A ) Diagnosis:       Change in bowel habits      Family history of colon cancer      Rectal bleeding      History of colon polyps      (Change in bowel habits [R19.4])      (Family history of colon cancer [Z80.0])      (Rectal bleeding [K62.5])      (History of colon polyps [Z86.010])    Surgeons: Peter Tejada MD Provider: Xavi Batista MD    Anesthesia Type: general ASA Status: 2          Anesthesia Type: general    Vitals  Vitals Value Taken Time   /66 08/17/22 1313   Temp     Pulse 65 08/17/22 1316   Resp     SpO2 95 % 08/17/22 1316   Vitals shown include unvalidated device data.        Post Anesthesia Care and Evaluation    Patient location during evaluation: bedside  Patient participation: complete - patient participated  Level of consciousness: awake  Pain score: 0  Pain management: adequate    Airway patency: patent  Anesthetic complications: No anesthetic complications  PONV Status: none  Cardiovascular status: acceptable and stable  Respiratory status: acceptable and room air  Hydration status: acceptable    Comments: An Anesthesiologist personally participated in the most demanding procedures (including induction and emergence if applicable) in the anesthesia plan, monitored the course of anesthesia administration at frequent intervals and remained physically present and available for immediate diagnosis and treatment of emergencies.

## 2022-08-18 ENCOUNTER — TREATMENT (OUTPATIENT)
Dept: PHYSICAL THERAPY | Facility: CLINIC | Age: 62
End: 2022-08-18

## 2022-08-18 DIAGNOSIS — M43.17 SPONDYLOLISTHESIS AT L5-S1 LEVEL: Primary | ICD-10-CM

## 2022-08-18 DIAGNOSIS — M54.42 CHRONIC LEFT-SIDED LOW BACK PAIN WITH LEFT-SIDED SCIATICA: ICD-10-CM

## 2022-08-18 DIAGNOSIS — G89.29 CHRONIC LEFT-SIDED LOW BACK PAIN WITH LEFT-SIDED SCIATICA: ICD-10-CM

## 2022-08-18 DIAGNOSIS — M25.60 STIFFNESS IN JOINT: ICD-10-CM

## 2022-08-18 PROCEDURE — 97161 PT EVAL LOW COMPLEX 20 MIN: CPT | Performed by: PHYSICAL THERAPIST

## 2022-08-18 NOTE — PROGRESS NOTES
Physical Therapy Initial Evaluation and Plan of Care    Patient: Kamini Orta   : 1960  Diagnosis/ICD-10 Code:  Spondylolisthesis at L5-S1 level [M43.17]  Referring practitioner: Julius Barrett MD  Date of Initial Visit: 2022  Today's Date: 2022  Patient seen for 1 sessions           Subjective Questionnaire: Oswestry: 23/50 = 46% Disability      Subjective Evaluation    History of Present Illness  Mechanism of injury: The patient presents to physical therapy with complaints of left sided low back pain with recent x-ray which revealed a 6 mm spondylolisthesis of L5-S1. She was diagnosed with arachnoiditis of her spine 10 years ago. The radiating pain in her left left started in May 2021 when she tried to lift something that was too heavy. Her pain is located in her low back and radiates into her left leg. Her pain is increased with prolonged standing, prolonged walking, sitting in the wrong position, and lifting. Her pain is improved with 5mg Lortab that she takes once per day, using a TENS unit, and rest. She has a burning sensation in her lateral thigh and tingling into her left foot.      Patient Occupation: Not currently working Pain  Current pain rating: 3  At best pain ratin  At worst pain rating: 10    Diagnostic Tests  X-ray: abnormal (L5-S1 spondylolisthesis)    Patient Goals  Patient goal: The patient would like to have less pain, improved mobility, and be able to walk up/down her stairs more easily.           Objective          Tenderness     Additional Tenderness Details  Tenderness to palpation of lumbar paraspinals bilaterally, L piriformis, and L glut med    Neurological Testing     Additional Neurological Details  Sensation to light touch intact and equal bilaterally from L2-S1     Active Range of Motion     Lumbar   Flexion: 70 degrees   Extension: Active lumbar extension: Not tested.   Left lateral flexion: Active left lumbar lateral flexion: 2'' above knee joint  line.   Left rotation: Active left lumbar rotation: 50%   Right rotation: Active right lumbar rotation: 75%   Left Hip   Normal active range of motion    Right Hip   Normal active range of motion    Strength/Myotome Testing     Left Hip   Planes of Motion   Flexion: 4  Abduction: 4  Adduction: 4+    Right Hip   Planes of Motion   Flexion: 4+  Abduction: 4  Adduction: 4+    Left Knee   Flexion: 4+  Extension: 4+    Right Knee   Flexion: 4+  Extension: 4+    Left Ankle/Foot   Dorsiflexion: 4+    Right Ankle/Foot   Dorsiflexion: 4+    Additional Strength Details  All MMT performed in the seated position.    Ambulation     Ambulation: Level Surfaces     Additional Level Surfaces Ambulation Details  The patient ambulates without an AD with decreased stance time on her right lower extremity, short step length with her right lower extremity and bilateral trendelenburg gait pattern.    Functional Assessment     Comments  5x sit to stand test: 18.8 seconds    2 minute walk test: 430 feet without an AD      See Exercise, Manual, and Modality Logs for complete treatment.     Assessment & Plan     Assessment  Impairments: abnormal gait, abnormal muscle firing, abnormal muscle tone, abnormal or restricted ROM, activity intolerance, impaired physical strength, pain with function and safety issue  Functional Limitations: carrying objects, lifting, sleeping, walking, pulling, pushing, uncomfortable because of pain, sitting, standing, stooping and unable to perform repetitive tasks  Assessment details: The patient presents to physical therapy with complaints of low back pain with radicular pain into her left lower extremity. She has previously been diagnosed with arachnoiditis and a spondylolisthesis at L5-S1. She presents with associated lumbar stiffness, tenderness to palpation, lower extremity weakness, and functional deficits (APOLINAR). She would benefit from skilled physical therapy as described below to address these limitations  and allow the patient to return to her prior level of function.  Prognosis: good    Goals  Plan Goals: LOW BACK PROBLEMS:    1. The patient complains of low back pain.  LTG 1: 12 weeks:  The patient will report a pain rating of 1/10 or better in order to improve  tolerance to activities of daily living and improve sleep quality.  STATUS:  New  STG 1a: 6 weeks:  The patient will report a pain rating of 2/10 or better.  STATUS:  New  TREATMENT:  Therapeutic exercises, manual therapy, aquatic therapy, home exercise   instruction, and modalities as needed for pain to include:  electrical stimulation, moist heat, ice,   ultrasound, and diathermy.      2. The patient demonstrates weakness of the bilateral hips.  LTG 2: 12 weeks:  The patient will demonstrate 5 /5 strength for bilateral hip flexion, abduction,  and adduction in order to improve hip stability.  STATUS:  New  STG 2a: 6 weeks:  The patient will demonstrate 4+ /5 strength for bilateral hip flexion, abduction,  and adduction.  STATUS:  New  TREATMENT: Therapeutic exercises, manual therapy, aquatic therapy, home exercise instruction,  and modalities as needed for pain to include:  electrical stimulation, moist heat, ice, ultrasound, and   diathermy.      3. The patient has gait dysfunction.  LTG 3: 12 weeks:  The patient will ambulate without assistive device, independently, for   community distances with normal biomechanics in order to improve mobility and allow   patient to perform activities such as grocery shopping with greater ease.  STATUS:  New  TREATMENT: Gait training, aquatic therapy, therapeutic exercise, and home exercise instruction.    4. The patient has limited lumbar AROM  LTG 4: 12 weeks:  The patient will demonstrate lumbar AROM as follows: 70 of flexion and side bending to knee joint line bilaterally.  STATUS:  New  TREATMENT: Manual therapy, therapeutic exercise, home exercise instruction, and modalities as needed to include: moist heat,  electrical stimulation, and ultrasound.      5. Mobility: Walking/Moving Around Functional Limitation    LTG 5: 12 weeks:  The patient will demonstrate 11.1 % limitation by achieving a score of 5/45 on the APOLINAR.  STATUS:  New  STG 5 a: 6 weeks:  The patient will demonstrate 22.2 % limitation by achieving a score of 10/45 on the APOLINAR.    STATUS:  New  TREATMENT:  Manual therapy, therapeutic exercise, home exercise instruction, and modalities as needed to include: moist heat, electrical stimulation, and ultrasound.    Plan  Therapy options: will be seen for skilled therapy services  Planned modality interventions: cryotherapy, electrical stimulation/Russian stimulation, TENS, dry needling and traction  Planned therapy interventions: balance/weight-bearing training, ADL retraining, soft tissue mobilization, strengthening, stretching, therapeutic activities, joint mobilization, home exercise program, functional ROM exercises, flexibility, body mechanics training, postural training, neuromuscular re-education, manual therapy, abdominal trunk stabilization, IADL retraining and spinal/joint mobilization  Frequency: 2x week  Duration in weeks: 12  Treatment plan discussed with: patient        Visit Diagnoses:    ICD-10-CM ICD-9-CM   1. Spondylolisthesis at L5-S1 level  M43.17 756.12   2. Chronic left-sided low back pain with left-sided sciatica  M54.42 724.2    G89.29 724.3     338.29   3. Stiffness in joint  M25.60 719.50       History # of Personal Factors and/or Comorbidities: LOW (0)  Examination of Body System(s): # of elements: LOW (1-2)  Clinical Presentation: STABLE   Clinical Decision Making: LOW       Timed:         Manual Therapy:    0     mins  40824;     Therapeutic Exercise:    0     mins  07891;     Neuromuscular Kellie:    0    mins  36822;    Therapeutic Activity:     0     mins  89130;     Gait Trainin     mins  49073;     Ultrasound:     0     mins  39509;    Ionto                               0     mins   69294  Self Care                       0     mins   55610  Canalith Repos    0     mins 44727      Un-Timed:  Electrical Stimulation:    0     mins  54324 ( );  Dry Needling     0     mins self-pay  Traction     0     mins 42647  Low Eval     30     Mins  12998  Mod Eval     0     Mins  26479  High Eval                       0     Mins  06037  Re-Eval                           0    mins  42469    Timed Treatment:   0   mins   Total Treatment:     30   mins    PT SIGNATURE: Luther Knutson PT    Electronically signed 8/18/2022    [place] License: PT - [id number]      Initial Certification  Certification Period: 8/18/2022 thru 11/15/2022  I certify that the therapy services are furnished while this patient is under my care.  The services outlined above are required by this patient, and will be reviewed every 90 days.     PHYSICIAN: Julius Barrett MD  NPI: [id number]      DATE:     Please sign and return via fax to [phone]. Thank you, [facility] Physical Therapy.

## 2022-08-23 ENCOUNTER — TELEPHONE (OUTPATIENT)
Dept: GASTROENTEROLOGY | Facility: CLINIC | Age: 62
End: 2022-08-23

## 2022-08-23 DIAGNOSIS — M54.50 CHRONIC BILATERAL LOW BACK PAIN WITHOUT SCIATICA: ICD-10-CM

## 2022-08-23 DIAGNOSIS — G89.29 CHRONIC BILATERAL LOW BACK PAIN WITHOUT SCIATICA: ICD-10-CM

## 2022-08-23 RX ORDER — HYDROCODONE BITARTRATE AND ACETAMINOPHEN 5; 325 MG/1; MG/1
TABLET ORAL
Qty: 60 TABLET | Refills: 0 | Status: SHIPPED | OUTPATIENT
Start: 2022-08-23 | End: 2022-10-06

## 2022-08-23 RX ORDER — FLUTICASONE PROPIONATE 50 MCG
SPRAY, SUSPENSION (ML) NASAL
Qty: 16 G | Refills: 1 | Status: SHIPPED | OUTPATIENT
Start: 2022-08-23 | End: 2022-11-21

## 2022-08-23 NOTE — TELEPHONE ENCOUNTER
I have reviewed the patients colonoscopy report. The report showed a normal colonoscopy.  No polyps removed or specimens collected.  Repeat colonoscopy in 5 years due to family history and personal history of colon polyps. Please place in recall. Send letter.     If patient has persistent GI symptoms please make appointment to be seen in office.

## 2022-08-25 NOTE — TELEPHONE ENCOUNTER
S/w pt. Aware of results. Pt has been placed in 5 year recall, letter sent  Pt does not need a office visit at this time, and will call if she feels she needs to be seen

## 2022-08-29 ENCOUNTER — APPOINTMENT (OUTPATIENT)
Dept: MAMMOGRAPHY | Facility: HOSPITAL | Age: 62
End: 2022-08-29

## 2022-09-07 ENCOUNTER — TREATMENT (OUTPATIENT)
Dept: PHYSICAL THERAPY | Facility: CLINIC | Age: 62
End: 2022-09-07

## 2022-09-07 DIAGNOSIS — M43.17 SPONDYLOLISTHESIS AT L5-S1 LEVEL: Primary | ICD-10-CM

## 2022-09-07 DIAGNOSIS — M25.60 STIFFNESS IN JOINT: ICD-10-CM

## 2022-09-07 DIAGNOSIS — M54.42 CHRONIC LEFT-SIDED LOW BACK PAIN WITH LEFT-SIDED SCIATICA: ICD-10-CM

## 2022-09-07 DIAGNOSIS — G89.29 CHRONIC LEFT-SIDED LOW BACK PAIN WITH LEFT-SIDED SCIATICA: ICD-10-CM

## 2022-09-07 PROCEDURE — 97113 AQUATIC THERAPY/EXERCISES: CPT | Performed by: PHYSICAL THERAPIST

## 2022-09-07 NOTE — PROGRESS NOTES
"Physical Therapy Daily Treatment Note      Patient: Kamini Orta   : 1960  Referring practitioner: Julius Barrett MD  Date of Initial Visit: Type: THERAPY  Noted: 2022  Today's Date: 2022  Patient seen for 2 sessions           Subjective  Kamini Orta reports: her back pain is 2-3/10. \"I need shots in my knees, can hardly walk. Have had lots an lots of shots, was gonna try and get one of the knees done this year but the back got so bad.\"     Kamini spoke about past interventions leaving her with neurological     Objective   See Exercise, Manual, and Modality Logs for complete treatment.       Assessment/Plan  This was Kamini's first aquatic follow up after the initial evaluation but she explained she had aquatic therapy in the past at this location. Time to include education in correcting exercise performance. Therapist directed program remains necessary to address flexibility, strength and pain control.    Visit Diagnoses:    ICD-10-CM ICD-9-CM   1. Spondylolisthesis at L5-S1 level  M43.17 756.12   2. Chronic left-sided low back pain with left-sided sciatica  M54.42 724.2    G89.29 724.3     338.29   3. Stiffness in joint  M25.60 719.50       Progress per Plan of Care and Progress strengthening /stabilization /functional activity           Timed:  Manual Therapy:         mins  66177;  Therapeutic Exercise:         mins  08506;     Neuromuscular Kelile:        mins  57306;    Therapeutic Activity:          mins  31732;     Gait Training:           mins  91742;     Ultrasound:          mins  29942;    Electrical Stimulation:         mins  87187 ( );  Aquatics  _28_   mins   88951    Untimed:  Electrical Stimulation:         mins  69017 ( );  Mechanical Traction:         mins  75126;     Timed Treatment:   28   mins   Total Treatment:     28   mins    Electronically Signed:  Daniella Thapa PTA  Physical Therapist Assistant    [place] PTA license [id number]            "

## 2022-09-12 ENCOUNTER — TREATMENT (OUTPATIENT)
Dept: PHYSICAL THERAPY | Facility: CLINIC | Age: 62
End: 2022-09-12

## 2022-09-12 ENCOUNTER — TELEPHONE (OUTPATIENT)
Dept: OBSTETRICS AND GYNECOLOGY | Facility: CLINIC | Age: 62
End: 2022-09-12

## 2022-09-12 DIAGNOSIS — M25.60 STIFFNESS IN JOINT: ICD-10-CM

## 2022-09-12 DIAGNOSIS — M43.17 SPONDYLOLISTHESIS AT L5-S1 LEVEL: Primary | ICD-10-CM

## 2022-09-12 DIAGNOSIS — M54.42 CHRONIC LEFT-SIDED LOW BACK PAIN WITH LEFT-SIDED SCIATICA: ICD-10-CM

## 2022-09-12 DIAGNOSIS — G89.29 CHRONIC LEFT-SIDED LOW BACK PAIN WITH LEFT-SIDED SCIATICA: ICD-10-CM

## 2022-09-12 PROCEDURE — 97113 AQUATIC THERAPY/EXERCISES: CPT | Performed by: PHYSICAL THERAPIST

## 2022-09-12 RX ORDER — ESTRADIOL 0.05 MG/D
1 FILM, EXTENDED RELEASE TRANSDERMAL 2 TIMES WEEKLY
Qty: 24 PATCH | Refills: 3 | Status: SHIPPED | OUTPATIENT
Start: 2022-09-12

## 2022-09-12 NOTE — TELEPHONE ENCOUNTER
I sent a new script to her pharmacy increasing the vivelle patch back up to 0.05 mg/hr. Let me know if any further concerns.

## 2022-09-12 NOTE — PROGRESS NOTES
Physical Therapy Daily Treatment Note    Patient: Kamini Orta   : 1960  Diagnosis/ICD-10 Code:  Spondylolisthesis at L5-S1 level [M43.17]  Referring practitioner: Julius Barrett MD  Date of Initial Visit: Type: THERAPY  Noted: 2022  Today's Date: 2022  Patient seen for 3 sessions           Subjective: Pt reporting she felt like she did too much walking in the pool last visit, states she was shuffling the rest of the evening because of her knee pain on both sides, right worse than left. She is wondering if she should gets injections in her knees prior to continuing with therapy.     Objective   See Exercise, Manual, and Modality Logs for complete treatment.       Assessment/Plan: Pt tolerated today's session well, denies pain during or after today's session. Refrained from walking in the pool, but continued other strengthening and core stabilization exercises. Pt would benefit from continued skilled therapy to address deficits. Progress per plan of care.             Manual Therapy:    0     mins  75565;  Therapeutic Exercise:    0     mins  75381;     Neuromuscular Kellie:    0    mins  87601;    Therapeutic Activity:     0     mins  21236;     Gait Trainin     mins  69472;     Ultrasound:     0     mins  37521;    Electrical Stimulation:    0     mins  57824 ( );  Dry Needling     0     mins self-pay;  Aquatic Therapy    24     mins  51533;  Mechanical Traction    0     mins  13614  Moist Heat     0     mins  No charge    Timed Treatment:   24   mins   Total Treatment:     24   mins    Andrea Dukes PT  Physical Therapist    Electronically signed 2022    [place] License: PT - [id number]

## 2022-09-12 NOTE — TELEPHONE ENCOUNTER
Patient called this am.  Last seen on 4-18-22.  You changed her  Vivelle Dot strength and she states having a lot of hot flashes and not sleeping.

## 2022-09-19 ENCOUNTER — TELEPHONE (OUTPATIENT)
Dept: ORTHOPEDIC SURGERY | Facility: CLINIC | Age: 62
End: 2022-09-19

## 2022-09-19 ENCOUNTER — TREATMENT (OUTPATIENT)
Dept: PHYSICAL THERAPY | Facility: CLINIC | Age: 62
End: 2022-09-19

## 2022-09-19 DIAGNOSIS — G89.29 CHRONIC LEFT-SIDED LOW BACK PAIN WITH LEFT-SIDED SCIATICA: ICD-10-CM

## 2022-09-19 DIAGNOSIS — M54.42 CHRONIC LEFT-SIDED LOW BACK PAIN WITH LEFT-SIDED SCIATICA: ICD-10-CM

## 2022-09-19 DIAGNOSIS — M43.17 SPONDYLOLISTHESIS AT L5-S1 LEVEL: Primary | ICD-10-CM

## 2022-09-19 DIAGNOSIS — M25.60 STIFFNESS IN JOINT: ICD-10-CM

## 2022-09-19 PROCEDURE — 97113 AQUATIC THERAPY/EXERCISES: CPT | Performed by: PHYSICAL THERAPIST

## 2022-09-19 NOTE — PROGRESS NOTES
"Physical Therapy Daily Treatment Note      Patient: Kamini Orta   : 1960  Referring practitioner: Julius Barrett MD  Date of Initial Visit: Type: THERAPY  Noted: 2022  Today's Date: 2022  Patient seen for 4 sessions           Subjective  Kamini Orta reports: \"I finally realized something, it I my knees that got so affected after last time I saw you, I am overdue for the injections        Objective   See Exercise, Manual, and Modality Logs for complete treatment.       Assessment/Plan  Progress note to be performed today. Refer to that for specifics as to future POC.    Visit Diagnoses:    ICD-10-CM ICD-9-CM   1. Spondylolisthesis at L5-S1 level  M43.17 756.12   2. Chronic left-sided low back pain with left-sided sciatica  M54.42 724.2    G89.29 724.3     338.29   3. Stiffness in joint  M25.60 719.50       Progress per Plan of Care and Progress strengthening /stabilization /functional activity           Timed:  Manual Therapy:         mins  40857;  Therapeutic Exercise:         mins  68601;     Neuromuscular Kellie:        mins  21303;    Therapeutic Activity:          mins  34489;     Gait Training:           mins  77709;     Ultrasound:          mins  56093;    Electrical Stimulation:         mins  40938 ( );  Aquatics  _25_   mins   73527    Untimed:  Electrical Stimulation:         mins  35850 ( );  Mechanical Traction:         mins  51171;     Timed Treatment:   25   mins   Total Treatment:     25   mins    Electronically Signed:  Daniella Thapa PTA  Physical Therapist Assistant    [place] P[place] license [id number]            "

## 2022-09-19 NOTE — TELEPHONE ENCOUNTER
Caller: Kamini Orta    Relationship to patient: Self    Best call back number:      Chief complaint: BILAT KNEE PAIN     Type of visit: FUP INJECTION    Additional notes:DOING PHYSICAL THERAPY ON MONDAYS AND WEDNESDAYS WOULD PREFER AFTERNOON APPT

## 2022-09-19 NOTE — PROGRESS NOTES
Progress Note      Patient: Kamini Orta   : 1960  Diagnosis/ICD-10 Code:  Spondylolisthesis at L5-S1 level [M43.17]  Referring practitioner: Julius Barrett MD  Date of Initial Visit: Type: THERAPY  Noted: 2022  Today's Date: 2022  Patient seen for 4 sessions      Subjective:   Subjective Questionnaire: Oswestry: 28/50 = 56% Disability  Clinical Progress: improved  Home Program Compliance: Yes  Treatment has included: aquatic therapy    Subjective   The patient reported that her back pain is a 3/10 today. Since starting PT, she has noticed improvements in her lower extremity strength and mobility. She feels much better on days when she comes to PT. She would like to continue with PT because she feels like it is helping with her overall mobility.    Objective          Tenderness     Additional Tenderness Details  Tenderness to palpation of lumbar paraspinals bilaterally, L piriformis, and L glut med    Neurological Testing     Additional Neurological Details  Sensation to light touch intact and equal bilaterally from L2-S1     Active Range of Motion     Lumbar   Flexion: 70 degrees   Extension: Active lumbar extension: Not tested.   Left lateral flexion: Active left lumbar lateral flexion: 2'' above knee joint line.   Left rotation: Active left lumbar rotation: 50%   Right rotation: Active right lumbar rotation: 75%   Left Hip   Normal active range of motion    Right Hip   Normal active range of motion    Strength/Myotome Testing     Left Hip   Planes of Motion   Flexion: 4  Abduction: 4+  Adduction: 4+    Right Hip   Planes of Motion   Flexion: 4+  Abduction: 4+  Adduction: 4+    Left Knee   Flexion: 4+  Extension: 4+    Right Knee   Flexion: 4+  Extension: 4+    Left Ankle/Foot   Dorsiflexion: 4+    Right Ankle/Foot   Dorsiflexion: 4+    Additional Strength Details  All MMT performed in the seated position.  All hip strength testing performed in the seated position on  9/19/22    Ambulation     Ambulation: Level Surfaces     Additional Level Surfaces Ambulation Details  The patient ambulates without an AD with decreased stance time on her right lower extremity, short step length with her right lower extremity and bilateral trendelenburg gait pattern.    Functional Assessment     Comments  5x sit to stand test: 16.5 seconds    2 minute walk test: 420 feet without an AD      See Exercise, Manual, and Modality Logs for complete treatment.     Assessment & Plan     Assessment    Assessment details: The patient was re-evaluated today and presents with some improvements in bilateral hip strength and time on 5x sit to stand test. She continues to present with mild low back pain, lower extremity weakness, and functional deficits (APOLINAR). She would benefit from continued skilled physical therapy to address remaining functional deficits and to allow the patient to return to her prior level of function.    Goals  Plan Goals: LOW BACK PROBLEMS:    1. The patient complains of low back pain.  LTG 1: 12 weeks:  The patient will report a pain rating of 1/10 or better in order to improve  tolerance to activities of daily living and improve sleep quality.  STATUS:  Progressing  STG 1a: 6 weeks:  The patient will report a pain rating of 2/10 or better.  STATUS:  Progressing  TREATMENT:  Therapeutic exercises, manual therapy, aquatic therapy, home exercise   instruction, and modalities as needed for pain to include:  electrical stimulation, moist heat, ice,   ultrasound, and diathermy.      2. The patient demonstrates weakness of the bilateral hips.  LTG 2: 12 weeks:  The patient will demonstrate 5 /5 strength for bilateral hip flexion, abduction,  and adduction in order to improve hip stability.  STATUS:  Progressing  STG 2a: 6 weeks:  The patient will demonstrate 4+ /5 strength for bilateral hip flexion, abduction,  and adduction.  STATUS:  Progressing  TREATMENT: Therapeutic exercises, manual  therapy, aquatic therapy, home exercise instruction,  and modalities as needed for pain to include:  electrical stimulation, moist heat, ice, ultrasound, and   diathermy.      3. The patient has gait dysfunction.  LTG 3: 12 weeks:  The patient will ambulate without assistive device, independently, for   community distances with normal biomechanics in order to improve mobility and allow   patient to perform activities such as grocery shopping with greater ease.  STATUS:  Progressing  TREATMENT: Gait training, aquatic therapy, therapeutic exercise, and home exercise instruction.    4. The patient has limited lumbar AROM  LTG 4: 12 weeks:  The patient will demonstrate lumbar AROM as follows: 70 of flexion and side bending to knee joint line bilaterally.  STATUS:  Progressing  TREATMENT: Manual therapy, therapeutic exercise, home exercise instruction, and modalities as needed to include: moist heat, electrical stimulation, and ultrasound.      5. Mobility: Walking/Moving Around Functional Limitation                   LTG 5: 12 weeks:  The patient will demonstrate 11.1 % limitation by achieving a score of 5/45 on the APOLINAR.  STATUS: Progressing  STG 5 a: 6 weeks:  The patient will demonstrate 22.2 % limitation by achieving a score of 10/45 on the APOLINAR.    STATUS: Progressing  TREATMENT:  Manual therapy, therapeutic exercise, home exercise instruction, and modalities as needed to include: moist heat, electrical stimulation, and ultrasound.      Progress toward previous goals: Partially Met      Recommendations: Continue as planned  Timeframe: 2 months  Prognosis to achieve goals: good    PT Signature: Luther Knutson PT    Electronically signed 2022    [place] License: PT - [id number]       Timed:  Manual Therapy:    0     mins  03945;  Therapeutic Exercise:    0     mins  76627;     Neuromuscular Kellie:    0    mins  45479;    Therapeutic Activity:     0     mins  08974;     Gait Trainin     mins  52284;     Aquatics                          0      mins  04072    Un-timed:  Mechanical Traction      0     mins  48157  Dry Needling     0     mins self-pay  Electrical Stimulation:    0     mins  79111 ( );    Timed Treatment:   0   mins   Total Treatment:     5   mins

## 2022-09-20 ENCOUNTER — CLINICAL SUPPORT (OUTPATIENT)
Dept: ORTHOPEDIC SURGERY | Facility: CLINIC | Age: 62
End: 2022-09-20

## 2022-09-20 VITALS — HEIGHT: 63 IN | BODY MASS INDEX: 36.8 KG/M2 | TEMPERATURE: 96.9 F | WEIGHT: 207.7 LBS

## 2022-09-20 DIAGNOSIS — M17.0 ARTHRITIS OF BOTH KNEES: Primary | ICD-10-CM

## 2022-09-20 PROCEDURE — 20610 DRAIN/INJ JOINT/BURSA W/O US: CPT | Performed by: NURSE PRACTITIONER

## 2022-09-20 RX ORDER — METHYLPREDNISOLONE ACETATE 80 MG/ML
INJECTION, SUSPENSION INTRA-ARTICULAR; INTRALESIONAL; INTRAMUSCULAR; SOFT TISSUE
Status: COMPLETED | OUTPATIENT
Start: 2022-09-20 | End: 2022-09-20

## 2022-09-20 RX ORDER — LIDOCAINE HYDROCHLORIDE 10 MG/ML
2 INJECTION, SOLUTION EPIDURAL; INFILTRATION; INTRACAUDAL; PERINEURAL
Status: COMPLETED | OUTPATIENT
Start: 2022-09-20 | End: 2022-09-20

## 2022-09-20 RX ADMIN — METHYLPREDNISOLONE ACETATE: 80 INJECTION, SUSPENSION INTRA-ARTICULAR; INTRALESIONAL; INTRAMUSCULAR; SOFT TISSUE at 10:01

## 2022-09-20 RX ADMIN — LIDOCAINE HYDROCHLORIDE 2 ML: 10 INJECTION, SOLUTION EPIDURAL; INFILTRATION; INTRACAUDAL; PERINEURAL at 10:01

## 2022-09-20 NOTE — PROGRESS NOTES
Kamini Orta is here today for worsening Bilateral knee pain. Knee injection was discussed with the patient in detail, including indication, risks, benefits, and alternatives. Verbal consent was given for the procedure. Injection site was aseptically prepared.    KNEE Injection Procedure Note:    Large Joint Arthrocentesis: R knee  Date/Time: 9/20/2022 10:01 AM  Consent given by: patient  Site marked: site marked  Timeout: Immediately prior to procedure a time out was called to verify the correct patient, procedure, equipment, support staff and site/side marked as required   Supporting Documentation  Indications: pain   Procedure Details  Location: knee - R knee  Preparation: Patient was prepped and draped in the usual sterile fashion  Needle gauge: 21g.  Approach: anterolateral  Medications administered: methylPREDNISolone acetate 80 MG/ML; 2 mL lidocaine PF 1% 1 %      Large Joint Arthrocentesis: L knee  Date/Time: 9/20/2022 10:01 AM  Consent given by: patient  Site marked: site marked  Timeout: Immediately prior to procedure a time out was called to verify the correct patient, procedure, equipment, support staff and site/side marked as required   Supporting Documentation  Indications: pain   Procedure Details  Location: knee - L knee  Preparation: Patient was prepped and draped in the usual sterile fashion  Needle gauge: 21g.  Approach: anterolateral  Medications administered: methylPREDNISolone acetate 80 MG/ML; 2 mL lidocaine PF 1% 1 %          Kamini Orta tolerated the procedure well. A Bandage was applied to the injection site. At the conclusion of the injection I discussed the importance of continued quad strengthening exercises on a daily basis. I will see the patient back if the symptoms should fail to improve or worsen.    Kelly eMrcado, APRN  9/20/2022      Much of this encounter note is an electronic transcription/translation of spoken language to printed text. The electronic translation  of spoken language may permit erroneous, or at times, nonsensical words or phrases to be inadvertently transcribed; Although I have reviewed the note for such errors, some may still exist

## 2022-09-21 ENCOUNTER — TREATMENT (OUTPATIENT)
Dept: PHYSICAL THERAPY | Facility: CLINIC | Age: 62
End: 2022-09-21

## 2022-09-21 DIAGNOSIS — M43.17 SPONDYLOLISTHESIS AT L5-S1 LEVEL: Primary | ICD-10-CM

## 2022-09-21 DIAGNOSIS — M54.42 CHRONIC LEFT-SIDED LOW BACK PAIN WITH LEFT-SIDED SCIATICA: ICD-10-CM

## 2022-09-21 DIAGNOSIS — G89.29 CHRONIC LEFT-SIDED LOW BACK PAIN WITH LEFT-SIDED SCIATICA: ICD-10-CM

## 2022-09-21 DIAGNOSIS — M25.60 STIFFNESS IN JOINT: ICD-10-CM

## 2022-09-21 PROCEDURE — 97113 AQUATIC THERAPY/EXERCISES: CPT | Performed by: PHYSICAL THERAPIST

## 2022-09-21 NOTE — PROGRESS NOTES
"Physical Therapy Daily Treatment Note      Patient: Kamini Orta   : 1960  Referring practitioner: Julius Barrett MD  Date of Initial Visit: Type: THERAPY  Noted: 2022  Today's Date: 2022  Patient seen for 5 sessions           Subjective  Kamini Orta reports: \"I got injections in both knees yesterday. It had been 7 months and I used to be on a 3 month routine.\" Kamini commented she felt L calf soreness.       Objective   See Exercise, Manual, and Modality Logs for complete treatment.       Assessment/Plan  Continued need for aquatics outlined at last visit, progress note details. Walking still declined due to Kamini's ongoing knee pain. She is hopeful shots will take effect soon.    Visit Diagnoses:    ICD-10-CM ICD-9-CM   1. Spondylolisthesis at L5-S1 level  M43.17 756.12   2. Chronic left-sided low back pain with left-sided sciatica  M54.42 724.2    G89.29 724.3     338.29   3. Stiffness in joint  M25.60 719.50       Progress per Plan of Care and Progress strengthening /stabilization /functional activity           Timed:  Manual Therapy:         mins  08929;  Therapeutic Exercise:         mins  56330;     Neuromuscular Kellie:        mins  85542;    Therapeutic Activity:          mins  44881;     Gait Training:           mins  71850;     Ultrasound:          mins  65145;    Electrical Stimulation:         mins  44942 ( );  Aquatics  _25_   mins   63425    Untimed:  Electrical Stimulation:         mins  97122 ( );  Mechanical Traction:         mins  07459;     Timed Treatment:   25   mins   Total Treatment:     25   mins    Electronically Signed:  Daniella Thapa PTA  Physical Therapist Assistant    [place] PTA license [id number]            "

## 2022-09-28 ENCOUNTER — TREATMENT (OUTPATIENT)
Dept: PHYSICAL THERAPY | Facility: CLINIC | Age: 62
End: 2022-09-28

## 2022-09-28 DIAGNOSIS — M43.17 SPONDYLOLISTHESIS AT L5-S1 LEVEL: Primary | ICD-10-CM

## 2022-09-28 DIAGNOSIS — M25.60 STIFFNESS IN JOINT: ICD-10-CM

## 2022-09-28 DIAGNOSIS — G89.29 CHRONIC LEFT-SIDED LOW BACK PAIN WITH LEFT-SIDED SCIATICA: ICD-10-CM

## 2022-09-28 DIAGNOSIS — M54.42 CHRONIC LEFT-SIDED LOW BACK PAIN WITH LEFT-SIDED SCIATICA: ICD-10-CM

## 2022-09-28 PROCEDURE — 97113 AQUATIC THERAPY/EXERCISES: CPT | Performed by: PHYSICAL THERAPIST

## 2022-09-28 NOTE — PROGRESS NOTES
Physical Therapy Daily Treatment Note    Patient: Kamini Orta   : 1960  Diagnosis/ICD-10 Code:  Spondylolisthesis at L5-S1 level [M43.17]  Referring practitioner: Julius Barrett MD  Date of Initial Visit: Type: THERAPY  Noted: 2022  Today's Date: 2022  Patient seen for 6 sessions           Subjective   The patient reported no new complaints today. She always feels like she moves better after getting in the pool.    Objective   See Exercise, Manual, and Modality Logs for complete treatment.     Assessment/Plan  The patient demonstrated good tolerance to all aquatic therapy interventions today. Continue to progress per patient tolerance.       Timed:  Manual Therapy:    0     mins  22875;  Therapeutic Exercise:    0     mins  54343;     Neuromuscular Kellie:   0    mins  95992;    Therapeutic Activity:     0     mins  31076;     Gait Trainin     mins  11706;     Aquatics                         38      mins  00121    Un-timed:  Mechanical Traction      0     mins  72248  Dry Needling     0     mins self-pay  Electrical Stimulation:    0     mins  28134 ( );      Timed Treatment:   38   mins   Total Treatment:     38   mins    Luther Knutson PT  Physical Therapist    Electronically signed 2022    [place] License: PT - [id number]

## 2022-10-03 ENCOUNTER — TREATMENT (OUTPATIENT)
Dept: PHYSICAL THERAPY | Facility: CLINIC | Age: 62
End: 2022-10-03

## 2022-10-03 DIAGNOSIS — M25.60 STIFFNESS IN JOINT: ICD-10-CM

## 2022-10-03 DIAGNOSIS — G89.29 CHRONIC LEFT-SIDED LOW BACK PAIN WITH LEFT-SIDED SCIATICA: ICD-10-CM

## 2022-10-03 DIAGNOSIS — M54.42 CHRONIC LEFT-SIDED LOW BACK PAIN WITH LEFT-SIDED SCIATICA: ICD-10-CM

## 2022-10-03 DIAGNOSIS — M43.17 SPONDYLOLISTHESIS AT L5-S1 LEVEL: Primary | ICD-10-CM

## 2022-10-03 PROCEDURE — 97113 AQUATIC THERAPY/EXERCISES: CPT | Performed by: PHYSICAL THERAPIST

## 2022-10-03 NOTE — PROGRESS NOTES
"Physical Therapy Daily Treatment Note      Patient: Kamini Orta   : 1960  Referring practitioner: Julius Barrett MD  Date of Initial Visit: Type: THERAPY  Noted: 2022  Today's Date: 10/3/2022  Patient seen for 7 sessions           Subjective  Kamini Orta reports: had to cancel Friday due to the back. \"Feels like a sunburn under the skin. 7/10 right now. That is the arachnoiditis, that is what my nerves do.\"       Objective   See Exercise, Manual, and Modality Logs for complete treatment.       Assessment/Plan  Kamini noted that physically she does get some relief but it gets the nerves flared up. Continued need for aquatics to aid strength gains while controlling pain.    Visit Diagnoses:    ICD-10-CM ICD-9-CM   1. Spondylolisthesis at L5-S1 level  M43.17 756.12   2. Chronic left-sided low back pain with left-sided sciatica  M54.42 724.2    G89.29 724.3     338.29   3. Stiffness in joint  M25.60 719.50       Progress per Plan of Care and Progress strengthening /stabilization /functional activity           Timed:  Manual Therapy:         mins  63988;  Therapeutic Exercise:         mins  08957;     Neuromuscular Kellie:        mins  33433;    Therapeutic Activity:          mins  56697;     Gait Training:           mins  79417;     Ultrasound:          mins  86196;    Electrical Stimulation:         mins  04212 ( );  Aquatics  _29_   mins   77067    Untimed:  Electrical Stimulation:         mins  53187 ( );  Mechanical Traction:         mins  96554;     Timed Treatment:   29   mins   Total Treatment:     29   mins    Electronically Signed:  Daniella Thapa PTA  Physical Therapist Assistant    [place] PTA license [id number]            "

## 2022-10-05 ENCOUNTER — TREATMENT (OUTPATIENT)
Dept: PHYSICAL THERAPY | Facility: CLINIC | Age: 62
End: 2022-10-05

## 2022-10-05 DIAGNOSIS — M43.17 SPONDYLOLISTHESIS AT L5-S1 LEVEL: Primary | ICD-10-CM

## 2022-10-05 DIAGNOSIS — M54.42 CHRONIC LEFT-SIDED LOW BACK PAIN WITH LEFT-SIDED SCIATICA: ICD-10-CM

## 2022-10-05 DIAGNOSIS — G89.29 CHRONIC LEFT-SIDED LOW BACK PAIN WITH LEFT-SIDED SCIATICA: ICD-10-CM

## 2022-10-05 DIAGNOSIS — M25.60 STIFFNESS IN JOINT: ICD-10-CM

## 2022-10-05 PROCEDURE — 97113 AQUATIC THERAPY/EXERCISES: CPT | Performed by: PHYSICAL THERAPIST

## 2022-10-05 NOTE — PROGRESS NOTES
Physical Therapy Daily Treatment Note    Patient: Kamini Orta   : 1960  Diagnosis/ICD-10 Code:  Spondylolisthesis at L5-S1 level [M43.17]  Referring practitioner: Julius Barrett MD  Date of Initial Visit: Type: THERAPY  Noted: 2022  Today's Date: 10/5/2022  Patient seen for 8 sessions           Subjective   The patient reported that her pain was an 8/10 earlier today. She had to take a pain pill before she could come to therapy today. Her pain is down to a 6/10.    Objective   See Exercise, Manual, and Modality Logs for complete treatment.     Assessment/Plan  The patient demonstrated good tolerance to all interventions today. Continue to progress per patient tolerance.       Timed:  Manual Therapy:    0     mins  94475;  Therapeutic Exercise:    0     mins  73923;     Neuromuscular Kellie:   0    mins  78079;    Therapeutic Activity:     0     mins  29093;     Gait Trainin     mins  74736;     Aquatics                         25      mins  56777    Un-timed:  Mechanical Traction      0     mins  79672  Dry Needling     0     mins self-pay  Electrical Stimulation:    0     mins  23694 ( );      Timed Treatment:   25   mins   Total Treatment:     25   mins    Luther Knutson PT  Physical Therapist    Electronically signed 10/5/2022    [place] License: PT - [id number]

## 2022-10-06 DIAGNOSIS — G89.29 CHRONIC BILATERAL LOW BACK PAIN, UNSPECIFIED WHETHER SCIATICA PRESENT: ICD-10-CM

## 2022-10-06 DIAGNOSIS — M54.50 CHRONIC BILATERAL LOW BACK PAIN, UNSPECIFIED WHETHER SCIATICA PRESENT: ICD-10-CM

## 2022-10-06 DIAGNOSIS — G89.29 CHRONIC BILATERAL LOW BACK PAIN WITHOUT SCIATICA: ICD-10-CM

## 2022-10-06 DIAGNOSIS — M54.50 CHRONIC BILATERAL LOW BACK PAIN WITHOUT SCIATICA: ICD-10-CM

## 2022-10-06 RX ORDER — ALPRAZOLAM 0.5 MG/1
TABLET ORAL
Qty: 60 TABLET | Refills: 5 | Status: SHIPPED | OUTPATIENT
Start: 2022-10-06

## 2022-10-06 RX ORDER — HYDROCODONE BITARTRATE AND ACETAMINOPHEN 5; 325 MG/1; MG/1
TABLET ORAL
Qty: 60 TABLET | Refills: 0 | Status: SHIPPED | OUTPATIENT
Start: 2022-10-06 | End: 2022-11-21

## 2022-10-10 ENCOUNTER — TREATMENT (OUTPATIENT)
Dept: PHYSICAL THERAPY | Facility: CLINIC | Age: 62
End: 2022-10-10

## 2022-10-10 DIAGNOSIS — G89.29 CHRONIC LEFT-SIDED LOW BACK PAIN WITH LEFT-SIDED SCIATICA: ICD-10-CM

## 2022-10-10 DIAGNOSIS — M43.17 SPONDYLOLISTHESIS AT L5-S1 LEVEL: Primary | ICD-10-CM

## 2022-10-10 DIAGNOSIS — M54.42 CHRONIC LEFT-SIDED LOW BACK PAIN WITH LEFT-SIDED SCIATICA: ICD-10-CM

## 2022-10-10 DIAGNOSIS — M25.60 STIFFNESS IN JOINT: ICD-10-CM

## 2022-10-10 PROCEDURE — 97113 AQUATIC THERAPY/EXERCISES: CPT | Performed by: PHYSICAL THERAPIST

## 2022-10-10 NOTE — PROGRESS NOTES
"Physical Therapy Daily Treatment Note      Patient: Kamini Orta   : 1960  Referring practitioner: Julius Barrett MD  Date of Initial Visit: Type: THERAPY  Noted: 2022  Today's Date: 10/10/2022  Patient seen for 9 sessions           Subjective  Kamini Orta reports: \"I am having the nerve sensations but it is not pain. At least not yet today.\"       Objective   See Exercise, Manual, and Modality Logs for complete treatment.       Assessment/Plan  Kamini felt good at time of therapy but she noted \"right now.\" Continued pain reduction at B knees allow for gait and squats to be performed. Kamini is to undergo progress note/reassessment soon for further determination for skilled care.    Visit Diagnoses:    ICD-10-CM ICD-9-CM   1. Spondylolisthesis at L5-S1 level  M43.17 756.12   2. Chronic left-sided low back pain with left-sided sciatica  M54.42 724.2    G89.29 724.3     338.29   3. Stiffness in joint  M25.60 719.50       Progress per Plan of Care and Progress strengthening /stabilization /functional activity           Timed:  Manual Therapy:         mins  08487;  Therapeutic Exercise:         mins  37413;     Neuromuscular Kellie:        mins  63625;    Therapeutic Activity:          mins  53713;     Gait Training:           mins  84239;     Ultrasound:          mins  70842;    Electrical Stimulation:         mins  37628 ( );  Aquatics  _25_   mins   49501    Untimed:  Electrical Stimulation:         mins  47023 ( );  Mechanical Traction:         mins  04507;     Timed Treatment:   25   mins   Total Treatment:     25   mins    Electronically Signed:  Daniella Thapa PTA  Physical Therapist Assistant    [place] PTA license [id number]            "

## 2022-10-12 ENCOUNTER — TELEPHONE (OUTPATIENT)
Dept: PHYSICAL THERAPY | Facility: CLINIC | Age: 62
End: 2022-10-12

## 2022-10-17 ENCOUNTER — TREATMENT (OUTPATIENT)
Dept: PHYSICAL THERAPY | Facility: CLINIC | Age: 62
End: 2022-10-17

## 2022-10-17 DIAGNOSIS — M43.17 SPONDYLOLISTHESIS AT L5-S1 LEVEL: Primary | ICD-10-CM

## 2022-10-17 DIAGNOSIS — G89.29 CHRONIC LEFT-SIDED LOW BACK PAIN WITH LEFT-SIDED SCIATICA: ICD-10-CM

## 2022-10-17 DIAGNOSIS — M25.60 STIFFNESS IN JOINT: ICD-10-CM

## 2022-10-17 DIAGNOSIS — M54.42 CHRONIC LEFT-SIDED LOW BACK PAIN WITH LEFT-SIDED SCIATICA: ICD-10-CM

## 2022-10-17 PROCEDURE — 97113 AQUATIC THERAPY/EXERCISES: CPT | Performed by: PHYSICAL THERAPIST

## 2022-10-17 NOTE — PROGRESS NOTES
"Physical Therapy Daily Treatment Note  [facility] [address] [place] [address]    Patient: Kamini Orta   : 1960  Referring practitioner: Julius Barrett MD  Date of Initial Visit: Type: THERAPY  Noted: 2022  Today's Date: 10/17/2022  Patient seen for 10 sessions           Subjective  Kamini Orta reports: her vertigo has affected her again. She is wearing a patch for vertigo.  \"I realized that I did something the other day that I would not have been able to and I know that I am able to walk a little more and move a little more. Drove to [place] and back and didn't have all that pain in my leg yesterday.\"       Objective   See Exercise, Manual, and Modality Logs for complete treatment.       Assessment/Plan  Kamini is able to recognize improved functional ability with less pain. She voices concern about transition to land based therapy, \"due to neurological deficits.\"   Kamini tolerated advanced speed on TM for gait.     Visit Diagnoses:    ICD-10-CM ICD-9-CM   1. Spondylolisthesis at L5-S1 level  M43.17 756.12   2. Chronic left-sided low back pain with left-sided sciatica  M54.42 724.2    G89.29 724.3     338.29   3. Stiffness in joint  M25.60 719.50       Kamini is to undergo progress note next visit to determine further POC.           Timed:  Manual Therapy:         mins  82145;  Therapeutic Exercise:         mins  35837;     Neuromuscular Kellie:        mins  76817;    Therapeutic Activity:          mins  54831;     Gait Training:           mins  45714;     Ultrasound:          mins  12523;    Electrical Stimulation:         mins  67805 ( );  Aquatics  25__   mins   50585    Untimed:  Electrical Stimulation:         mins  81445 ( );  Mechanical Traction:         mins  39832;     Timed Treatment:   25   mins   Total Treatment:     25   mins    Electronically Signed:  Daniella Thapa PTA  Physical Therapist Assistant    [place] PTA license [id number]            "

## 2022-10-19 ENCOUNTER — TREATMENT (OUTPATIENT)
Dept: PHYSICAL THERAPY | Facility: CLINIC | Age: 62
End: 2022-10-19

## 2022-10-19 DIAGNOSIS — M54.42 CHRONIC LEFT-SIDED LOW BACK PAIN WITH LEFT-SIDED SCIATICA: ICD-10-CM

## 2022-10-19 DIAGNOSIS — M43.17 SPONDYLOLISTHESIS AT L5-S1 LEVEL: Primary | ICD-10-CM

## 2022-10-19 DIAGNOSIS — M25.60 STIFFNESS IN JOINT: ICD-10-CM

## 2022-10-19 DIAGNOSIS — G89.29 CHRONIC LEFT-SIDED LOW BACK PAIN WITH LEFT-SIDED SCIATICA: ICD-10-CM

## 2022-10-19 PROCEDURE — 97113 AQUATIC THERAPY/EXERCISES: CPT | Performed by: PHYSICAL THERAPIST

## 2022-10-19 NOTE — PROGRESS NOTES
Progress Note  [facility] [address] [place] [address]      Patient: Kamini Orta   : 1960  Diagnosis/ICD-10 Code:  Spondylolisthesis at L5-S1 level [M43.17]  Referring practitioner: Julius Barrett MD  Date of Initial Visit: Type: THERAPY  Noted: 2022  Today's Date: 10/19/2022  Patient seen for 11 sessions      Subjective:   Subjective Questionnaire: Oswestry: 25/50 = 50% Disability  Clinical Progress: improved  Home Program Compliance: Yes  Treatment has included: Aquatic Therapy    Subjective   The patient reported that her pain level today is a 3/10. Over the past month, she has noticed improvements in her left sided radicular pain. Additionally, she has noticed increased ability to perform ADLs in her home. She feels like therapy is still helping. She would prefer to stay in the pool, but will attempt to transition to land based therapy at this time.    Objective          Tenderness     Additional Tenderness Details  Tenderness to palpation of lumbar paraspinals bilaterally, L piriformis, and L glut med    Neurological Testing     Additional Neurological Details  Sensation to light touch intact and equal bilaterally from L2-S1     Active Range of Motion     Lumbar   Flexion: 70 degrees   Extension: Active lumbar extension: Not tested.   Left lateral flexion: Active left lumbar lateral flexion: knee joint line.   Right lateral flexion: Active right lumbar lateral flexion: knee joint line.   Left rotation: Active left lumbar rotation: 75%   Right rotation: Active right lumbar rotation: 75%   Left Hip   Normal active range of motion    Right Hip   Normal active range of motion    Strength/Myotome Testing     Left Hip   Planes of Motion   Flexion: 4+  Abduction: 4+  Adduction: 4+    Right Hip   Planes of Motion   Flexion: 4+  Abduction: 4+  Adduction: 4+    Left Knee   Flexion: 4+  Extension: 4+    Right Knee   Flexion: 4+  Extension: 4+    Left Ankle/Foot   Dorsiflexion:  4+    Right Ankle/Foot   Dorsiflexion: 4+    Additional Strength Details  All MMT performed in the seated position.  All hip strength testing performed in the seated position on 9/19/22    Ambulation     Ambulation: Level Surfaces     Additional Level Surfaces Ambulation Details  The patient ambulates without an AD with decreased stance time on her right lower extremity, short step length with her right lower extremity and bilateral trendelenburg gait pattern.    Functional Assessment     Comments  5x sit to stand test: 13.9 seconds    2 minute walk test: 420 feet without an AD      See Exercise, Manual, and Modality Logs for complete treatment.     Assessment & Plan     Assessment    Assessment details: The patient was re-evaluated today and presents with improvements in lumbar AROM, hip strength, 5x sit to stand test, and APOLINAR. She has progressed well with aquatic therapy, but continues to present with slight deficits in lower extremity strength and function (APOLINAR). We will transition to land based therapy for the next month to progress functional strengthening.    Goals  Plan Goals: LOW BACK PROBLEMS:    1. The patient complains of low back pain.  LTG 1: 12 weeks:  The patient will report a pain rating of 1/10 or better in order to improve  tolerance to activities of daily living and improve sleep quality.  STATUS:  Progressing  STG 1a: 6 weeks:  The patient will report a pain rating of 2/10 or better.  STATUS:  Progressing  TREATMENT:  Therapeutic exercises, manual therapy, aquatic therapy, home exercise   instruction, and modalities as needed for pain to include:  electrical stimulation, moist heat, ice,   ultrasound, and diathermy.      2. The patient demonstrates weakness of the bilateral hips.  LTG 2: 12 weeks:  The patient will demonstrate 5 /5 strength for bilateral hip flexion, abduction,  and adduction in order to improve hip stability.  STATUS:  Progressing  STG 2a: 6 weeks:  The patient will demonstrate  4+ /5 strength for bilateral hip flexion, abduction,  and adduction.  STATUS:  Met  TREATMENT: Therapeutic exercises, manual therapy, aquatic therapy, home exercise instruction,  and modalities as needed for pain to include:  electrical stimulation, moist heat, ice, ultrasound, and   diathermy.      3. The patient has gait dysfunction.  LTG 3: 12 weeks:  The patient will ambulate without assistive device, independently, for   community distances with normal biomechanics in order to improve mobility and allow   patient to perform activities such as grocery shopping with greater ease.  STATUS:  Progressing  TREATMENT: Gait training, aquatic therapy, therapeutic exercise, and home exercise instruction.    4. The patient has limited lumbar AROM  LTG 4: 12 weeks:  The patient will demonstrate lumbar AROM as follows: 70 of flexion and side bending to knee joint line bilaterally.  STATUS:  Met  TREATMENT: Manual therapy, therapeutic exercise, home exercise instruction, and modalities as needed to include: moist heat, electrical stimulation, and ultrasound.      5. Mobility: Walking/Moving Around Functional Limitation                   LTG 5: 12 weeks:  The patient will demonstrate 11.1 % limitation by achieving a score of 5/45 on the APOLINAR.  STATUS: Progressing  STG 5 a: 6 weeks:  The patient will demonstrate 22.2 % limitation by achieving a score of 10/45 on the APOLINAR.    STATUS: Progressing  TREATMENT:  Manual therapy, therapeutic exercise, home exercise instruction, and modalities as needed to include: moist heat, electrical stimulation, and ultrasound.      Progress toward previous goals: Partially Met      Recommendations: Continue as planned  Timeframe: 1 month  Prognosis to achieve goals: good    PT Signature: Luther Knutson PT    Electronically signed 10/19/2022    [place] License: PT - [id number]       Timed:  Manual Therapy:    0     mins  13370;  Therapeutic Exercise:    0     mins  57647;     Neuromuscular Kellie:    0    mins   33712;    Therapeutic Activity:     0     mins  12890;     Gait Trainin     mins  03428;     Aquatics                         30      mins  37889    Un-timed:  Mechanical Traction      0     mins  21838  Dry Needling     0     mins self-pay  Electrical Stimulation:    0     mins  25183 ( );    Timed Treatment:   30   mins   Total Treatment:     30   mins

## 2022-10-24 ENCOUNTER — TELEPHONE (OUTPATIENT)
Dept: PHYSICAL THERAPY | Facility: CLINIC | Age: 62
End: 2022-10-24

## 2022-11-07 ENCOUNTER — HOSPITAL ENCOUNTER (OUTPATIENT)
Dept: MAMMOGRAPHY | Facility: HOSPITAL | Age: 62
Discharge: HOME | End: 2022-11-07
Admitting: NURSE PRACTITIONER

## 2022-11-07 DIAGNOSIS — Z12.31 ENCOUNTER FOR SCREENING MAMMOGRAM FOR MALIGNANT NEOPLASM OF BREAST: ICD-10-CM

## 2022-11-07 PROCEDURE — 77067 SCR MAMMO BI INCL CAD: CPT

## 2022-11-07 PROCEDURE — 77063 BREAST TOMOSYNTHESIS BI: CPT

## 2022-11-21 ENCOUNTER — OFFICE VISIT (OUTPATIENT)
Dept: INTERNAL MEDICINE | Facility: CLINIC | Age: 62
End: 2022-11-21

## 2022-11-21 VITALS
SYSTOLIC BLOOD PRESSURE: 121 MMHG | HEIGHT: 63 IN | OXYGEN SATURATION: 95 % | DIASTOLIC BLOOD PRESSURE: 81 MMHG | HEART RATE: 71 BPM | BODY MASS INDEX: 35.4 KG/M2 | WEIGHT: 199.8 LBS | TEMPERATURE: 97.8 F

## 2022-11-21 DIAGNOSIS — R42 VERTIGO: ICD-10-CM

## 2022-11-21 DIAGNOSIS — G89.29 CHRONIC BILATERAL LOW BACK PAIN WITHOUT SCIATICA: ICD-10-CM

## 2022-11-21 DIAGNOSIS — M17.0 ARTHRITIS OF BOTH KNEES: ICD-10-CM

## 2022-11-21 DIAGNOSIS — M17.11 PRIMARY OSTEOARTHRITIS OF RIGHT KNEE: ICD-10-CM

## 2022-11-21 DIAGNOSIS — M54.50 CHRONIC BILATERAL LOW BACK PAIN WITHOUT SCIATICA: ICD-10-CM

## 2022-11-21 DIAGNOSIS — M25.561 CHRONIC PAIN OF RIGHT KNEE: Primary | ICD-10-CM

## 2022-11-21 DIAGNOSIS — I10 HYPERTENSION, ESSENTIAL: ICD-10-CM

## 2022-11-21 DIAGNOSIS — M51.36 LUMBAR DEGENERATIVE DISC DISEASE: ICD-10-CM

## 2022-11-21 DIAGNOSIS — G89.29 CHRONIC PAIN OF RIGHT KNEE: Primary | ICD-10-CM

## 2022-11-21 PROCEDURE — 99214 OFFICE O/P EST MOD 30 MIN: CPT | Performed by: INTERNAL MEDICINE

## 2022-11-21 RX ORDER — VALSARTAN AND HYDROCHLOROTHIAZIDE 80; 12.5 MG/1; MG/1
1 TABLET, FILM COATED ORAL EVERY EVENING
Qty: 90 TABLET | Refills: 3 | Status: SHIPPED | OUTPATIENT
Start: 2022-11-21

## 2022-11-21 RX ORDER — LEVOTHYROXINE SODIUM 0.07 MG/1
75 TABLET ORAL DAILY
Qty: 90 TABLET | Refills: 3 | Status: SHIPPED | OUTPATIENT
Start: 2022-11-21

## 2022-11-21 RX ORDER — PANTOPRAZOLE SODIUM 40 MG/1
40 TABLET, DELAYED RELEASE ORAL DAILY
Qty: 90 TABLET | Refills: 3 | Status: SHIPPED | OUTPATIENT
Start: 2022-11-21

## 2022-11-21 RX ORDER — FLUTICASONE PROPIONATE 50 MCG
SPRAY, SUSPENSION (ML) NASAL
Qty: 16 G | Refills: 0 | Status: SHIPPED | OUTPATIENT
Start: 2022-11-21 | End: 2022-11-21 | Stop reason: SDUPTHER

## 2022-11-21 RX ORDER — FLUOXETINE HYDROCHLORIDE 40 MG/1
40 CAPSULE ORAL DAILY
Qty: 90 CAPSULE | Refills: 3 | Status: SHIPPED | OUTPATIENT
Start: 2022-11-21

## 2022-11-21 RX ORDER — FLUTICASONE PROPIONATE 50 MCG
2 SPRAY, SUSPENSION (ML) NASAL DAILY
Qty: 16 G | Refills: 5 | Status: SHIPPED | OUTPATIENT
Start: 2022-11-21

## 2022-11-21 RX ORDER — HYDROCODONE BITARTRATE AND ACETAMINOPHEN 5; 325 MG/1; MG/1
TABLET ORAL
Qty: 60 TABLET | Refills: 0 | Status: SHIPPED | OUTPATIENT
Start: 2022-11-21 | End: 2022-11-21 | Stop reason: SDUPTHER

## 2022-11-21 RX ORDER — HYDROCODONE BITARTRATE AND ACETAMINOPHEN 5; 325 MG/1; MG/1
1 TABLET ORAL 2 TIMES DAILY
Qty: 60 TABLET | Refills: 0 | Status: SHIPPED | OUTPATIENT
Start: 2022-11-21 | End: 2023-03-08

## 2022-11-21 NOTE — PROGRESS NOTES
"Chief Complaint/ HPI: Patient is here to follow-up with pain issues, anxiety stress weight loss, doing well otherwise,  And blood pressure issues, she is losing weight again, she is on a stricter better diet again,        Objective   Vital Signs  Vitals:    11/21/22 1303   BP: 121/81   Pulse: 71   Temp: 97.8 °F (36.6 °C)   SpO2: 95%   Weight: 90.6 kg (199 lb 12.8 oz)   Height: 160 cm (62.99\")      Body mass index is 35.4 kg/m².  Review of Systems   Constitutional: Negative.    HENT: Negative.    Eyes: Negative.    Respiratory: Negative.    Cardiovascular: Negative.    Gastrointestinal: Negative.    Endocrine: Negative.    Genitourinary: Negative.    Musculoskeletal: Negative.    Allergic/Immunologic: Negative.    Neurological: Negative.    Hematological: Negative.    Psychiatric/Behavioral: Negative.       Physical Exam  Constitutional:       General: She is not in acute distress.     Appearance: Normal appearance.   HENT:      Head: Normocephalic.      Mouth/Throat:      Mouth: Mucous membranes are moist.   Eyes:      Conjunctiva/sclera: Conjunctivae normal.      Pupils: Pupils are equal, round, and reactive to light.   Cardiovascular:      Rate and Rhythm: Normal rate and regular rhythm.      Pulses: Normal pulses.      Heart sounds: Normal heart sounds.   Pulmonary:      Effort: Pulmonary effort is normal.      Breath sounds: Normal breath sounds.   Abdominal:      General: Abdomen is flat. Bowel sounds are normal.      Palpations: Abdomen is soft.   Musculoskeletal:         General: No swelling. Normal range of motion.      Cervical back: Neck supple.   Skin:     General: Skin is warm and dry.      Coloration: Skin is not jaundiced.   Neurological:      General: No focal deficit present.      Mental Status: She is alert and oriented to person, place, and time. Mental status is at baseline.   Psychiatric:         Mood and Affect: Mood normal.         Behavior: Behavior normal.         Thought Content: Thought " content normal.         Judgment: Judgment normal.        Result Review :   No results found for: PROBNP, BNP  CMP    CMP 3/2/22 5/13/22 6/24/22   Glucose 90 93 99   BUN 11 11 12   Creatinine 0.89 0.83 0.85   Sodium 141 137 142   Potassium 4.6 3.7 4.0   Chloride 101 100 102   Calcium 9.7 9.4 10.0   Albumin 4.40 4.10 4.60   Total Bilirubin 0.3 0.3 0.2   Alkaline Phosphatase 67 63 71   AST (SGOT) 26 27 26   ALT (SGPT) 19 20 24           CBC w/diff    CBC w/Diff 3/2/22 5/13/22 6/24/22   WBC 8.62 7.53 8.27   RBC 4.43 4.18 4.42   Hemoglobin 13.0 12.4 12.9   Hematocrit 39.4 37.5 38.9   MCV 88.9 89.7 88.0   MCH 29.3 29.7 29.2   MCHC 33.0 33.1 33.2   RDW 13.6 13.8 13.2   Platelets 301 285 305   Neutrophil Rel % 54.1 52.4 60.0   Immature Granulocyte Rel % 0.3 0.3 0.5   Lymphocyte Rel % 34.6 34.8 30.5   Monocyte Rel % 8.2 9.2 6.8   Eosinophil Rel % 2.2 2.5 1.8   Basophil Rel % 0.6 0.8 0.4            Lipid Panel    Lipid Panel 1/4/22   Total Cholesterol 139   Triglycerides 219 (A)   HDL Cholesterol 39 (A)   VLDL Cholesterol 36   LDL Cholesterol  64   LDL/HDL Ratio 1.44   (A) Abnormal value             Lab Results   Component Value Date    TSH 1.170 01/04/2022    TSH 1.640 06/21/2021    TSH 2.600 06/15/2020      Lab Results   Component Value Date    FREET4 1.47 01/04/2022    FREET4 1.34 06/21/2021    FREET4 1.3 10/15/2019                          Visit Diagnoses:    ICD-10-CM ICD-9-CM   1. Chronic pain of right knee  M25.561 719.46    G89.29 338.29   2. Chronic bilateral low back pain without sciatica  M54.50 724.2    G89.29 338.29   3. Hypertension, essential  I10 401.9   4. Lumbar degenerative disc disease  M51.36 722.52   5. Arthritis of both knees  M17.0 716.96   6. Primary osteoarthritis of right knee  M17.11 715.16   7. Vertigo  R42 780.4       Assessment and Plan   Diagnoses and all orders for this visit:    1. Chronic pain of right knee (Primary)  -     Ambulatory Referral to Physical Therapy Evaluate and treat,  Vestibular  -     Lipid Panel; Future  -     Hemoglobin A1c; Future  -     Comprehensive Metabolic Panel; Future  -     CBC & Differential; Future  -     TSH+Free T4; Future    2. Chronic bilateral low back pain without sciatica  -     HYDROcodone-acetaminophen (NORCO) 5-325 MG per tablet; Take 1 tablet by mouth 2 (Two) Times a Day.  Dispense: 60 tablet; Refill: 0  -     Ambulatory Referral to Physical Therapy Evaluate and treat, Vestibular  -     Lipid Panel; Future  -     Hemoglobin A1c; Future  -     Comprehensive Metabolic Panel; Future  -     CBC & Differential; Future  -     TSH+Free T4; Future    3. Hypertension, essential  -     Ambulatory Referral to Physical Therapy Evaluate and treat, Vestibular  -     Lipid Panel; Future  -     Hemoglobin A1c; Future  -     Comprehensive Metabolic Panel; Future  -     CBC & Differential; Future  -     TSH+Free T4; Future    4. Lumbar degenerative disc disease  -     Ambulatory Referral to Physical Therapy Evaluate and treat, Vestibular  -     Lipid Panel; Future  -     Hemoglobin A1c; Future  -     Comprehensive Metabolic Panel; Future  -     CBC & Differential; Future  -     TSH+Free T4; Future    5. Arthritis of both knees  -     Ambulatory Referral to Physical Therapy Evaluate and treat, Vestibular  -     Lipid Panel; Future  -     Hemoglobin A1c; Future  -     Comprehensive Metabolic Panel; Future  -     CBC & Differential; Future  -     TSH+Free T4; Future    6. Primary osteoarthritis of right knee  -     Ambulatory Referral to Physical Therapy Evaluate and treat, Vestibular  -     Lipid Panel; Future  -     Hemoglobin A1c; Future  -     Comprehensive Metabolic Panel; Future  -     CBC & Differential; Future  -     TSH+Free T4; Future    7. Vertigo  -     Ambulatory Referral to Physical Therapy Evaluate and treat, Vestibular  -     Lipid Panel; Future  -     Hemoglobin A1c; Future  -     Comprehensive Metabolic Panel; Future  -     CBC & Differential; Future  -      TSH+Free T4; Future    Other orders  -     FLUoxetine (PROzac) 40 MG capsule; Take 1 capsule by mouth Daily.  Dispense: 90 capsule; Refill: 3  -     fluticasone (FLONASE) 50 MCG/ACT nasal spray; 2 sprays by Each Nare route Daily.  Dispense: 16 g; Refill: 5  -     diclofenac sodium (VOTAREN XR) 100 MG 24 hr tablet; Take 1 tablet by mouth Daily.  Dispense: 90 tablet; Refill: 3  -     levothyroxine (SYNTHROID, LEVOTHROID) 75 MCG tablet; Take 1 tablet by mouth Daily.  Dispense: 90 tablet; Refill: 3  -     pantoprazole (PROTONIX) 40 MG EC tablet; Take 1 tablet by mouth Daily.  Dispense: 90 tablet; Refill: 3  -     valsartan-hydrochlorothiazide (DIOVAN-HCT) 80-12.5 MG per tablet; Take 1 tablet by mouth Every Evening.  Dispense: 90 tablet; Refill: 3    covid infection --will rx with paxlovid , June 2022    Back pain, sciatica, r leg, --MRI November 2021 showed a left L5-S1 disc paracentral extrusion with multiple levels of foraminal stenosis on both sides throughout the spine, with degenerative disc disease noted, patient did go see Ortho spine surgeon in [place] was possibly going to have surgery but then they called and said they would not do it due to her increased risk and back issues and stability, discussed May 2022--- we will make referral to [facility] [place] for second opinion given the severity of the discs and the continued back pain that she is having, May 2022, patient continues on diclofenac  mg daily as above,    abdominal pain possible recurrent diverticulitis January 31, 2022--, called in Cipro and Flagyl has finished that course of antibiotics now with continued upset stomach nausea, abdominal pains loose stools diarrhea mucus, concerned about this and addition of probiotics recently,--- March 2, 2022--- patient had a colonoscopy August 2022, CT scan abdomen and pelvis June 24, 2022,--diverticulosis no evidence of diverticulitis or serious findings,---- recommend continue Protonix in the  morning and Pepcid at bedtime---     vertigo --recurrent kishore 10/ 2022 --- with nausea vomiting, headaches, work-up in the emergency room included MRI of the brain CT of the brain did not show any acute abnormality, chest x-ray no active disease--- she will continue meclizine 3-4 times per day and add scopolamine patch December 6, 2021, and again January 10, 2022,---, referral made for Epley maneuver with PTA, November 2022    hypertension  continues ---valsartan HCT 80/12.5 mg daily,,     PULM NODULE ON CXR SEPT 2017, CT SCAN OCT 2017, NO CHANGE IN CT APRIL 2018, (DONE ADIA TRAIL DIAG, --CT CHEST OCT 2019 , NO CHANGE IN PULM NODULE OVER 2 YRS , SO NO MORE F/U     LIFE STRESSORS DISCUSSED,  ANNA MARIE AND URINE DRUG SCREEN REIVIEWED nov 2022    Hypothyroidism, cont levothyroxine 0.075 mg qd     HEART MURUMUR, ECHO 11/2018, ESSENTIALLY NL     obesity --    LLQ Abdominal Pain/Diverticulitis--6/18/16.-- colonoscopy Dr. Alarcon--  NOV, 2019, THI ,, August 2022,, internal hemorrhoids only, otherwise normal exam,    L BREAST MASS--f/u U/S WAS NEG,. 8/2015, , Yearly mammograms    Patient quit smoking 10 years ago    CHRONIC PAIN ISSUES DISCUSSED --WITH PAIN IN NECK , BACK PAIN- ON PRN NORCO-- WE DISCUSSED RISK OF DRIVING AND ADDICTION WITH MED--OCT 2020 patient continues on diclofenac 100 mg XR daily,    DEPRESSION,--continues Xanax 0.5 twice a day, Prozac 40 mg daily,    ELEVATED CHOL, TRIG,-continues Crestor,      Follow Up   Return in about 6 months (around 5/21/2023).  Patient was given instructions and counseling regarding her condition or for health maintenance advice. Please see specific information pulled into the AVS if appropriate.         Answers for HPI/ROS submitted by the patient on 11/21/2022  Please describe your symptoms.: Still having some issues with Vertigo  Have you had these symptoms before?: Yes  How long have you been having these symptoms?: Greater than 2 weeks  Please list any medications you are  currently taking for this condition.: Meclizine and Transderm patches  Please describe any probable cause for these symptoms. : My head is stopped up  What is the primary reason for your visit?: Other

## 2023-01-24 ENCOUNTER — OFFICE VISIT (OUTPATIENT)
Dept: INTERNAL MEDICINE | Facility: CLINIC | Age: 63
End: 2023-01-24
Payer: COMMERCIAL

## 2023-01-24 ENCOUNTER — HOSPITAL ENCOUNTER (OUTPATIENT)
Dept: GENERAL RADIOLOGY | Facility: HOSPITAL | Age: 63
Discharge: HOME | End: 2023-01-24
Payer: COMMERCIAL

## 2023-01-24 VITALS
HEIGHT: 63 IN | DIASTOLIC BLOOD PRESSURE: 78 MMHG | TEMPERATURE: 97.3 F | SYSTOLIC BLOOD PRESSURE: 122 MMHG | WEIGHT: 199 LBS | OXYGEN SATURATION: 97 % | RESPIRATION RATE: 18 BRPM | BODY MASS INDEX: 35.26 KG/M2 | HEART RATE: 64 BPM

## 2023-01-24 DIAGNOSIS — M25.562 ACUTE PAIN OF LEFT KNEE: Primary | ICD-10-CM

## 2023-01-24 DIAGNOSIS — M54.50 CHRONIC BILATERAL LOW BACK PAIN WITHOUT SCIATICA: ICD-10-CM

## 2023-01-24 DIAGNOSIS — M25.562 ACUTE PAIN OF LEFT KNEE: ICD-10-CM

## 2023-01-24 DIAGNOSIS — G89.29 CHRONIC BILATERAL LOW BACK PAIN WITHOUT SCIATICA: ICD-10-CM

## 2023-01-24 PROCEDURE — 99213 OFFICE O/P EST LOW 20 MIN: CPT

## 2023-01-24 PROCEDURE — 73562 X-RAY EXAM OF KNEE 3: CPT

## 2023-01-24 RX ORDER — BACLOFEN 10 MG/1
10 TABLET ORAL 3 TIMES DAILY PRN
Qty: 30 TABLET | Refills: 1 | Status: SHIPPED | OUTPATIENT
Start: 2023-01-24

## 2023-01-24 NOTE — PROGRESS NOTES
Chief Complaint  Knee Pain (Pt states that last night she had sat down in a chair there was no pop no noise but there was a shooting pain that went from her left knee up to her thigh area. )    History of Present Illness  SUBJECTIVE  Kamini Orta presents to [facility]  With complaints of left knee pain. Pt states that it started yesterday. Knee pain is anteriorly and radiates up. No known injury. Pt has a hx of chronic knee pain, but it is usually in the right knee. Pt did apply ice and compression. Pt is taking norco PRN. Denies any pain posteriorly.       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   • Thyroid disease    • Vertigo       Family History   Problem Relation Age of Onset   • Cancer Father         Mesothelioma   • Colon polyps Father    • Scoliosis Sister    • Cancer Brother         Prostate   • Cancer Brother         Colon & Liver   • Colon cancer Brother 64   • Heart disease Other    • Lung disease Other    • Malig Hyperthermia Neg Hx       Past Surgical History:   Procedure Laterality Date   •  SECTION      ALSO    • CHOLECYSTECTOMY     • COLONOSCOPY     • COLONOSCOPY N/A 2022    Procedure: COLONOSCOPY;  Surgeon: Peter Tejada MD;  Location: [facility] ENDOSCOPY;  Service: Gastroenterology;  Laterality: N/A;  HEMMORHOIDS   • ESOPHAGEAL DILATATION     • HYSTERECTOMY      menorrhagia   • KNEE ARTHROSCOPY Right  "2012    \"CLEAN UP\"   • KNEE ARTHROSCOPY W/ MENISCAL REPAIR Right 2004   • TRIGGER POINT INJECTION  CRESENCIO's for back from 1992    WILL NOT AGAIN   • UPPER GASTROINTESTINAL ENDOSCOPY          Current Outpatient Medications:   •  ALPRAZolam (XANAX) 0.5 MG tablet, TAKE ONE TABLET BY MOUTH TWICE DAILY, Disp: 60 tablet, Rfl: 5  •  baclofen (LIORESAL) 10 MG tablet, Take 1 tablet by mouth 3 (Three) Times a Day As Needed for Muscle Spasms., Disp: 30 tablet, Rfl: 1  •  diclofenac (VOLTAREN) 50 MG EC tablet, Take 100 mg by mouth Daily., Disp: , Rfl:   •  diclofenac sodium (VOTAREN XR) 100 MG 24 hr tablet, Take 1 tablet by mouth Daily., Disp: 90 tablet, Rfl: 3  •  dicyclomine (BENTYL) 20 MG tablet, Take 1 tablet by mouth Every 8 (Eight) Hours As Needed (abdominal pain)., Disp: 30 tablet, Rfl: 0  •  estradiol (Vivelle-Dot) 0.05 MG/24HR patch, Place 1 patch on the skin as directed by provider 2 (Two) Times a Week., Disp: 24 patch, Rfl: 3  •  FLUoxetine (PROzac) 40 MG capsule, Take 1 capsule by mouth Daily., Disp: 90 capsule, Rfl: 3  •  fluticasone (FLONASE) 50 MCG/ACT nasal spray, 2 sprays by Each Nare route Daily., Disp: 16 g, Rfl: 5  •  HYDROcodone-acetaminophen (NORCO) 5-325 MG per tablet, Take 1 tablet by mouth 2 (Two) Times a Day., Disp: 60 tablet, Rfl: 0  •  levothyroxine (SYNTHROID, LEVOTHROID) 75 MCG tablet, Take 1 tablet by mouth Daily., Disp: 90 tablet, Rfl: 3  •  meclizine (ANTIVERT) 25 MG tablet, TAKE ONE TABLET BY MOUTH THREE TIMES DAILY AS NEEDED FOR dizziness, Disp: , Rfl: 0  •  pantoprazole (PROTONIX) 40 MG EC tablet, Take 1 tablet by mouth Daily., Disp: 90 tablet, Rfl: 3  •  rosuvastatin (CRESTOR) 10 MG tablet, Take 10 mg by mouth Daily., Disp: , Rfl:   •  Scopolamine 1 MG/3DAYS patch, Place 1 patch on the skin as directed by provider Every 72 (Seventy-Two) Hours., Disp: 4 patch, Rfl: 5  •  valsartan-hydrochlorothiazide (DIOVAN-HCT) 80-12.5 MG per tablet, Take 1 tablet by mouth Every Evening., Disp: 90 tablet, Rfl: " "3    OBJECTIVE  Vital Signs:   /78 (BP Location: Left arm)   Pulse 64   Temp 97.3 °F (36.3 °C) (Temporal)   Resp 18   Ht 160 cm (62.99\")   Wt 90.3 kg (199 lb)   SpO2 97%   BMI 35.26 kg/m²    Estimated body mass index is 35.26 kg/m² as calculated from the following:    Height as of this encounter: 160 cm (62.99\").    Weight as of this encounter: 90.3 kg (199 lb).     Wt Readings from Last 3 Encounters:   01/24/23 90.3 kg (199 lb)   11/21/22 90.6 kg (199 lb 12.8 oz)   09/20/22 94.2 kg (207 lb 11.2 oz)     BP Readings from Last 3 Encounters:   01/24/23 122/78   11/21/22 121/81   08/17/22 110/72       Physical Exam  Vitals and nursing note reviewed.   Constitutional:       Appearance: Normal appearance.   HENT:      Head: Normocephalic.   Eyes:      Extraocular Movements: Extraocular movements intact.      Conjunctiva/sclera: Conjunctivae normal.   Cardiovascular:      Rate and Rhythm: Normal rate and regular rhythm.      Heart sounds: Normal heart sounds. No murmur heard.  Pulmonary:      Effort: Pulmonary effort is normal.      Breath sounds: Normal breath sounds. No wheezing or rales.   Abdominal:      General: Bowel sounds are normal.      Palpations: Abdomen is soft.      Tenderness: There is no abdominal tenderness. There is no guarding.   Musculoskeletal:         General: No swelling.      Right knee: Swelling and bony tenderness present. Decreased range of motion.   Skin:     General: Skin is warm and dry.   Neurological:      General: No focal deficit present.      Mental Status: She is alert. Mental status is at baseline.   Psychiatric:         Mood and Affect: Mood normal.         Thought Content: Thought content normal.          Result Review        Mammo Screening Digital Tomosynthesis Bilateral With CAD    Result Date: 11/8/2022   Benign mammogram. Suggest routine mammographic screening.  RECOMMENDATION(S):  ROUTINE MAMMOGRAM AND CLINICAL EVALUATION IN 12 MONTHS.   BIRADS:  DIAGNOSTIC CATEGORY " 2--BENIGN FINDING   BREAST COMPOSITION: Scattered areas fibroglandular density.  PLEASE NOTE:  A NORMAL MAMMOGRAM DOES NOT EXCLUDE THE POSSIBILITY OF BREAST CANCER. ANY CLINICALLY SUSPICIOUS PALPABLE LUMP SHOULD BE BIOPSIED.      ELLA RAMOS MD       Electronically Signed and Approved By: ELLA RAMOS MD on 11/08/2022 at 7:49                The above data has been reviewed by CAROLINE Gauthier 01/24/2023 15:34 EST.          Patient Care Team:  Davin Hall MD as PCP - General (Internal Medicine)      ASSESSMENT & PLAN    Diagnoses and all orders for this visit:    1. Acute pain of left knee (Primary)  Assessment & Plan:  Acute on chronic left knee pain.  Patient states that started yesterday.  Patient denies any known injury.  Patient states she was sitting down and her knee just started hurting.  She describes it as a shooting pain.  She states it hurts with her left knee anteriorly and radiates up a few inches.  Patient states pain is worse with bearing weight.  Patient states pain is also with palpation.  She is taking Norco as needed.  Plan: We will get a left knee x-ray.  Also encourage patient to ice and keep compression.  Referring to physical therapy for back and bilateral knee pain.  Patient may continue her diclofenac as well.    Orders:  -     XR Knee 3 View Left; Future  -     Ambulatory Referral to Physical Therapy Evaluate and treat    2. Chronic bilateral low back pain without sciatica  -     Ambulatory Referral to Physical Therapy Evaluate and treat    Other orders  -     baclofen (LIORESAL) 10 MG tablet; Take 1 tablet by mouth 3 (Three) Times a Day As Needed for Muscle Spasms.  Dispense: 30 tablet; Refill: 1       Tobacco Use: Medium Risk   • Smoking Tobacco Use: Former   • Smokeless Tobacco Use: Former   • Passive Exposure: Not on file       Follow Up     Return if symptoms worsen or fail to improve.    Please note that portions of this note were completed with a voice  recognition program.    Patient was given instructions and counseling regarding her condition or for health maintenance advice. Please see specific information pulled into the AVS if appropriate.   I have reviewed information obtained and documented by others and I have confirmed the accuracy of this documented note.    CAROLINE Gauthier

## 2023-01-24 NOTE — ASSESSMENT & PLAN NOTE
Acute on chronic left knee pain.  Patient states that started yesterday.  Patient denies any known injury.  Patient states she was sitting down and her knee just started hurting.  She describes it as a shooting pain.  She states it hurts with her left knee anteriorly and radiates up a few inches.  Patient states pain is worse with bearing weight.  Patient states pain is also with palpation.  She is taking Norco as needed.  Plan: We will get a left knee x-ray.  Also encourage patient to ice and keep compression.  Referring to physical therapy for back and bilateral knee pain.  Patient may continue her diclofenac as well.

## 2023-01-25 ENCOUNTER — TELEPHONE (OUTPATIENT)
Dept: INTERNAL MEDICINE | Facility: CLINIC | Age: 63
End: 2023-01-25
Payer: COMMERCIAL

## 2023-01-25 NOTE — TELEPHONE ENCOUNTER
Caller: Kamini Orta    Relationship to patient: Self    Best call back number: [phone]    Patient is needing: PATIENT CALLED IN AND IS REQUESTING A CALL BACK REGARDING HER X-RAY FROM YESTERDAY PLEASE

## 2023-01-25 NOTE — TELEPHONE ENCOUNTER
Spoke with patient and rely message, and she wants to know if she needs the MRI or if she needs to see ortho first, please advise.

## 2023-02-13 ENCOUNTER — TELEPHONE (OUTPATIENT)
Dept: ORTHOPEDIC SURGERY | Facility: CLINIC | Age: 63
End: 2023-02-13

## 2023-02-13 NOTE — TELEPHONE ENCOUNTER
Caller: LATASHA KURTZ    Relationship to patient: SELF    Best call back number: [phone]    Chief complaint: BILATERAL KNEE PAIN    Type of visit: CORTISONE INJECTION    Requested date: ASAP     If rescheduling, when is the original appointment: N/A     Additional notes: LAST INJECTION 09.20.22

## 2023-02-21 ENCOUNTER — CLINICAL SUPPORT (OUTPATIENT)
Dept: ORTHOPEDIC SURGERY | Facility: CLINIC | Age: 63
End: 2023-02-21
Payer: COMMERCIAL

## 2023-02-21 VITALS — BODY MASS INDEX: 37.03 KG/M2 | HEIGHT: 63 IN | TEMPERATURE: 96.5 F | WEIGHT: 209 LBS

## 2023-02-21 DIAGNOSIS — M17.0 ARTHRITIS OF BOTH KNEES: Primary | ICD-10-CM

## 2023-02-21 PROCEDURE — 20610 DRAIN/INJ JOINT/BURSA W/O US: CPT | Performed by: NURSE PRACTITIONER

## 2023-02-21 RX ORDER — METHYLPREDNISOLONE ACETATE 80 MG/ML
80 INJECTION, SUSPENSION INTRA-ARTICULAR; INTRALESIONAL; INTRAMUSCULAR; SOFT TISSUE
Status: COMPLETED | OUTPATIENT
Start: 2023-02-21 | End: 2023-02-21

## 2023-02-21 RX ORDER — LIDOCAINE HYDROCHLORIDE 10 MG/ML
1 INJECTION, SOLUTION EPIDURAL; INFILTRATION; INTRACAUDAL; PERINEURAL
Status: COMPLETED | OUTPATIENT
Start: 2023-02-21 | End: 2023-02-21

## 2023-02-21 RX ADMIN — LIDOCAINE HYDROCHLORIDE 1 ML: 10 INJECTION, SOLUTION EPIDURAL; INFILTRATION; INTRACAUDAL; PERINEURAL at 10:09

## 2023-02-21 RX ADMIN — METHYLPREDNISOLONE ACETATE 80 MG: 80 INJECTION, SUSPENSION INTRA-ARTICULAR; INTRALESIONAL; INTRAMUSCULAR; SOFT TISSUE at 10:08

## 2023-02-21 RX ADMIN — LIDOCAINE HYDROCHLORIDE 1 ML: 10 INJECTION, SOLUTION EPIDURAL; INFILTRATION; INTRACAUDAL; PERINEURAL at 10:08

## 2023-02-21 RX ADMIN — METHYLPREDNISOLONE ACETATE 80 MG: 80 INJECTION, SUSPENSION INTRA-ARTICULAR; INTRALESIONAL; INTRAMUSCULAR; SOFT TISSUE at 10:09

## 2023-02-21 NOTE — PROGRESS NOTES
Kamini Orta is here today for worsening Bilateral knee pain. Knee injection was discussed with the patient in detail, including indication, risks, benefits, and alternatives. Verbal consent was given for the procedure. Injection site was aseptically prepared.      KNEE Injection Procedure Note:    Large Joint Arthrocentesis: R knee  Date/Time: 2/21/2023 10:08 AM  Consent given by: patient  Site marked: site marked  Timeout: Immediately prior to procedure a time out was called to verify the correct patient, procedure, equipment, support staff and site/side marked as required   Supporting Documentation  Indications: pain, joint swelling and diagnostic evaluation   Procedure Details  Location: knee - R knee  Preparation: Patient was prepped and draped in the usual sterile fashion  Needle gauge: 21G.  Medications administered: 80 mg methylPREDNISolone acetate 80 MG/ML; 1 mL lidocaine PF 1% 1 %  Patient tolerance: patient tolerated the procedure well with no immediate complications    Large Joint Arthrocentesis: L knee  Date/Time: 2/21/2023 10:09 AM  Consent given by: patient  Site marked: site marked  Timeout: Immediately prior to procedure a time out was called to verify the correct patient, procedure, equipment, support staff and site/side marked as required   Supporting Documentation  Indications: pain   Procedure Details  Location: knee - L knee  Preparation: Patient was prepped and draped in the usual sterile fashion  Needle gauge: 21G.  Medications administered: 80 mg methylPREDNISolone acetate 80 MG/ML; 1 mL lidocaine PF 1% 1 %  Patient tolerance: patient tolerated the procedure well with no immediate complications      Kamini Orta tolerated the procedure well. A Bandage was applied to the injection site. At the conclusion of the injection I discussed the importance of continued quad strengthening exercises on a daily basis. I will see the patient back if the symptoms should fail to improve or  worsen.    -Patient thinks it is time to start the replacement on the right knee.  She understands that has released 3 months from the time of her last injection.  I discussed with her we can set her up with an appointment in about 6 weeks to see Dr. Moore and start the process of getting the right knee surgery scheduled, she verbalized understanding is in agreement with this plan.    Kelly Mercado, APRN  2/21/2023    Dictated Utilizing Dragon Dictation

## 2023-03-07 ENCOUNTER — TELEPHONE (OUTPATIENT)
Dept: INTERNAL MEDICINE | Facility: CLINIC | Age: 63
End: 2023-03-07
Payer: COMMERCIAL

## 2023-03-07 DIAGNOSIS — G89.29 CHRONIC BILATERAL LOW BACK PAIN WITHOUT SCIATICA: ICD-10-CM

## 2023-03-07 DIAGNOSIS — M54.50 CHRONIC BILATERAL LOW BACK PAIN WITHOUT SCIATICA: ICD-10-CM

## 2023-03-07 RX ORDER — FLUCONAZOLE 100 MG/1
100 TABLET ORAL DAILY
Qty: 3 TABLET | Refills: 2 | Status: SHIPPED | OUTPATIENT
Start: 2023-03-07

## 2023-03-07 NOTE — TELEPHONE ENCOUNTER
----- Message from Aleida Schwarz MA sent at 3/7/2023 10:55 AM EST -----  Regarding: FW: Diflucan  Contact: [phone]  Please advise   ----- Message -----  From: Kamini Orta  Sent: 3/7/2023  10:51 AM EST  To: [facility]  Subject: Diflucan                                         Dr. Siddiqi,   I recently had some extensive dental procedures and had to take an antibiotic. I took a diflucan that I had left over here, but please, I need more.  Thank you so much,  Kamini BOWMAN

## 2023-03-08 RX ORDER — HYDROCODONE BITARTRATE AND ACETAMINOPHEN 5; 325 MG/1; MG/1
TABLET ORAL
Qty: 60 TABLET | Refills: 0 | Status: SHIPPED | OUTPATIENT
Start: 2023-03-08

## 2023-04-13 DIAGNOSIS — G89.29 CHRONIC BILATERAL LOW BACK PAIN, UNSPECIFIED WHETHER SCIATICA PRESENT: ICD-10-CM

## 2023-04-13 DIAGNOSIS — M54.50 CHRONIC BILATERAL LOW BACK PAIN WITHOUT SCIATICA: ICD-10-CM

## 2023-04-13 DIAGNOSIS — M54.50 CHRONIC BILATERAL LOW BACK PAIN, UNSPECIFIED WHETHER SCIATICA PRESENT: ICD-10-CM

## 2023-04-13 DIAGNOSIS — G89.29 CHRONIC BILATERAL LOW BACK PAIN WITHOUT SCIATICA: ICD-10-CM

## 2023-04-13 RX ORDER — ALPRAZOLAM 0.5 MG/1
0.5 TABLET ORAL 2 TIMES DAILY
Qty: 60 TABLET | Refills: 0 | Status: SHIPPED | OUTPATIENT
Start: 2023-04-13

## 2023-04-13 RX ORDER — HYDROCODONE BITARTRATE AND ACETAMINOPHEN 5; 325 MG/1; MG/1
1 TABLET ORAL 2 TIMES DAILY
Qty: 60 TABLET | Refills: 0 | Status: SHIPPED | OUTPATIENT
Start: 2023-04-13

## 2023-05-03 ENCOUNTER — TELEPHONE (OUTPATIENT)
Dept: ORTHOPEDIC SURGERY | Facility: CLINIC | Age: 63
End: 2023-05-03

## 2023-05-03 NOTE — TELEPHONE ENCOUNTER
Caller: ZOE VELAZQUEZ     Relationship to patient: PATIENT     Best call back number: [phone]    Chief complaint: CLAUDE KNEE    Type of visit: SANCHEZ INJECTIONS    Requested date: IN THE NEXT COUPLE WEEKS     If rescheduling, when is the original appointment: N/A     Additional notes: N/A

## 2023-05-09 ENCOUNTER — TELEPHONE (OUTPATIENT)
Dept: INTERNAL MEDICINE | Facility: CLINIC | Age: 63
End: 2023-05-09
Payer: COMMERCIAL

## 2023-05-09 ENCOUNTER — PATIENT MESSAGE (OUTPATIENT)
Dept: INTERNAL MEDICINE | Facility: CLINIC | Age: 63
End: 2023-05-09
Payer: COMMERCIAL

## 2023-05-09 DIAGNOSIS — R42 VERTIGO: Primary | ICD-10-CM

## 2023-05-09 NOTE — TELEPHONE ENCOUNTER
----- Message from Sharlene Schwarz MA sent at 5/9/2023  1:03 PM EDT -----  Regarding: FW: Vertigo  Contact: [phone]    ----- Message -----  From: Kamini Orta  Sent: 5/9/2023  12:34 PM EDT  To: [facility]  Subject: Vertigo                                          Dr. Siddiqi,  You gave me a referral to [facility] in Nov. 22 for my vertigo. I was never able to go, but my vertigo has kicked back in over the last weekend. I have 1 patch left, and one presently behind my ear. Pretty sure I'm going to need another referral, and I understand there is someone at [facility] [email or web address] that is certified to work on patients with Vertigo.  Any help would be greatly appreciated!  Thank you,  Kamini Orta

## 2023-05-09 NOTE — TELEPHONE ENCOUNTER
Call patient back---- tell her we can put a referral in for [facility] for vertigo and maneuvers to help,, and go ahead and put the referral in for [facility] for vertigo diagnosis and treatment options    Call her in scopolamine patches 1.5 mg every 3 days behind the ear as directed, #4 patches with 2 refills

## 2023-05-10 RX ORDER — SCOLOPAMINE TRANSDERMAL SYSTEM 1 MG/1
1 PATCH, EXTENDED RELEASE TRANSDERMAL
Qty: 4 PATCH | Refills: 2 | Status: SHIPPED | OUTPATIENT
Start: 2023-05-10

## 2023-05-15 DIAGNOSIS — M54.50 CHRONIC BILATERAL LOW BACK PAIN, UNSPECIFIED WHETHER SCIATICA PRESENT: ICD-10-CM

## 2023-05-15 DIAGNOSIS — G89.29 CHRONIC BILATERAL LOW BACK PAIN, UNSPECIFIED WHETHER SCIATICA PRESENT: ICD-10-CM

## 2023-05-15 RX ORDER — BACLOFEN 10 MG/1
TABLET ORAL
Qty: 30 TABLET | Refills: 1 | Status: SHIPPED | OUTPATIENT
Start: 2023-05-15

## 2023-05-15 RX ORDER — ALPRAZOLAM 0.5 MG/1
TABLET ORAL
Qty: 60 TABLET | Refills: 5 | Status: SHIPPED | OUTPATIENT
Start: 2023-05-15

## 2023-05-23 ENCOUNTER — APPOINTMENT (OUTPATIENT)
Dept: CT IMAGING | Facility: HOSPITAL | Age: 63
End: 2023-05-23
Payer: COMMERCIAL

## 2023-05-23 ENCOUNTER — APPOINTMENT (OUTPATIENT)
Dept: GENERAL RADIOLOGY | Facility: HOSPITAL | Age: 63
End: 2023-05-23
Payer: COMMERCIAL

## 2023-05-23 ENCOUNTER — HOSPITAL ENCOUNTER (EMERGENCY)
Facility: HOSPITAL | Age: 63
Discharge: HOME | End: 2023-05-23
Attending: EMERGENCY MEDICINE | Admitting: EMERGENCY MEDICINE
Payer: COMMERCIAL

## 2023-05-23 VITALS
HEART RATE: 59 BPM | WEIGHT: 203.93 LBS | HEIGHT: 63 IN | RESPIRATION RATE: 18 BRPM | TEMPERATURE: 99 F | SYSTOLIC BLOOD PRESSURE: 155 MMHG | DIASTOLIC BLOOD PRESSURE: 76 MMHG | BODY MASS INDEX: 36.13 KG/M2 | OXYGEN SATURATION: 98 %

## 2023-05-23 DIAGNOSIS — R51.9 ACUTE NONINTRACTABLE HEADACHE, UNSPECIFIED HEADACHE TYPE: Primary | ICD-10-CM

## 2023-05-23 DIAGNOSIS — R42 CHRONIC VERTIGO: ICD-10-CM

## 2023-05-23 LAB
ALBUMIN SERPL-MCNC: 4 G/DL (ref 3.5–5.2)
ALBUMIN/GLOB SERPL: 1.4 G/DL
ALP SERPL-CCNC: 71 U/L (ref 39–117)
ALT SERPL W P-5'-P-CCNC: 15 U/L (ref 1–33)
ANION GAP SERPL CALCULATED.3IONS-SCNC: 9.6 MMOL/L (ref 5–15)
AST SERPL-CCNC: 21 U/L (ref 1–32)
BACTERIA UR QL AUTO: ABNORMAL /HPF
BASOPHILS # BLD AUTO: 0.05 10*3/MM3 (ref 0–0.2)
BASOPHILS NFR BLD AUTO: 0.6 % (ref 0–1.5)
BILIRUB SERPL-MCNC: 0.2 MG/DL (ref 0–1.2)
BILIRUB UR QL STRIP: ABNORMAL
BUN SERPL-MCNC: 12 MG/DL (ref 8–23)
BUN/CREAT SERPL: 15.8 (ref 7–25)
CALCIUM SPEC-SCNC: 9.3 MG/DL (ref 8.6–10.5)
CHLORIDE SERPL-SCNC: 103 MMOL/L (ref 98–107)
CLARITY UR: ABNORMAL
CO2 SERPL-SCNC: 27.4 MMOL/L (ref 22–29)
COLOR UR: ABNORMAL
CREAT SERPL-MCNC: 0.76 MG/DL (ref 0.57–1)
DEPRECATED RDW RBC AUTO: 45.9 FL (ref 37–54)
EGFRCR SERPLBLD CKD-EPI 2021: 89.3 ML/MIN/1.73
EOSINOPHIL # BLD AUTO: 0.21 10*3/MM3 (ref 0–0.4)
EOSINOPHIL NFR BLD AUTO: 2.6 % (ref 0.3–6.2)
ERYTHROCYTE [DISTWIDTH] IN BLOOD BY AUTOMATED COUNT: 14.3 % (ref 12.3–15.4)
GLOBULIN UR ELPH-MCNC: 2.8 GM/DL
GLUCOSE SERPL-MCNC: 135 MG/DL (ref 65–99)
GLUCOSE UR STRIP-MCNC: NEGATIVE MG/DL
HCT VFR BLD AUTO: 36.5 % (ref 34–46.6)
HGB BLD-MCNC: 12.5 G/DL (ref 12–15.9)
HGB UR QL STRIP.AUTO: NEGATIVE
HOLD SPECIMEN: NORMAL
HOLD SPECIMEN: NORMAL
HYALINE CASTS UR QL AUTO: ABNORMAL /LPF
IMM GRANULOCYTES # BLD AUTO: 0.03 10*3/MM3 (ref 0–0.05)
IMM GRANULOCYTES NFR BLD AUTO: 0.4 % (ref 0–0.5)
KETONES UR QL STRIP: NEGATIVE
LEUKOCYTE ESTERASE UR QL STRIP.AUTO: ABNORMAL
LYMPHOCYTES # BLD AUTO: 2.66 10*3/MM3 (ref 0.7–3.1)
LYMPHOCYTES NFR BLD AUTO: 33.5 % (ref 19.6–45.3)
MAGNESIUM SERPL-MCNC: 2 MG/DL (ref 1.6–2.4)
MCH RBC QN AUTO: 30.3 PG (ref 26.6–33)
MCHC RBC AUTO-ENTMCNC: 34.2 G/DL (ref 31.5–35.7)
MCV RBC AUTO: 88.4 FL (ref 79–97)
MONOCYTES # BLD AUTO: 0.52 10*3/MM3 (ref 0.1–0.9)
MONOCYTES NFR BLD AUTO: 6.5 % (ref 5–12)
NEUTROPHILS NFR BLD AUTO: 4.47 10*3/MM3 (ref 1.7–7)
NEUTROPHILS NFR BLD AUTO: 56.4 % (ref 42.7–76)
NITRITE UR QL STRIP: NEGATIVE
NRBC BLD AUTO-RTO: 0 /100 WBC (ref 0–0.2)
PH UR STRIP.AUTO: 5.5 [PH] (ref 5–8)
PLATELET # BLD AUTO: 288 10*3/MM3 (ref 140–450)
PMV BLD AUTO: 10.3 FL (ref 6–12)
POTASSIUM SERPL-SCNC: 3.6 MMOL/L (ref 3.5–5.2)
PROT SERPL-MCNC: 6.8 G/DL (ref 6–8.5)
PROT UR QL STRIP: ABNORMAL
QT INTERVAL: 413 MS
RBC # BLD AUTO: 4.13 10*6/MM3 (ref 3.77–5.28)
RBC # UR STRIP: ABNORMAL /HPF
REF LAB TEST METHOD: ABNORMAL
SODIUM SERPL-SCNC: 140 MMOL/L (ref 136–145)
SP GR UR STRIP: 1.02 (ref 1–1.03)
SQUAMOUS #/AREA URNS HPF: ABNORMAL /HPF
TROPONIN T SERPL HS-MCNC: <6 NG/L
UROBILINOGEN UR QL STRIP: ABNORMAL
WBC # UR STRIP: ABNORMAL /HPF
WBC NRBC COR # BLD: 7.94 10*3/MM3 (ref 3.4–10.8)
WHOLE BLOOD HOLD COAG: NORMAL
WHOLE BLOOD HOLD SPECIMEN: NORMAL

## 2023-05-23 PROCEDURE — 70450 CT HEAD/BRAIN W/O DYE: CPT

## 2023-05-23 PROCEDURE — 96375 TX/PRO/DX INJ NEW DRUG ADDON: CPT

## 2023-05-23 PROCEDURE — 25010000002 KETOROLAC TROMETHAMINE PER 15 MG: Performed by: EMERGENCY MEDICINE

## 2023-05-23 PROCEDURE — 71045 X-RAY EXAM CHEST 1 VIEW: CPT

## 2023-05-23 PROCEDURE — 84484 ASSAY OF TROPONIN QUANT: CPT

## 2023-05-23 PROCEDURE — 93005 ELECTROCARDIOGRAM TRACING: CPT

## 2023-05-23 PROCEDURE — 96374 THER/PROPH/DIAG INJ IV PUSH: CPT

## 2023-05-23 PROCEDURE — 80053 COMPREHEN METABOLIC PANEL: CPT

## 2023-05-23 PROCEDURE — 85025 COMPLETE CBC W/AUTO DIFF WBC: CPT | Performed by: EMERGENCY MEDICINE

## 2023-05-23 PROCEDURE — 83735 ASSAY OF MAGNESIUM: CPT

## 2023-05-23 PROCEDURE — 93005 ELECTROCARDIOGRAM TRACING: CPT | Performed by: EMERGENCY MEDICINE

## 2023-05-23 PROCEDURE — 25010000002 METOCLOPRAMIDE PER 10 MG: Performed by: EMERGENCY MEDICINE

## 2023-05-23 PROCEDURE — 81001 URINALYSIS AUTO W/SCOPE: CPT

## 2023-05-23 PROCEDURE — 99284 EMERGENCY DEPT VISIT MOD MDM: CPT

## 2023-05-23 RX ORDER — SODIUM CHLORIDE 0.9 % (FLUSH) 0.9 %
10 SYRINGE (ML) INJECTION AS NEEDED
Status: DISCONTINUED | OUTPATIENT
Start: 2023-05-23 | End: 2023-05-23 | Stop reason: HOSPADM

## 2023-05-23 RX ORDER — KETOROLAC TROMETHAMINE 30 MG/ML
30 INJECTION, SOLUTION INTRAMUSCULAR; INTRAVENOUS ONCE
Status: COMPLETED | OUTPATIENT
Start: 2023-05-23 | End: 2023-05-23

## 2023-05-23 RX ORDER — METOCLOPRAMIDE HYDROCHLORIDE 5 MG/ML
10 INJECTION INTRAMUSCULAR; INTRAVENOUS ONCE
Status: COMPLETED | OUTPATIENT
Start: 2023-05-23 | End: 2023-05-23

## 2023-05-23 RX ORDER — DIPHENHYDRAMINE HYDROCHLORIDE 50 MG/ML
25 INJECTION INTRAMUSCULAR; INTRAVENOUS ONCE
Status: DISCONTINUED | OUTPATIENT
Start: 2023-05-23 | End: 2023-05-23 | Stop reason: HOSPADM

## 2023-05-23 RX ADMIN — KETOROLAC TROMETHAMINE 30 MG: 30 INJECTION, SOLUTION INTRAMUSCULAR; INTRAVENOUS at 16:20

## 2023-05-23 RX ADMIN — METOCLOPRAMIDE HYDROCHLORIDE 10 MG: 5 INJECTION INTRAMUSCULAR; INTRAVENOUS at 16:21

## 2023-05-23 NOTE — ED PROVIDER NOTES
Time: 4:37 PM EDT  Date of encounter:  2023  Independent Historian/Clinical History and Information was obtained by:   Patient  Chief Complaint: Headache and blurred vision    History is limited by: N/A    History of Present Illness:  Patient is a 62 y.o. year old adult who presents to the emergency department for evaluation of headache, dizziness, blurry vision.  Patient states that earlier today she had taken off her scopolamine patch for which she uses for chronic dizziness.  Patient states a short time later she began having blurry vision in her left eye only.  Patient noted that her left eye was dilated.  Patient was experience a left-sided headache.  Subsequently she presents to the ER for evaluation.  Patient denies any fevers or chills.  Patient reports the headache is gradual in onset.  Patient describes the dizziness as an unsteadiness or loss of balance.    HPI    Patient Care Team  Primary Care Provider: Davin Hall MD    Past Medical History:     Allergies   Allergen Reactions   • Codeine Anaphylaxis     Chest pains   • Erythromycin Diarrhea     Cramps   • Erythromycin Base Other (See Comments)   • Morphine Unknown - High Severity and Other (See Comments)     Chest pain   Chest pain      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   • Thyroid disease    • Vertigo      Past Surgical History:   Procedure Laterality Date   •  SECTION      ALSO    •  "CHOLECYSTECTOMY  1986   • COLONOSCOPY     • COLONOSCOPY N/A 8/17/2022    Procedure: COLONOSCOPY;  Surgeon: Peter Tejada MD;  Location: [facility];  Service: Gastroenterology;  Laterality: N/A;  HEMMORHOIDS   • ESOPHAGEAL DILATATION     • HYSTERECTOMY  1994    menorrhagia   • KNEE ARTHROSCOPY Right 2012    \"CLEAN UP\"   • KNEE ARTHROSCOPY W/ MENISCAL REPAIR Right 2004   • TRIGGER POINT INJECTION  CRESENCIO's for back from 1992    WILL NOT AGAIN   • UPPER GASTROINTESTINAL ENDOSCOPY       Family History   Problem Relation Age of Onset   • Cancer Father         Mesothelioma   • Colon polyps Father    • Scoliosis Sister    • Cancer Brother         Prostate   • Cancer Brother         Colon & Liver   • Colon cancer Brother 64   • Heart disease Other    • Lung disease Other    • Malig Hyperthermia Neg Hx        Home Medications:  Prior to Admission medications    Medication Sig Start Date End Date Taking? Authorizing Provider   ALPRAZolam (XANAX) 0.5 MG tablet TAKE ONE TABLET BY MOUTH TWICE DAILY AS NEEDED for severe anxiety 5/15/23   Davin Hall MD   baclofen (LIORESAL) 10 MG tablet TAKE ONE TABLET BY MOUTH THREE TIMES DAILY AS NEEDED FOR MUSCLE SPASMS 5/15/23   Lissette Benitez APRN   diclofenac (VOLTAREN) 50 MG EC tablet Take 100 mg by mouth Daily. 11/21/22   Provider, MD Remedios   diclofenac sodium (VOTAREN XR) 100 MG 24 hr tablet Take 1 tablet by mouth Daily. 11/21/22   Davin Hall MD   dicyclomine (BENTYL) 20 MG tablet Take 1 tablet by mouth Every 8 (Eight) Hours As Needed (abdominal pain). 6/24/22   Aliyah Joyner APRN   estradiol (Vivelle-Dot) 0.05 MG/24HR patch Place 1 patch on the skin as directed by provider 2 (Two) Times a Week. 9/12/22   Johanna Burton APRN   fluconazole (Diflucan) 100 MG tablet Take 1 tablet by mouth Daily. 3/7/23   Davin Hall MD   FLUoxetine (PROzac) 40 MG capsule Take 1 capsule by mouth Daily. 11/21/22   Davin Hall MD "   fluticasone (FLONASE) 50 MCG/ACT nasal spray 2 sprays by Each Nare route Daily. 22   Davin Hall MD   HYDROcodone-acetaminophen (NORCO) 5-325 MG per tablet Take 1 tablet by mouth 2 (Two) Times a Day. Take 1 tablet every 12 hours as needed for severe pain. 23   Xavi Coronado MD   levothyroxine (SYNTHROID, LEVOTHROID) 75 MCG tablet Take 1 tablet by mouth Daily. 22   Davin Hall MD   meclizine (ANTIVERT) 25 MG tablet TAKE ONE TABLET BY MOUTH THREE TIMES DAILY AS NEEDED FOR dizziness 19   ProviderRemedios MD   pantoprazole (PROTONIX) 40 MG EC tablet Take 1 tablet by mouth Daily. 22   Davin Hall MD   rosuvastatin (CRESTOR) 10 MG tablet Take 10 mg by mouth Daily. 20   ProviderRemedios MD   Scopolamine 1 MG/3DAYS patch Place 1 patch on the skin as directed by provider Every 72 (Seventy-Two) Hours. 5/10/23   Davin Hall MD   valsartan-hydrochlorothiazide (DIOVAN-HCT) 80-12.5 MG per tablet Take 1 tablet by mouth Every Evening. 22   Davin Hall MD        Social History:   Social History     Tobacco Use   • Smoking status: Former     Years: 30.00     Types: Cigarettes     Start date: 1982     Quit date: 2012     Years since quittin.2   Vaping Use   • Vaping Use: Never used   Substance Use Topics   • Alcohol use: No   • Drug use: No         Review of Systems:  Review of Systems   Constitutional: Negative for chills and fever.   HENT: Negative for congestion, ear pain and sore throat.    Eyes: Positive for visual disturbance. Negative for pain.   Respiratory: Negative for cough, chest tightness and shortness of breath.    Cardiovascular: Negative for chest pain.   Gastrointestinal: Negative for abdominal pain, diarrhea, nausea and vomiting.   Genitourinary: Negative for flank pain and hematuria.   Musculoskeletal: Negative for joint swelling.   Skin: Negative for pallor.   Neurological: Positive for  "dizziness and headaches. Negative for seizures.   All other systems reviewed and are negative.       Physical Exam:  /76   Pulse 56   Temp 97.9 °F (36.6 °C) (Oral)   Resp 18   Ht 160 cm (63\")   Wt 92.5 kg (203 lb 14.8 oz)   SpO2 95%   BMI 36.12 kg/m²     Physical Exam       Vital signs were reviewed under triage note.  General appearance - Patient appears well-developed and well-nourished.  Patient is in no acute distress.  Head - Normocephalic, atraumatic.  Pupils -pupils are round.  Patient has anisocoric with the left pupil being larger and less reactive to light.  Extraocular muscles are intact.  Conjunctiva is clear.  Nasal - Normal inspection.  No evidence of trauma or epistaxis.  Tympanic membranes - Gray, intact without erythema or retractions.  Oral mucosa - Pink and moist without lesions or erythema.  Uvula is midline.  Chest wall - Atraumatic.  Chest wall is nontender.  There are no vesicular rashes noted.  Neck - Supple.  Trachea was midline.  There is no palpable lymphadenopathy or thyromegaly.  There are no meningeal signs  Lungs - Clear to auscultation and percussion bilaterally.  Heart - Regular rate and rhythm without any murmurs, clicks, or gallops.  Abdomen - Soft.  Bowel sounds are present.  There is no palpable tenderness.  There is no rebound, guarding, or rigidity.  There are no palpable masses.  There are no pulsatile masses.  Back - Spine is straight and midline.  There is no CVA tenderness.  Extremities - Intact x4 with full range of motion.  There is no palpable edema.  Pulses are intact x4 and equal.  Neurologic - Patient is awake, alert, and oriented x3.  Cranial nerves II through XII are grossly intact.  Motor and sensory functions grossly intact.  Cerebellar function was normal.  Integument - There are no rashes.  There are no petechia or purpura lesions noted.  There are no vesicular lesions noted.      Procedures:  Procedures      Medical Decision " Making:      Comorbidities that affect care:    Thyroid disease, depression, vertigo, hypertension    External Notes reviewed:    Telephone Encounter: Telephone encounter with Dr. Hall dated 5/9/2023 was reviewed by me      The following orders were placed and all results were independently analyzed by me:  Orders Placed This Encounter   Procedures   • XR Chest 1 View   • CT Head Without Contrast   • [place] Draw   • Comprehensive Metabolic Panel   • Single High Sensitivity Troponin T   • Magnesium   • Urinalysis With Culture If Indicated -   • CBC Auto Differential   • Urinalysis, Microscopic Only - Urine, Clean Catch   • NPO Diet NPO Type: Strict NPO   • Undress & Gown   • Continuous Pulse Oximetry   • Vital Signs   • Orthostatic Blood Pressure   • Oxygen Therapy- Nasal Cannula; Titrate 1-6 LPM Per SpO2; 90 - 95%   • POC Glucose Once   • ECG 12 Lead ED Triage Standing Order; Weak / Dizzy / AMS   • Insert Peripheral IV   • Fall Precautions   • CBC & Differential   • Green Top (Gel)   • Lavender Top   • Gold Top - SST   • Light Blue Top       Medications Given in the Emergency Department:  Medications   sodium chloride 0.9 % flush 10 mL (has no administration in time range)   diphenhydrAMINE (BENADRYL) injection 25 mg (25 mg Intravenous Not Given 5/23/23 1635)   ketorolac (TORADOL) injection 30 mg (30 mg Intravenous Given 5/23/23 1620)   metoclopramide (REGLAN) injection 10 mg (10 mg Intravenous Given 5/23/23 1621)        ED Course:    ED Course as of 05/24/23 2202   Wed May 24, 2023   2201 EKG performed at 1236 was interpreted by me showing normal sinus rhythm with a ventricular rate of 65 bpm.  Parable is 188 ms.  P waves are normal.  QRS interval is normal.  Axis is 18 degrees.  There is no acute ischemic ST or T wave change identified.  QT corrected was 431 ms.  This EKG was compared to prior dated 6/24/2022 and is unchanged. [TB]      ED Course User Index  [TB] Cheko Carlson DO   Patient was seen and evaluated  in the ED by me.  The above history and physical examination was performed as documented.  Diagnostic data was obtained.  Results reviewed.  Discussed with the patient.  Over the ED course the patient's left pupil became less dilated and more reactive.  I believe at this point time she most likely removed her scopolamine patch and then inadvertently touched her left eye causing mydriasis.  I feel this is what triggered the blurred vision.  Patient already has vertigo and the dizziness itself may be her just her chronic vertigo or even exacerbated by her left eye.  Patient is feeling better at time of discharge is stable for discharge home.    Labs:    Lab Results (last 24 hours)     Procedure Component Value Units Date/Time    CBC & Differential [[id number]]  (Normal) Collected: 05/23/23 1253    Specimen: Blood from Arm, Left Updated: 05/23/23 1545    Narrative:      The following orders were created for panel order CBC & Differential.  Procedure                               Abnormality         Status                     ---------                               -----------         ------                     CBC Auto Differential[[id number]]        Normal              Final result                 Please view results for these tests on the individual orders.    Comprehensive Metabolic Panel [[id number]]  (Abnormal) Collected: 05/23/23 1253    Specimen: Blood Updated: 05/23/23 1458     Glucose 135 mg/dL      BUN 12 mg/dL      Creatinine 0.76 mg/dL      Sodium 140 mmol/L      Potassium 3.6 mmol/L      Chloride 103 mmol/L      CO2 27.4 mmol/L      Calcium 9.3 mg/dL      Total Protein 6.8 g/dL      Albumin 4.0 g/dL      ALT (SGPT) 15 U/L      AST (SGOT) 21 U/L      Alkaline Phosphatase 71 U/L      Total Bilirubin 0.2 mg/dL      Globulin 2.8 gm/dL      A/G Ratio 1.4 g/dL      BUN/Creatinine Ratio 15.8     Anion Gap 9.6 mmol/L      eGFR 89.3 mL/min/1.73     Narrative:      GFR Normal >60  Chronic Kidney Disease <60  Kidney  Failure <15      Single High Sensitivity Troponin T [[id number]]  (Normal) Collected: 05/23/23 1253    Specimen: Blood Updated: 05/23/23 1457     HS Troponin T <6 ng/L     Narrative:      High Sensitive Troponin T Reference Range:  <10.0 ng/L- Negative Female for AMI  <15.0 ng/L- Negative Male for AMI  >=10 - Abnormal Female indicating possible myocardial injury.  >=15 - Abnormal Male indicating possible myocardial injury.   Clinicians would have to utilize clinical acumen, EKG, Troponin, and serial changes to determine if it is an Acute Myocardial Infarction or myocardial injury due to an underlying chronic condition.         Magnesium [[id number]]  (Normal) Collected: 05/23/23 1253    Specimen: Blood Updated: 05/23/23 1458     Magnesium 2.0 mg/dL     CBC Auto Differential [[id number]]  (Normal) Collected: 05/23/23 1253    Specimen: Blood from Arm, Left Updated: 05/23/23 1545     WBC 7.94 10*3/mm3      RBC 4.13 10*6/mm3      Hemoglobin 12.5 g/dL      Hematocrit 36.5 %      MCV 88.4 fL      MCH 30.3 pg      MCHC 34.2 g/dL      RDW 14.3 %      RDW-SD 45.9 fl      MPV 10.3 fL      Platelets 288 10*3/mm3      Neutrophil % 56.4 %      Lymphocyte % 33.5 %      Monocyte % 6.5 %      Eosinophil % 2.6 %      Basophil % 0.6 %      Immature Grans % 0.4 %      Neutrophils, Absolute 4.47 10*3/mm3      Lymphocytes, Absolute 2.66 10*3/mm3      Monocytes, Absolute 0.52 10*3/mm3      Eosinophils, Absolute 0.21 10*3/mm3      Basophils, Absolute 0.05 10*3/mm3      Immature Grans, Absolute 0.03 10*3/mm3      nRBC 0.0 /100 WBC     Urinalysis With Culture If Indicated - Urine, Clean Catch [[id number]]  (Abnormal) Collected: 05/23/23 1349    Specimen: Urine, Clean Catch Updated: 05/23/23 1427     Color, UA Dark Yellow     Appearance, UA Cloudy     pH, UA 5.5     Specific Gravity, UA 1.021     Glucose, UA Negative     Ketones, UA Negative     Bilirubin, UA Small (1+)     Blood, UA Negative     Protein, UA 30 mg/dL (1+)     Leuk Esterase, UA  Trace     Nitrite, UA Negative     Urobilinogen, UA 1.0 E.U./dL    Narrative:      In absence of clinical symptoms, the presence of pyuria, bacteria, and/or nitrites on the urinalysis result does not correlate with infection.    Urinalysis, Microscopic Only - Urine, Clean Catch [[id number]]  (Abnormal) Collected: 05/23/23 1349    Specimen: Urine, Clean Catch Updated: 05/23/23 1437     RBC, UA None Seen /HPF      WBC, UA 0-2 /HPF      Comment: Urine culture not indicated.        Bacteria, UA None Seen /HPF      Squamous Epithelial Cells, UA 3-6 /HPF      Hyaline Casts, UA None Seen /LPF      Methodology Manual Light Microscopy           Imaging:    CT Head Without Contrast    Result Date: 5/23/2023  PROCEDURE: CT HEAD WO CONTRAST  COMPARISON:  [facility], MR, MRI BRAIN WO CONTRAST, 12/01/2021, 6:45.  [facility], CT, CT HEAD WO CONTRAST, 12/01/2021, 3:27. INDICATIONS: headache, left eye blurry, dizziness  PROTOCOL:   Standard imaging protocol performed    RADIATION:   DLP: 954 mGy*cm   MA and/or KV was adjusted to minimize radiation dose.     TECHNIQUE: After obtaining the patient's consent, CT images were obtained without non-ionic intravenous contrast material.  FINDINGS:  There is no CT evidence of acute infarction, mass or intracranial hemorrhage.  The ventricles are normal in size and configuration.  Visualized extracranial soft tissues appear unremarkable.  No focal calvarial abnormality is seen.  Visualized paranasal sinuses are clear.        1. No acute intracranial abnormality     Flakito Lin M.D.       Electronically Signed and Approved By: Flakito Lin M.D. on 5/23/2023 at 15:14             XR Chest 1 View    Result Date: 5/23/2023  PROCEDURE: XR CHEST 1 VW  COMPARISON: [facility], CR, XR CHEST 1 VW, 12/01/2021, 3:36.  INDICATIONS: weakness  FINDINGS:  There is no pneumothorax, pleural effusion or focal airspace consolidation. The heart size and pulmonary  vasculature appear within normal limits. There are no acute osseous abnormalities.       No acute cardiopulmonary abnormality.       ALBERTO TURCIOS MD       Electronically Signed and Approved By: ALBERTO TURCIOS MD on 5/23/2023 at 13:06                 Differential Diagnosis and Discussion:    Dizziness: Based on the patient's history, signs, and symptoms, the diffential diagnosis includes but is not limited to meningitis, stroke, sepsis, subarachnoid hemorrhage, intracranial bleeding, encephalitis, vertigo, electrolyte imbalance, and metabolic disorders.    All labs were reviewed and interpreted by me.  All X-rays impressions were independently interpreted by me.  EKG was interpreted by me.  CT scan radiology impression was interpreted by me.    MDM         Patient Care Considerations:    ANTIBIOTICS: I considered prescribing antibiotics as an outpatient however There is no evidence of an acute bacterial infection      Consultants/Shared Management Plan:    None    Social Determinants of Health:    Patient is independent, reliable, and has access to care.       Disposition and Care Coordination:    Discharged: I considered escalation of care by admitting this patient for observation, however the patient has improved and is suitable and  stable for discharge.    I have explained the patient´s condition, diagnoses and treatment plan based on the information available to me at this time. I have answered questions and addressed any concerns. The patient has a good  understanding of the patient´s diagnosis, condition, and treatment plan as can be expected at this point. The vital signs have been stable. The patient´s condition is stable and appropriate for discharge from the emergency department.      The patient will pursue further outpatient evaluation with the primary care physician or other designated or consulting physician as outlined in the discharge instructions. They are agreeable to this plan of care and  follow-up instructions have been explained in detail. The patient has received these instructions in written format and have expressed an understanding of the discharge instructions. The patient is aware that any significant change in condition or worsening of symptoms should prompt an immediate return to this or the closest emergency department or call to 911.    Final diagnoses:   Acute nonintractable headache, unspecified headache type   Chronic vertigo        ED Disposition     ED Disposition   Discharge    Condition   Stable    Comment   --             This medical record created using voice recognition software.           Cheko Carlson DO  05/24/23 2617

## 2023-05-23 NOTE — Clinical Note
[facility] EMERGENCY ROOM  [address]  [place] [address]  Phone: [phone]    Kamini Orta was seen and treated in our emergency department on 5/23/2023.  Kamini Orta may return to work on 05/24/2023.         Thank you for choosing [facility].    Cheko Carlson DO

## 2023-05-23 NOTE — Clinical Note
[facility] EMERGENCY ROOM  [address] [place] [address]  Phone: [phone]    Raj Orta accompanied Kamini Orta to the emergency department on 5/23/2023. They may return to work on 05/24/2023.  Was in the Emergency Department with his spouse.       Thank you for choosing [facility].    Davin Ariza RN

## 2023-05-23 NOTE — DISCHARGE INSTRUCTIONS
Resume your normal home medications as previously instructed.  Avoid using the scopolamine patch until you see your primary care provider tomorrow.  Follow-up with Dr. Hall tomorrow as scheduled.  I return to the ER for any other concerns issues that may arise.

## 2023-05-24 ENCOUNTER — OFFICE VISIT (OUTPATIENT)
Dept: INTERNAL MEDICINE | Facility: CLINIC | Age: 63
End: 2023-05-24
Payer: COMMERCIAL

## 2023-05-24 VITALS
DIASTOLIC BLOOD PRESSURE: 70 MMHG | HEIGHT: 63 IN | BODY MASS INDEX: 36.14 KG/M2 | HEART RATE: 67 BPM | SYSTOLIC BLOOD PRESSURE: 105 MMHG | TEMPERATURE: 97.3 F | WEIGHT: 204 LBS | OXYGEN SATURATION: 91 %

## 2023-05-24 DIAGNOSIS — M17.11 PRIMARY OSTEOARTHRITIS OF RIGHT KNEE: ICD-10-CM

## 2023-05-24 DIAGNOSIS — M54.50 CHRONIC BILATERAL LOW BACK PAIN, UNSPECIFIED WHETHER SCIATICA PRESENT: Primary | ICD-10-CM

## 2023-05-24 DIAGNOSIS — G89.29 CHRONIC BILATERAL LOW BACK PAIN, UNSPECIFIED WHETHER SCIATICA PRESENT: Primary | ICD-10-CM

## 2023-05-24 DIAGNOSIS — R42 VERTIGO: ICD-10-CM

## 2023-05-24 DIAGNOSIS — M17.0 ARTHRITIS OF BOTH KNEES: ICD-10-CM

## 2023-05-24 DIAGNOSIS — I10 HYPERTENSION, ESSENTIAL: ICD-10-CM

## 2023-05-24 DIAGNOSIS — G03.9 ARACHNOIDITIS: ICD-10-CM

## 2023-05-24 DIAGNOSIS — G89.29 CHRONIC PAIN OF RIGHT KNEE: ICD-10-CM

## 2023-05-24 DIAGNOSIS — M25.561 CHRONIC PAIN OF RIGHT KNEE: ICD-10-CM

## 2023-05-24 PROCEDURE — 99214 OFFICE O/P EST MOD 30 MIN: CPT | Performed by: INTERNAL MEDICINE

## 2023-05-24 NOTE — PROGRESS NOTES
"CHIEF COMPLAINT/ HPI:  Hypertension and Follow-up (Patient is her for a routine follow up, also was in the er yesterday )    Patient got dizzy she noticed her pupil in her left eye got big and she had a bad headache, she went to the ER,, blurry vision          Objective   Vital Signs  Vitals:    05/24/23 1307   BP: 105/70   Pulse: 67   Temp: 97.3 °F (36.3 °C)   SpO2: 91%   Weight: 92.5 kg (204 lb)   Height: 160 cm (62.99\")      Body mass index is 36.15 kg/m².  Review of Systems   Eyes: Positive for blurred vision.   Neurological: Positive for headache.      Physical Exam  Constitutional:       General: Kamini Orta is not in acute distress.     Appearance: Normal appearance. Kamini Orta is obese.   HENT:      Head: Normocephalic.      Mouth/Throat:      Mouth: Mucous membranes are moist.   Eyes:      Conjunctiva/sclera: Conjunctivae normal.      Pupils: Pupils are equal, round, and reactive to light.   Cardiovascular:      Rate and Rhythm: Normal rate and regular rhythm.      Pulses: Normal pulses.      Heart sounds: Normal heart sounds.   Pulmonary:      Effort: Pulmonary effort is normal.      Breath sounds: Normal breath sounds.   Abdominal:      General: Bowel sounds are normal.      Palpations: Abdomen is soft.   Musculoskeletal:         General: No swelling. Normal range of motion.      Cervical back: Neck supple.   Skin:     General: Skin is warm and dry.      Coloration: Skin is not jaundiced.   Neurological:      General: No focal deficit present.      Mental Status: Kamini Orta is alert and oriented to person, place, and time. Mental status is at baseline.   Psychiatric:         Mood and Affect: Mood normal.         Behavior: Behavior normal.         Thought Content: Thought content normal.         Judgment: Judgment normal.        Result Review :   No results found for: PROBNP, BNP  CMP        6/24/2022    17:07 5/23/2023    12:53   CMP   Glucose 99   135     BUN 12   12     Creatinine " 0.85   0.76     EGFR 78.5   89.3     Sodium 142   140     Potassium 4.0   3.6     Chloride 102   103     Calcium 10.0   9.3     Total Protein 7.3   6.8     Albumin 4.60   4.0     Globulin 2.7   2.8     Total Bilirubin 0.2   0.2     Alkaline Phosphatase 71   71     AST (SGOT) 26   21     ALT (SGPT) 24   15     Albumin/Globulin Ratio 1.7   1.4     BUN/Creatinine Ratio 14.1   15.8     Anion Gap 10.7   9.6       CBC w/diff        6/24/2022    17:07 5/23/2023    12:53   CBC w/Diff   WBC 8.27   7.94     RBC 4.42   4.13     Hemoglobin 12.9   12.5     Hematocrit 38.9   36.5     MCV 88.0   88.4     MCH 29.2   30.3     MCHC 33.2   34.2     RDW 13.2   14.3     Platelets 305   288     Neutrophil Rel % 60.0   56.4     Immature Granulocyte Rel % 0.5   0.4     Lymphocyte Rel % 30.5   33.5     Monocyte Rel % 6.8   6.5     Eosinophil Rel % 1.8   2.6     Basophil Rel % 0.4   0.6           Lab Results   Component Value Date    TSH 1.170 01/04/2022    TSH 1.640 06/21/2021    TSH 2.600 06/15/2020      Lab Results   Component Value Date    FREET4 1.47 01/04/2022    FREET4 1.34 06/21/2021    FREET4 1.3 10/15/2019                          Visit Diagnoses:    ICD-10-CM ICD-9-CM   1. Chronic bilateral low back pain, unspecified whether sciatica present  M54.50 724.2    G89.29 338.29   2. Vertigo  R42 780.4   3. Arthritis of both knees  M17.0 716.96   4. Arachnoiditis  G03.9 322.9   5. Chronic pain of right knee  M25.561 719.46    G89.29 338.29   6. Primary osteoarthritis of right knee  M17.11 715.16   7. Hypertension, essential  I10 401.9       Assessment and Plan   Diagnoses and all orders for this visit:    1. Chronic bilateral low back pain, unspecified whether sciatica present (Primary)  -     CBC & Differential; Future  -     Comprehensive Metabolic Panel; Future  -     Hemoglobin A1c; Future  -     Lipid Panel; Future  -     TSH+Free T4; Future    2. Vertigo  -     CBC & Differential; Future  -     Comprehensive Metabolic Panel;  Future  -     Hemoglobin A1c; Future  -     Lipid Panel; Future  -     TSH+Free T4; Future    3. Arthritis of both knees  -     CBC & Differential; Future  -     Comprehensive Metabolic Panel; Future  -     Hemoglobin A1c; Future  -     Lipid Panel; Future  -     TSH+Free T4; Future    4. Arachnoiditis  -     CBC & Differential; Future  -     Comprehensive Metabolic Panel; Future  -     Hemoglobin A1c; Future  -     Lipid Panel; Future  -     TSH+Free T4; Future    5. Chronic pain of right knee  -     CBC & Differential; Future  -     Comprehensive Metabolic Panel; Future  -     Hemoglobin A1c; Future  -     Lipid Panel; Future  -     TSH+Free T4; Future    6. Primary osteoarthritis of right knee  -     CBC & Differential; Future  -     Comprehensive Metabolic Panel; Future  -     Hemoglobin A1c; Future  -     Lipid Panel; Future  -     TSH+Free T4; Future    7. Hypertension, essential  -     CBC & Differential; Future  -     Comprehensive Metabolic Panel; Future  -     Hemoglobin A1c; Future  -     Lipid Panel; Future  -     TSH+Free T4; Future             covid infection --will rx with paxlovid , June 2022    Back pain, sciatica, r leg, --MRI November 2021 showed a left L5-S1 disc paracentral extrusion with multiple levels of foraminal stenosis on both sides throughout the spine, with degenerative disc disease noted, patient did go see Ortho spine surgeon in [place] was possibly going to have surgery but then they called and said they would not do it due to her increased risk and back issues and stability, discussed May 2022--- we will make referral to [facility] [place] for second opinion given the severity of the discs and the continued back pain that she is having, May 2022, patient continues on diclofenac  mg daily as above,    abdominal pain possible recurrent diverticulitis January 31, 2022--, patient had a colonoscopy August 2022, CT scan abdomen and pelvis June 24, 2022,--diverticulosis no  evidence of diverticulitis or serious findings,---- recommend continue Protonix in the morning and Pepcid at bedtime---     vertigo --recurrent kishore 10/ 2022 --- with nausea vomiting, headaches, work-up in the emergency room included MRI of the brain CT of the brain did not show any acute abnormality, chest x-ray no active disease--- she will continue meclizine 3-4 times per day and add scopolamine patch December 6, 2021, and again January 10, 2022,---, referral made for Epley maneuver with PTA, November 2022, she has been using the scopolamine patch with some help, but she also had possibly gotten some in her hand and eye recently with some changes,    hypertension  continues ---valsartan HCT 80/12.5 mg daily,,     PULM NODULE ON CXR SEPT 2017, CT SCAN OCT 2017, NO CHANGE IN CT APRIL 2018, (DONE ADIA TRAIL DIAG, --CT CHEST OCT 2019 , NO CHANGE IN PULM NODULE OVER 2 YRS , SO NO MORE F/U     LIFE STRESSORS DISCUSSED,  ANNA MARIE AND URINE DRUG SCREEN REIVIEWED nov 2022    Hypothyroidism, cont levothyroxine 0.075 mg qd     HEART MURUMUR, ECHO 11/2018, ESSENTIALLY NL     obesity --    LLQ Abdominal Pain/Diverticulitis--6/18/16.-- colonoscopy Dr. Alarcon--  NOV, 2019, THI ,, August 2022,, internal hemorrhoids only, otherwise normal exam,    L BREAST MASS--f/u U/S WAS NEG,. 8/2015, , Yearly mammograms    Patient quit smoking 10 years ago    CHRONIC PAIN ISSUES DISCUSSED --WITH PAIN IN NECK , BACK PAIN- ON PRN NORCO-- WE DISCUSSED RISK OF DRIVING AND ADDICTION WITH MED--OCT 2020 patient continues on diclofenac 100 mg XR daily,    DEPRESSION,--continues Xanax 0.5 twice a day, Prozac 40 mg daily,    ELEVATED CHOL, TRIG,-continues Crestor,    Follow Up   Return in about 4 months (around 9/24/2023).  Patient was given instructions and counseling regarding Kamini Orta's condition or for health maintenance advice. Please see specific information pulled into the AVS if appropriate.         Answers for HPI/ROS submitted by  the patient on 5/22/2023  Please describe your symptoms.: Issues with Vertigo  Have you had these symptoms before?: Yes  How long have you been having these symptoms?: Greater than 2 weeks  Please list any medications you are currently taking for this condition.: Meclizine, Scopolamine  Please describe any probable cause for these symptoms. : Vertigo  What is the primary reason for your visit?: Other

## 2023-06-02 ENCOUNTER — CLINICAL SUPPORT (OUTPATIENT)
Dept: ORTHOPEDIC SURGERY | Facility: CLINIC | Age: 63
End: 2023-06-02

## 2023-06-02 VITALS — WEIGHT: 207 LBS | TEMPERATURE: 97.5 F | BODY MASS INDEX: 36.68 KG/M2 | HEIGHT: 63 IN

## 2023-06-02 DIAGNOSIS — R52 PAIN: Primary | ICD-10-CM

## 2023-06-02 DIAGNOSIS — M17.0 ARTHRITIS OF BOTH KNEES: ICD-10-CM

## 2023-06-02 RX ORDER — LIDOCAINE HYDROCHLORIDE 10 MG/ML
2 INJECTION, SOLUTION EPIDURAL; INFILTRATION; INTRACAUDAL; PERINEURAL
Status: COMPLETED | OUTPATIENT
Start: 2023-06-02 | End: 2023-06-02

## 2023-06-02 RX ORDER — METHYLPREDNISOLONE ACETATE 80 MG/ML
80 INJECTION, SUSPENSION INTRA-ARTICULAR; INTRALESIONAL; INTRAMUSCULAR; SOFT TISSUE
Status: COMPLETED | OUTPATIENT
Start: 2023-06-02 | End: 2023-06-02

## 2023-06-02 RX ADMIN — LIDOCAINE HYDROCHLORIDE 2 ML: 10 INJECTION, SOLUTION EPIDURAL; INFILTRATION; INTRACAUDAL; PERINEURAL at 10:44

## 2023-06-02 RX ADMIN — METHYLPREDNISOLONE ACETATE 80 MG: 80 INJECTION, SUSPENSION INTRA-ARTICULAR; INTRALESIONAL; INTRAMUSCULAR; SOFT TISSUE at 10:44

## 2023-06-02 NOTE — PROGRESS NOTES
Kamini Otra is here today for worsening Bilateral knee pain. Knee injection was discussed with the patient in detail, including indication, risks, benefits, and alternatives. Verbal consent was given for the procedure. Injection site was aseptically prepared.    Radiology:   AP, lateral, of the right knee obtained in the office today due to pain, with comparison views shows advanced tricompartmental degenerative changes, most significantly  medial and patellofemoral compartment with bone on bone contact, osteophyte formation and subchondral sclerosis      KNEE Injection Procedure Note:    Large Joint Arthrocentesis: R knee  Date/Time: 6/2/2023 10:44 AM  Consent given by: patient  Site marked: site marked  Timeout: Immediately prior to procedure a time out was called to verify the correct patient, procedure, equipment, support staff and site/side marked as required   Supporting Documentation  Indications: pain   Procedure Details  Location: knee - R knee  Preparation: Patient was prepped and draped in the usual sterile fashion  Needle gauge: 21g.  Approach: anterolateral  Medications administered: 80 mg methylPREDNISolone acetate 80 MG/ML; 2 mL lidocaine PF 1% 1 %  Patient tolerance: patient tolerated the procedure well with no immediate complications    Large Joint Arthrocentesis: L knee  Date/Time: 6/2/2023 10:44 AM  Consent given by: patient  Site marked: site marked  Timeout: Immediately prior to procedure a time out was called to verify the correct patient, procedure, equipment, support staff and site/side marked as required   Supporting Documentation  Indications: pain   Procedure Details  Location: knee - L knee  Preparation: Patient was prepped and draped in the usual sterile fashion  Needle gauge: 21g.  Approach: anterolateral  Medications administered: 80 mg methylPREDNISolone acetate 80 MG/ML; 2 mL lidocaine PF 1% 1 %  Patient tolerance: patient tolerated the procedure well with no immediate  complications      Kamini Orta tolerated the procedure well. A Bandage was applied to the injection site. At the conclusion of the injection I discussed the importance of continued quad strengthening exercises on a daily basis. I will see the patient back if the symptoms should fail to improve or worsen.    -Patient is thinking she would like to proceed with a right total knee replacement in the fall of this year.  She states that she is needing to get it done and she is no longer making as long on the injections.  She has not done any formal physical therapy on the knee.  I did place a referral for physical therapy, she states she is about to start for vertigo.  I recommend she get in get some physical therapy on the knee and get a home program to get the exercises prior to having surgery.  She may need to wait till after the vertigo subsides in order to do the knee PT.  I also recommend she see his Dr. Moore in about 6 weeks to get herself signed up for surgery.  She verbalized understanding is in agreement this plan.    Kelly Mercado, APRN  6/2/2023    Dictated Utilizing Dragon Dictation

## 2023-06-06 LAB — QT INTERVAL: 413 MS

## 2023-06-06 RX ORDER — ROSUVASTATIN CALCIUM 10 MG/1
TABLET, COATED ORAL
Qty: 90 TABLET | Refills: 3 | Status: SHIPPED | OUTPATIENT
Start: 2023-06-06

## 2023-08-10 ENCOUNTER — OFFICE VISIT (OUTPATIENT)
Dept: ORTHOPEDIC SURGERY | Facility: CLINIC | Age: 63
End: 2023-08-10
Payer: COMMERCIAL

## 2023-08-10 VITALS — BODY MASS INDEX: 36.68 KG/M2 | HEIGHT: 63 IN | WEIGHT: 207 LBS | TEMPERATURE: 97.8 F

## 2023-08-10 DIAGNOSIS — M17.12 ARTHRITIS OF LEFT KNEE: ICD-10-CM

## 2023-08-10 DIAGNOSIS — M17.11 ARTHRITIS OF RIGHT KNEE: Primary | ICD-10-CM

## 2023-08-10 RX ORDER — METHYLPREDNISOLONE ACETATE 80 MG/ML
80 INJECTION, SUSPENSION INTRA-ARTICULAR; INTRALESIONAL; INTRAMUSCULAR; SOFT TISSUE
Status: COMPLETED | OUTPATIENT
Start: 2023-08-10 | End: 2023-08-10

## 2023-08-10 RX ORDER — MELOXICAM 7.5 MG/1
15 TABLET ORAL ONCE
OUTPATIENT
Start: 2023-08-10 | End: 2023-08-10

## 2023-08-10 RX ORDER — ACETAMINOPHEN 325 MG/1
1000 TABLET ORAL ONCE
OUTPATIENT
Start: 2023-08-10 | End: 2023-08-10

## 2023-08-10 RX ORDER — PREGABALIN 75 MG/1
75 CAPSULE ORAL ONCE
OUTPATIENT
Start: 2023-08-10 | End: 2023-08-10

## 2023-08-10 RX ORDER — CHLORHEXIDINE GLUCONATE 500 MG/1
CLOTH TOPICAL TAKE AS DIRECTED
OUTPATIENT
Start: 2023-08-10

## 2023-08-10 RX ORDER — LIDOCAINE HYDROCHLORIDE 10 MG/ML
2 INJECTION, SOLUTION EPIDURAL; INFILTRATION; INTRACAUDAL; PERINEURAL
Status: COMPLETED | OUTPATIENT
Start: 2023-08-10 | End: 2023-08-10

## 2023-08-10 RX ADMIN — METHYLPREDNISOLONE ACETATE 80 MG: 80 INJECTION, SUSPENSION INTRA-ARTICULAR; INTRALESIONAL; INTRAMUSCULAR; SOFT TISSUE at 11:03

## 2023-08-10 RX ADMIN — LIDOCAINE HYDROCHLORIDE 2 ML: 10 INJECTION, SOLUTION EPIDURAL; INFILTRATION; INTRACAUDAL; PERINEURAL at 11:03

## 2023-08-10 NOTE — PROGRESS NOTES
`Patient Name: Kamini Orta   YOB: 1960  Referring Primary Care Physician: Davin Hall MD  BMI: Body mass index is 36.67 kg/mý.    Chief Complaint:    Chief Complaint   Patient presents with    Left Knee - Follow-up, Pain    Right Knee - Follow-up, Pain        HPI:     Kamini Orta is a 62 y.o. adult who presents today for evaluation of   Chief Complaint   Patient presents with    Left Knee - Follow-up, Pain    Right Knee - Follow-up, Pain   .  Kamini follows up today on her right knee and says she just cannot go on with that anymore it is killing her.  Left knee bothers her as well she has had injection she has had physical therapy and she has been working on her home exercise program.  It is really interfering with the quality of her life and she wishes to discuss total knee replacement on the right at this time      Subjective   Medications:   Home Medications:  Current Outpatient Medications on File Prior to Visit   Medication Sig    ALPRAZolam (XANAX) 0.5 MG tablet TAKE ONE TABLET BY MOUTH TWICE DAILY AS NEEDED for severe anxiety    baclofen (LIORESAL) 10 MG tablet TAKE ONE TABLET BY MOUTH THREE TIMES DAILY AS NEEDED FOR MUSCLE SPASMS    diclofenac (VOLTAREN) 50 MG EC tablet Take 2 tablets by mouth Daily.    dicyclomine (BENTYL) 20 MG tablet Take 1 tablet by mouth Every 8 (Eight) Hours As Needed (abdominal pain).    estradiol (Vivelle-Dot) 0.05 MG/24HR patch Place 1 patch on the skin as directed by provider 2 (Two) Times a Week.    fluconazole (Diflucan) 100 MG tablet Take 1 tablet by mouth Daily.    FLUoxetine (PROzac) 40 MG capsule Take 1 capsule by mouth Daily.    fluticasone (FLONASE) 50 MCG/ACT nasal spray 2 sprays by Each Nare route Daily.    HYDROcodone-acetaminophen (NORCO) 5-325 MG per tablet TAKE ONE TABLET BY MOUTH TWICE DAILY (EVERY TWELVE HOURS) AS NEEDED for severe pain    levothyroxine (SYNTHROID, LEVOTHROID) 75 MCG tablet Take 1 tablet by mouth Daily.     "meclizine (ANTIVERT) 25 MG tablet TAKE ONE TABLET BY MOUTH THREE TIMES DAILY AS NEEDED FOR dizziness    pantoprazole (PROTONIX) 40 MG EC tablet Take 1 tablet by mouth Daily.    rosuvastatin (CRESTOR) 10 MG tablet TAKE ONE TABLET BY MOUTH EVERY DAY    Scopolamine 1 MG/3DAYS patch Place 1 patch on the skin as directed by provider Every 72 (Seventy-Two) Hours.    valsartan-hydrochlorothiazide (DIOVAN-HCT) 80-12.5 MG per tablet Take 1 tablet by mouth Every Evening.     No current facility-administered medications on file prior to visit.     Current Medications:  Scheduled Meds:  Continuous Infusions:No current facility-administered medications for this visit.    PRN Meds:.    I have reviewed the patient's medical history in detail and updated the computerized patient record.  Review and summarization of old records includes:    Past Medical History:   Diagnosis Date    Ankle sprain 40 years ago    Anxiety     Arachnoiditis     Arthritis of back hard to pinpoint    CTS (carpal tunnel syndrome)     Depression     Diverticulitis     Diverticulosis     Hip arthrosis     History of medical problems Vertigo    HL (hearing loss)     Hyperlipidemia     Hypertension     Knee swelling     Low back pain     Low back strain years    Lumbosacral disc disease this last time,     Scoliosis     Tear of meniscus of knee     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    Thyroid disease     Vertigo         Past Surgical History:   Procedure Laterality Date     SECTION      ALSO     CHOLECYSTECTOMY      COLONOSCOPY      COLONOSCOPY N/A 2022    Procedure: COLONOSCOPY;  Surgeon: Peter Tejada MD;  Location: [facility];  Service: Gastroenterology;  Laterality: N/A;  HEMMORHOIDS    ESOPHAGEAL DILATATION      HYSTERECTOMY      menorrhagia    KNEE ARTHROSCOPY Right     \"CLEAN UP\"    KNEE ARTHROSCOPY W/ MENISCAL REPAIR Right     TRIGGER POINT INJECTION  CRESENCIO's for back from "     WILL NOT AGAIN    UPPER GASTROINTESTINAL ENDOSCOPY          Social History     Occupational History    Not on file   Tobacco Use    Smoking status: Former     Years: 30.00     Types: Cigarettes     Start date: 1982     Quit date: 2012     Years since quittin.4    Smokeless tobacco: Not on file   Vaping Use    Vaping Use: Never used   Substance and Sexual Activity    Alcohol use: No    Drug use: No    Sexual activity: Not Currently     Comment: Full hysterectomy       Social History     Social History Narrative    Not on file        Family History   Problem Relation Age of Onset    Cancer Father         Mesothelioma    Colon polyps Father     Scoliosis Sister     Cancer Brother         Prostate    Cancer Brother         Colon & Liver    Colon cancer Brother 64    Heart disease Other     Lung disease Other     Malig Hyperthermia Neg Hx        ROS: 14 point review of systems was performed and all other systems were reviewed and are negative except for documented findings in HPI and today's encounter.     Allergies:   Allergies   Allergen Reactions    Codeine Anaphylaxis     Chest pains    Erythromycin Diarrhea     Cramps    Erythromycin Base Other (See Comments)    Morphine Unknown - High Severity and Other (See Comments)     Chest pain   Chest pain      Constitutional:  Denies fever, shaking or chills   Eyes:  Denies change in visual acuity   HENT:  Denies nasal congestion or sore throat   Respiratory:  Denies cough or shortness of breath   Cardiovascular:  Denies chest pain or severe LE edema   GI:  Denies abdominal pain, nausea, vomiting, bloody stools or diarrhea   Musculoskeletal:  Numbness, tingling, pain, or loss of motor function only as noted above in history of present illness.  : Denies painful urination or hematuria  Integument:  Denies rash, lesion or ulceration   Neurologic:  Denies headache or focal weakness  Endocrine:  Denies lymphadenopathy  Psych:  Denies confusion or  "change in mental status   Hem:  Denies active bleeding    OBJECTIVE:  Physical Exam: 62 y.o. adult  Wt Readings from Last 3 Encounters:   08/10/23 93.9 kg (207 lb)   06/02/23 93.9 kg (207 lb)   05/24/23 92.5 kg (204 lb)     Ht Readings from Last 1 Encounters:   08/10/23 160 cm (63\")     Body mass index is 36.67 kg/mý.  Vitals:    08/10/23 1039   Temp: 97.8 øF (36.6 øC)     Vital signs reviewed.     General Appearance:    Alert, cooperative, in no acute distress                  Eyes: conjunctiva clear  ENT: external ears and nose atraumatic  CV: no peripheral edema  Resp: normal respiratory effort  Skin: no rashes or wounds; normal turgor  Psych: mood and affect appropriate  Lymph: no nodes appreciated  Neuro: gross sensation intact  Vascular:  Palpable peripheral pulse in noted extremity  Musculoskeletal Extremities: Blayne shows crepitation synovitis swelling she has about 7 degree flexion contracture flexes back to about 110 has some pseudolaxity and joint line tenderness left knee has better motion and not the extent of swelling but she still has a lot of crepitation and joint line tenderness    Radiology:   AP lateral 40 degree PA x-ray bilateral knees taken the office previously viewed at this time with comparison views shows advanced arthritic change of bone-on-bone osteophyte subchondral sclerosis        Assessment:     ICD-10-CM ICD-9-CM   1. Arthritis of right knee  M17.11 716.96   2. Arthritis L knee  M17.12 716.96        MDM/Plan:   The diagnosis(es), natural history, pathophysiology and treatment for diagnosis(es) were discussed. Opportunity given and questions answered.  Biomechanics of pertinent body areas discussed.  When appropriate, the use of ambulatory aids discussed.    Biomechanics of pertinent body areas discussed.  When appropriate, the use of ambulatory aids discussed.  EXERCISES:  Advice on benefits of, and types of regular/moderate exercise pertaining to orthopedic diagnosis(es).  Cortisone " Injection. See procedure note.  HOME EXERCISE/PT program encouraged  MEDICAL RECORDS reviewed from other provider(s) for past and current medical history pertinent to this complaint.  Total Knee Replacement : Continuation of conservative management vs. TKA: I reviewed anatomy of a total knee arthroplasty in laymen's terms, as well as typical postoperative recovery and possibly 6-12 months for maximal recovery, and possible need for rehabilitation stay after hospitalization. We also discussed risks, benefits, alternatives, and limitations of procedure with risks including but not limited to neurovascular damage, bleeding, infection, malalignment, chronic pain, failure of implants, osteolysis, loosening of implants, loss of motion, weakness, stiffness, instability, DVT, pulmonary embolus, death, stroke, complex regional pain syndrome, myocardial infarction, and need for additional procedures. Concept of substitution vs. replacement discussed.  No guarantees were given regarding results of surgery.  Patient verbalized understanding, and was given the opportunity to ask and have all questions answered today.   She was given injection in the left today it is a little early but she got 2-1/2 months and she wants schedule right total knee replacement.    8/10/2023    Dictated utilizing Dragon dictation        Large Joint Arthrocentesis: L knee  Date/Time: 8/10/2023 11:03 AM  Consent given by: patient  Site marked: site marked  Timeout: Immediately prior to procedure a time out was called to verify the correct patient, procedure, equipment, support staff and site/side marked as required   Supporting Documentation  Indications: pain   Procedure Details  Location: knee - L knee  Preparation: Patient was prepped and draped in the usual sterile fashion  Needle gauge: 21g.  Approach: anterolateral  Medications administered: 80 mg methylPREDNISolone acetate 80 MG/ML; 2 mL lidocaine PF 1% 1 %  Patient tolerance: patient tolerated  the procedure well with no immediate complications

## 2023-08-31 LAB
BACTERIA UR QL AUTO: NORMAL
BILIRUB UR QL STRIP: NORMAL
CLARITY UR: NORMAL
COLOR UR: NORMAL
GLUCOSE UR STRIP-MCNC: NORMAL MG/DL
HGB UR QL STRIP.AUTO: NORMAL
HYALINE CASTS UR QL AUTO: NORMAL
KETONES UR QL STRIP: NORMAL
LEUKOCYTE ESTERASE UR QL STRIP.AUTO: NORMAL
NITRITE UR QL STRIP: NORMAL
PH UR STRIP.AUTO: NORMAL [PH]
PROT UR QL STRIP: NORMAL
RBC # UR STRIP: NORMAL /UL
REF LAB TEST METHOD: NORMAL
SP GR UR STRIP: NORMAL
SQUAMOUS #/AREA URNS HPF: NORMAL /[HPF]
UROBILINOGEN UR QL STRIP: NORMAL
WBC # UR STRIP: NORMAL /UL

## 2023-09-19 ENCOUNTER — LAB (OUTPATIENT)
Dept: LAB | Facility: HOSPITAL | Age: 63
End: 2023-09-19
Payer: COMMERCIAL

## 2023-09-19 DIAGNOSIS — R42 VERTIGO: ICD-10-CM

## 2023-09-19 DIAGNOSIS — G89.29 CHRONIC BILATERAL LOW BACK PAIN, UNSPECIFIED WHETHER SCIATICA PRESENT: ICD-10-CM

## 2023-09-19 DIAGNOSIS — M54.50 CHRONIC BILATERAL LOW BACK PAIN, UNSPECIFIED WHETHER SCIATICA PRESENT: ICD-10-CM

## 2023-09-19 DIAGNOSIS — G03.9 ARACHNOIDITIS: ICD-10-CM

## 2023-09-19 DIAGNOSIS — G89.29 CHRONIC PAIN OF RIGHT KNEE: ICD-10-CM

## 2023-09-19 DIAGNOSIS — M17.11 PRIMARY OSTEOARTHRITIS OF RIGHT KNEE: ICD-10-CM

## 2023-09-19 DIAGNOSIS — I10 HYPERTENSION, ESSENTIAL: ICD-10-CM

## 2023-09-19 DIAGNOSIS — M25.561 CHRONIC PAIN OF RIGHT KNEE: ICD-10-CM

## 2023-09-19 DIAGNOSIS — M17.0 ARTHRITIS OF BOTH KNEES: ICD-10-CM

## 2023-09-19 LAB
ALBUMIN SERPL-MCNC: 4.2 G/DL (ref 3.5–5.2)
ALBUMIN/GLOB SERPL: 1.5 G/DL
ALP SERPL-CCNC: 66 U/L (ref 39–117)
ALT SERPL W P-5'-P-CCNC: 15 U/L (ref 1–33)
ANION GAP SERPL CALCULATED.3IONS-SCNC: 10.5 MMOL/L (ref 5–15)
AST SERPL-CCNC: 22 U/L (ref 1–32)
BASOPHILS # BLD AUTO: 0.04 10*3/MM3 (ref 0–0.2)
BASOPHILS NFR BLD AUTO: 0.6 % (ref 0–1.5)
BILIRUB SERPL-MCNC: 0.3 MG/DL (ref 0–1.2)
BUN SERPL-MCNC: 14 MG/DL (ref 8–23)
BUN/CREAT SERPL: 17.7 (ref 7–25)
CALCIUM SPEC-SCNC: 9.8 MG/DL (ref 8.6–10.5)
CHLORIDE SERPL-SCNC: 100 MMOL/L (ref 98–107)
CHOLEST SERPL-MCNC: 188 MG/DL (ref 0–200)
CO2 SERPL-SCNC: 29.5 MMOL/L (ref 22–29)
CREAT SERPL-MCNC: 0.79 MG/DL (ref 0.57–1)
DEPRECATED RDW RBC AUTO: 44.6 FL (ref 37–54)
EGFRCR SERPLBLD CKD-EPI 2021: 85.2 ML/MIN/1.73
EOSINOPHIL # BLD AUTO: 0.17 10*3/MM3 (ref 0–0.4)
EOSINOPHIL NFR BLD AUTO: 2.4 % (ref 0.3–6.2)
ERYTHROCYTE [DISTWIDTH] IN BLOOD BY AUTOMATED COUNT: 14 % (ref 12.3–15.4)
GLOBULIN UR ELPH-MCNC: 2.8 GM/DL
GLUCOSE SERPL-MCNC: 100 MG/DL (ref 65–99)
HBA1C MFR BLD: 6.2 % (ref 4.8–5.6)
HCT VFR BLD AUTO: 36.7 % (ref 34–46.6)
HDLC SERPL-MCNC: 42 MG/DL (ref 40–60)
HGB BLD-MCNC: 12.3 G/DL (ref 12–15.9)
IMM GRANULOCYTES # BLD AUTO: 0.02 10*3/MM3 (ref 0–0.05)
IMM GRANULOCYTES NFR BLD AUTO: 0.3 % (ref 0–0.5)
LDLC SERPL CALC-MCNC: 92 MG/DL (ref 0–100)
LDLC/HDLC SERPL: 1.94 {RATIO}
LYMPHOCYTES # BLD AUTO: 2.25 10*3/MM3 (ref 0.7–3.1)
LYMPHOCYTES NFR BLD AUTO: 31.9 % (ref 19.6–45.3)
MCH RBC QN AUTO: 29.7 PG (ref 26.6–33)
MCHC RBC AUTO-ENTMCNC: 33.5 G/DL (ref 31.5–35.7)
MCV RBC AUTO: 88.6 FL (ref 79–97)
MONOCYTES # BLD AUTO: 0.48 10*3/MM3 (ref 0.1–0.9)
MONOCYTES NFR BLD AUTO: 6.8 % (ref 5–12)
NEUTROPHILS NFR BLD AUTO: 4.09 10*3/MM3 (ref 1.7–7)
NEUTROPHILS NFR BLD AUTO: 58 % (ref 42.7–76)
NRBC BLD AUTO-RTO: 0 /100 WBC (ref 0–0.2)
PLATELET # BLD AUTO: 275 10*3/MM3 (ref 140–450)
PMV BLD AUTO: 10.1 FL (ref 6–12)
POTASSIUM SERPL-SCNC: 4 MMOL/L (ref 3.5–5.2)
PROT SERPL-MCNC: 7 G/DL (ref 6–8.5)
RBC # BLD AUTO: 4.14 10*6/MM3 (ref 3.77–5.28)
SODIUM SERPL-SCNC: 140 MMOL/L (ref 136–145)
T4 FREE SERPL-MCNC: 1.49 NG/DL (ref 0.93–1.7)
TRIGL SERPL-MCNC: 322 MG/DL (ref 0–150)
TSH SERPL DL<=0.05 MIU/L-ACNC: 1.55 UIU/ML (ref 0.27–4.2)
VLDLC SERPL-MCNC: 54 MG/DL (ref 5–40)
WBC NRBC COR # BLD: 7.05 10*3/MM3 (ref 3.4–10.8)

## 2023-09-19 PROCEDURE — 36415 COLL VENOUS BLD VENIPUNCTURE: CPT

## 2023-09-19 PROCEDURE — 83036 HEMOGLOBIN GLYCOSYLATED A1C: CPT

## 2023-09-19 PROCEDURE — 84439 ASSAY OF FREE THYROXINE: CPT

## 2023-09-19 PROCEDURE — 80050 GENERAL HEALTH PANEL: CPT

## 2023-09-19 PROCEDURE — 80061 LIPID PANEL: CPT

## 2023-09-25 ENCOUNTER — TRANSCRIBE ORDERS (OUTPATIENT)
Dept: ADMINISTRATIVE | Facility: HOSPITAL | Age: 63
End: 2023-09-25
Payer: COMMERCIAL

## 2023-09-25 DIAGNOSIS — Z12.31 ENCOUNTER FOR SCREENING MAMMOGRAM FOR BREAST CANCER: Primary | ICD-10-CM

## 2023-10-03 ENCOUNTER — OFFICE VISIT (OUTPATIENT)
Dept: INTERNAL MEDICINE | Facility: CLINIC | Age: 63
End: 2023-10-03
Payer: COMMERCIAL

## 2023-10-03 VITALS
DIASTOLIC BLOOD PRESSURE: 82 MMHG | OXYGEN SATURATION: 94 % | WEIGHT: 213 LBS | BODY MASS INDEX: 37.74 KG/M2 | SYSTOLIC BLOOD PRESSURE: 135 MMHG | HEART RATE: 79 BPM | HEIGHT: 63 IN | TEMPERATURE: 98 F

## 2023-10-03 DIAGNOSIS — I10 HYPERTENSION, ESSENTIAL: Primary | ICD-10-CM

## 2023-10-03 DIAGNOSIS — M17.11 PRIMARY OSTEOARTHRITIS OF RIGHT KNEE: ICD-10-CM

## 2023-10-03 DIAGNOSIS — M51.36 LUMBAR DEGENERATIVE DISC DISEASE: ICD-10-CM

## 2023-10-03 DIAGNOSIS — G03.9 ADHESIVE ARACHNOIDITIS: ICD-10-CM

## 2023-10-03 DIAGNOSIS — G89.29 CHRONIC BILATERAL LOW BACK PAIN WITHOUT SCIATICA: ICD-10-CM

## 2023-10-03 DIAGNOSIS — M17.0 ARTHRITIS OF BOTH KNEES: ICD-10-CM

## 2023-10-03 DIAGNOSIS — M25.561 CHRONIC PAIN OF RIGHT KNEE: ICD-10-CM

## 2023-10-03 DIAGNOSIS — M54.50 CHRONIC BILATERAL LOW BACK PAIN WITHOUT SCIATICA: ICD-10-CM

## 2023-10-03 DIAGNOSIS — G89.29 CHRONIC PAIN OF RIGHT KNEE: ICD-10-CM

## 2023-10-03 LAB
ALBUMIN UR-MCNC: <1.2 MG/DL
AMPHET+METHAMPHET UR QL: NEGATIVE
BARBITURATES UR QL SCN: NEGATIVE
BENZODIAZ UR QL SCN: NEGATIVE
CANNABINOIDS SERPL QL: POSITIVE
COCAINE UR QL: NEGATIVE
FENTANYL UR-MCNC: NEGATIVE NG/ML
METHADONE UR QL SCN: NEGATIVE
OPIATES UR QL: NEGATIVE
OXYCODONE UR QL SCN: NEGATIVE

## 2023-10-03 PROCEDURE — 80307 DRUG TEST PRSMV CHEM ANLYZR: CPT | Performed by: INTERNAL MEDICINE

## 2023-10-03 PROCEDURE — 82043 UR ALBUMIN QUANTITATIVE: CPT | Performed by: INTERNAL MEDICINE

## 2023-10-03 PROCEDURE — 99214 OFFICE O/P EST MOD 30 MIN: CPT | Performed by: INTERNAL MEDICINE

## 2023-10-03 RX ORDER — HYDROCODONE BITARTRATE AND ACETAMINOPHEN 5; 325 MG/1; MG/1
1 TABLET ORAL EVERY 12 HOURS PRN
Qty: 60 TABLET | Refills: 0 | Status: SHIPPED | OUTPATIENT
Start: 2023-10-03

## 2023-10-03 RX ORDER — LEVOTHYROXINE SODIUM 0.07 MG/1
75 TABLET ORAL DAILY
Qty: 90 TABLET | Refills: 3 | Status: SHIPPED | OUTPATIENT
Start: 2023-10-03

## 2023-10-03 RX ORDER — PANTOPRAZOLE SODIUM 40 MG/1
40 TABLET, DELAYED RELEASE ORAL DAILY
Qty: 90 TABLET | Refills: 3 | Status: SHIPPED | OUTPATIENT
Start: 2023-10-03

## 2023-10-03 NOTE — PROGRESS NOTES
"CHIEF COMPLAINT/ HPI:  Hypertension (Routine follow up, Lab follow up. Pt states left shoulder pain for couple weeks , ROM compromised. Medication Refill )              Objective   Vital Signs  Vitals:    10/03/23 1013   BP: 135/82   Pulse: 79   Temp: 98 °F (36.7 °C)   SpO2: 94%   Weight: 96.6 kg (213 lb)   Height: 160 cm (62.99\")      Body mass index is 37.74 kg/m².  Review of Systems   Constitutional: Negative.    HENT: Negative.     Eyes: Negative.    Respiratory: Negative.     Cardiovascular: Negative.    Gastrointestinal: Negative.    Endocrine: Negative.    Genitourinary: Negative.    Musculoskeletal: Negative.    Allergic/Immunologic: Negative.    Neurological: Negative.    Hematological: Negative.    Psychiatric/Behavioral: Negative.      Physical Exam  Constitutional:       General: Kamini Orta is not in acute distress.     Appearance: Normal appearance. Kamini Orta is obese.   HENT:      Head: Normocephalic.      Mouth/Throat:      Mouth: Mucous membranes are moist.   Eyes:      Conjunctiva/sclera: Conjunctivae normal.      Pupils: Pupils are equal, round, and reactive to light.   Cardiovascular:      Rate and Rhythm: Normal rate and regular rhythm.      Pulses: Normal pulses.      Heart sounds: Murmur (1/6 systolic, right upper sternal border) heard.   Pulmonary:      Effort: Pulmonary effort is normal.      Breath sounds: Normal breath sounds.   Abdominal:      General: Bowel sounds are normal.      Palpations: Abdomen is soft.   Musculoskeletal:         General: No swelling. Normal range of motion.      Cervical back: Neck supple.   Skin:     General: Skin is warm and dry.      Coloration: Skin is not jaundiced.   Neurological:      General: No focal deficit present.      Mental Status: Kamini Orta is alert and oriented to person, place, and time. Mental status is at baseline.   Psychiatric:         Mood and Affect: Mood normal.         Behavior: Behavior normal.         Thought " Content: Thought content normal.         Judgment: Judgment normal.      Result Review :   No results found for: PROBNP, BNP  CMP          5/23/2023    12:53 9/19/2023    10:12   CMP   Glucose 135  100    BUN 12  14    Creatinine 0.76  0.79    EGFR 89.3  85.2    Sodium 140  140    Potassium 3.6  4.0    Chloride 103  100    Calcium 9.3  9.8    Total Protein 6.8  7.0    Albumin 4.0  4.2    Globulin 2.8  2.8    Total Bilirubin 0.2  0.3    Alkaline Phosphatase 71  66    AST (SGOT) 21  22    ALT (SGPT) 15  15    Albumin/Globulin Ratio 1.4  1.5    BUN/Creatinine Ratio 15.8  17.7    Anion Gap 9.6  10.5      CBC w/diff          5/23/2023    12:53 9/19/2023    10:12   CBC w/Diff   WBC 7.94  7.05    RBC 4.13  4.14    Hemoglobin 12.5  12.3    Hematocrit 36.5  36.7    MCV 88.4  88.6    MCH 30.3  29.7    MCHC 34.2  33.5    RDW 14.3  14.0    Platelets 288  275    Neutrophil Rel % 56.4  58.0    Immature Granulocyte Rel % 0.4  0.3    Lymphocyte Rel % 33.5  31.9    Monocyte Rel % 6.5  6.8    Eosinophil Rel % 2.6  2.4    Basophil Rel % 0.6  0.6       Lipid Panel          9/19/2023    10:12   Lipid Panel   Total Cholesterol 188    Triglycerides 322    HDL Cholesterol 42    VLDL Cholesterol 54    LDL Cholesterol  92    LDL/HDL Ratio 1.94       Lab Results   Component Value Date    TSH 1.550 09/19/2023    TSH 1.170 01/04/2022    TSH 1.640 06/21/2021      Lab Results   Component Value Date    FREET4 1.49 09/19/2023    FREET4 1.47 01/04/2022    FREET4 1.34 06/21/2021      A1C Last 3 Results          9/19/2023    10:12   HGBA1C Last 3 Results   Hemoglobin A1C 6.20                        Visit Diagnoses:    ICD-10-CM ICD-9-CM   1. Hypertension, essential  I10 401.9   2. Chronic bilateral low back pain without sciatica  M54.50 724.2    G89.29 338.29   3. Lumbar degenerative disc disease  M51.36 722.52   4. Arthritis of both knees  M17.0 716.96   5. Chronic pain of right knee  M25.561 719.46    G89.29 338.29   6. Adhesive arachnoiditis  G03.9  322.9   7. Primary osteoarthritis of right knee  M17.11 715.16       Assessment and Plan   Diagnoses and all orders for this visit:    1. Hypertension, essential (Primary)  -     HYDROcodone-acetaminophen (NORCO) 5-325 MG per tablet; Take 1 tablet by mouth Every 12 (Twelve) Hours As Needed for Moderate Pain.  Dispense: 60 tablet; Refill: 0  -     pantoprazole (PROTONIX) 40 MG EC tablet; Take 1 tablet by mouth Daily.  Dispense: 90 tablet; Refill: 3  -     levothyroxine (SYNTHROID, LEVOTHROID) 75 MCG tablet; Take 1 tablet by mouth Daily.  Dispense: 90 tablet; Refill: 3  -     Lipid Panel; Future  -     Comprehensive Metabolic Panel; Future  -     CBC & Differential; Future  -     TSH+Free T4; Future  -     Urine Drug Screen - Urine, Clean Catch; Future  -     MicroAlbumin, Urine, Random - Urine, Clean Catch; Future  -     Hemoglobin A1c; Future  -     Adult Transthoracic Echo Complete W/ Cont if Necessary Per Protocol; Future    2. Chronic bilateral low back pain without sciatica  -     HYDROcodone-acetaminophen (NORCO) 5-325 MG per tablet; Take 1 tablet by mouth Every 12 (Twelve) Hours As Needed for Moderate Pain.  Dispense: 60 tablet; Refill: 0  -     pantoprazole (PROTONIX) 40 MG EC tablet; Take 1 tablet by mouth Daily.  Dispense: 90 tablet; Refill: 3  -     levothyroxine (SYNTHROID, LEVOTHROID) 75 MCG tablet; Take 1 tablet by mouth Daily.  Dispense: 90 tablet; Refill: 3  -     Lipid Panel; Future  -     Comprehensive Metabolic Panel; Future  -     CBC & Differential; Future  -     TSH+Free T4; Future  -     Urine Drug Screen - Urine, Clean Catch; Future  -     MicroAlbumin, Urine, Random - Urine, Clean Catch; Future  -     Hemoglobin A1c; Future  -     Adult Transthoracic Echo Complete W/ Cont if Necessary Per Protocol; Future    3. Lumbar degenerative disc disease  -     HYDROcodone-acetaminophen (NORCO) 5-325 MG per tablet; Take 1 tablet by mouth Every 12 (Twelve) Hours As Needed for Moderate Pain.  Dispense: 60  tablet; Refill: 0  -     pantoprazole (PROTONIX) 40 MG EC tablet; Take 1 tablet by mouth Daily.  Dispense: 90 tablet; Refill: 3  -     levothyroxine (SYNTHROID, LEVOTHROID) 75 MCG tablet; Take 1 tablet by mouth Daily.  Dispense: 90 tablet; Refill: 3  -     Lipid Panel; Future  -     Comprehensive Metabolic Panel; Future  -     CBC & Differential; Future  -     TSH+Free T4; Future  -     Urine Drug Screen - Urine, Clean Catch; Future  -     MicroAlbumin, Urine, Random - Urine, Clean Catch; Future  -     Hemoglobin A1c; Future  -     Adult Transthoracic Echo Complete W/ Cont if Necessary Per Protocol; Future    4. Arthritis of both knees  -     HYDROcodone-acetaminophen (NORCO) 5-325 MG per tablet; Take 1 tablet by mouth Every 12 (Twelve) Hours As Needed for Moderate Pain.  Dispense: 60 tablet; Refill: 0  -     pantoprazole (PROTONIX) 40 MG EC tablet; Take 1 tablet by mouth Daily.  Dispense: 90 tablet; Refill: 3  -     levothyroxine (SYNTHROID, LEVOTHROID) 75 MCG tablet; Take 1 tablet by mouth Daily.  Dispense: 90 tablet; Refill: 3  -     Lipid Panel; Future  -     Comprehensive Metabolic Panel; Future  -     CBC & Differential; Future  -     TSH+Free T4; Future  -     Urine Drug Screen - Urine, Clean Catch; Future  -     MicroAlbumin, Urine, Random - Urine, Clean Catch; Future  -     Hemoglobin A1c; Future  -     Adult Transthoracic Echo Complete W/ Cont if Necessary Per Protocol; Future    5. Chronic pain of right knee  -     HYDROcodone-acetaminophen (NORCO) 5-325 MG per tablet; Take 1 tablet by mouth Every 12 (Twelve) Hours As Needed for Moderate Pain.  Dispense: 60 tablet; Refill: 0  -     pantoprazole (PROTONIX) 40 MG EC tablet; Take 1 tablet by mouth Daily.  Dispense: 90 tablet; Refill: 3  -     levothyroxine (SYNTHROID, LEVOTHROID) 75 MCG tablet; Take 1 tablet by mouth Daily.  Dispense: 90 tablet; Refill: 3  -     Lipid Panel; Future  -     Comprehensive Metabolic Panel; Future  -     CBC & Differential;  Future  -     TSH+Free T4; Future  -     Urine Drug Screen - Urine, Clean Catch; Future  -     MicroAlbumin, Urine, Random - Urine, Clean Catch; Future  -     Hemoglobin A1c; Future  -     Adult Transthoracic Echo Complete W/ Cont if Necessary Per Protocol; Future    6. Adhesive arachnoiditis  -     HYDROcodone-acetaminophen (NORCO) 5-325 MG per tablet; Take 1 tablet by mouth Every 12 (Twelve) Hours As Needed for Moderate Pain.  Dispense: 60 tablet; Refill: 0  -     pantoprazole (PROTONIX) 40 MG EC tablet; Take 1 tablet by mouth Daily.  Dispense: 90 tablet; Refill: 3  -     levothyroxine (SYNTHROID, LEVOTHROID) 75 MCG tablet; Take 1 tablet by mouth Daily.  Dispense: 90 tablet; Refill: 3  -     Lipid Panel; Future  -     Comprehensive Metabolic Panel; Future  -     CBC & Differential; Future  -     TSH+Free T4; Future  -     Urine Drug Screen - Urine, Clean Catch; Future  -     MicroAlbumin, Urine, Random - Urine, Clean Catch; Future  -     Hemoglobin A1c; Future  -     Adult Transthoracic Echo Complete W/ Cont if Necessary Per Protocol; Future    7. Primary osteoarthritis of right knee  -     HYDROcodone-acetaminophen (NORCO) 5-325 MG per tablet; Take 1 tablet by mouth Every 12 (Twelve) Hours As Needed for Moderate Pain.  Dispense: 60 tablet; Refill: 0  -     pantoprazole (PROTONIX) 40 MG EC tablet; Take 1 tablet by mouth Daily.  Dispense: 90 tablet; Refill: 3  -     levothyroxine (SYNTHROID, LEVOTHROID) 75 MCG tablet; Take 1 tablet by mouth Daily.  Dispense: 90 tablet; Refill: 3  -     Lipid Panel; Future  -     Comprehensive Metabolic Panel; Future  -     CBC & Differential; Future  -     TSH+Free T4; Future  -     Urine Drug Screen - Urine, Clean Catch; Future  -     MicroAlbumin, Urine, Random - Urine, Clean Catch; Future  -     Hemoglobin A1c; Future  -     Adult Transthoracic Echo Complete W/ Cont if Necessary Per Protocol; Future    Severe arthritis and pain in her right knee having upcoming right total knee  arthroplasty Dr. Mccarty in [facility] , WILL DO URINE MICRO ALBUMIN OCT 3, 2023    Back pain, sciatica, r leg, --MRI November 2021 showed a left L5-S1 disc paracentral extrusion with multiple levels of foraminal stenosis on both sides throughout the spine, with degenerative disc disease noted, patient did go see Ortho spine surgeon in [place] was possibly going to have surgery but then they called and said they would not do it due to her increased risk and back issues and stability, discussed May 2022--- we will make referral to [facility] [place] for second opinion given the severity of the discs and the continued back pain that she is having, May 2022, patient continues on diclofenac  mg daily as above,    abdominal pain possible recurrent diverticulitis January 31, 2022--, patient had a colonoscopy August 2022, CT scan abdomen and pelvis June 24, 2022,--diverticulosis no evidence of diverticulitis or serious findings,---- recommend continue Protonix in the morning and Pepcid at bedtime---     vertigo --recurrent kishore 10/ 2022 --- with nausea vomiting, headaches, work-up in the emergency room included MRI of the brain CT of the brain did not show any acute abnormality, chest x-ray no active disease--- she will continue meclizine 3-4 times per day and add scopolamine patch December 6, 2021, and again January 10, 2022,---, referral made for Epley maneuver with [facility], November 2022, she has been using the scopolamine patch with some help, but she also had possibly gotten some in her hand and eye recently with some changes,--------------- symptoms have gotten much better after the Epley maneuver, October 3, 2023    hypertension  continues ---valsartan HCT 80/12.5 mg daily,,     PULM NODULE ON CXR SEPT 2017, CT SCAN OCT 2017, NO CHANGE IN CT APRIL 2018, (DONE ADIA ROMERO, --CT CHEST OCT 2019 , NO CHANGE IN PULM NODULE OVER 2 YRS , SO NO MORE F/U     LIFE STRESSORS DISCUSSED,  ANNA MARIE AND URINE  DRUG SCREEN REIVIEWED nov 2022    Hypothyroidism, cont levothyroxine 0.075 mg qd     HEART MURUMUR, ECHO 11/2018, ESSENTIALLY NL, will recheck echo at some point discussed October 3, 2023    obesity --    LLQ Abdominal Pain/Diverticulitis--6/18/16.-- colonoscopy Dr. Alarcon--  NOV, 2019, THI ,, August 2022,, internal hemorrhoids only, otherwise normal exam,    L BREAST MASS--f/u U/S WAS NEG,. 8/2015, , Yearly mammograms    Patient quit smoking 11 years ago    CHRONIC PAIN ISSUES DISCUSSED --WITH PAIN IN NECK , BACK PAIN- ON PRN NORCO-- WE DISCUSSED RISK OF DRIVING AND ADDICTION WITH MED--OCT 2020 patient continues on diclofenac 100 mg XR daily,    DEPRESSION,--continues Xanax 0.5 twice a day, Prozac 40 mg daily,    ELEVATED CHOL, TRIG,-continues Crestor,    Follow Up   Return in about 6 months (around 4/3/2024).  Patient was given instructions and counseling regarding Kamini Orta's condition or for health maintenance advice. Please see specific information pulled into the AVS if appropriate.       Answers submitted by the patient for this visit:  Primary Reason for Visit (Submitted on 10/2/2023)  What is the primary reason for your visit?: Other, Other  Other (Submitted on 10/2/2023)  Please describe your symptoms.: N/A  Have you had these symptoms before?: Yes  How long have you been having these symptoms?: 1-4 days  Please list any medications you are currently taking for this condition.: N/A  Please describe any probable cause for these symptoms. : N/A

## 2023-10-18 RX ORDER — FLUCONAZOLE 100 MG/1
100 TABLET ORAL DAILY
Qty: 3 TABLET | Refills: 5 | Status: SHIPPED | OUTPATIENT
Start: 2023-10-18

## 2023-10-31 ENCOUNTER — PRE-ADMISSION TESTING (OUTPATIENT)
Dept: PREADMISSION TESTING | Facility: HOSPITAL | Age: 63
End: 2023-10-31
Payer: COMMERCIAL

## 2023-10-31 VITALS
TEMPERATURE: 96.8 F | SYSTOLIC BLOOD PRESSURE: 114 MMHG | DIASTOLIC BLOOD PRESSURE: 65 MMHG | HEART RATE: 69 BPM | OXYGEN SATURATION: 95 % | HEIGHT: 63 IN | RESPIRATION RATE: 20 BRPM | WEIGHT: 213 LBS | BODY MASS INDEX: 37.74 KG/M2

## 2023-10-31 LAB
ANION GAP SERPL CALCULATED.3IONS-SCNC: 9 MMOL/L (ref 5–15)
BUN SERPL-MCNC: 14 MG/DL (ref 8–23)
BUN/CREAT SERPL: 15.9 (ref 7–25)
CALCIUM SPEC-SCNC: 9.1 MG/DL (ref 8.6–10.5)
CHLORIDE SERPL-SCNC: 103 MMOL/L (ref 98–107)
CO2 SERPL-SCNC: 28 MMOL/L (ref 22–29)
CREAT SERPL-MCNC: 0.88 MG/DL (ref 0.57–1)
DEPRECATED RDW RBC AUTO: 44.2 FL (ref 37–54)
EGFRCR SERPLBLD CKD-EPI 2021: 74.9 ML/MIN/1.73
ERYTHROCYTE [DISTWIDTH] IN BLOOD BY AUTOMATED COUNT: 13.7 % (ref 12.3–15.4)
GLUCOSE SERPL-MCNC: 104 MG/DL (ref 65–99)
HCT VFR BLD AUTO: 37.5 % (ref 34–46.6)
HGB BLD-MCNC: 12.5 G/DL (ref 12–15.9)
MCH RBC QN AUTO: 29.6 PG (ref 26.6–33)
MCHC RBC AUTO-ENTMCNC: 33.3 G/DL (ref 31.5–35.7)
MCV RBC AUTO: 88.7 FL (ref 79–97)
PLATELET # BLD AUTO: 273 10*3/MM3 (ref 140–450)
PMV BLD AUTO: 9.5 FL (ref 6–12)
POTASSIUM SERPL-SCNC: 3.7 MMOL/L (ref 3.5–5.2)
RBC # BLD AUTO: 4.23 10*6/MM3 (ref 3.77–5.28)
SODIUM SERPL-SCNC: 140 MMOL/L (ref 136–145)
WBC NRBC COR # BLD: 8.04 10*3/MM3 (ref 3.4–10.8)

## 2023-10-31 PROCEDURE — 36415 COLL VENOUS BLD VENIPUNCTURE: CPT

## 2023-10-31 PROCEDURE — 80048 BASIC METABOLIC PNL TOTAL CA: CPT

## 2023-10-31 PROCEDURE — 85027 COMPLETE CBC AUTOMATED: CPT

## 2023-10-31 RX ORDER — CETIRIZINE HYDROCHLORIDE 10 MG/1
10 TABLET ORAL DAILY
COMMUNITY

## 2023-10-31 RX ORDER — FAMOTIDINE 10 MG
10 TABLET ORAL NIGHTLY
COMMUNITY

## 2023-10-31 RX ORDER — MULTIPLE VITAMINS W/ MINERALS TAB 9MG-400MCG
1 TAB ORAL DAILY
COMMUNITY

## 2023-10-31 NOTE — DISCHARGE INSTRUCTIONS
Take the following medications the morning of surgery:    Levothyroxin  prozac       If you are on prescription narcotic pain medication to control your pain you may also take that medication the morning of surgery.    General Instructions:  Do not eat solid food after midnight the night before surgery.  You may drink clear liquids day of surgery but must stop at least one hour before your hospital arrival time.  It is beneficial for you to have a clear drink that contains carbohydrates the day of surgery.  We suggest a 12 to 20 ounce bottle of Gatorade or Powerade for non-diabetic patients or a 12 to 20 ounce bottle of G2 or Powerade Zero for diabetic patients. (Pediatric patients, are not advised to drink a 12 to 20 ounce carbohydrate drink)    Clear liquids are liquids you can see through.  Nothing red in color.     Plain water                               Sports drinks  Sodas                                   Gelatin (Jell-O)  Fruit juices without pulp such as white grape juice and apple juice  Popsicles that contain no fruit or yogurt  Tea or coffee (no cream or milk added)  Gatorade / Powerade  G2 / Powerade Zero    Infants may have breast milk up to four hours before surgery.  Infants drinking formula may drink formula up to six hours before surgery.   Patients who avoid smoking, chewing tobacco and alcohol for 4 weeks prior to surgery have a reduced risk of post-operative complications.  Quit smoking as many days before surgery as you can.  Do not smoke, use chewing tobacco or drink alcohol the day of surgery.   If applicable bring your C-PAP/ BI-PAP machine in with you to preop day of surgery.  Bring any papers given to you in the doctor’s office.  Wear clean comfortable clothes.  Do not wear contact lenses, false eyelashes or make-up.  Bring a case for your glasses.   Bring crutches or walker if applicable.  Remove all piercings.  Leave jewelry and any other valuables at home.  Hair extensions with metal  clips must be removed prior to surgery.  The Pre-Admission Testing nurse will instruct you to bring medications if unable to obtain an accurate list in Pre-Admission Testing.        If you were given a blood bank ID arm band remember to bring it with you the day of surgery.    Preventing a Surgical Site Infection:  For 2 to 3 days before surgery, avoid shaving with a razor because the razor can irritate skin and make it easier to develop an infection.    Any areas of open skin can increase the risk of a post-operative wound infection by allowing bacteria to enter and travel throughout the body.  Notify your surgeon if you have any skin wounds / rashes even if it is not near the expected surgical site.  The area will need assessed to determine if surgery should be delayed until it is healed.  The night prior to surgery shower using a fresh bar of anti-bacterial soap (such as Dial) and clean washcloth.  Sleep in a clean bed with clean clothing.  Do not allow pets to sleep with you.  Shower on the morning of surgery using a fresh bar of anti-bacterial soap (such as Dial) and clean washcloth.  Dry with a clean towel and dress in clean clothing.  Ask your surgeon if you will be receiving antibiotics prior to surgery.  Make sure you, your family, and all healthcare providers clean their hands with soap and water or an alcohol based hand  before caring for you or your wound.    Day of surgery:11/14/2023   Hospital to call with time of arrival  Your arrival time is approximately two hours before your scheduled surgery time.  Upon arrival, a Pre-op nurse and Anesthesiologist will review your health history, obtain vital signs, and answer questions you may have.  The only belongings needed at this time will be a list of your home medications and if applicable your C-PAP/BI-PAP machine.  A Pre-op nurse will start an IV and you may receive medication in preparation for surgery, including something to help you relax.      Please be aware that surgery does come with discomfort.  We want to make every effort to control your discomfort so please discuss any uncontrolled symptoms with your nurse.   Your doctor will most likely have prescribed pain medications.      If you are going home after surgery you will receive individualized written care instructions before being discharged.  A responsible adult must drive you to and from the hospital on the day of your surgery and stay with you for 24 hours.  Discharge prescriptions can be filled by the hospital pharmacy during regular pharmacy hours.  If you are having surgery late in the day/evening your prescription may be e-prescribed to your pharmacy.  Please verify your pharmacy hours or chose a 24 hour pharmacy to avoid not having access to your prescription because your pharmacy has closed for the day.    If you are staying overnight following surgery, you will be transported to your hospital room following the recovery period.  [facility] has all private rooms.    If you have any questions please call Pre-Admission Testing at [phone].  Deductibles and co-payments are collected on the day of service. Please be prepared to pay the required co-pay, deductible or deposit on the day of service as defined by your plan.    Call your surgeon immediately if you experience any of the following symptoms:  Sore Throat  Shortness of Breath or difficulty breathing  Cough  Chills  Body soreness or muscle pain  Headache  Fever  New loss of taste or smell  Do not arrive for your surgery ill.  Your procedure will need to be rescheduled to another time.  You will need to call your physician before the day of surgery to avoid any unnecessary exposure to hospital staff as well as other patients.  CHLORHEXIDINE CLOTH INSTRUCTIONS  The morning of surgery follow these instructions using the Chlorhexidine cloths you've been given.  These steps reduce bacteria on the body.  Do not use the  cloths near your eyes, ears mouth, genitalia or on open wounds.  Throw the cloths away after use but do not try to flush them down a toilet.      Open and remove one cloth at a time from the package.    Leave the cloth unfolded and begin the bathing.  Massage the skin with the cloths using gentle pressure to remove bacteria.  Do not scrub harshly.   Follow the steps below with one 2% CHG cloth per area (6 total cloths).  One cloth for neck, shoulders and chest.  One cloth for both arms, hands, fingers and underarms (do underarms last).  One cloth for the abdomen followed by groin.  One cloth for right leg and foot including between the toes.  One cloth for left leg and foot including between the toes.  The last cloth is to be used for the back of the neck, back and buttocks.    Allow the CHG to air dry 3 minutes on the skin which will give it time to work and decrease the chance of irritation.  The skin may feel sticky until it is dry.  Do not rinse with water or any other liquid or you will lose the beneficial effects of the CHG.  If mild skin irritation occurs, do rinse the skin to remove the CHG.  Report this to the nurse at time of admission.  Do not apply lotions, creams, ointments, deodorants or perfumes after using the clothes. Dress in clean clothes before coming to the hospital.

## 2023-11-06 ENCOUNTER — HOSPITAL ENCOUNTER (OUTPATIENT)
Dept: CARDIOLOGY | Facility: HOSPITAL | Age: 63
Discharge: HOME | End: 2023-11-06
Admitting: INTERNAL MEDICINE
Payer: COMMERCIAL

## 2023-11-06 DIAGNOSIS — M51.36 LUMBAR DEGENERATIVE DISC DISEASE: ICD-10-CM

## 2023-11-06 DIAGNOSIS — G89.29 CHRONIC PAIN OF RIGHT KNEE: ICD-10-CM

## 2023-11-06 DIAGNOSIS — G03.9 ADHESIVE ARACHNOIDITIS: ICD-10-CM

## 2023-11-06 DIAGNOSIS — M17.0 ARTHRITIS OF BOTH KNEES: ICD-10-CM

## 2023-11-06 DIAGNOSIS — G89.29 CHRONIC BILATERAL LOW BACK PAIN WITHOUT SCIATICA: ICD-10-CM

## 2023-11-06 DIAGNOSIS — M54.50 CHRONIC BILATERAL LOW BACK PAIN WITHOUT SCIATICA: ICD-10-CM

## 2023-11-06 DIAGNOSIS — I10 HYPERTENSION, ESSENTIAL: ICD-10-CM

## 2023-11-06 DIAGNOSIS — M25.561 CHRONIC PAIN OF RIGHT KNEE: ICD-10-CM

## 2023-11-06 DIAGNOSIS — M17.11 PRIMARY OSTEOARTHRITIS OF RIGHT KNEE: ICD-10-CM

## 2023-11-06 LAB
BH CV ECHO MEAS - AO MAX PG: 13 MMHG
BH CV ECHO MEAS - AO MEAN PG: 6 MMHG
BH CV ECHO MEAS - AO ROOT DIAM: 3.3 CM
BH CV ECHO MEAS - AO V2 MAX: 183 CM/SEC
BH CV ECHO MEAS - AO V2 VTI: 37.6 CM
BH CV ECHO MEAS - AVA(I,D): 1.42 CM2
BH CV ECHO MEAS - EDV(CUBED): 64 ML
BH CV ECHO MEAS - EDV(MOD-SP2): 65.4 ML
BH CV ECHO MEAS - EDV(MOD-SP4): 73.6 ML
BH CV ECHO MEAS - EF(MOD-BP): 67 %
BH CV ECHO MEAS - EF(MOD-SP2): 67.3 %
BH CV ECHO MEAS - EF(MOD-SP4): 67.7 %
BH CV ECHO MEAS - ESV(CUBED): 15.6 ML
BH CV ECHO MEAS - ESV(MOD-SP2): 21.4 ML
BH CV ECHO MEAS - ESV(MOD-SP4): 23.8 ML
BH CV ECHO MEAS - FS: 37.5 %
BH CV ECHO MEAS - IVS/LVPW: 1.44 CM
BH CV ECHO MEAS - IVSD: 1.3 CM
BH CV ECHO MEAS - LA DIMENSION: 3.5 CM
BH CV ECHO MEAS - LAT PEAK E' VEL: 12.8 CM/SEC
BH CV ECHO MEAS - LV MASS(C)D: 145.6 GRAMS
BH CV ECHO MEAS - LV MAX PG: 3.8 MMHG
BH CV ECHO MEAS - LV MEAN PG: 2 MMHG
BH CV ECHO MEAS - LV V1 MAX: 98.1 CM/SEC
BH CV ECHO MEAS - LV V1 VTI: 21 CM
BH CV ECHO MEAS - LVIDD: 4 CM
BH CV ECHO MEAS - LVIDS: 2.5 CM
BH CV ECHO MEAS - LVOT AREA: 2.5 CM2
BH CV ECHO MEAS - LVOT DIAM: 1.8 CM
BH CV ECHO MEAS - LVPWD: 0.9 CM
BH CV ECHO MEAS - MED PEAK E' VEL: 10.3 CM/SEC
BH CV ECHO MEAS - MV A MAX VEL: 88.8 CM/SEC
BH CV ECHO MEAS - MV DEC SLOPE: 632 CM/SEC2
BH CV ECHO MEAS - MV DEC TIME: 0.21 SEC
BH CV ECHO MEAS - MV E MAX VEL: 92.5 CM/SEC
BH CV ECHO MEAS - MV E/A: 1.04
BH CV ECHO MEAS - MV MAX PG: 6 MMHG
BH CV ECHO MEAS - MV MEAN PG: 2 MMHG
BH CV ECHO MEAS - MV P1/2T: 49.6 MSEC
BH CV ECHO MEAS - MV V2 VTI: 29.5 CM
BH CV ECHO MEAS - MVA(P1/2T): 4.4 CM2
BH CV ECHO MEAS - MVA(VTI): 1.81 CM2
BH CV ECHO MEAS - RAP SYSTOLE: 3 MMHG
BH CV ECHO MEAS - RVDD: 3 CM
BH CV ECHO MEAS - RVSP: 35 MMHG
BH CV ECHO MEAS - SV(LVOT): 53.4 ML
BH CV ECHO MEAS - SV(MOD-SP2): 44 ML
BH CV ECHO MEAS - SV(MOD-SP4): 49.8 ML
BH CV ECHO MEAS - TR MAX PG: 31.8 MMHG
BH CV ECHO MEAS - TR MAX VEL: 282 CM/SEC
BH CV ECHO MEASUREMENTS AVERAGE E/E' RATIO: 8.01
LEFT ATRIUM VOLUME INDEX: 15.9 ML/M2
LEFT ATRIUM VOLUME: 31.7 ML

## 2023-11-06 PROCEDURE — 93306 TTE W/DOPPLER COMPLETE: CPT | Performed by: INTERNAL MEDICINE

## 2023-11-06 PROCEDURE — 93306 TTE W/DOPPLER COMPLETE: CPT

## 2023-11-07 ENCOUNTER — OFFICE VISIT (OUTPATIENT)
Dept: ORTHOPEDIC SURGERY | Facility: CLINIC | Age: 63
End: 2023-11-07
Payer: COMMERCIAL

## 2023-11-07 ENCOUNTER — TELEPHONE (OUTPATIENT)
Dept: INTERNAL MEDICINE | Facility: CLINIC | Age: 63
End: 2023-11-07
Payer: COMMERCIAL

## 2023-11-07 VITALS — BODY MASS INDEX: 37.39 KG/M2 | TEMPERATURE: 97.8 F | HEIGHT: 63 IN | WEIGHT: 211 LBS

## 2023-11-07 DIAGNOSIS — Z96.651 S/P TKR (TOTAL KNEE REPLACEMENT), RIGHT: ICD-10-CM

## 2023-11-07 DIAGNOSIS — M17.11 ARTHRITIS OF KNEE, RIGHT: Primary | ICD-10-CM

## 2023-11-07 NOTE — PROGRESS NOTES
"   History & Physical       Patient: Kamini Orta  YOB: 1960  Medical Record Number: [id number]  Wt Readings from Last 3 Encounters:   11/07/23 95.7 kg (211 lb)   10/31/23 96.6 kg (213 lb)   10/03/23 96.6 kg (213 lb)     Ht Readings from Last 3 Encounters:   11/07/23 160 cm (63\")   10/31/23 160 cm (63\")   10/03/23 160 cm (62.99\")     Body mass index is 37.38 kg/m².  Facility age limit for growth %marcela is 20 years.    Surgeon:  Dr. Fabian Moore    Chief Complaints:   Chief Complaint   Patient presents with    Right Knee - Pain, Pre-op Exam     Surgery: Right Total Knee Arthroplasty with Terrell Navigation    Subjective:    History of Present Illness: 62 y.o. adult presents with   Chief Complaint   Patient presents with    Right Knee - Pain, Pre-op Exam   . Chronic symptoms have been progressively worsening despite more conservative treatment measures including medications OTC and prescription, cortisone injections and physical therapy.  Symptoms are associated with ability to move, exercise, and perform activities of daily living.  Symptoms are aggravated by weight bearing and ROM necessary for activities of daily living.   Symptoms improve with rest, ice and elevation only minimally.      Allergies:   Allergies   Allergen Reactions    Codeine Anaphylaxis     Chest pains    Erythromycin Diarrhea     Cramps    Erythromycin Base Other (See Comments)    Morphine Other (See Comments) and Unknown - High Severity     Chest pain          Medications:   Home Medications:  Current Outpatient Medications on File Prior to Visit   Medication Sig    ALPRAZolam (XANAX) 0.5 MG tablet TAKE ONE TABLET BY MOUTH TWICE DAILY AS NEEDED for severe anxiety (Patient taking differently: Take 2 tablets by mouth Every Night.)    cetirizine (zyrTEC) 10 MG tablet Take 1 tablet by mouth Daily.    Coenzyme Q10 (COQ10 PO) Take 1 capsule by mouth.    diclofenac (VOLTAREN) 50 MG EC tablet Take 2 tablets by mouth Daily With " Dinner.    estradiol (Vivelle-Dot) 0.05 MG/24HR patch Place 1 patch on the skin as directed by provider 2 (Two) Times a Week.    famotidine (PEPCID) 10 MG tablet Take 1 tablet by mouth Every Night.    FLUoxetine (PROzac) 40 MG capsule Take 1 capsule by mouth Daily. (Patient taking differently: Take 1 capsule by mouth Every Morning.)    fluticasone (FLONASE) 50 MCG/ACT nasal spray 2 sprays by Each Nare route Daily. (Patient taking differently: 2 sprays by Each Nare route Daily As Needed.)    HYDROcodone-acetaminophen (NORCO) 5-325 MG per tablet Take 1 tablet by mouth Every 12 (Twelve) Hours As Needed for Moderate Pain.    levothyroxine (SYNTHROID, LEVOTHROID) 75 MCG tablet Take 1 tablet by mouth Daily. (Patient taking differently: Take 1 tablet by mouth Every Morning.)    meclizine (ANTIVERT) 25 MG tablet TAKE ONE TABLET BY MOUTH THREE TIMES DAILY AS NEEDED FOR dizziness    Melatonin 10 MG sublingual tablet Place 1 tablet under the tongue Every Night.    multivitamin with minerals (MULTIVITAMIN ADULT PO) Take 1 tablet by mouth Daily.    pantoprazole (PROTONIX) 40 MG EC tablet Take 1 tablet by mouth Daily. (Patient taking differently: Take 1 tablet by mouth Every Morning.)    POTASSIUM PO Take 1 tablet by mouth Daily.    rosuvastatin (CRESTOR) 10 MG tablet TAKE ONE TABLET BY MOUTH EVERY DAY (Patient taking differently: Take 1 tablet by mouth Daily With Dinner.)    Sodium Hyaluronate, oral, (HYALURONIC ACID PO) Take 1 tablet by mouth Daily.    valsartan-hydrochlorothiazide (DIOVAN-HCT) 80-12.5 MG per tablet Take 1 tablet by mouth Every Evening.    [DISCONTINUED] baclofen (LIORESAL) 10 MG tablet TAKE ONE TABLET BY MOUTH THREE TIMES DAILY AS NEEDED FOR MUSCLE SPASMS (Patient not taking: Reported on 11/7/2023)     No current facility-administered medications on file prior to visit.     Current Medications:  Scheduled Meds:  Continuous Infusions:No current facility-administered medications for this visit.    PRN  "Meds:.    I have reviewed the patient's medical history in detail and updated the computerized patient record.  Review and summarization of old records include:    Past Medical History:   Diagnosis Date    Ankle sprain 40 years ago    Anxiety     Arachnoiditis     Arthritis of back hard to pinpoint    At risk for sleep apnea     6    CTS (carpal tunnel syndrome)     Depression     Diverticulitis     Diverticulosis     GERD (gastroesophageal reflux disease)     Hip arthrosis     History of medical problems Vertigo    History of transfusion     no reaction   age 15    HL (hearing loss)     Hyperlipidemia     Hypertension     Knee swelling     Low back pain     Low back strain years    Lumbosacral disc disease this last time,     PONV (postoperative nausea and vomiting)     Scoliosis 1986    Tear of meniscus of knee     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    Thyroid disease     Vertigo         Past Surgical History:   Procedure Laterality Date     SECTION      ALSO     CHOLECYSTECTOMY      COLONOSCOPY      COLONOSCOPY N/A 2022    Procedure: COLONOSCOPY;  Surgeon: Peter Tejada MD;  Location: [facility] ENDOS[facility];  Service: Gastroenterology;  Laterality: N/A;  HEMMORHOIDS    ESOPHAGEAL DILATATION      HYSTERECTOMY      menorrhagia    KNEE ARTHROSCOPY Right     \"CLEAN UP\"    KNEE ARTHROSCOPY W/ MENISCAL REPAIR Right     LASIK Bilateral     TONSILLECTOMY      TRIGGER POINT INJECTION  CRESENCIO's for back from     WILL NOT AGAIN    UPPER GASTROINTESTINAL ENDOSCOPY          Social History     Occupational History    Not on file   Tobacco Use    Smoking status: Former     Packs/day: 0.00     Years: 30.00     Additional pack years: 0.00     Total pack years: 0.00     Types: Cigarettes     Start date: 1982     Quit date: 2012     Years since quittin.7    Smokeless tobacco: Never   Vaping Use    Vaping Use: Former    Start date: 2013    Quit date: " "2/14/2012    Substances: Nicotine, stepped down gradually to no nicotine    Devices: Pre-filled or refillable cartridge   Substance and Sexual Activity    Alcohol use: No    Drug use: Yes     Frequency: 7.0 times per week     Types: Marijuana    Sexual activity: Not Currently     Comment: Full hysterectomy 1995      Social History     Social History Narrative    Not on file        Family History   Problem Relation Age of Onset    Cancer Father         Mesothelioma    Colon polyps Father     Scoliosis Sister     Cancer Brother         Prostate    Cancer Brother         Colon & Liver    Colon cancer Brother 64    Heart disease Other     Lung disease Other     Malig Hyperthermia Neg Hx        ROS: 14 point review of systems was performed and was negative except for documented findings in HPI and today's encounter.       Constitutional:  Denies fever, shaking or chills   Eyes:  Denies change in visual acuity   HENT:  Denies nasal congestion or sore throat   Respiratory:  Denies cough or shortness of breath   Cardiovascular:  Denies chest pain or severe LE edema   GI:  Denies abdominal pain, nausea, vomiting, bloody stools or diarrhea   Musculoskeletal:  Denies numbness tingling or loss of motor function except as outlined above in history of present illness.  : Denies painful urination or hematuria  Integument:  Denies rash, lesion or ulceration   Neurologic:  Denies headache or focal weakness  Endocrine:  Denies lymphadenopathy  Psych:  Denies confusion or change in mental status   Hem:  Denies active bleeding    Physical Exam: 62 y.o. adult  Wt Readings from Last 3 Encounters:   11/07/23 95.7 kg (211 lb)   10/31/23 96.6 kg (213 lb)   10/03/23 96.6 kg (213 lb)     Ht Readings from Last 3 Encounters:   11/07/23 160 cm (63\")   10/31/23 160 cm (63\")   10/03/23 160 cm (62.99\")     Body mass index is 37.38 kg/m².  Facility age limit for growth %marcela is 20 years.  Vitals:    11/07/23 0917   Temp: 97.8 °F (36.6 °C) "       Vital signs reviewed.   General Appearance:    Alert, cooperative, in no acute distress                  Eyes: conjunctiva clear  ENT: external ears and nose atraumatic  CV: no peripheral edema  Resp: normal respiratory effort  Skin: no rashes or wounds; normal turgor  Psych: mood and affect appropriate  Lymph: no nodes appreciated  Neuro: gross sensation intact  Vascular:  Palpable peripheral pulse in noted extremity  Musculoskeletal Extremities: KNEE Exam: medial joint line tenderness with crepitation, synovitis, swelling, and joint effusion right knee.        Diagnostic Tests:  Lab Results   Component Value Date    WBC 8.04 10/31/2023    HGB 12.5 10/31/2023    HCT 37.5 10/31/2023    MCV 88.7 10/31/2023     10/31/2023     Lab Results   Component Value Date    GLUCOSE 104 (H) 10/31/2023    CALCIUM 9.1 10/31/2023     10/31/2023    K 3.7 10/31/2023    CO2 28.0 10/31/2023     10/31/2023    BUN 14 10/31/2023    CREATININE 0.88 10/31/2023    EGFR 74.9 10/31/2023    BCR 15.9 10/31/2023    ANIONGAP 9.0 10/31/2023       EKG:    I have reviewed all the lab & EKG results.     Radiology:  Imaging was done previously in the office, viewed images and discussed with the patient:        Assessment:  Patient Active Problem List    Diagnosis Date Noted    Acute pain of left knee 01/24/2023    Change in bowel habits 07/08/2022     Note Last Updated: 7/8/2022     Added automatically from request for surgery [id number]      Family history of colon cancer 07/08/2022     Note Last Updated: 7/8/2022     Added automatically from request for surgery [id number]      Rectal bleeding 07/08/2022     Note Last Updated: 7/8/2022     Added automatically from request for surgery [id number]      History of colon polyps 07/08/2022     Note Last Updated: 7/8/2022     Added automatically from request for surgery [id number]      COVID-19 virus infection 06/15/2022    Chronic bilateral low back pain 05/17/2022    Diverticulitis of large  intestine without perforation or abscess without bleeding 03/02/2022    Acute non-recurrent maxillary sinusitis 01/10/2022    Vertigo 12/06/2021    Hypertension, essential 12/06/2021    Projectile vomiting with nausea 12/06/2021    Worsening headaches 12/06/2021    Acute non-recurrent pansinusitis 10/21/2021    Primary osteoarthritis of right knee 06/08/2018    Arthritis of both knees 05/09/2017    Chronic pain of right knee 02/08/2017    Arthritis of right knee 04/22/2016    Adhesive arachnoiditis 04/22/2016    Arachnoiditis 04/22/2016    Lumbar degenerative disc disease 04/22/2016       Plan:  Dr. Fabian Moore reviewed anatomy of a total joint arthroplasty in laymen's terms, as well as typical postoperative recovery and possibly 6-12 months for maximal recovery, and possible need for rehabilitation stay after hospitalization. We also discussed risks, benefits, alternatives, and limitations of procedure with risks including but not limited to neurovascular damage, bleeding, infection, malalignment, chronic pian, failure of implants, osteolysis, loosening of implants, loss of motion, weakness, stiffness, instability, DVT, pulmonary embolus, death, stroke, complex regional pain syndrome, myocardial infarction, and need for additional procedures. Concept of substitution vs. replacement discussed.  No guarantees were given regarding results of surgery.      Kamini Jose Orta was given the opportunity to ask and have all questions answered today.  The patient voiced understanding of the risks, benefits, and alternative forms of treatment that were discussed and the patient consents to proceed with surgery.       Skin breakdown? WNL  Metal allergy? No  DVT Risk Factors: no significant increased risk factors    DVT Prophylaxis:  Aspirin 81mg BID x2 weeks, then aspirin 81mg daily x4 weeks  Walker:  has one home  Consults: none  Additional orders: PT order given in office for outpatient  Pharmacy: meds to  beds    Discharge Plan: POD #1 to home, home health, and when cleared by physical therapy as safe for discharge    To be updated    Date: 11/7/2023  CAROLINE Garcia    Dictated Utilizing Dragon Dictation

## 2023-11-07 NOTE — TELEPHONE ENCOUNTER
Advised patient of message. She is scheduled to have knee replacement on 11/14/23. Is she cleared from a cardiac standpoint to have this done?

## 2023-11-07 NOTE — TELEPHONE ENCOUNTER
Call patient tell her her study was a little technically difficult but her heart muscle function appeared to be normal,, she does have a little thickening of the muscle below the aortic valve,, they did recommend or consider recommending a follow-up echo or cardiologist to look at it with a different type of machine, if she would like to discuss this further before her next appointment make her a telehealth visit with me, otherwise I can put a consult in for a cardiologist here in the town with Dr. Che or Dr. Holm or anyone in their group for a second opinion on the heart murmur, just let me know

## 2023-11-07 NOTE — H&P (VIEW-ONLY)
"   History & Physical       Patient: Kamini Orta  YOB: 1960  Medical Record Number: [id number]  Wt Readings from Last 3 Encounters:   11/07/23 95.7 kg (211 lb)   10/31/23 96.6 kg (213 lb)   10/03/23 96.6 kg (213 lb)     Ht Readings from Last 3 Encounters:   11/07/23 160 cm (63\")   10/31/23 160 cm (63\")   10/03/23 160 cm (62.99\")     Body mass index is 37.38 kg/m².  Facility age limit for growth %marcela is 20 years.    Surgeon:  Dr. Fabian Moore    Chief Complaints:   Chief Complaint   Patient presents with    Right Knee - Pain, Pre-op Exam     Surgery: Right Total Knee Arthroplasty with Terrell Navigation    Subjective:    History of Present Illness: 62 y.o. adult presents with   Chief Complaint   Patient presents with    Right Knee - Pain, Pre-op Exam   . Chronic symptoms have been progressively worsening despite more conservative treatment measures including medications OTC and prescription, cortisone injections and physical therapy.  Symptoms are associated with ability to move, exercise, and perform activities of daily living.  Symptoms are aggravated by weight bearing and ROM necessary for activities of daily living.   Symptoms improve with rest, ice and elevation only minimally.      Allergies:   Allergies   Allergen Reactions    Codeine Anaphylaxis     Chest pains    Erythromycin Diarrhea     Cramps    Erythromycin Base Other (See Comments)    Morphine Other (See Comments) and Unknown - High Severity     Chest pain          Medications:   Home Medications:  Current Outpatient Medications on File Prior to Visit   Medication Sig    ALPRAZolam (XANAX) 0.5 MG tablet TAKE ONE TABLET BY MOUTH TWICE DAILY AS NEEDED for severe anxiety (Patient taking differently: Take 2 tablets by mouth Every Night.)    cetirizine (zyrTEC) 10 MG tablet Take 1 tablet by mouth Daily.    Coenzyme Q10 (COQ10 PO) Take 1 capsule by mouth.    diclofenac (VOLTAREN) 50 MG EC tablet Take 2 tablets by mouth Daily With " Dinner.    estradiol (Vivelle-Dot) 0.05 MG/24HR patch Place 1 patch on the skin as directed by provider 2 (Two) Times a Week.    famotidine (PEPCID) 10 MG tablet Take 1 tablet by mouth Every Night.    FLUoxetine (PROzac) 40 MG capsule Take 1 capsule by mouth Daily. (Patient taking differently: Take 1 capsule by mouth Every Morning.)    fluticasone (FLONASE) 50 MCG/ACT nasal spray 2 sprays by Each Nare route Daily. (Patient taking differently: 2 sprays by Each Nare route Daily As Needed.)    HYDROcodone-acetaminophen (NORCO) 5-325 MG per tablet Take 1 tablet by mouth Every 12 (Twelve) Hours As Needed for Moderate Pain.    levothyroxine (SYNTHROID, LEVOTHROID) 75 MCG tablet Take 1 tablet by mouth Daily. (Patient taking differently: Take 1 tablet by mouth Every Morning.)    meclizine (ANTIVERT) 25 MG tablet TAKE ONE TABLET BY MOUTH THREE TIMES DAILY AS NEEDED FOR dizziness    Melatonin 10 MG sublingual tablet Place 1 tablet under the tongue Every Night.    multivitamin with minerals (MULTIVITAMIN ADULT PO) Take 1 tablet by mouth Daily.    pantoprazole (PROTONIX) 40 MG EC tablet Take 1 tablet by mouth Daily. (Patient taking differently: Take 1 tablet by mouth Every Morning.)    POTASSIUM PO Take 1 tablet by mouth Daily.    rosuvastatin (CRESTOR) 10 MG tablet TAKE ONE TABLET BY MOUTH EVERY DAY (Patient taking differently: Take 1 tablet by mouth Daily With Dinner.)    Sodium Hyaluronate, oral, (HYALURONIC ACID PO) Take 1 tablet by mouth Daily.    valsartan-hydrochlorothiazide (DIOVAN-HCT) 80-12.5 MG per tablet Take 1 tablet by mouth Every Evening.    [DISCONTINUED] baclofen (LIORESAL) 10 MG tablet TAKE ONE TABLET BY MOUTH THREE TIMES DAILY AS NEEDED FOR MUSCLE SPASMS (Patient not taking: Reported on 11/7/2023)     No current facility-administered medications on file prior to visit.     Current Medications:  Scheduled Meds:  Continuous Infusions:No current facility-administered medications for this visit.    PRN  "Meds:.    I have reviewed the patient's medical history in detail and updated the computerized patient record.  Review and summarization of old records include:    Past Medical History:   Diagnosis Date    Ankle sprain 40 years ago    Anxiety     Arachnoiditis     Arthritis of back hard to pinpoint    At risk for sleep apnea     6    CTS (carpal tunnel syndrome)     Depression     Diverticulitis     Diverticulosis     GERD (gastroesophageal reflux disease)     Hip arthrosis     History of medical problems Vertigo    History of transfusion     no reaction   age 15    HL (hearing loss)     Hyperlipidemia     Hypertension     Knee swelling     Low back pain     Low back strain years    Lumbosacral disc disease this last time,     PONV (postoperative nausea and vomiting)     Scoliosis 1986    Tear of meniscus of knee     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    Thyroid disease     Vertigo         Past Surgical History:   Procedure Laterality Date     SECTION      ALSO     CHOLECYSTECTOMY      COLONOSCOPY      COLONOSCOPY N/A 2022    Procedure: COLONOSCOPY;  Surgeon: Peter Tejada MD;  Location: [facility] ENDOSCOPY;  Service: Gastroenterology;  Laterality: N/A;  HEMMORHOIDS    ESOPHAGEAL DILATATION      HYSTERECTOMY      menorrhagia    KNEE ARTHROSCOPY Right     \"CLEAN UP\"    KNEE ARTHROSCOPY W/ MENISCAL REPAIR Right     LASIK Bilateral     TONSILLECTOMY      TRIGGER POINT INJECTION  CRESENCIO's for back from     WILL NOT AGAIN    UPPER GASTROINTESTINAL ENDOSCOPY          Social History     Occupational History    Not on file   Tobacco Use    Smoking status: Former     Packs/day: 0.00     Years: 30.00     Additional pack years: 0.00     Total pack years: 0.00     Types: Cigarettes     Start date: 1982     Quit date: 2012     Years since quittin.7    Smokeless tobacco: Never   Vaping Use    Vaping Use: Former    Start date: 2013    Quit date: " "2/14/2012    Substances: Nicotine, stepped down gradually to no nicotine    Devices: Pre-filled or refillable cartridge   Substance and Sexual Activity    Alcohol use: No    Drug use: Yes     Frequency: 7.0 times per week     Types: Marijuana    Sexual activity: Not Currently     Comment: Full hysterectomy 1995      Social History     Social History Narrative    Not on file        Family History   Problem Relation Age of Onset    Cancer Father         Mesothelioma    Colon polyps Father     Scoliosis Sister     Cancer Brother         Prostate    Cancer Brother         Colon & Liver    Colon cancer Brother 64    Heart disease Other     Lung disease Other     Malig Hyperthermia Neg Hx        ROS: 14 point review of systems was performed and was negative except for documented findings in HPI and today's encounter.       Constitutional:  Denies fever, shaking or chills   Eyes:  Denies change in visual acuity   HENT:  Denies nasal congestion or sore throat   Respiratory:  Denies cough or shortness of breath   Cardiovascular:  Denies chest pain or severe LE edema   GI:  Denies abdominal pain, nausea, vomiting, bloody stools or diarrhea   Musculoskeletal:  Denies numbness tingling or loss of motor function except as outlined above in history of present illness.  : Denies painful urination or hematuria  Integument:  Denies rash, lesion or ulceration   Neurologic:  Denies headache or focal weakness  Endocrine:  Denies lymphadenopathy  Psych:  Denies confusion or change in mental status   Hem:  Denies active bleeding    Physical Exam: 62 y.o. adult  Wt Readings from Last 3 Encounters:   11/07/23 95.7 kg (211 lb)   10/31/23 96.6 kg (213 lb)   10/03/23 96.6 kg (213 lb)     Ht Readings from Last 3 Encounters:   11/07/23 160 cm (63\")   10/31/23 160 cm (63\")   10/03/23 160 cm (62.99\")     Body mass index is 37.38 kg/m².  Facility age limit for growth %marcela is 20 years.  Vitals:    11/07/23 0917   Temp: 97.8 °F (36.6 °C) "       Vital signs reviewed.   General Appearance:    Alert, cooperative, in no acute distress                  Eyes: conjunctiva clear  ENT: external ears and nose atraumatic  CV: no peripheral edema  Resp: normal respiratory effort  Skin: no rashes or wounds; normal turgor  Psych: mood and affect appropriate  Lymph: no nodes appreciated  Neuro: gross sensation intact  Vascular:  Palpable peripheral pulse in noted extremity  Musculoskeletal Extremities: KNEE Exam: medial joint line tenderness with crepitation, synovitis, swelling, and joint effusion right knee.        Diagnostic Tests:  Lab Results   Component Value Date    WBC 8.04 10/31/2023    HGB 12.5 10/31/2023    HCT 37.5 10/31/2023    MCV 88.7 10/31/2023     10/31/2023     Lab Results   Component Value Date    GLUCOSE 104 (H) 10/31/2023    CALCIUM 9.1 10/31/2023     10/31/2023    K 3.7 10/31/2023    CO2 28.0 10/31/2023     10/31/2023    BUN 14 10/31/2023    CREATININE 0.88 10/31/2023    EGFR 74.9 10/31/2023    BCR 15.9 10/31/2023    ANIONGAP 9.0 10/31/2023       EKG:    I have reviewed all the lab & EKG results.     Radiology:  Imaging was done previously in the office, viewed images and discussed with the patient:        Assessment:  Patient Active Problem List    Diagnosis Date Noted    Acute pain of left knee 01/24/2023    Change in bowel habits 07/08/2022     Note Last Updated: 7/8/2022     Added automatically from request for surgery [id number]      Family history of colon cancer 07/08/2022     Note Last Updated: 7/8/2022     Added automatically from request for surgery [id number]      Rectal bleeding 07/08/2022     Note Last Updated: 7/8/2022     Added automatically from request for surgery [id number]      History of colon polyps 07/08/2022     Note Last Updated: 7/8/2022     Added automatically from request for surgery [id number]      COVID-19 virus infection 06/15/2022    Chronic bilateral low back pain 05/17/2022    Diverticulitis of large  intestine without perforation or abscess without bleeding 03/02/2022    Acute non-recurrent maxillary sinusitis 01/10/2022    Vertigo 12/06/2021    Hypertension, essential 12/06/2021    Projectile vomiting with nausea 12/06/2021    Worsening headaches 12/06/2021    Acute non-recurrent pansinusitis 10/21/2021    Primary osteoarthritis of right knee 06/08/2018    Arthritis of both knees 05/09/2017    Chronic pain of right knee 02/08/2017    Arthritis of right knee 04/22/2016    Adhesive arachnoiditis 04/22/2016    Arachnoiditis 04/22/2016    Lumbar degenerative disc disease 04/22/2016       Plan:  Dr. Fabian Moore reviewed anatomy of a total joint arthroplasty in laymen's terms, as well as typical postoperative recovery and possibly 6-12 months for maximal recovery, and possible need for rehabilitation stay after hospitalization. We also discussed risks, benefits, alternatives, and limitations of procedure with risks including but not limited to neurovascular damage, bleeding, infection, malalignment, chronic pian, failure of implants, osteolysis, loosening of implants, loss of motion, weakness, stiffness, instability, DVT, pulmonary embolus, death, stroke, complex regional pain syndrome, myocardial infarction, and need for additional procedures. Concept of substitution vs. replacement discussed.  No guarantees were given regarding results of surgery.      Kamini Jose Orta was given the opportunity to ask and have all questions answered today.  The patient voiced understanding of the risks, benefits, and alternative forms of treatment that were discussed and the patient consents to proceed with surgery.       Skin breakdown? WNL  Metal allergy? No  DVT Risk Factors: no significant increased risk factors    DVT Prophylaxis:  Aspirin 81mg BID x2 weeks, then aspirin 81mg daily x4 weeks  Walker:  has one home  Consults: none  Additional orders: PT order given in office for outpatient  Pharmacy: meds to  beds    Discharge Plan: POD #1 to home, home health, and when cleared by physical therapy as safe for discharge    To be updated    Date: 11/7/2023  CAROLINE Garcia    Dictated Utilizing Dragon Dictation

## 2023-11-08 ENCOUNTER — HOSPITAL ENCOUNTER (OUTPATIENT)
Dept: MAMMOGRAPHY | Facility: HOSPITAL | Age: 63
Discharge: HOME | End: 2023-11-08
Admitting: INTERNAL MEDICINE
Payer: COMMERCIAL

## 2023-11-08 ENCOUNTER — TELEPHONE (OUTPATIENT)
Dept: INTERNAL MEDICINE | Facility: CLINIC | Age: 63
End: 2023-11-08
Payer: COMMERCIAL

## 2023-11-08 DIAGNOSIS — Z12.31 ENCOUNTER FOR SCREENING MAMMOGRAM FOR BREAST CANCER: ICD-10-CM

## 2023-11-08 PROCEDURE — 77063 BREAST TOMOSYNTHESIS BI: CPT

## 2023-11-08 PROCEDURE — 77067 SCR MAMMO BI INCL CAD: CPT

## 2023-11-08 NOTE — TELEPHONE ENCOUNTER
----- Message from Gillian Navarrete MA sent at 11/8/2023 11:41 AM EST -----  Regarding: FW: Echocardiogram  Contact: [phone]    ----- Message -----  From: Kamini Orta  Sent: 11/8/2023   9:31 AM EST  To: [facility]  Subject: Echocardiogram                                   Dr. Siddiqi,  I received a phone call yesterday about the test results from my Echocardiogram. It sounded like there were issues, and the nurse said she would have you call me last night. My total knee replacement is less than a week from now on Nov.14th, is it safe for me to be put under?  I am concerned.  Thanks,  Kamini Orta

## 2023-11-09 ENCOUNTER — OFFICE VISIT (OUTPATIENT)
Dept: INTERNAL MEDICINE | Facility: CLINIC | Age: 63
End: 2023-11-09
Payer: COMMERCIAL

## 2023-11-09 VITALS
BODY MASS INDEX: 37.39 KG/M2 | WEIGHT: 211 LBS | DIASTOLIC BLOOD PRESSURE: 81 MMHG | HEART RATE: 64 BPM | OXYGEN SATURATION: 95 % | SYSTOLIC BLOOD PRESSURE: 148 MMHG | TEMPERATURE: 97.7 F | HEIGHT: 63 IN

## 2023-11-09 DIAGNOSIS — I42.2 ASYMMETRIC SEPTAL HYPERTROPHY: Primary | ICD-10-CM

## 2023-11-09 PROBLEM — I51.7 ASYMMETRIC SEPTAL HYPERTROPHY: Status: ACTIVE | Noted: 2023-11-09

## 2023-11-09 PROCEDURE — 99213 OFFICE O/P EST LOW 20 MIN: CPT | Performed by: INTERNAL MEDICINE

## 2023-11-09 NOTE — PROGRESS NOTES
"CHIEF COMPLAINT/ HPI:  Imaging Only (Pt is here for ECHO results. She is having surgery on Tuesday. )              Objective   Vital Signs  Vitals:    11/09/23 1538   BP: 148/81   Pulse: 64   Temp: 97.7 °F (36.5 °C)   TempSrc: Skin   SpO2: 95%   Weight: 95.7 kg (211 lb)   Height: 160 cm (62.99\")      Body mass index is 37.39 kg/m².  Review of Systems notes no chest pains, no shortness of breath,  Physical Exam lungs are clear, cardiac exam reveals regular rhythm distant heart tones, no appreciable murmur, patient is alert and oriented x3  Result Review :   No results found for: \"PROBNP\", \"BNP\"  CMP          5/23/2023    12:53 9/19/2023    10:12 10/31/2023    10:38   CMP   Glucose 135  100  104    BUN 12  14  14    Creatinine 0.76  0.79  0.88    EGFR 89.3  85.2  74.9    Sodium 140  140  140    Potassium 3.6  4.0  3.7    Chloride 103  100  103    Calcium 9.3  9.8  9.1    Total Protein 6.8  7.0     Albumin 4.0  4.2     Globulin 2.8  2.8     Total Bilirubin 0.2  0.3     Alkaline Phosphatase 71  66     AST (SGOT) 21  22     ALT (SGPT) 15  15     Albumin/Globulin Ratio 1.4  1.5     BUN/Creatinine Ratio 15.8  17.7  15.9    Anion Gap 9.6  10.5  9.0      CBC w/diff          5/23/2023    12:53 9/19/2023    10:12 10/31/2023    10:38   CBC w/Diff   WBC 7.94  7.05  8.04    RBC 4.13  4.14  4.23    Hemoglobin 12.5  12.3  12.5    Hematocrit 36.5  36.7  37.5    MCV 88.4  88.6  88.7    MCH 30.3  29.7  29.6    MCHC 34.2  33.5  33.3    RDW 14.3  14.0  13.7    Platelets 288  275  273    Neutrophil Rel % 56.4  58.0     Immature Granulocyte Rel % 0.4  0.3     Lymphocyte Rel % 33.5  31.9     Monocyte Rel % 6.5  6.8     Eosinophil Rel % 2.6  2.4     Basophil Rel % 0.6  0.6        Lipid Panel          9/19/2023    10:12   Lipid Panel   Total Cholesterol 188    Triglycerides 322    HDL Cholesterol 42    VLDL Cholesterol 54    LDL Cholesterol  92    LDL/HDL Ratio 1.94       Lab Results   Component Value Date    TSH 1.550 09/19/2023    TSH " 1.170 01/04/2022    TSH 1.640 06/21/2021      Lab Results   Component Value Date    FREET4 1.49 09/19/2023    FREET4 1.47 01/04/2022    FREET4 1.34 06/21/2021      A1C Last 3 Results          9/19/2023    10:12   HGBA1C Last 3 Results   Hemoglobin A1C 6.20                        Visit Diagnoses:    ICD-10-CM ICD-9-CM   1. Asymmetric septal hypertrophy  I42.2 425.18       Assessment and Plan   Diagnoses and all orders for this visit:    1. Asymmetric septal hypertrophy (Primary)        HEART MURUMUR, ECHO 11/2018, ESSENTIALLY NL, --- echo report discussed with patient November 9, 2023, patient has upcoming right total knee arthroplasty scheduled for November 14, 2023------ , patient has some mild asymmetric septal hypertrophy?  Based on a technically difficult study with poor acoustic windows on echocardiogram from November 6, 2023, discussed with patient that we would repeat this test in 6 to 9 months, if the hypertrophy looks worsened or any changes consider getting a transesophageal echo, otherwise patient is stable for surgery,        severe arthritis and pain in her right knee having upcoming right total knee arthroplasty Dr. Mccarty in [facility] , WILL DO URINE MICRO ALBUMIN OCT 3, 2023    Back pain, sciatica, r leg, --MRI November 2021 showed a left L5-S1 disc paracentral extrusion with multiple levels of foraminal stenosis on both sides throughout the spine, with degenerative disc disease noted, patient did go see Ortho spine surgeon in [place] was possibly going to have surgery but then they called and said they would not do it due to her increased risk and back issues and stability, discussed May 2022--- we will make referral to [facility] [place] for second opinion given the severity of the discs and the continued back pain that she is having, May 2022, patient continues on diclofenac  mg daily as above,    abdominal pain possible recurrent diverticulitis January 31, 2022--, patient  had a colonoscopy August 2022, CT scan abdomen and pelvis June 24, 2022,--diverticulosis no evidence of diverticulitis or serious findings,---- recommend continue Protonix in the morning and Pepcid at bedtime---     vertigo --recurrent kishore 10/ 2022 --- with nausea vomiting, headaches, work-up in the emergency room included MRI of the brain CT of the brain did not show any acute abnormality, chest x-ray no active disease--- she will continue meclizine 3-4 times per day and add scopolamine patch December 6, 2021, and again January 10, 2022,---, referral made for Epley maneuver with [facility], November 2022, she has been using the scopolamine patch with some help, but she also had possibly gotten some in her hand and eye recently with some changes,--------------- symptoms have gotten much better after the Epley maneuver, October 3, 2023    hypertension  continues ---valsartan HCT 80/12.5 mg daily,,     PULM NODULE ON CXR SEPT 2017, CT SCAN OCT 2017, NO CHANGE IN CT APRIL 2018, (DONE ADIA TRAIL DIAG, --CT CHEST OCT 2019 , NO CHANGE IN PULM NODULE OVER 2 YRS , SO NO MORE F/U     LIFE STRESSORS DISCUSSED,  ANNA MARIE AND URINE DRUG SCREEN REIVIEWED nov 2022    Hypothyroidism, cont levothyroxine 0.075 mg qd     obesity --    LLQ Abdominal Pain/Diverticulitis--6/18/16.-- colonoscopy Dr. Alarcon--  NOV, 2019, THI ,, August 2022,, internal hemorrhoids only, otherwise normal exam,    L BREAST MASS--f/u U/S WAS NEG,. 8/2015, , Yearly mammograms    Patient quit smoking 11 years ago    CHRONIC PAIN ISSUES DISCUSSED --WITH PAIN IN NECK , BACK PAIN- ON PRN NORCO-- WE DISCUSSED RISK OF DRIVING AND ADDICTION WITH MED--OCT 2020 patient continues on diclofenac 100 mg XR daily,    DEPRESSION,--continues Xanax 0.5 twice a day, Prozac 40 mg daily,    ELEVATED CHOL, TRIG,-continues Crestor,             Follow Up   No follow-ups on file.  Patient was given instructions and counseling regarding Kamini Orta's condition or for health  maintenance advice. Please see specific information pulled into the AVS if appropriate.         Answers submitted by the patient for this visit:  Primary Reason for Visit (Submitted on 11/8/2023)  What is the primary reason for your visit?: Other, Other  Other (Submitted on 11/8/2023)  Please describe your symptoms.: results echocardiogram  Have you had these symptoms before?: No  How long have you been having these symptoms?: 1-4 days

## 2023-11-11 DIAGNOSIS — M54.50 CHRONIC BILATERAL LOW BACK PAIN, UNSPECIFIED WHETHER SCIATICA PRESENT: ICD-10-CM

## 2023-11-11 DIAGNOSIS — G89.29 CHRONIC BILATERAL LOW BACK PAIN, UNSPECIFIED WHETHER SCIATICA PRESENT: ICD-10-CM

## 2023-11-13 RX ORDER — ALPRAZOLAM 0.5 MG/1
TABLET ORAL
Qty: 60 TABLET | Refills: 5 | Status: SHIPPED | OUTPATIENT
Start: 2023-11-13

## 2023-11-13 RX ORDER — FLUOXETINE HYDROCHLORIDE 40 MG/1
40 CAPSULE ORAL DAILY
Qty: 90 CAPSULE | Refills: 3 | Status: SHIPPED | OUTPATIENT
Start: 2023-11-13

## 2023-11-14 ENCOUNTER — ANESTHESIA EVENT (OUTPATIENT)
Dept: PERIOP | Facility: HOSPITAL | Age: 63
End: 2023-11-14
Payer: COMMERCIAL

## 2023-11-14 ENCOUNTER — ANESTHESIA (OUTPATIENT)
Dept: PERIOP | Facility: HOSPITAL | Age: 63
End: 2023-11-14
Payer: COMMERCIAL

## 2023-11-14 ENCOUNTER — HOSPITAL ENCOUNTER (OUTPATIENT)
Facility: HOSPITAL | Age: 63
Discharge: HOME | End: 2023-11-15
Attending: ORTHOPAEDIC SURGERY | Admitting: ORTHOPAEDIC SURGERY
Payer: COMMERCIAL

## 2023-11-14 ENCOUNTER — APPOINTMENT (OUTPATIENT)
Dept: GENERAL RADIOLOGY | Facility: HOSPITAL | Age: 63
End: 2023-11-14
Payer: COMMERCIAL

## 2023-11-14 DIAGNOSIS — Z96.651 S/P TKR (TOTAL KNEE REPLACEMENT), RIGHT: Primary | ICD-10-CM

## 2023-11-14 DIAGNOSIS — M17.11 ARTHRITIS OF RIGHT KNEE: ICD-10-CM

## 2023-11-14 PROCEDURE — 73560 X-RAY EXAM OF KNEE 1 OR 2: CPT

## 2023-11-14 PROCEDURE — 25010000002 DEXAMETHASONE PER 1 MG: Performed by: NURSE ANESTHETIST, CERTIFIED REGISTERED

## 2023-11-14 PROCEDURE — 25810000003 LACTATED RINGERS PER 1000 ML: Performed by: NURSE ANESTHETIST, CERTIFIED REGISTERED

## 2023-11-14 PROCEDURE — 25010000002 ONDANSETRON PER 1 MG: Performed by: NURSE ANESTHETIST, CERTIFIED REGISTERED

## 2023-11-14 PROCEDURE — 25010000002 FENTANYL CITRATE (PF) 100 MCG/2ML SOLUTION: Performed by: NURSE ANESTHETIST, CERTIFIED REGISTERED

## 2023-11-14 PROCEDURE — 25010000002 MAGNESIUM SULFATE PER 500 MG OF MAGNESIUM: Performed by: NURSE ANESTHETIST, CERTIFIED REGISTERED

## 2023-11-14 PROCEDURE — C1776 JOINT DEVICE (IMPLANTABLE): HCPCS | Performed by: ORTHOPAEDIC SURGERY

## 2023-11-14 PROCEDURE — 25010000002 SUGAMMADEX 200 MG/2ML SOLUTION: Performed by: NURSE ANESTHETIST, CERTIFIED REGISTERED

## 2023-11-14 PROCEDURE — C1713 ANCHOR/SCREW BN/BN,TIS/BN: HCPCS | Performed by: ORTHOPAEDIC SURGERY

## 2023-11-14 PROCEDURE — 25010000002 HYDROMORPHONE 1 MG/ML SOLUTION: Performed by: NURSE ANESTHETIST, CERTIFIED REGISTERED

## 2023-11-14 PROCEDURE — 25010000002 EPINEPHRINE 1 MG/ML SOLUTION 30 ML VIAL: Performed by: ORTHOPAEDIC SURGERY

## 2023-11-14 PROCEDURE — 27447 TOTAL KNEE ARTHROPLASTY: CPT | Performed by: ORTHOPAEDIC SURGERY

## 2023-11-14 PROCEDURE — 25010000002 CLONIDINE PER 1 MG: Performed by: ORTHOPAEDIC SURGERY

## 2023-11-14 PROCEDURE — 20985 CPTR-ASST DIR MS PX: CPT | Performed by: NURSE PRACTITIONER

## 2023-11-14 PROCEDURE — 25010000002 ROPIVACAINE PER 1 MG: Performed by: ORTHOPAEDIC SURGERY

## 2023-11-14 PROCEDURE — 25010000002 FENTANYL CITRATE (PF) 50 MCG/ML SOLUTION: Performed by: NURSE ANESTHETIST, CERTIFIED REGISTERED

## 2023-11-14 PROCEDURE — 25010000002 PROPOFOL 200 MG/20ML EMULSION: Performed by: NURSE ANESTHETIST, CERTIFIED REGISTERED

## 2023-11-14 PROCEDURE — 25810000003 LACTATED RINGERS PER 1000 ML: Performed by: ANESTHESIOLOGY

## 2023-11-14 PROCEDURE — 20985 CPTR-ASST DIR MS PX: CPT | Performed by: ORTHOPAEDIC SURGERY

## 2023-11-14 PROCEDURE — 97110 THERAPEUTIC EXERCISES: CPT

## 2023-11-14 PROCEDURE — 25810000003 LACTATED RINGERS PER 1000 ML: Performed by: NURSE PRACTITIONER

## 2023-11-14 PROCEDURE — 25010000002 KETOROLAC TROMETHAMINE PER 15 MG: Performed by: ORTHOPAEDIC SURGERY

## 2023-11-14 PROCEDURE — 25010000002 MIDAZOLAM PER 1 MG: Performed by: ANESTHESIOLOGY

## 2023-11-14 PROCEDURE — 25010000002 HYDROMORPHONE PER 4 MG: Performed by: NURSE ANESTHETIST, CERTIFIED REGISTERED

## 2023-11-14 PROCEDURE — 27447 TOTAL KNEE ARTHROPLASTY: CPT | Performed by: NURSE PRACTITIONER

## 2023-11-14 PROCEDURE — 25010000002 CEFAZOLIN IN DEXTROSE 2-4 GM/100ML-% SOLUTION: Performed by: NURSE PRACTITIONER

## 2023-11-14 PROCEDURE — 25010000002 CEFAZOLIN IN DEXTROSE 2-4 GM/100ML-% SOLUTION: Performed by: ORTHOPAEDIC SURGERY

## 2023-11-14 PROCEDURE — 97161 PT EVAL LOW COMPLEX 20 MIN: CPT

## 2023-11-14 DEVICE — SMARTSET HIGH PERFORMANCE MV MEDIUM VISCOSITY BONE CEMENT 40G
Type: IMPLANTABLE DEVICE | Site: KNEE | Status: FUNCTIONAL
Brand: SMARTSET

## 2023-11-14 DEVICE — CAP KN ATTUNE FB CMT: Type: IMPLANTABLE DEVICE | Status: FUNCTIONAL

## 2023-11-14 DEVICE — ATTUNE KNEE SYSTEM FEMORAL CRUCIATE RETAINING SIZE 3 RIGHT CEMENTED
Type: IMPLANTABLE DEVICE | Site: KNEE | Status: FUNCTIONAL
Brand: ATTUNE

## 2023-11-14 DEVICE — DEV CONTRL TISS STRATAFIXSPIRALMNCRYL PLSPS2 REV3/0 45CM: Type: IMPLANTABLE DEVICE | Site: KNEE | Status: FUNCTIONAL

## 2023-11-14 DEVICE — ATTUNE KNEE SYSTEM TIBIAL INSERT FIXED BEARING CRUCIATE RETAINING 3 6MM AOX
Type: IMPLANTABLE DEVICE | Site: KNEE | Status: FUNCTIONAL
Brand: ATTUNE

## 2023-11-14 DEVICE — ATTUNE PATELLA MEDIALIZED DOME 32MM CEMENTED AOX
Type: IMPLANTABLE DEVICE | Site: KNEE | Status: FUNCTIONAL
Brand: ATTUNE

## 2023-11-14 DEVICE — P.F.C. DRILL BIT AND STEINMAN PIN PACKET (1 UNIT) .125IN DIA 5IN LGTH
Type: IMPLANTABLE DEVICE | Site: KNEE | Status: FUNCTIONAL
Brand: P.F.C.

## 2023-11-14 DEVICE — ATTUNE KNEE SYSTEM TIBIAL BASE FIXED BEARING SIZE 4 CEMENTED
Type: IMPLANTABLE DEVICE | Site: KNEE | Status: FUNCTIONAL
Brand: ATTUNE

## 2023-11-14 DEVICE — DEV CONTRL TISS STRATAFIX SYMM PDS PLUS VIL CT-1 60CM: Type: IMPLANTABLE DEVICE | Site: KNEE | Status: FUNCTIONAL

## 2023-11-14 RX ORDER — FENTANYL CITRATE 50 UG/ML
INJECTION, SOLUTION INTRAMUSCULAR; INTRAVENOUS AS NEEDED
Status: DISCONTINUED | OUTPATIENT
Start: 2023-11-14 | End: 2023-11-14 | Stop reason: SURG

## 2023-11-14 RX ORDER — TRANEXAMIC ACID 100 MG/ML
INJECTION, SOLUTION INTRAVENOUS AS NEEDED
Status: DISCONTINUED | OUTPATIENT
Start: 2023-11-14 | End: 2023-11-14 | Stop reason: SURG

## 2023-11-14 RX ORDER — ROSUVASTATIN CALCIUM 10 MG/1
10 TABLET, COATED ORAL
Status: DISCONTINUED | OUTPATIENT
Start: 2023-11-14 | End: 2023-11-15 | Stop reason: HOSPADM

## 2023-11-14 RX ORDER — MAGNESIUM HYDROXIDE 1200 MG/15ML
LIQUID ORAL AS NEEDED
Status: DISCONTINUED | OUTPATIENT
Start: 2023-11-14 | End: 2023-11-14 | Stop reason: HOSPADM

## 2023-11-14 RX ORDER — FLUMAZENIL 0.1 MG/ML
0.2 INJECTION INTRAVENOUS AS NEEDED
Status: DISCONTINUED | OUTPATIENT
Start: 2023-11-14 | End: 2023-11-14 | Stop reason: HOSPADM

## 2023-11-14 RX ORDER — FLUOXETINE HYDROCHLORIDE 20 MG/1
40 CAPSULE ORAL DAILY
Status: DISCONTINUED | OUTPATIENT
Start: 2023-11-15 | End: 2023-11-15 | Stop reason: HOSPADM

## 2023-11-14 RX ORDER — IPRATROPIUM BROMIDE AND ALBUTEROL SULFATE 2.5; .5 MG/3ML; MG/3ML
3 SOLUTION RESPIRATORY (INHALATION) ONCE AS NEEDED
Status: DISCONTINUED | OUTPATIENT
Start: 2023-11-14 | End: 2023-11-14 | Stop reason: HOSPADM

## 2023-11-14 RX ORDER — SODIUM CHLORIDE, SODIUM LACTATE, POTASSIUM CHLORIDE, CALCIUM CHLORIDE 600; 310; 30; 20 MG/100ML; MG/100ML; MG/100ML; MG/100ML
9 INJECTION, SOLUTION INTRAVENOUS CONTINUOUS
Status: DISCONTINUED | OUTPATIENT
Start: 2023-11-14 | End: 2023-11-14

## 2023-11-14 RX ORDER — ACETAMINOPHEN 325 MG/1
1000 TABLET ORAL ONCE
Status: COMPLETED | OUTPATIENT
Start: 2023-11-14 | End: 2023-11-14

## 2023-11-14 RX ORDER — CHLORHEXIDINE GLUCONATE 500 MG/1
CLOTH TOPICAL TAKE AS DIRECTED
Status: DISCONTINUED | OUTPATIENT
Start: 2023-11-14 | End: 2023-11-15 | Stop reason: HOSPADM

## 2023-11-14 RX ORDER — LABETALOL HYDROCHLORIDE 5 MG/ML
5 INJECTION, SOLUTION INTRAVENOUS
Status: DISCONTINUED | OUTPATIENT
Start: 2023-11-14 | End: 2023-11-14 | Stop reason: HOSPADM

## 2023-11-14 RX ORDER — SODIUM CHLORIDE 0.9 % (FLUSH) 0.9 %
3-10 SYRINGE (ML) INJECTION AS NEEDED
Status: DISCONTINUED | OUTPATIENT
Start: 2023-11-14 | End: 2023-11-14 | Stop reason: HOSPADM

## 2023-11-14 RX ORDER — ONDANSETRON 4 MG/1
4 TABLET, FILM COATED ORAL EVERY 6 HOURS PRN
Status: DISCONTINUED | OUTPATIENT
Start: 2023-11-14 | End: 2023-11-15 | Stop reason: HOSPADM

## 2023-11-14 RX ORDER — HYDROCODONE BITARTRATE AND ACETAMINOPHEN 5; 325 MG/1; MG/1
2 TABLET ORAL ONCE AS NEEDED
Status: COMPLETED | OUTPATIENT
Start: 2023-11-14 | End: 2023-11-14

## 2023-11-14 RX ORDER — MELOXICAM 15 MG/1
15 TABLET ORAL DAILY
Status: DISCONTINUED | OUTPATIENT
Start: 2023-11-14 | End: 2023-11-15 | Stop reason: HOSPADM

## 2023-11-14 RX ORDER — NALOXONE HCL 0.4 MG/ML
0.1 VIAL (ML) INJECTION
Status: DISCONTINUED | OUTPATIENT
Start: 2023-11-14 | End: 2023-11-15 | Stop reason: HOSPADM

## 2023-11-14 RX ORDER — ROCURONIUM BROMIDE 10 MG/ML
INJECTION, SOLUTION INTRAVENOUS AS NEEDED
Status: DISCONTINUED | OUTPATIENT
Start: 2023-11-14 | End: 2023-11-14 | Stop reason: SURG

## 2023-11-14 RX ORDER — ACETAMINOPHEN 325 MG/1
325 TABLET ORAL EVERY 4 HOURS PRN
Status: DISCONTINUED | OUTPATIENT
Start: 2023-11-14 | End: 2023-11-15 | Stop reason: HOSPADM

## 2023-11-14 RX ORDER — HYDROCODONE BITARTRATE AND ACETAMINOPHEN 7.5; 325 MG/1; MG/1
1 TABLET ORAL EVERY 4 HOURS PRN
Status: DISCONTINUED | OUTPATIENT
Start: 2023-11-14 | End: 2023-11-15 | Stop reason: HOSPADM

## 2023-11-14 RX ORDER — MAGNESIUM SULFATE HEPTAHYDRATE 500 MG/ML
INJECTION, SOLUTION INTRAMUSCULAR; INTRAVENOUS AS NEEDED
Status: DISCONTINUED | OUTPATIENT
Start: 2023-11-14 | End: 2023-11-14 | Stop reason: SURG

## 2023-11-14 RX ORDER — ONDANSETRON 2 MG/ML
4 INJECTION INTRAMUSCULAR; INTRAVENOUS ONCE AS NEEDED
Status: COMPLETED | OUTPATIENT
Start: 2023-11-14 | End: 2023-11-14

## 2023-11-14 RX ORDER — LIDOCAINE HYDROCHLORIDE 10 MG/ML
0.5 INJECTION, SOLUTION INFILTRATION; PERINEURAL ONCE AS NEEDED
Status: DISCONTINUED | OUTPATIENT
Start: 2023-11-14 | End: 2023-11-14 | Stop reason: HOSPADM

## 2023-11-14 RX ORDER — BISACODYL 10 MG
10 SUPPOSITORY, RECTAL RECTAL DAILY PRN
Status: DISCONTINUED | OUTPATIENT
Start: 2023-11-14 | End: 2023-11-15 | Stop reason: HOSPADM

## 2023-11-14 RX ORDER — HYDROMORPHONE HYDROCHLORIDE 1 MG/ML
0.5 INJECTION, SOLUTION INTRAMUSCULAR; INTRAVENOUS; SUBCUTANEOUS
Status: DISCONTINUED | OUTPATIENT
Start: 2023-11-14 | End: 2023-11-15 | Stop reason: HOSPADM

## 2023-11-14 RX ORDER — CEFAZOLIN SODIUM 2 G/100ML
2 INJECTION, SOLUTION INTRAVENOUS EVERY 8 HOURS
Status: COMPLETED | OUTPATIENT
Start: 2023-11-14 | End: 2023-11-14

## 2023-11-14 RX ORDER — MIDAZOLAM HYDROCHLORIDE 1 MG/ML
1 INJECTION INTRAMUSCULAR; INTRAVENOUS
Status: DISCONTINUED | OUTPATIENT
Start: 2023-11-14 | End: 2023-11-14 | Stop reason: HOSPADM

## 2023-11-14 RX ORDER — DROPERIDOL 2.5 MG/ML
0.62 INJECTION, SOLUTION INTRAMUSCULAR; INTRAVENOUS
Status: DISCONTINUED | OUTPATIENT
Start: 2023-11-14 | End: 2023-11-14 | Stop reason: HOSPADM

## 2023-11-14 RX ORDER — FENTANYL CITRATE 50 UG/ML
50 INJECTION, SOLUTION INTRAMUSCULAR; INTRAVENOUS
Status: DISCONTINUED | OUTPATIENT
Start: 2023-11-14 | End: 2023-11-14 | Stop reason: HOSPADM

## 2023-11-14 RX ORDER — CETIRIZINE HYDROCHLORIDE 10 MG/1
10 TABLET ORAL DAILY
Status: DISCONTINUED | OUTPATIENT
Start: 2023-11-14 | End: 2023-11-15 | Stop reason: HOSPADM

## 2023-11-14 RX ORDER — FAMOTIDINE 10 MG/ML
20 INJECTION, SOLUTION INTRAVENOUS ONCE
Status: COMPLETED | OUTPATIENT
Start: 2023-11-14 | End: 2023-11-14

## 2023-11-14 RX ORDER — HYDRALAZINE HYDROCHLORIDE 20 MG/ML
5 INJECTION INTRAMUSCULAR; INTRAVENOUS
Status: DISCONTINUED | OUTPATIENT
Start: 2023-11-14 | End: 2023-11-14 | Stop reason: HOSPADM

## 2023-11-14 RX ORDER — PROMETHAZINE HYDROCHLORIDE 25 MG/1
25 SUPPOSITORY RECTAL ONCE AS NEEDED
Status: DISCONTINUED | OUTPATIENT
Start: 2023-11-14 | End: 2023-11-14 | Stop reason: HOSPADM

## 2023-11-14 RX ORDER — SODIUM CHLORIDE, SODIUM LACTATE, POTASSIUM CHLORIDE, CALCIUM CHLORIDE 600; 310; 30; 20 MG/100ML; MG/100ML; MG/100ML; MG/100ML
100 INJECTION, SOLUTION INTRAVENOUS CONTINUOUS
Status: DISCONTINUED | OUTPATIENT
Start: 2023-11-14 | End: 2023-11-15 | Stop reason: HOSPADM

## 2023-11-14 RX ORDER — DIPHENHYDRAMINE HYDROCHLORIDE 50 MG/ML
12.5 INJECTION INTRAMUSCULAR; INTRAVENOUS
Status: DISCONTINUED | OUTPATIENT
Start: 2023-11-14 | End: 2023-11-14 | Stop reason: HOSPADM

## 2023-11-14 RX ORDER — FLUTICASONE PROPIONATE 50 MCG
2 SPRAY, SUSPENSION (ML) NASAL DAILY PRN
Status: DISCONTINUED | OUTPATIENT
Start: 2023-11-14 | End: 2023-11-15 | Stop reason: HOSPADM

## 2023-11-14 RX ORDER — SODIUM CHLORIDE 0.9 % (FLUSH) 0.9 %
3 SYRINGE (ML) INJECTION EVERY 12 HOURS SCHEDULED
Status: DISCONTINUED | OUTPATIENT
Start: 2023-11-14 | End: 2023-11-14 | Stop reason: HOSPADM

## 2023-11-14 RX ORDER — DOCUSATE SODIUM 100 MG/1
100 CAPSULE, LIQUID FILLED ORAL 2 TIMES DAILY
Status: DISCONTINUED | OUTPATIENT
Start: 2023-11-14 | End: 2023-11-15 | Stop reason: HOSPADM

## 2023-11-14 RX ORDER — FLUOXETINE HYDROCHLORIDE 20 MG/1
40 CAPSULE ORAL DAILY
Status: DISCONTINUED | OUTPATIENT
Start: 2023-11-14 | End: 2023-11-14

## 2023-11-14 RX ORDER — HYDROMORPHONE HYDROCHLORIDE 1 MG/ML
0.5 INJECTION, SOLUTION INTRAMUSCULAR; INTRAVENOUS; SUBCUTANEOUS
Status: DISCONTINUED | OUTPATIENT
Start: 2023-11-14 | End: 2023-11-14 | Stop reason: HOSPADM

## 2023-11-14 RX ORDER — PROPOFOL 10 MG/ML
INJECTION, EMULSION INTRAVENOUS AS NEEDED
Status: DISCONTINUED | OUTPATIENT
Start: 2023-11-14 | End: 2023-11-14 | Stop reason: SURG

## 2023-11-14 RX ORDER — ONDANSETRON 2 MG/ML
4 INJECTION INTRAMUSCULAR; INTRAVENOUS EVERY 6 HOURS PRN
Status: DISCONTINUED | OUTPATIENT
Start: 2023-11-14 | End: 2023-11-15 | Stop reason: HOSPADM

## 2023-11-14 RX ORDER — PANTOPRAZOLE SODIUM 40 MG/1
40 TABLET, DELAYED RELEASE ORAL EVERY MORNING
Status: DISCONTINUED | OUTPATIENT
Start: 2023-11-15 | End: 2023-11-15 | Stop reason: HOSPADM

## 2023-11-14 RX ORDER — FENTANYL CITRATE 50 UG/ML
50 INJECTION, SOLUTION INTRAMUSCULAR; INTRAVENOUS ONCE AS NEEDED
Status: DISCONTINUED | OUTPATIENT
Start: 2023-11-14 | End: 2023-11-14 | Stop reason: HOSPADM

## 2023-11-14 RX ORDER — BISACODYL 5 MG/1
10 TABLET, DELAYED RELEASE ORAL DAILY PRN
Status: DISCONTINUED | OUTPATIENT
Start: 2023-11-14 | End: 2023-11-15 | Stop reason: HOSPADM

## 2023-11-14 RX ORDER — POLYETHYLENE GLYCOL 3350 17 G/17G
17 POWDER, FOR SOLUTION ORAL DAILY
Status: DISCONTINUED | OUTPATIENT
Start: 2023-11-14 | End: 2023-11-15 | Stop reason: HOSPADM

## 2023-11-14 RX ORDER — KETAMINE HCL IN NACL, ISO-OSM 100MG/10ML
SYRINGE (ML) INJECTION AS NEEDED
Status: DISCONTINUED | OUTPATIENT
Start: 2023-11-14 | End: 2023-11-14 | Stop reason: SURG

## 2023-11-14 RX ORDER — PROMETHAZINE HYDROCHLORIDE 25 MG/1
25 TABLET ORAL ONCE AS NEEDED
Status: DISCONTINUED | OUTPATIENT
Start: 2023-11-14 | End: 2023-11-14 | Stop reason: HOSPADM

## 2023-11-14 RX ORDER — ASPIRIN 81 MG/1
81 TABLET ORAL EVERY 12 HOURS SCHEDULED
Status: DISCONTINUED | OUTPATIENT
Start: 2023-11-15 | End: 2023-11-15 | Stop reason: HOSPADM

## 2023-11-14 RX ORDER — FAMOTIDINE 20 MG/1
10 TABLET, FILM COATED ORAL NIGHTLY
Status: DISCONTINUED | OUTPATIENT
Start: 2023-11-14 | End: 2023-11-15 | Stop reason: HOSPADM

## 2023-11-14 RX ORDER — MELOXICAM 15 MG/1
15 TABLET ORAL ONCE
Status: COMPLETED | OUTPATIENT
Start: 2023-11-14 | End: 2023-11-14

## 2023-11-14 RX ORDER — ALPRAZOLAM 0.5 MG/1
0.5 TABLET ORAL 2 TIMES DAILY PRN
Status: DISCONTINUED | OUTPATIENT
Start: 2023-11-14 | End: 2023-11-15 | Stop reason: HOSPADM

## 2023-11-14 RX ORDER — HYDROCODONE BITARTRATE AND ACETAMINOPHEN 7.5; 325 MG/1; MG/1
2 TABLET ORAL EVERY 4 HOURS PRN
Status: DISCONTINUED | OUTPATIENT
Start: 2023-11-14 | End: 2023-11-15 | Stop reason: HOSPADM

## 2023-11-14 RX ORDER — EPHEDRINE SULFATE 50 MG/ML
INJECTION INTRAVENOUS AS NEEDED
Status: DISCONTINUED | OUTPATIENT
Start: 2023-11-14 | End: 2023-11-14 | Stop reason: SURG

## 2023-11-14 RX ORDER — NALOXONE HCL 0.4 MG/ML
0.2 VIAL (ML) INJECTION AS NEEDED
Status: DISCONTINUED | OUTPATIENT
Start: 2023-11-14 | End: 2023-11-14 | Stop reason: HOSPADM

## 2023-11-14 RX ORDER — LIDOCAINE HYDROCHLORIDE 20 MG/ML
INJECTION, SOLUTION EPIDURAL; INFILTRATION; INTRACAUDAL; PERINEURAL AS NEEDED
Status: DISCONTINUED | OUTPATIENT
Start: 2023-11-14 | End: 2023-11-14 | Stop reason: SURG

## 2023-11-14 RX ORDER — LEVOTHYROXINE SODIUM 0.07 MG/1
75 TABLET ORAL
Status: DISCONTINUED | OUTPATIENT
Start: 2023-11-15 | End: 2023-11-15 | Stop reason: HOSPADM

## 2023-11-14 RX ORDER — DEXAMETHASONE SODIUM PHOSPHATE 4 MG/ML
INJECTION, SOLUTION INTRA-ARTICULAR; INTRALESIONAL; INTRAMUSCULAR; INTRAVENOUS; SOFT TISSUE AS NEEDED
Status: DISCONTINUED | OUTPATIENT
Start: 2023-11-14 | End: 2023-11-14 | Stop reason: SURG

## 2023-11-14 RX ORDER — PREGABALIN 75 MG/1
75 CAPSULE ORAL ONCE
Status: COMPLETED | OUTPATIENT
Start: 2023-11-14 | End: 2023-11-14

## 2023-11-14 RX ORDER — HYDROCHLOROTHIAZIDE 12.5 MG/1
12.5 TABLET ORAL
Status: DISCONTINUED | OUTPATIENT
Start: 2023-11-14 | End: 2023-11-15 | Stop reason: HOSPADM

## 2023-11-14 RX ORDER — VALSARTAN 80 MG/1
80 TABLET ORAL
Status: DISCONTINUED | OUTPATIENT
Start: 2023-11-14 | End: 2023-11-15 | Stop reason: HOSPADM

## 2023-11-14 RX ORDER — CEFAZOLIN SODIUM 2 G/100ML
2 INJECTION, SOLUTION INTRAVENOUS ONCE
Status: COMPLETED | OUTPATIENT
Start: 2023-11-14 | End: 2023-11-14

## 2023-11-14 RX ORDER — EPHEDRINE SULFATE 50 MG/ML
5 INJECTION, SOLUTION INTRAVENOUS ONCE AS NEEDED
Status: DISCONTINUED | OUTPATIENT
Start: 2023-11-14 | End: 2023-11-14 | Stop reason: HOSPADM

## 2023-11-14 RX ORDER — GLYCOPYRROLATE 0.2 MG/ML
INJECTION INTRAMUSCULAR; INTRAVENOUS AS NEEDED
Status: DISCONTINUED | OUTPATIENT
Start: 2023-11-14 | End: 2023-11-14 | Stop reason: SURG

## 2023-11-14 RX ADMIN — HYDROCODONE BITARTRATE AND ACETAMINOPHEN 2 TABLET: 5; 325 TABLET ORAL at 12:16

## 2023-11-14 RX ADMIN — EPHEDRINE SULFATE 5 MG: 50 INJECTION INTRAVENOUS at 09:53

## 2023-11-14 RX ADMIN — ROSUVASTATIN CALCIUM 10 MG: 10 TABLET, FILM COATED ORAL at 17:55

## 2023-11-14 RX ADMIN — ROCURONIUM BROMIDE 50 MG: 10 INJECTION, SOLUTION INTRAVENOUS at 09:32

## 2023-11-14 RX ADMIN — SODIUM CHLORIDE, POTASSIUM CHLORIDE, SODIUM LACTATE AND CALCIUM CHLORIDE 9 ML/HR: 600; 310; 30; 20 INJECTION, SOLUTION INTRAVENOUS at 07:45

## 2023-11-14 RX ADMIN — EPHEDRINE SULFATE 5 MG: 50 INJECTION INTRAVENOUS at 09:50

## 2023-11-14 RX ADMIN — Medication 20 MG: at 10:02

## 2023-11-14 RX ADMIN — EPHEDRINE SULFATE 10 MG: 50 INJECTION INTRAVENOUS at 09:40

## 2023-11-14 RX ADMIN — HYDROMORPHONE HYDROCHLORIDE 0.5 MG: 1 INJECTION, SOLUTION INTRAMUSCULAR; INTRAVENOUS; SUBCUTANEOUS at 11:59

## 2023-11-14 RX ADMIN — MELOXICAM 15 MG: 15 TABLET ORAL at 07:24

## 2023-11-14 RX ADMIN — FENTANYL CITRATE 50 MCG: 50 INJECTION, SOLUTION INTRAMUSCULAR; INTRAVENOUS at 09:31

## 2023-11-14 RX ADMIN — HYDROMORPHONE HYDROCHLORIDE 0.5 MG: 1 INJECTION, SOLUTION INTRAMUSCULAR; INTRAVENOUS; SUBCUTANEOUS at 11:10

## 2023-11-14 RX ADMIN — SUGAMMADEX 200 MG: 100 INJECTION, SOLUTION INTRAVENOUS at 11:10

## 2023-11-14 RX ADMIN — MELOXICAM 15 MG: 15 TABLET ORAL at 16:02

## 2023-11-14 RX ADMIN — MIDAZOLAM 1 MG: 1 INJECTION INTRAMUSCULAR; INTRAVENOUS at 07:47

## 2023-11-14 RX ADMIN — ACETAMINOPHEN 975 MG: 325 TABLET ORAL at 07:24

## 2023-11-14 RX ADMIN — Medication 30 MG: at 09:31

## 2023-11-14 RX ADMIN — CEFAZOLIN SODIUM 2 G: 2 INJECTION, SOLUTION INTRAVENOUS at 22:12

## 2023-11-14 RX ADMIN — FENTANYL CITRATE 50 MCG: 50 INJECTION, SOLUTION INTRAMUSCULAR; INTRAVENOUS at 11:47

## 2023-11-14 RX ADMIN — EPHEDRINE SULFATE 5 MG: 50 INJECTION INTRAVENOUS at 09:35

## 2023-11-14 RX ADMIN — DEXAMETHASONE SODIUM PHOSPHATE 8 MG: 4 INJECTION, SOLUTION INTRA-ARTICULAR; INTRALESIONAL; INTRAMUSCULAR; INTRAVENOUS; SOFT TISSUE at 09:38

## 2023-11-14 RX ADMIN — EPHEDRINE SULFATE 5 MG: 50 INJECTION INTRAVENOUS at 09:56

## 2023-11-14 RX ADMIN — PREGABALIN 75 MG: 75 CAPSULE ORAL at 07:24

## 2023-11-14 RX ADMIN — CEFAZOLIN SODIUM 2 G: 2 INJECTION, SOLUTION INTRAVENOUS at 17:53

## 2023-11-14 RX ADMIN — FENTANYL CITRATE 50 MCG: 50 INJECTION, SOLUTION INTRAMUSCULAR; INTRAVENOUS at 10:02

## 2023-11-14 RX ADMIN — SODIUM CHLORIDE, POTASSIUM CHLORIDE, SODIUM LACTATE AND CALCIUM CHLORIDE 100 ML/HR: 600; 310; 30; 20 INJECTION, SOLUTION INTRAVENOUS at 16:02

## 2023-11-14 RX ADMIN — ALPRAZOLAM 0.5 MG: 0.5 TABLET ORAL at 22:12

## 2023-11-14 RX ADMIN — FAMOTIDINE 20 MG: 10 INJECTION INTRAVENOUS at 07:44

## 2023-11-14 RX ADMIN — PROPOFOL 120 MG: 10 INJECTION, EMULSION INTRAVENOUS at 09:32

## 2023-11-14 RX ADMIN — ONDANSETRON 4 MG: 2 INJECTION INTRAMUSCULAR; INTRAVENOUS at 11:59

## 2023-11-14 RX ADMIN — GLYCOPYRROLATE 0.2 MCG: 0.2 INJECTION INTRAMUSCULAR; INTRAVENOUS at 09:38

## 2023-11-14 RX ADMIN — LIDOCAINE HYDROCHLORIDE 100 MG: 20 INJECTION, SOLUTION EPIDURAL; INFILTRATION; INTRACAUDAL; PERINEURAL at 09:31

## 2023-11-14 RX ADMIN — CETIRIZINE HYDROCHLORIDE 10 MG: 10 TABLET ORAL at 17:50

## 2023-11-14 RX ADMIN — POLYETHYLENE GLYCOL 3350 17 G: 17 POWDER, FOR SOLUTION ORAL at 16:02

## 2023-11-14 RX ADMIN — HYDROCODONE BITARTRATE AND ACETAMINOPHEN 1 TABLET: 7.5; 325 TABLET ORAL at 20:23

## 2023-11-14 RX ADMIN — TRANEXAMIC ACID 1000 MG: 100 INJECTION INTRAVENOUS at 09:38

## 2023-11-14 RX ADMIN — MAGNESIUM SULFATE HEPTAHYDRATE 2 G: 500 INJECTION, SOLUTION INTRAMUSCULAR; INTRAVENOUS at 10:02

## 2023-11-14 RX ADMIN — SODIUM CHLORIDE, POTASSIUM CHLORIDE, SODIUM LACTATE AND CALCIUM CHLORIDE 100 ML/HR: 600; 310; 30; 20 INJECTION, SOLUTION INTRAVENOUS at 15:12

## 2023-11-14 RX ADMIN — CEFAZOLIN SODIUM 2 G: 2 INJECTION, SOLUTION INTRAVENOUS at 09:21

## 2023-11-14 NOTE — PLAN OF CARE
Goal Outcome Evaluation:                      VSS. Pt up with assist to bathroom; voiding. Up to chair. No c/o pain.

## 2023-11-14 NOTE — OP NOTE
Operative Note    Name: Kamini Orta  YOB: 1960  MRN: [id number]  BMI: Body mass index is 37.38 kg/m².    DATE OF SURGERY: 11/14/2023    PREOPERATIVE DIAGNOSIS: right knee end-stage osteoarthritis    POSTOPERATIVE DIAGNOSIS: right knee end-stage osteoarthritis    PROCEDURE PERFORMED: right total knee replacement with [employer] navigation    SURGICAL APPROACH: Knee Mid-Vastus     SURGEON: Dr. Fabian Moore    ASSISTANT: Christine Moore    IMPLANTS: DepuyAttune    Estimated Blood Loss: 100 mL  Specimens : none  Complications: none  22 Modifier:  none    DESCRIPTION OF PROCEDURE: The patient was taken to the operating room and placed in the supine position. Preoperative antibiotics were administered. Surgical time out was performed. After adequate induction of anesthesia, the leg was prepped and draped in the usual sterile fashion. The leg was exsanguinated with an Esmarch bandage and the tourniquet inflated to 250 mmHg. A midline incision was performed followed by a medial parapatellar arthrotomy. The patella was subluxed laterally.  A portion of the fat pad, ACL, and anterior horns of the meniscus were excised.  The [employer] navigation device was affixed and navigated. The distal cut was made. The femur was then sized with a sizing guide. The femoral cutting block was placed and all femoral cuts were performed. The proximal tibia was exposed. [place] navigation protocol was accomplished.  10 millimeters were removed from the distal femur.  We used the extramedullary tibial cutting guide set for removal of 3 mm of bone off the low side. The tibial cut was performed. The posterior horns of the menisci were excised. The posterior osteophytes were removed. Flexion extension blocks were then used to balance the knee. The tibial cut surface was then sized with the sizing templates and the tibial and femoral trial were then placed. The tray was aligned with the middle third of the tubercle.     Attention was then placed to the patella. The patella was balanced to track centrally through range of motion.  It measured 24 and patella resurfacing was performed.   At this point all trial components were removed, the knee was copiously irrigated with pulsed lavage, and the knee was injected with anesthetic cocktail solution. The cut surfaces were then dried with clean lap sponges, and the components were cemented, first the tibia, then insertion of the polyethylene spacer, followed by femur. The patella was placed unless patelloplasty was chosen. The knee was held in full extension and all excess cement was removed. The cement was allowed to harden.   The knee was then copiously irrigated in standard fashion. The tourniquet was then released. There was excellent hemostasis. We closed the knee in multiple layers in standard fashion.  A sterile dressing was applied. At the end of the case, the sponge and needle counts were reported as being correct. There were no known complications. The patient was then transported to the recovery room.    The surgical assistant performed retraction, suction, hemostasis, and specific limb positioning and manipulation required for accuracy of joint placement, and assistance in wound closure and dressing application.       Fabian Moore M.D.

## 2023-11-14 NOTE — ANESTHESIA POSTPROCEDURE EVALUATION
Patient: Kamini Orta    Procedure Summary       Date: 11/14/23 Room / Location: [facility] OR [address] [place] [address] [facility] [place] [facility]    Anesthesia Start: 0925 Anesthesia Stop: 1126    Procedure: RIGHT TOTAL KNEE ARTHROPLASTY WITH SHANTELLE NAVIGATION (Right: Knee) Diagnosis:       Arthritis of right knee      (Arthritis of right knee [M17.11])    Surgeons: Fabian Moore MD Provider: Raj Slater MD    Anesthesia Type: general ASA Status: 3            Anesthesia Type: general    Vitals  Vitals Value Taken Time   /55 11/14/23 1230   Temp 36.8 °C (98.2 °F) 11/14/23 1130   Pulse 81 11/14/23 1238   Resp 19 11/14/23 1215   SpO2 95 % 11/14/23 1238   Vitals shown include unfiled device data.        Post Anesthesia Care and Evaluation    Patient location during evaluation: bedside  Patient participation: complete - patient participated  Level of consciousness: awake  Pain management: adequate    Airway patency: patent  Anesthetic complications: No anesthetic complications  PONV Status: controlled  Cardiovascular status: acceptable  Respiratory status: acceptable  Hydration status: acceptable    Comments: --------------------            11/14/23               1230     --------------------   BP:       106/55     Pulse:      81       Resp:                Temp:                SpO2:      94%      --------------------

## 2023-11-14 NOTE — ANESTHESIA PREPROCEDURE EVALUATION
Anesthesia Evaluation     Patient summary reviewed and Nursing notes reviewed   history of anesthetic complications:  PONV  NPO Solid Status: > 8 hours             Airway   Mallampati: II  TM distance: >3 FB  Neck ROM: full  no difficulty expected  Dental - normal exam     Pulmonary - normal exam   (+) ,sleep apnea  Cardiovascular - normal exam    (+) hypertension      Neuro/Psych  (+) headaches, psychiatric history Depression  GI/Hepatic/Renal/Endo    (+) thyroid problem     Musculoskeletal     (+) back pain  Abdominal    Substance History - negative use     OB/GYN negative ob/gyn ROS         Other   arthritis,                 Anesthesia Plan    ASA 3     general       Anesthetic plan, risks, benefits, and alternatives have been provided, discussed and informed consent has been obtained with: patient.    CODE STATUS:

## 2023-11-14 NOTE — ANESTHESIA PROCEDURE NOTES
Airway  Urgency: elective    Date/Time: 11/14/2023 9:36 AM  Airway not difficult    General Information and Staff    Patient location during procedure: OR  Anesthesiologist: Raj Slater MD  CRNA/CAA: Monika Sales CRNA    Indications and Patient Condition  Indications for airway management: airway protection    Preoxygenated: yes  MILS maintained throughout  Mask difficulty assessment: 2 - vent by mask + OA or adjuvant +/- NMBA    Final Airway Details  Final airway type: endotracheal airway      Successful airway: ETT  Cuffed: yes   Successful intubation technique: direct laryngoscopy  Facilitating devices/methods: anterior pressure/BURP  Endotracheal tube insertion site: oral  Blade: Nati  Blade size: 3  ETT size (mm): 7.0  Cormack-Lehane Classification: grade IIa - partial view of glottis  Placement verified by: chest auscultation and capnometry   Cuff volume (mL): 6  Measured from: lips  ETT/EBT  to lips (cm): 20  Number of attempts at approach: 1  Assessment: lips, teeth, and gum same as pre-op and atraumatic intubation    Additional Comments  Atraumatic ET Tube placement.  Teeth as pre-op. BLEBS.  -ABD sounds.  +ET CO2.  Secured to face

## 2023-11-14 NOTE — THERAPY EVALUATION
Patient Name: Kamini Orta  : 1960    MRN: [id number]                              Today's Date: 2023       Admit Date: 2023    Visit Dx:     ICD-10-CM ICD-9-CM   1. Arthritis of right knee  M17.11 716.96     Patient Active Problem List   Diagnosis    Arthritis of right knee    Adhesive arachnoiditis    Arachnoiditis    Lumbar degenerative disc disease    Chronic pain of right knee    Arthritis of both knees    Primary osteoarthritis of right knee    Acute non-recurrent pansinusitis    Vertigo    Hypertension, essential    Projectile vomiting with nausea    Worsening headaches    Acute non-recurrent maxillary sinusitis    Diverticulitis of large intestine without perforation or abscess without bleeding    Chronic bilateral low back pain    COVID-19 virus infection    Change in bowel habits    Family history of colon cancer    Rectal bleeding    History of colon polyps    Acute pain of left knee    Asymmetric septal hypertrophy     Past Medical History:   Diagnosis Date    Ankle sprain 40 years ago    Anxiety     Arachnoiditis     Arthritis of back hard to pinpoint    At risk for sleep apnea     6    CTS (carpal tunnel syndrome)     Depression     Diverticulitis     Diverticulosis     GERD (gastroesophageal reflux disease)     Hip arthrosis     History of medical problems Vertigo    History of transfusion     no reaction   age 15    HL (hearing loss)     Hyperlipidemia     Hypertension     Knee swelling     Low back pain     Low back strain years    Lumbosacral disc disease this last time,     PONV (postoperative nausea and vomiting)     Scoliosis 1986    Tear of meniscus of knee     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    Thyroid disease     Vertigo      Past Surgical History:   Procedure Laterality Date     SECTION      ALSO 1987    CHOLECYSTECTOMY  1986    COLONOSCOPY      COLONOSCOPY N/A 2022    Procedure: COLONOSCOPY;  Surgeon: Peter Tejada  "MD Chemo;  Location: [facility] ENDOSCOPY;  Service: Gastroenterology;  Laterality: N/A;  HEMMORHOIDS    ESOPHAGEAL DILATATION      HYSTERECTOMY  1994    menorrhagia    KNEE ARTHROSCOPY Right 2012    \"CLEAN UP\"    KNEE ARTHROSCOPY W/ MENISCAL REPAIR Right 2004    LASIK Bilateral     TONSILLECTOMY      TRIGGER POINT INJECTION  CRESENCIO's for back from 1992    WILL NOT AGAIN    UPPER GASTROINTESTINAL ENDOSCOPY        General Information       Row Name 11/14/23 1451          Physical Therapy Time and Intention    Document Type evaluation  -MS     Mode of Treatment physical therapy  -MS       Row Name 11/14/23 1451          General Information    Patient Profile Reviewed yes  -MS     Prior Level of Function independent:;all household mobility  access to RW  -MS     Existing Precautions/Restrictions fall  -MS     Barriers to Rehab none identified  -MS       Row Name 11/14/23 1451          Living Environment    People in Home spouse  -MS       Row Name 11/14/23 1451          Home Main Entrance    Number of Stairs, Main Entrance one  -MS       Row Name 11/14/23 1451          Stairs Within Home, Primary    Stairs, Within Home, Primary bedroom is on main level.  -MS       Row Name 11/14/23 1451          Cognition    Orientation Status (Cognition) oriented x 4  -MS       Row Name 11/14/23 1451          Safety Issues, Functional Mobility    Safety Issues Affecting Function (Mobility) insight into deficits/self-awareness;judgment;positioning of assistive device;sequencing abilities  -MS     Impairments Affecting Function (Mobility) strength;endurance/activity tolerance;balance;pain;postural/trunk control;range of motion (ROM)  -MS     Comment, Safety Issues/Impairments (Mobility) Gait belt and non skid socks donned.  -MS               User Key  (r) = Recorded By, (t) = Taken By, (c) = Cosigned By      Initials Name Provider Type    Amber Rose, PT Physical Therapist                   Mobility       Row Name 11/14/23 1451    "       Bed Mobility    Bed Mobility supine-sit  -MS     Supine-Sit [place] (Bed Mobility) standby assist  -MS       Row Name 11/14/23 1451          Sit-Stand Transfer    Sit-Stand [place] (Transfers) contact guard;verbal cues  -MS     Assistive Device (Sit-Stand Transfers) walker, front-wheeled  -MS       Row Name 11/14/23 1451          Gait/Stairs (Locomotion)    [place] Level (Gait) contact guard;verbal cues;nonverbal cues (demo/gesture)  -MS     Assistive Device (Gait) walker, front-wheeled  -MS     Patient was able to Ambulate yes  -MS     Distance in Feet (Gait) 20'  -MS     Deviations/Abnormal Patterns (Gait) valerie decreased;gait speed decreased;antalgic  -MS     Comment, (Gait/Stairs) Sequencing and walker placement cues. Limited d/t drowsiness and difficulty keeping eyes open.  -MS       Row Name 11/14/23 1451          Mobility    Extremity Weight-bearing Status right lower extremity  -MS     Right Lower Extremity (Weight-bearing Status) weight-bearing as tolerated (WBAT)  -MS               User Key  (r) = Recorded By, (t) = Taken By, (c) = Cosigned By      Initials Name Provider Type    MS Amber Kasper, PT Physical Therapist                   Obj/Interventions       Row Name 11/14/23 1452          Range of Motion Comprehensive    General Range of Motion lower extremity range of motion deficits identified  -MS     Comment, General Range of Motion R LE limited 2/2 pain  -MS       Row Name 11/14/23 1452          Strength Comprehensive (MMT)    General Manual Muscle Testing (MMT) Assessment lower extremity strength deficits identified  -MS     Comment, General Manual Muscle Testing (MMT) Assessment R LE post op weakness  -MS       Row Name 11/14/23 1452          Motor Skills    Therapeutic Exercise knee  R TKA protocol x 10 reps  -MS       Row Name 11/14/23 1452          Knee (Therapeutic Exercise)    Knee Strengthening (Therapeutic Exercise) heel slides  -MS       Row Name 11/14/23 1452           Balance    Balance Interventions sitting;standing;sit to stand  -MS       Row Name 11/14/23 1452          Sensory Assessment (Somatosensory)    Sensory Assessment (Somatosensory) LE sensation intact  -MS               User Key  (r) = Recorded By, (t) = Taken By, (c) = Cosigned By      Initials Name Provider Type    Alvaro Rosececi VÁSQUEZ, PT Physical Therapist                   Goals/Plan       Row Name 11/14/23 1453          Bed Mobility Goal 1 (PT)    Activity/Assistive Device (Bed Mobility Goal 1, PT) sit to supine/supine to sit  -MS     [place] Level/Cues Needed (Bed Mobility Goal 1, PT) modified independence  -MS     Time Frame (Bed Mobility Goal 1, PT) long term goal (LTG);3 days  -MS       Row Name 11/14/23 1453          Transfer Goal 1 (PT)    Activity/Assistive Device (Transfer Goal 1, PT) sit-to-stand/stand-to-sit  -MS     [place] Level/Cues Needed (Transfer Goal 1, PT) modified independence  -MS     Time Frame (Transfer Goal 1, PT) long term goal (LTG);3 days  -MS       Row Name 11/14/23 1453          Gait Training Goal 1 (PT)    Activity/Assistive Device (Gait Training Goal 1, PT) gait (walking locomotion)  -MS     [place] Level (Gait Training Goal 1, PT) modified independence  -MS     Time Frame (Gait Training Goal 1, PT) long term goal (LTG);3 days  -MS       Row Name 11/14/23 1453          ROM Goal 1 (PT)    ROM Goal 1 (PT) Surgical Knee ROM:  -MS     Time Frame (ROM Goal 1, PT) long-term goal (LTG);3 days  -MS       Row Name 11/14/23 1453          Therapy Assessment/Plan (PT)    Planned Therapy Interventions (PT) balance training;bed mobility training;gait training;home exercise program;ROM (range of motion);patient/family education;stair training;strengthening;stretching;transfer training  -MS               User Key  (r) = Recorded By, (t) = Taken By, (c) = Cosigned By      Initials Name Provider Type    Amber Rose FAHAD, PT Physical Therapist                   Clinical  Impression       Row Name 11/14/23 [facility]3          Pain    Pretreatment Pain Rating 0/10 - no pain  -MS     Posttreatment Pain Rating 0/10 - no pain  -MS       Row Name 11/14/23 1453          Therapy Assessment/Plan (PT)    Rehab Potential (PT) good, to achieve stated therapy goals  -MS     Criteria for Skilled Interventions Met (PT) yes  -MS     Therapy Frequency (PT) daily  -MS       Row Name 11/14/23 1453          Vital Signs    O2 Delivery Pre Treatment room air  -MS       Row Name 11/14/23 [facility]          Positioning and Restraints    Pre-Treatment Position in bed  -MS     Post Treatment Position chair  -MS     In Chair reclined;call light within reach;encouraged to call for assist;exit alarm on;RLE elevated  -MS               User Key  (r) = Recorded By, (t) = Taken By, (c) = Cosigned By      Initials Name Provider Type    Amber Rose, PT Physical Therapist                   Outcome Measures       Row Name 11/14/23 1453          How much help from another person do you currently need...    Turning from your back to your side while in flat bed without using bedrails? 4  -MS     Moving from lying on back to sitting on the side of a flat bed without bedrails? 3  -MS     Moving to and from a bed to a chair (including a wheelchair)? 3  -MS     Standing up from a chair using your arms (e.g., wheelchair, bedside chair)? 3  -MS     Climbing 3-5 steps with a railing? 2  -MS     To walk in hospital room? 3  -MS     AM-PAC 6 Clicks Score (PT) 18  -MS     Highest Level of Mobility Goal 6 --> Walk 10 steps or more  -MS       Row Name 11/14/23 [facility]          Functional Assessment    Outcome Measure Options AM-PAC 6 Clicks Basic Mobility (PT)  -MS               User Key  (r) = Recorded By, (t) = Taken By, (c) = Cosigned By      Initials Name Provider Type    Amber Rose, PT Physical Therapist                                 Physical Therapy Education       Title: PT OT SLP Therapies (Done)       Topic:  Physical Therapy (Done)       Point: Mobility training (Done)       Learning Progress Summary             Patient Acceptance, E,TB, VU by MS at 11/14/2023 1454                         Point: Home exercise program (Done)       Learning Progress Summary             Patient Acceptance, E,TB, VU by MS at 11/14/2023 1454                         Point: Body mechanics (Done)       Learning Progress Summary             Patient Acceptance, E,TB, VU by MS at 11/14/2023 1454                         Point: Precautions (Done)       Learning Progress Summary             Patient Acceptance, E,TB, VU by MS at 11/14/2023 1454                                         User Key       Initials Effective Dates Name Provider Type Discipline    MS 06/16/21 -  Amber Kasper PT Physical Therapist PT                  PT Recommendation and Plan  Planned Therapy Interventions (PT): balance training, bed mobility training, gait training, home exercise program, ROM (range of motion), patient/family education, stair training, strengthening, stretching, transfer training        Time Calculation:         PT Charges       Row Name 11/14/23 1454             Time Calculation    Start Time 1441  -MS      Stop Time 1455  -MS      Time Calculation (min) 14 min  -MS      PT Received On 11/14/23  -MS      PT - Next Appointment 11/15/23  -MS      PT Goal Re-Cert Due Date 11/21/23  -MS                User Key  (r) = Recorded By, (t) = Taken By, (c) = Cosigned By      Initials Name Provider Type    MS KaspreAmber PT Physical Therapist                  Therapy Charges for Today       Code Description Service Date Service Provider Modifiers Qty    21845248493 HC PT EVAL LOW COMPLEXITY 2 11/14/2023 Amber Kasper, PT GP 1    62042343286 HC PT THER PROC EA 15 MIN 11/14/2023 Amber Kasper, PT GP 1            PT G-Codes  Outcome Measure Options: AM-PAC 6 Clicks Basic Mobility (PT)  AM-PAC 6 Clicks Score (PT): 18  PT Discharge  Summary  Anticipated Discharge Disposition (PT): home with home health, home with assist    Amber Kasper, PT  11/14/2023

## 2023-11-14 NOTE — PLAN OF CARE
Goal Outcome Evaluation:      Patient is a pleasant 62 y.o. adult POD0 R TKA with expected post op weakness and impaired functional mobility. Patient is independent at baseline and lives at home with spouse. Access to RW. Today, patient performed bed mobility with SBA, required CGA for transfers, and ambulated 20' CGA with a RW- very drowsy during ambulation and demo'd difficulty keeping eyes open. Patient will benefit from skilled PT services acutely to address functional deficits as well as improve level of independence prior to discharge. Anticipate home with HH PT upon DC.        Anticipated Discharge Disposition (PT): home with home health, home with assist

## 2023-11-15 VITALS
HEART RATE: 69 BPM | HEIGHT: 63 IN | OXYGEN SATURATION: 93 % | DIASTOLIC BLOOD PRESSURE: 46 MMHG | SYSTOLIC BLOOD PRESSURE: 95 MMHG | TEMPERATURE: 97.4 F | WEIGHT: 210.98 LBS | RESPIRATION RATE: 16 BRPM | BODY MASS INDEX: 37.38 KG/M2

## 2023-11-15 LAB
HCT VFR BLD AUTO: 30.4 % (ref 34–46.6)
HGB BLD-MCNC: 10.2 G/DL (ref 12–15.9)

## 2023-11-15 PROCEDURE — 85014 HEMATOCRIT: CPT | Performed by: NURSE PRACTITIONER

## 2023-11-15 PROCEDURE — 85018 HEMOGLOBIN: CPT | Performed by: NURSE PRACTITIONER

## 2023-11-15 PROCEDURE — 99024 POSTOP FOLLOW-UP VISIT: CPT | Performed by: NURSE PRACTITIONER

## 2023-11-15 PROCEDURE — 97110 THERAPEUTIC EXERCISES: CPT

## 2023-11-15 RX ORDER — POLYETHYLENE GLYCOL 3350 17 G/17G
17 POWDER, FOR SOLUTION ORAL DAILY PRN
Qty: 238 G | Refills: 0 | Status: SHIPPED | OUTPATIENT
Start: 2023-11-15

## 2023-11-15 RX ORDER — HYDROCODONE BITARTRATE AND ACETAMINOPHEN 7.5; 325 MG/1; MG/1
1 TABLET ORAL EVERY 6 HOURS PRN
Qty: 28 TABLET | Refills: 0 | Status: SHIPPED | OUTPATIENT
Start: 2023-11-15

## 2023-11-15 RX ORDER — NALOXONE HYDROCHLORIDE 4 MG/.1ML
SPRAY NASAL
Qty: 2 EACH | Refills: 0 | Status: SHIPPED | OUTPATIENT
Start: 2023-11-15

## 2023-11-15 RX ORDER — ONDANSETRON 4 MG/1
4 TABLET, FILM COATED ORAL EVERY 6 HOURS PRN
Qty: 10 TABLET | Refills: 0 | Status: SHIPPED | OUTPATIENT
Start: 2023-11-15

## 2023-11-15 RX ORDER — PSEUDOEPHEDRINE HCL 30 MG
100 TABLET ORAL 2 TIMES DAILY PRN
Qty: 60 CAPSULE | Refills: 0 | Status: SHIPPED | OUTPATIENT
Start: 2023-11-15

## 2023-11-15 RX ORDER — ASPIRIN 81 MG/1
TABLET ORAL
Qty: 60 TABLET | Refills: 0 | Status: SHIPPED | OUTPATIENT
Start: 2023-11-15

## 2023-11-15 RX ADMIN — HYDROCODONE BITARTRATE AND ACETAMINOPHEN 1 TABLET: 7.5; 325 TABLET ORAL at 11:52

## 2023-11-15 RX ADMIN — FLUOXETINE HYDROCHLORIDE 40 MG: 20 CAPSULE ORAL at 08:43

## 2023-11-15 RX ADMIN — ASPIRIN 81 MG: 81 TABLET, COATED ORAL at 08:43

## 2023-11-15 RX ADMIN — LEVOTHYROXINE SODIUM 75 MCG: 75 TABLET ORAL at 05:25

## 2023-11-15 RX ADMIN — HYDROCODONE BITARTRATE AND ACETAMINOPHEN 1 TABLET: 7.5; 325 TABLET ORAL at 05:25

## 2023-11-15 RX ADMIN — MELOXICAM 15 MG: 15 TABLET ORAL at 08:43

## 2023-11-15 RX ADMIN — CETIRIZINE HYDROCHLORIDE 10 MG: 10 TABLET ORAL at 08:43

## 2023-11-15 RX ADMIN — HYDROCODONE BITARTRATE AND ACETAMINOPHEN 1 TABLET: 7.5; 325 TABLET ORAL at 00:39

## 2023-11-15 RX ADMIN — PANTOPRAZOLE SODIUM 40 MG: 40 TABLET, DELAYED RELEASE ORAL at 05:26

## 2023-11-15 NOTE — DISCHARGE PLACEMENT REQUEST
"Kamini Orta (62 y.o. Choose not to disclose)       Date of Birth   1960    Social Security Number       Address   [facility] [address] [facility] [place] [address]    Home Phone   [phone]    MRN   [id number]       [facility]    Marital Status                               Admission Date   11/14/23    Admission Type   Elective    Admitting Provider   Fabian Moore MD    Attending Provider   Fabian Moore MD    Department, Room/Bed   [address], P776/1       Discharge Date       Discharge Disposition   Home or Self Care    Discharge Destination                                 Attending Provider: Fabian Moore MD    Allergies: Codeine, Erythromycin, Erythromycin Base, Morphine    Isolation: None   Infection: None   Code Status: CPR    Ht: 160 cm (62.99\")   Wt: 95.7 kg (210 lb 15.7 oz)    Admission Cmt: None   Principal Problem: Arthritis of right knee [M17.11]                   Active Insurance as of 11/14/2023       Primary Coverage       Payor Plan Insurance Group Employer/Plan Group    [facility] [employer] [facility] [employer] [facility] PPO [id number]       Payor Plan Address Payor Plan Phone Number Payor Plan Fax Number Effective Dates    [address] [phone]  1/1/2022 - None Entered    [facility] [id number]         Subscriber Name Subscriber Birth Date Member ID       KO ORTA 1960 [id number]                     Emergency Contacts        (Rel.) Home Phone Work Phone Mobile Phone    Ko Orta (Spouse) -- -- [phone]          "

## 2023-11-15 NOTE — PLAN OF CARE
Goal Outcome Evaluation:  Plan of Care Reviewed With: patient           Outcome Evaluation: Patient resting in bed upon arrival. SV for all mobility this date and able to ambulate 100' SV with a RW. Reviewed 1 FARZANEH and technique verbally. Okay to DC home with HH PT from PT standpoint.      Anticipated Discharge Disposition (PT): home with home health, home with assist

## 2023-11-15 NOTE — PLAN OF CARE
Goal Outcome Evaluation:           Progress: improving  Outcome Evaluation: POD1 R TKA, ace wrap in place, A&Ox4, VSS, on room air, voiding per brp, up x1 assist, pain controlled with prn oral meds. N/vas WNL. Pt plan pending d/c this AM

## 2023-11-15 NOTE — PROGRESS NOTES
Orthopedic Total Joint Progress Note        Patient: Kamini Orta    Date of Admission: 11/14/2023  6:27 AM    YOB: 1960    Medical Record Number: [id number]    Attending Physician: Fabian Moore MD      POD # 1 Day Post-Op Procedure(s) (LRB):  RIGHT TOTAL KNEE ARTHROPLASTY WITH SHANTELLE NAVIGATION (Right)       Systemic or Specific Complaints: The patient has had a relatively normal postoperative course.  The patient has had no current complaints. The patient has had improving normal postoperative pain.  The patient has had no issues with the wound..  Patient resting in bed upon arrival.  She states she is doing okay, she knows the numbing medication has worn off and her pain has increased.  She has no other complaints at this time.      Allergies:   Allergies   Allergen Reactions    Codeine Anaphylaxis     Chest pains    Erythromycin Diarrhea     Cramps    Erythromycin Base Other (See Comments)    Morphine Other (See Comments) and Unknown - High Severity     Chest pain          Medications:   Current Medications:  Scheduled Meds:aspirin, 81 mg, Oral, Q12H  cetirizine, 10 mg, Oral, Daily  Chlorhexidine Gluconate Cloth, , Apply externally, Take As Directed  docusate sodium, 100 mg, Oral, BID  famotidine, 10 mg, Oral, Nightly  FLUoxetine, 40 mg, Oral, Daily  valsartan, 80 mg, Oral, Q24H   And  hydroCHLOROthiazide, 12.5 mg, Oral, Q24H  levothyroxine, 75 mcg, Oral, Q AM  meloxicam, 15 mg, Oral, Daily  pantoprazole, 40 mg, Oral, QAM  polyethylene glycol, 17 g, Oral, Daily  rosuvastatin, 10 mg, Oral, Daily With Dinner      Continuous Infusions:lactated ringers, 100 mL/hr, Last Rate: Stopped (11/14/23 1753)  lactated ringers, 100 mL/hr, Last Rate: 100 mL/hr (11/14/23 1753)      PRN Meds:.  acetaminophen    ALPRAZolam    bisacodyl    bisacodyl    fluticasone    HYDROcodone-acetaminophen    HYDROcodone-acetaminophen    HYDROmorphone **AND** naloxone    magnesium hydroxide    ondansetron **OR**  "ondansetron      Physical Exam: 62 y.o. adult   Wt Readings from Last 3 Encounters:   11/14/23 95.7 kg (210 lb 15.7 oz)   11/09/23 95.7 kg (211 lb)   11/07/23 95.7 kg (211 lb)     Ht Readings from Last 3 Encounters:   11/14/23 160 cm (62.99\")   11/09/23 160 cm (62.99\")   11/07/23 160 cm (63\")     Body mass index is 37.38 kg/m².    Vitals:    11/14/23 1817 11/14/23 2200 11/15/23 0235 11/15/23 0516   BP: 103/58 107/67 108/59 105/62   BP Location: Left arm Right arm Right arm Right arm   Patient Position: Lying Lying Lying Lying   Pulse: 73 76 70 71   Resp: 16 16 16 16   Temp:  97.5 °F (36.4 °C) 98.3 °F (36.8 °C) 98 °F (36.7 °C)   TempSrc:  Oral Oral Oral   SpO2: 97% 95% 94% 92%   Weight:       Height:            General Appearance:    General: alert and oriented         Abdomen/:     soft non-tender, non-distended, voiding without difficulty       Extremities:   Operative extremity neurovascular status intact. ROM appropriate.  Incision intact w/out signs or symptoms of infection.  No cyanosis, calf is soft and nontender.  Ace bandage and cast padding removed.  Optifoam dressing clean dry and intact, area of dried bloody drainage at the most distal end of the dressing, no signs of drainage or active bleeding.     Activity: Mobilizing Per P.T.   Weight Bearing: As Tolerated    Diagnostic Tests:   Admission on 11/14/2023   Component Date Value Ref Range Status    Hemoglobin 11/15/2023 10.2 (L)  12.0 - 15.9 g/dL Final    Hematocrit 11/15/2023 30.4 (L)  34.0 - 46.6 % Final       Imaging Results (Last 72 Hours)       Procedure Component Value Units Date/Time    XR Knee 1 or 2 View Right [[id number]] Collected: 11/14/23 1144     Updated: 11/14/23 1148    Narrative:      XR KNEE 1 OR 2 VW RIGHT-     INDICATIONS: Postoperative evaluation.     TECHNIQUE: Frontal and lateral views of the right knee     COMPARISON: None available     FINDINGS:      Intact appearing knee arthroplasty hardware is seen with adjacent  surgical soft " tissue gas. No acute fracture is identified.          Impression:         Postsurgical changes.           This report was finalized on 11/14/2023 11:45 AM by Dr. Augustin Perez M.D on Workstation: [employer]               Personally viewed ortho images and report     Assessment:    Arthritis of right knee  - Doing well 1 Day Post-Op following total joint replacement  - Acute Blood Loss Anemia, preoperative hemoglobin 12.5, Postoperative hemoglobin 10.2 - stable  - Post-operative Pain  - Limited mobility, requires use of walker and assistance when OOB.    Plan:    - Consults: none  - Continue to monitor labs and/or v/s, for tolerance to post op blood loss.  - Continue efforts to increase mobilization.  - Continue Pain Control Measures.  - Continue incisional Care.  - DVT prophylaxis - aspirin  - Follow up in office with Fabian Moore M.D. In 2 weeks.    Discharge Plan:today to home, home health, and when cleared by physical therapy as safe for discharge    Date: 11/15/2023  CAROLINE Garcia

## 2023-11-15 NOTE — DISCHARGE SUMMARY
Orthopedic Discharge Summary      Patient: Kamini Orta  YOB: 1960  Medical Record Number: [id number]    Attending Physician: Fabian Moore MD  Consulting Physician(s):   Consults       No orders found for last 30 day(s).            Date of Admission: 11/14/2023  6:27 AM  Date of Discharge: 11/15/2023    Discharge Diagnosis: VT ARTHRP KNE CONDYLE&PLATU MEDIAL&LAT COMPARTMENTS [18092] (RIGHT TOTAL KNEE ARTHROPLASTY WITH SHANTELLE NAVIGATION),   Acute Blood Loss Anemia, stable  Post-operative Pain  Limited mobility, requires use of walker and assistance when OOB.    Presenting Problem/History of Present Illness: Arthritis of right knee [M17.11]    Allergies:   Allergies   Allergen Reactions    Codeine Anaphylaxis     Chest pains    Erythromycin Diarrhea     Cramps    Erythromycin Base Other (See Comments)    Morphine Other (See Comments) and Unknown - High Severity     Chest pain          Discharge Medications       Discharge Medications        New Medications        Instructions Start Date   aspirin 81 MG EC tablet   Take 1 tablet by mouth twice a day x2 weeks, then take 1 tablet by mouth daily x4 weeks      docusate sodium 100 MG capsule   100 mg, Oral, 2 Times Daily PRN      HYDROcodone-acetaminophen 7.5-325 MG per tablet  Commonly known as: NORCO  Replaces: HYDROcodone-acetaminophen 5-325 MG per tablet   1 tablet, Oral, Every 6 Hours PRN      naloxone 4 MG/0.1ML nasal spray  Commonly known as: NARCAN   Call 911. Don't prime. [place] in 1 nostril for overdose. Repeat in 2-3 minutes in other nostril if no or minimal breathing/responsiveness.      ondansetron 4 MG tablet  Commonly known as: ZOFRAN   4 mg, Oral, Every 6 Hours PRN      polyethylene glycol 17 g packet  Commonly known as: MIRALAX   17 g, Oral, Daily PRN             Changes to Medications        Instructions Start Date   levothyroxine 75 MCG tablet  Commonly known as: SYNTHROID, LEVOTHROID  What changed: when to take this   75 mcg,  Oral, Daily      pantoprazole 40 MG EC tablet  Commonly known as: PROTONIX  What changed: when to take this   40 mg, Oral, Daily      rosuvastatin 10 MG tablet  Commonly known as: CRESTOR  What changed: when to take this   TAKE ONE TABLET BY MOUTH EVERY DAY             Continue These Medications        Instructions Start Date   ALPRAZolam 0.5 MG tablet  Commonly known as: XANAX   TAKE ONE TABLET BY MOUTH TWICE DAILY AS NEEDED for severe anxiety      cetirizine 10 MG tablet  Commonly known as: zyrTEC  Notes to patient: 11/16/23   10 mg, Oral, Daily      estradiol 0.05 MG/24HR patch  Commonly known as: Vivelle-Dot  Notes to patient: TAKE AS PREVIOUSLY INSTRUCTED.   1 patch, Transdermal, 2 Times Weekly      famotidine 10 MG tablet  Commonly known as: PEPCID  Notes to patient: 11/15/23 PM   10 mg, Oral, Nightly      FLUoxetine 40 MG capsule  Commonly known as: PROzac  Notes to patient: 11/16/23   40 mg, Oral, Daily      fluticasone 50 MCG/ACT nasal spray  Commonly known as: FLONASE  Notes to patient: 11/16/23   2 sprays, Each Nare, Daily      meclizine 25 MG tablet  Commonly known as: ANTIVERT   TAKE ONE TABLET BY MOUTH THREE TIMES DAILY AS NEEDED FOR dizziness      Melatonin 10 MG sublingual tablet  Notes to patient: 11/15/23 PM   1 tablet, Sublingual, Nightly      valsartan-hydrochlorothiazide 80-12.5 MG per tablet  Commonly known as: DIOVAN-HCT  Notes to patient: 11/15/23 PM   1 tablet, Oral, Every Evening             Stop These Medications      COQ10 PO     diclofenac 50 MG EC tablet  Commonly known as: VOLTAREN     HYALURONIC ACID PO     HYDROcodone-acetaminophen 5-325 MG per tablet  Commonly known as: NORCO  Replaced by: HYDROcodone-acetaminophen 7.5-325 MG per tablet     multivitamin with minerals tablet tablet     POTASSIUM PO                Past Medical History:   Diagnosis Date    Ankle sprain 40 years ago    Anxiety     Arachnoiditis     Arthritis of back hard to pinpoint    At risk for sleep apnea     5     "CTS (carpal tunnel syndrome) 2002    Depression     Diverticulitis     Diverticulosis     GERD (gastroesophageal reflux disease)     Hip arthrosis     History of medical problems Vertigo    History of transfusion     no reaction   age 15    HL (hearing loss)     Hyperlipidemia     Hypertension     Knee swelling     Low back pain     Low back strain years    Lumbosacral disc disease this last time,     PONV (postoperative nausea and vomiting)     Scoliosis     Tear of meniscus of knee     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    Thyroid disease     Vertigo         Past Surgical History:   Procedure Laterality Date     SECTION      ALSO     CHOLECYSTECTOMY      COLONOSCOPY      COLONOSCOPY N/A 2022    Procedure: COLONOSCOPY;  Surgeon: Peter Tejada MD;  Location: [facility] ENDOSCOPY;  Service: Gastroenterology;  Laterality: N/A;  HEMMORHOIDS    ESOPHAGEAL DILATATION      HYSTERECTOMY      menorrhagia    KNEE ARTHROSCOPY Right     \"CLEAN UP\"    KNEE ARTHROSCOPY W/ MENISCAL REPAIR Right     LASIK Bilateral     TONSILLECTOMY      TRIGGER POINT INJECTION  CRESENCIO's for back from     WILL NOT AGAIN    UPPER GASTROINTESTINAL ENDOSCOPY          Social History     Occupational History    Not on file   Tobacco Use    Smoking status: Former     Packs/day: 0.00     Years: 30.00     Additional pack years: 0.00     Total pack years: 0.00     Types: Cigarettes     Start date: 1982     Quit date: 2012     Years since quittin.7    Smokeless tobacco: Never   Vaping Use    Vaping Use: Former    Start date: 2013    Quit date: 2012    Substances: Nicotine, stepped down gradually to no nicotine    Devices: Pre-filled or refillable cartridge   Substance and Sexual Activity    Alcohol use: No    Drug use: Yes     Frequency: 7.0 times per week     Types: Marijuana    Sexual activity: Not Currently     Comment: Full hysterectomy       Social History "     Social History Narrative    Not on file        Family History   Problem Relation Age of Onset    Cancer Father         Mesothelioma    Colon polyps Father     Scoliosis Sister     Cancer Brother         Prostate    Cancer Brother         Colon & Liver    Colon cancer Brother 64    Heart disease Other     Lung disease Other     Malig Hyperthermia Neg Hx          Physical Exam: 62 y.o. adult   Body mass index is 37.38 kg/m².  Facility age limit for growth %marcela is 20 years.  Vitals:    11/15/23 0516   BP: 105/62   Pulse: 71   Resp: 16   Temp: 98 °F (36.7 °C)   SpO2: 92%         General Appearance:    Alert, cooperative, in no acute distress                      Vitals:    11/14/23 1817 11/14/23 2200 11/15/23 0235 11/15/23 0516   BP: 103/58 107/67 108/59 105/62   BP Location: Left arm Right arm Right arm Right arm   Patient Position: Lying Lying Lying Lying   Pulse: 73 76 70 71   Resp: 16 16 16 16   Temp:  97.5 °F (36.4 °C) 98.3 °F (36.8 °C) 98 °F (36.7 °C)   TempSrc:  Oral Oral Oral   SpO2: 97% 95% 94% 92%   Weight:       Height:            DIAGNOSTIC TESTS:   Admission on 11/14/2023   Component Date Value Ref Range Status    Hemoglobin 11/15/2023 10.2 (L)  12.0 - 15.9 g/dL Final    Hematocrit 11/15/2023 30.4 (L)  34.0 - 46.6 % Final       Imaging Results (Last 72 Hours)       Procedure Component Value Units Date/Time    XR Knee 1 or 2 View Right [[id number]] Collected: 11/14/23 1144     Updated: 11/14/23 1148    Narrative:      XR KNEE 1 OR 2 VW RIGHT-     INDICATIONS: Postoperative evaluation.     TECHNIQUE: Frontal and lateral views of the right knee     COMPARISON: None available     FINDINGS:      Intact appearing knee arthroplasty hardware is seen with adjacent  surgical soft tissue gas. No acute fracture is identified.          Impression:         Postsurgical changes.           This report was finalized on 11/14/2023 11:45 AM by Dr. Augustin Perez M.D on Workstation: [address]  Course:  62 y.o. adult admitted to [facility] to services of aFbian Moore MD with Arthritis of right knee [M17.11] on 11/14/2023 and underwent CO ARTHRP KNE CONDYLE&PLATU MEDIAL&LAT COMPARTMENTS [[id number]] (RIGHT TOTAL KNEE ARTHROPLASTY WITH SHANTELLE NAVIGATION)  Per Fabian Moore MD. Antibiotic and VTE prophylaxis were per SCIP protocols. Post-operatively the patient transferred to the post-operative floor where the patient underwent mobilization therapy that included active as well as passive ROM exercises. Opioids were titrated to achieve appropriate pain management to allow for participation in mobilization exercises. Vital signs are now stable. On the day of discharge the wound was clean, dry and intact and calf was soft and nontender and Homans sign was negative. Operative extremity neurovascular status remains intact.   Appropriate education re: incision care, activity levels, medications, and follow up visits was completed and all questions were answered. The patient is now deemed stable for discharge.    Condition on Discharge:  Stable    Total Joint Replacement Discharge Instructions: Patient is to continue with physical therapy exercises twice daily and continue working with the physical therapist as ordered  and should be up walking every 2 hours during the day in addition to the physical therapy exercises. Patient may weight bear as tolerated unless otherwise specified. Continue to ice regularly. Do frequent ankle pumping exercises while you are sitting with legs elevated.  Patient also instructed on deep breathing, coughing, and using  incentive spirometer during hospitalization and encouraged to continue to use at home regularly.        VI. FOLLOW-UP VISITS:  Follow up in the office with Dr Fabian Moore in 2 weeks - If already scheduled (see Future Appointments) for date and time, if not yet scheduled, patient to call the office at [phone] to schedule. Prescriptions were given for pain  medication, nausea, constipation, and blood thinner therapy.    If you have any concerns or suspected complications prior to your follow up visit, please call your surgeon's office. Do not wait until your appointment time if you suspect complications. These will need to be addressed in the office promptly.      Future Appointments   Date Time Provider Department Center   11/27/2023 10:00 AM Kelly Mercado APRN [facility] [address] [facility]   4/8/2024  1:15 PM Davin Hall MD [facility]     Additional Instructions for the Follow-ups that You Need to Schedule       Ambulatory Referral to Home Health   As directed      Face to Face Visit Date: 11/15/2023   Follow-up provider for Plan of Care?: I will be treating the patient on an ongoing basis.  Please send me the Plan of Care for signature.   Follow-up provider: FABIAN SELF [[id number]]   Reason/Clinical Findings: post surgical   Describe mobility limitations that make leaving home difficult: Requires the assistance of another to leave home   Nursing/Therapeutic Services Requested: Physical Therapy   PT orders: Total joint pathway   Frequency: 1 Week 1        Discharge Follow-up with Specialty: Orthopedics; 2 Weeks   As directed      Specialty: Orthopedics   Follow Up: 2 Weeks   Follow Up Details: Return to the office to see Dr. Fabian Self                Discharge Disposition Plan:today to home, home health, and when cleared by physical therapy as safe for discharge    Date: 11/15/2023    CAROLINE Garcia    Dictated Utilizing Dragon Dictation

## 2023-11-15 NOTE — THERAPY DISCHARGE NOTE
Patient Name: Kamini Orta  : 1960    MRN: [id number]                              Today's Date: 11/15/2023       Admit Date: 2023    Visit Dx:     ICD-10-CM ICD-9-CM   1. S/P TKR (total knee replacement), right  Z96.651 V43.65   2. Arthritis of right knee  M17.11 716.96     Patient Active Problem List   Diagnosis    Arthritis of right knee    Adhesive arachnoiditis    Arachnoiditis    Lumbar degenerative disc disease    Chronic pain of right knee    Arthritis of both knees    Primary osteoarthritis of right knee    Acute non-recurrent pansinusitis    Vertigo    Hypertension, essential    Projectile vomiting with nausea    Worsening headaches    Acute non-recurrent maxillary sinusitis    Diverticulitis of large intestine without perforation or abscess without bleeding    Chronic bilateral low back pain    COVID-19 virus infection    Change in bowel habits    Family history of colon cancer    Rectal bleeding    History of colon polyps    Acute pain of left knee    Asymmetric septal hypertrophy     Past Medical History:   Diagnosis Date    Ankle sprain 40 years ago    Anxiety     Arachnoiditis     Arthritis of back hard to pinpoint    At risk for sleep apnea     5    CTS (carpal tunnel syndrome)     Depression     Diverticulitis     Diverticulosis     GERD (gastroesophageal reflux disease)     Hip arthrosis     History of medical problems Vertigo    History of transfusion     no reaction   age 15    HL (hearing loss)     Hyperlipidemia     Hypertension     Knee swelling     Low back pain     Low back strain years    Lumbosacral disc disease this last time,     PONV (postoperative nausea and vomiting)     Scoliosis 1986    Tear of meniscus of knee     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    Thyroid disease     Vertigo      Past Surgical History:   Procedure Laterality Date     SECTION      ALSO 1987    CHOLECYSTECTOMY      COLONOSCOPY      COLONOSCOPY N/A  "08/17/2022    Procedure: COLONOSCOPY;  Surgeon: Peter Tejada MD;  Location: [facility] ENDOSCOPY;  Service: Gastroenterology;  Laterality: N/A;  HEMMORHOIDS    ESOPHAGEAL DILATATION      HYSTERECTOMY  1994    menorrhagia    KNEE ARTHROSCOPY Right 2012    \"CLEAN UP\"    KNEE ARTHROSCOPY W/ MENISCAL REPAIR Right 2004    LASIK Bilateral     TONSILLECTOMY      TRIGGER POINT INJECTION  CRESENCIO's for back from 1992    WILL NOT AGAIN    UPPER GASTROINTESTINAL ENDOSCOPY        General Information       Row Name 11/15/23 0939          Physical Therapy Time and Intention    Document Type discharge treatment  -MS     Mode of Treatment physical therapy  -MS       Row Name 11/15/23 0939          General Information    Existing Precautions/Restrictions fall  -MS       Row Name 11/15/23 0939          Cognition    Orientation Status (Cognition) oriented x 4  -MS       Row Name 11/15/23 0939          Safety Issues, Functional Mobility    Safety Issues Affecting Function (Mobility) positioning of assistive device;sequencing abilities  -MS     Impairments Affecting Function (Mobility) strength;endurance/activity tolerance;balance;pain;postural/trunk control;range of motion (ROM)  -MS     Comment, Safety Issues/Impairments (Mobility) Gait belt and non skid socks donned.  -MS               User Key  (r) = Recorded By, (t) = Taken By, (c) = Cosigned By      Initials Name Provider Type    MS Amber Kasper PT Physical Therapist                   Mobility       Row Name 11/15/23 0939          Bed Mobility    Supine-Sit [place] (Bed Mobility) supervision  -MS       Row Name 11/15/23 0939          Sit-Stand Transfer    Sit-Stand [place] (Transfers) supervision  -MS     Assistive Device (Sit-Stand Transfers) walker, front-wheeled  -MS       Row Name 11/15/23 0939          Gait/Stairs (Locomotion)    [place] Level (Gait) supervision  -MS     Assistive Device (Gait) walker, front-wheeled  -MS     Distance in Feet (Gait) 100'  " -MS     Deviations/Abnormal Patterns (Gait) valerie decreased;gait speed decreased;antalgic  -MS     Comment, (Gait/Stairs) Reviewed walker placement cues. Stair training reviewed verbally.  -MS       Row Name 11/15/23 0939          Mobility    Extremity Weight-bearing Status right lower extremity  -MS     Right Lower Extremity (Weight-bearing Status) weight-bearing as tolerated (WBAT)  -MS               User Key  (r) = Recorded By, (t) = Taken By, (c) = Cosigned By      Initials Name Provider Type    Amber Rose, JENNIFER Physical Therapist                   Obj/Interventions       Row Name 11/15/23 1001          Motor Skills    Therapeutic Exercise --  TKA protocol x 10 reps, incr time needed d/t pain  -MS               User Key  (r) = Recorded By, (t) = Taken By, (c) = Cosigned By      Initials Name Provider Type    Amber Rose PT Physical Therapist                   Goals/Plan    No documentation.                  Clinical Impression       Row Name 11/15/23 1001          Pain    Pretreatment Pain Rating 6/10  -MS     Posttreatment Pain Rating 6/10  -MS     Pain Location - Side/Orientation Right  -MS     Pain Location incisional  -MS     Pain Location - knee  -MS     Pain Intervention(s) Repositioned;Ambulation/increased activity;Rest  -MS       Row Name 11/15/23 1001          Plan of Care Review    Plan of Care Reviewed With patient  -MS     Outcome Evaluation Patient resting in bed upon arrival. SV for all mobility this date and able to ambulate 100' SV with a RW. Reviewed 1 FARZANEH and technique verbally. Okay to DC home with  PT from PT standpoint.  -MS       Row Name 11/15/23 1001          Vital Signs    O2 Delivery Pre Treatment room air  -MS       Row Name 11/15/23 1001          Positioning and Restraints    Pre-Treatment Position in bed  -MS     Post Treatment Position chair  -MS     In Chair reclined;call light within reach;encouraged to call for assist;exit alarm on  ice pack  -MS                User Key  (r) = Recorded By, (t) = Taken By, (c) = Cosigned By      Initials Name Provider Type    MS Kasper Amber VÁSQUEZ, PT Physical Therapist                   Outcome Measures       Row Name 11/15/23 1002 11/15/23 0846       How much help from another person do you currently need...    Turning from your back to your side while in flat bed without using bedrails? 4  -MS 4  -JM    Moving from lying on back to sitting on the side of a flat bed without bedrails? 4  -MS 4  -JM    Moving to and from a bed to a chair (including a wheelchair)? 3  -MS 3  -JM    Standing up from a chair using your arms (e.g., wheelchair, bedside chair)? 3  -MS 3  -JM    Climbing 3-5 steps with a railing? 3  -MS 3  -JM    To walk in hospital room? 3  -MS 3  -JM    AM-PAC 6 Clicks Score (PT) 20  -MS 20  -JM    Highest Level of Mobility Goal 6 --> Walk 10 steps or more  -MS 6 --> Walk 10 steps or more  -JM              User Key  (r) = Recorded By, (t) = Taken By, (c) = Cosigned By      Initials Name Provider Type    MS KasperAmber, PT Physical Therapist    Perlita Becerril RN Registered Nurse                  Physical Therapy Education       Title: PT OT SLP Therapies (Done)       Topic: Physical Therapy (Done)       Point: Mobility training (Done)       Learning Progress Summary             Patient Acceptance, E,TB, VU by MS at 11/14/2023 1454                         Point: Home exercise program (Done)       Learning Progress Summary             Patient Acceptance, E,TB, VU by MS at 11/14/2023 1454                         Point: Body mechanics (Done)       Learning Progress Summary             Patient Acceptance, E,TB, VU by MS at 11/14/2023 1454                         Point: Precautions (Done)       Learning Progress Summary             Patient Acceptance, E,TB, VU by MS at 11/14/2023 1454                                         User Key       Initials Effective Dates Name Provider Type Discipline    MS 06/16/21 -   Amber Kasper, PT Physical Therapist PT                  PT Recommendation and Plan  Planned Therapy Interventions (PT): balance training, bed mobility training, gait training, home exercise program, ROM (range of motion), patient/family education, stair training, strengthening, stretching, transfer training  Plan of Care Reviewed With: patient  Outcome Evaluation: Patient resting in bed upon arrival. SV for all mobility this date and able to ambulate 100' SV with a RW. Reviewed 1 FARZANEH and technique verbally. Okay to DC home with HH PT from PT standpoint.     Time Calculation:         PT Charges       Row Name 11/15/23 1000             Time Calculation    Start Time 0922  -MS      Stop Time 0951  -MS      Time Calculation (min) 29 min  -MS      PT Received On 11/15/23  -MS                User Key  (r) = Recorded By, (t) = Taken By, (c) = Cosigned By      Initials Name Provider Type    Amber Rose, PT Physical Therapist                  Therapy Charges for Today       Code Description Service Date Service Provider Modifiers Qty    [id number]  PT EVAL LOW COMPLEXITY 2 11/14/2023 Amber Kasper, PT GP 1    [id number]  PT THER PROC EA 15 MIN 11/14/2023 Amber Kasper, PT GP 1    [id number]  PT THER PROC EA 15 MIN 11/15/2023 Amber Kasper, PT GP 2            PT G-Codes  Outcome Measure Options: AM-PAC 6 Clicks Basic Mobility (PT)  AM-PAC 6 Clicks Score (PT): 20    PT Discharge Summary  Anticipated Discharge Disposition (PT): home with home health, home with assist    Amber Kasper PT  11/15/2023

## 2023-11-27 ENCOUNTER — OFFICE VISIT (OUTPATIENT)
Dept: ORTHOPEDIC SURGERY | Facility: CLINIC | Age: 63
End: 2023-11-27
Payer: COMMERCIAL

## 2023-11-27 VITALS — WEIGHT: 204.8 LBS | BODY MASS INDEX: 36.29 KG/M2 | TEMPERATURE: 98 F | HEIGHT: 63 IN

## 2023-11-27 DIAGNOSIS — Z96.651 S/P TKR (TOTAL KNEE REPLACEMENT), RIGHT: ICD-10-CM

## 2023-11-27 DIAGNOSIS — R52 PAIN: Primary | ICD-10-CM

## 2023-11-27 PROCEDURE — 73560 X-RAY EXAM OF KNEE 1 OR 2: CPT | Performed by: NURSE PRACTITIONER

## 2023-11-27 PROCEDURE — 99024 POSTOP FOLLOW-UP VISIT: CPT | Performed by: NURSE PRACTITIONER

## 2023-11-27 RX ORDER — HYDROCODONE BITARTRATE AND ACETAMINOPHEN 7.5; 325 MG/1; MG/1
1 TABLET ORAL EVERY 6 HOURS PRN
Qty: 28 TABLET | Refills: 0 | Status: SHIPPED | OUTPATIENT
Start: 2023-11-27

## 2023-11-27 NOTE — PROGRESS NOTES
Kamini Orta : 1960 MRN: [id number] DATE: 2023  Body mass index is 36.28 kg/m².  Vitals:    23 0951   Temp: 98 °F (36.7 °C)       DIAGNOSIS: 2 week follow up right total knee arthroplasty    SUBJECTIVE:Patient returns today for 2 week follow up of right total knee replacement.  Patient ambulates to appointment with rollator, family present.  She states she is doing well, she switches to outpatient therapy tomorrow.  She has no other complaints at this time.    OBJECTIVE:   Exam:. The incision is healing appropriately. No sign of infection. Range of motion is progressing as expected, ROM approximately 15 degrees short of full extension and approximately 85 degrees of flexion. The calf is soft and nontender with a negative Homans sign.    DIAGNOSTIC STUDIES  2V AP&Lat of the right knee were done in the office today  Indication, findings and comparison: images were reviewed for evaluation of recent knee replacement. They demonstrate a well positioned, well aligned knee replacement without complicating factors noted. In comparison with previous films there has been interval implant placement.    ASSESSMENT: 2 week status post right knee replacement expected healing.  Incision appears well-healing, edges well-approximated, no erythema, no open wounds, no signs of active drainage or bleeding noted.    PLAN: 1) dressing and exofin fusion removed, steri-strips applied   2) Order given for PT and pain medicine (as needed)   3) Continue ice PRN   4) Start aspirin 81mg daily x4 weeks   5) Follow up in 4 weeks with repeat Xrays of right knee (2 views)   6) Wait for 3 months after surgery for routine teeth cleaning and continue with taking a dose of antibiotics before dental procedures for 2 yrs post total joint replacement.    Kelly Mercado, APRN  2023    Dictated Utilizing Dragon Dictation

## 2023-12-11 RX ORDER — VALSARTAN AND HYDROCHLOROTHIAZIDE 80; 12.5 MG/1; MG/1
1 TABLET, FILM COATED ORAL EVERY EVENING
Qty: 90 TABLET | Refills: 3 | Status: SHIPPED | OUTPATIENT
Start: 2023-12-11

## 2023-12-26 ENCOUNTER — PATIENT MESSAGE (OUTPATIENT)
Dept: INTERNAL MEDICINE | Facility: CLINIC | Age: 63
End: 2023-12-26
Payer: COMMERCIAL

## 2023-12-26 DIAGNOSIS — M17.0 ARTHRITIS OF BOTH KNEES: Primary | ICD-10-CM

## 2023-12-26 RX ORDER — FLUTICASONE PROPIONATE 50 MCG
2 SPRAY, SUSPENSION (ML) NASAL DAILY
Qty: 16 G | Refills: 5 | Status: SHIPPED | OUTPATIENT
Start: 2023-12-26

## 2024-01-03 ENCOUNTER — OFFICE VISIT (OUTPATIENT)
Dept: ORTHOPEDIC SURGERY | Facility: CLINIC | Age: 64
End: 2024-01-03
Payer: COMMERCIAL

## 2024-01-03 VITALS — WEIGHT: 213.4 LBS | TEMPERATURE: 97.5 F | HEIGHT: 63 IN | BODY MASS INDEX: 37.81 KG/M2

## 2024-01-03 DIAGNOSIS — Z96.651 S/P TKR (TOTAL KNEE REPLACEMENT), RIGHT: ICD-10-CM

## 2024-01-03 DIAGNOSIS — R52 PAIN: Primary | ICD-10-CM

## 2024-01-03 PROCEDURE — 99024 POSTOP FOLLOW-UP VISIT: CPT | Performed by: ORTHOPAEDIC SURGERY

## 2024-01-03 RX ORDER — BIFIDOBACTERIUM LONGUM SUBSP. INFANTIS, AVOBENZONE, HOMOSALATE, OCTISALATE, OCTOCRYLENE, AND OXYBENZONE
KIT
COMMUNITY

## 2024-01-03 RX ORDER — HYDROCODONE BITARTRATE AND ACETAMINOPHEN 5; 325 MG/1; MG/1
1 TABLET ORAL DAILY
COMMUNITY

## 2024-01-03 NOTE — PROGRESS NOTES
"Kamini follows up today status post right total knee on 1114 says she is about 7 weeks out.  She says she is \"doing great\" she is very happy with that.  Says she has 0 out of 10 pain.  Her incision is healing nicely infection is very pleased with that she goes from 0 back to about 115 degrees and has good stability and can transfer and walk well.  X-ray AP lateral sunrise view right knee taken the office today for postop follow-up with comparison view shows well aligned total knee replacement.  Postop total knee recommendations given questions answered follow-up in 2 months with x-rays sooner if she needs to be seen  "

## 2024-01-19 ENCOUNTER — TELEPHONE (OUTPATIENT)
Dept: OBSTETRICS AND GYNECOLOGY | Facility: CLINIC | Age: 64
End: 2024-01-19

## 2024-01-19 RX ORDER — ESTRADIOL 0.05 MG/D
1 PATCH, EXTENDED RELEASE TRANSDERMAL 2 TIMES WEEKLY
Qty: 24 PATCH | Refills: 3 | OUTPATIENT
Start: 2024-01-22

## 2024-01-19 NOTE — TELEPHONE ENCOUNTER
Caller: You Kamini Jose    Relationship: Self    Best call back number: [phone]    Requested Prescriptions:   Requested Prescriptions     Pending Prescriptions Disp Refills    estradiol (Vivelle-Dot) 0.05 MG/24HR patch 24 patch 3     Sig: Place 1 patch on the skin as directed by provider 2 (Two) Times a Week.        Pharmacy where request should be sent:  [facility] @ [address] [place] [address]    Last office visit with prescribing clinician: 4/18/2022   Last telemedicine visit with prescribing clinician: Visit date not found   Next office visit with prescribing clinician: Visit date not found     Additional details provided by patient: PT ADV SHE IS DOWN TO HER LAST BOX    Does the patient have less than a 3 day supply:  [] Yes  [x] No    Would you like a call back once the refill request has been completed: [x] Yes [] No    If the office needs to give you a call back, can they leave a voicemail: [x] Yes [] No    Calixto Thomas Rep   01/19/24 12:48 EST

## 2024-01-19 NOTE — TELEPHONE ENCOUNTER
Denied refill request on jossie wolf. Pt has not been seen since April 2022. She does not have an upcoming appointment. She must be seen before anything can be sent in.

## 2024-01-19 NOTE — TELEPHONE ENCOUNTER
Caller: Kamini Orta    Relationship: Self    Best call back number:[phone]    Requested Prescriptions: ESTRADIOL PATCH  Requested Prescriptions      No prescriptions requested or ordered in this encounter        Pharmacy where request should be sent:  [facility]    Last office visit with prescribing clinician: 4/18/2022   Last telemedicine visit with prescribing clinician: Visit date not found   Next office visit with prescribing clinician: 2/6/2024     Additional details provided by patient: HAS A COUPLE LEFT BUT NOT ENOUGH TO GET UNTIL 2/6/24    Does the patient have less than a 3 day supply:  [] Yes  [x] No    Would you like a call back once the refill request has been completed: [x] Yes [] No    If the office needs to give you a call back, can they leave a voicemail: [] Yes [] No    Calixto Arciniega Rep   01/19/24 13:21 EST

## 2024-02-10 DIAGNOSIS — Z96.651 S/P TKR (TOTAL KNEE REPLACEMENT), RIGHT: ICD-10-CM

## 2024-02-12 DIAGNOSIS — M54.50 LOW BACK PAIN, UNSPECIFIED: ICD-10-CM

## 2024-02-12 DIAGNOSIS — M51.36 OTHER INTERVERTEBRAL DISC DEGENERATION, LUMBAR REGION: ICD-10-CM

## 2024-02-12 RX ORDER — HYDROCODONE BITARTRATE AND ACETAMINOPHEN 5; 325 MG/1; MG/1
TABLET ORAL
Qty: 60 TABLET | Refills: 0 | OUTPATIENT
Start: 2024-02-12

## 2024-02-12 RX ORDER — HYDROCODONE BITARTRATE AND ACETAMINOPHEN 7.5; 325 MG/1; MG/1
1 TABLET ORAL EVERY 6 HOURS PRN
Qty: 28 TABLET | Refills: 0 | OUTPATIENT
Start: 2024-02-12

## 2024-02-13 ENCOUNTER — TELEPHONE (OUTPATIENT)
Dept: ORTHOPEDIC SURGERY | Facility: CLINIC | Age: 64
End: 2024-02-13
Payer: COMMERCIAL

## 2024-02-13 ENCOUNTER — OFFICE VISIT (OUTPATIENT)
Dept: OBSTETRICS AND GYNECOLOGY | Facility: CLINIC | Age: 64
End: 2024-02-13
Payer: COMMERCIAL

## 2024-02-13 VITALS
HEART RATE: 83 BPM | DIASTOLIC BLOOD PRESSURE: 64 MMHG | SYSTOLIC BLOOD PRESSURE: 110 MMHG | HEIGHT: 63 IN | WEIGHT: 217 LBS | BODY MASS INDEX: 38.45 KG/M2

## 2024-02-13 DIAGNOSIS — Z01.419 ENCOUNTER FOR GYNECOLOGICAL EXAMINATION WITHOUT ABNORMAL FINDING: Primary | ICD-10-CM

## 2024-02-13 DIAGNOSIS — Z79.890 HORMONE REPLACEMENT THERAPY (HRT): ICD-10-CM

## 2024-02-13 LAB
DEVELOPER EXPIRATION DATE: NORMAL
DEVELOPER LOT NUMBER: NORMAL
EXPIRATION DATE: NORMAL
FECAL OCCULT BLOOD SCREEN, POC: NEGATIVE
Lab: NORMAL
NEGATIVE CONTROL: NEGATIVE
POSITIVE CONTROL: POSITIVE

## 2024-02-13 RX ORDER — ESTRADIOL 0.05 MG/D
1 PATCH, EXTENDED RELEASE TRANSDERMAL 2 TIMES WEEKLY
Qty: 24 PATCH | Refills: 4 | Status: SHIPPED | OUTPATIENT
Start: 2024-02-15

## 2024-02-14 ENCOUNTER — TELEPHONE (OUTPATIENT)
Dept: ORTHOPEDIC SURGERY | Facility: CLINIC | Age: 64
End: 2024-02-14

## 2024-02-14 NOTE — TELEPHONE ENCOUNTER
Caller: Kamini Orta    Relationship to patient: Self    Best call back number: [phone]    Chief complaint: LEFT KNEE    Type of visit: INJECTION    Requested date: ASAP     If rescheduling, when is the original appointment: 02/22/24    Additional notes:PATIENT ASKING IF SHE CAN BE SCHEDULED ANY EARLIER WITH [facility]. HUB UNABLE TO SCHEDULE WITH THIS PROVIDER.

## 2024-02-22 ENCOUNTER — OFFICE VISIT (OUTPATIENT)
Dept: ORTHOPEDIC SURGERY | Facility: CLINIC | Age: 64
End: 2024-02-22
Payer: COMMERCIAL

## 2024-02-22 VITALS — TEMPERATURE: 98.6 F | BODY MASS INDEX: 37.63 KG/M2 | HEIGHT: 63 IN | WEIGHT: 212.4 LBS

## 2024-02-22 DIAGNOSIS — M17.12 ARTHRITIS OF LEFT KNEE: ICD-10-CM

## 2024-02-22 DIAGNOSIS — Z96.651 STATUS POST TOTAL RIGHT KNEE REPLACEMENT: ICD-10-CM

## 2024-02-22 DIAGNOSIS — R52 PAIN: Primary | ICD-10-CM

## 2024-02-22 RX ORDER — METHYLPREDNISOLONE ACETATE 80 MG/ML
80 INJECTION, SUSPENSION INTRA-ARTICULAR; INTRALESIONAL; INTRAMUSCULAR; SOFT TISSUE
Status: COMPLETED | OUTPATIENT
Start: 2024-02-22 | End: 2024-02-22

## 2024-02-22 RX ORDER — LIDOCAINE HYDROCHLORIDE 10 MG/ML
2 INJECTION, SOLUTION EPIDURAL; INFILTRATION; INTRACAUDAL; PERINEURAL
Status: COMPLETED | OUTPATIENT
Start: 2024-02-22 | End: 2024-02-22

## 2024-02-22 RX ADMIN — METHYLPREDNISOLONE ACETATE 80 MG: 80 INJECTION, SUSPENSION INTRA-ARTICULAR; INTRALESIONAL; INTRAMUSCULAR; SOFT TISSUE at 10:16

## 2024-02-22 RX ADMIN — LIDOCAINE HYDROCHLORIDE 2 ML: 10 INJECTION, SOLUTION EPIDURAL; INFILTRATION; INTRACAUDAL; PERINEURAL at 10:16

## 2024-02-22 NOTE — PROGRESS NOTES
"Kamini follows up today.  She is about 3 months out of a right total knee but her left knee is bothering her.  Her right knee has full extension flexes back to 125 degrees has good stability and she says it is \"perfect\" left knee has slight flexion contractures he has varus medial joint line tenderness some swelling and crepitation and flexes back to about 100.  X-ray AP lateral 40 degree PA x-ray left knee taken the office today which also includes right knee for pain with comparison view shows advanced varus arthritis left knee with \"bone-on-bone and subchondral sclerosis and a right knee with excellent alignment.  Impressions arthritis left knee #2 his right total knee replacement plan gave her advice and answer questions and she was aware of antibiotic prophylaxis etc. for total knee left knee we will go ahead and injected encouraged her to do a home exercise program and follow-up as needed    Large Joint Arthrocentesis: L knee  Date/Time: 2/22/2024 10:16 AM  Consent given by: patient  Site marked: site marked  Timeout: Immediately prior to procedure a time out was called to verify the correct patient, procedure, equipment, support staff and site/side marked as required   Supporting Documentation  Indications: pain   Procedure Details  Location: knee - L knee  Preparation: Patient was prepped and draped in the usual sterile fashion  Needle gauge: 21G.  Medications administered: 80 mg methylPREDNISolone acetate 80 MG/ML; 2 mL lidocaine PF 1% 1 %  Patient tolerance: patient tolerated the procedure well with no immediate complications       "

## 2024-02-27 ENCOUNTER — TELEPHONE (OUTPATIENT)
Dept: INTERNAL MEDICINE | Facility: CLINIC | Age: 64
End: 2024-02-27
Payer: COMMERCIAL

## 2024-02-27 NOTE — TELEPHONE ENCOUNTER
Pt requesting RF of Hydrocodone 5mg. She states that our office prescribed this medication and then her surgeon took over when she had her knee replacement surgery. She is requesting PCP start prescribing this medication once again.    I made patient an appointment for Monday so she could discuss this with her provider.

## 2024-02-28 ENCOUNTER — LAB (OUTPATIENT)
Dept: LAB | Facility: HOSPITAL | Age: 64
End: 2024-02-28
Payer: COMMERCIAL

## 2024-02-28 DIAGNOSIS — I10 HYPERTENSION, ESSENTIAL: ICD-10-CM

## 2024-02-28 DIAGNOSIS — G89.29 CHRONIC BILATERAL LOW BACK PAIN WITHOUT SCIATICA: ICD-10-CM

## 2024-02-28 DIAGNOSIS — M25.561 CHRONIC PAIN OF RIGHT KNEE: ICD-10-CM

## 2024-02-28 DIAGNOSIS — G03.9 ADHESIVE ARACHNOIDITIS: ICD-10-CM

## 2024-02-28 DIAGNOSIS — M54.50 CHRONIC BILATERAL LOW BACK PAIN WITHOUT SCIATICA: ICD-10-CM

## 2024-02-28 DIAGNOSIS — M17.11 PRIMARY OSTEOARTHRITIS OF RIGHT KNEE: ICD-10-CM

## 2024-02-28 DIAGNOSIS — M17.0 ARTHRITIS OF BOTH KNEES: ICD-10-CM

## 2024-02-28 DIAGNOSIS — G89.29 CHRONIC PAIN OF RIGHT KNEE: ICD-10-CM

## 2024-02-28 DIAGNOSIS — M51.36 LUMBAR DEGENERATIVE DISC DISEASE: ICD-10-CM

## 2024-02-28 LAB
ALBUMIN SERPL-MCNC: 4.3 G/DL (ref 3.5–5.2)
ALBUMIN/GLOB SERPL: 1.7 G/DL
ALP SERPL-CCNC: 70 U/L (ref 39–117)
ALT SERPL W P-5'-P-CCNC: 16 U/L (ref 1–33)
ANION GAP SERPL CALCULATED.3IONS-SCNC: 12 MMOL/L (ref 5–15)
AST SERPL-CCNC: 24 U/L (ref 1–32)
BASOPHILS # BLD AUTO: 0.05 10*3/MM3 (ref 0–0.2)
BASOPHILS NFR BLD AUTO: 0.5 % (ref 0–1.5)
BILIRUB SERPL-MCNC: 0.3 MG/DL (ref 0–1.2)
BUN SERPL-MCNC: 12 MG/DL (ref 8–23)
BUN/CREAT SERPL: 13.3 (ref 7–25)
CALCIUM SPEC-SCNC: 9.4 MG/DL (ref 8.6–10.5)
CHLORIDE SERPL-SCNC: 100 MMOL/L (ref 98–107)
CHOLEST SERPL-MCNC: 152 MG/DL (ref 0–200)
CO2 SERPL-SCNC: 28 MMOL/L (ref 22–29)
CREAT SERPL-MCNC: 0.9 MG/DL (ref 0.57–1)
DEPRECATED RDW RBC AUTO: 43.9 FL (ref 37–54)
EGFRCR SERPLBLD CKD-EPI 2021: 72.4 ML/MIN/1.73
EOSINOPHIL # BLD AUTO: 0.22 10*3/MM3 (ref 0–0.4)
EOSINOPHIL NFR BLD AUTO: 2 % (ref 0.3–6.2)
ERYTHROCYTE [DISTWIDTH] IN BLOOD BY AUTOMATED COUNT: 14.7 % (ref 12.3–15.4)
GLOBULIN UR ELPH-MCNC: 2.5 GM/DL
GLUCOSE SERPL-MCNC: 102 MG/DL (ref 65–99)
HBA1C MFR BLD: 6.4 % (ref 4.8–5.6)
HCT VFR BLD AUTO: 38.4 % (ref 34–46.6)
HDLC SERPL-MCNC: 43 MG/DL (ref 40–60)
HGB BLD-MCNC: 12.2 G/DL (ref 12–15.9)
IMM GRANULOCYTES # BLD AUTO: 0.04 10*3/MM3 (ref 0–0.05)
IMM GRANULOCYTES NFR BLD AUTO: 0.4 % (ref 0–0.5)
LDLC SERPL CALC-MCNC: 64 MG/DL (ref 0–100)
LDLC/HDLC SERPL: 1.22 {RATIO}
LYMPHOCYTES # BLD AUTO: 2.55 10*3/MM3 (ref 0.7–3.1)
LYMPHOCYTES NFR BLD AUTO: 23 % (ref 19.6–45.3)
MCH RBC QN AUTO: 26.5 PG (ref 26.6–33)
MCHC RBC AUTO-ENTMCNC: 31.8 G/DL (ref 31.5–35.7)
MCV RBC AUTO: 83.3 FL (ref 79–97)
MONOCYTES # BLD AUTO: 0.66 10*3/MM3 (ref 0.1–0.9)
MONOCYTES NFR BLD AUTO: 6 % (ref 5–12)
NEUTROPHILS NFR BLD AUTO: 68.1 % (ref 42.7–76)
NEUTROPHILS NFR BLD AUTO: 7.56 10*3/MM3 (ref 1.7–7)
NRBC BLD AUTO-RTO: 0 /100 WBC (ref 0–0.2)
PLATELET # BLD AUTO: 318 10*3/MM3 (ref 140–450)
PMV BLD AUTO: 10.1 FL (ref 6–12)
POTASSIUM SERPL-SCNC: 3.9 MMOL/L (ref 3.5–5.2)
PROT SERPL-MCNC: 6.8 G/DL (ref 6–8.5)
RBC # BLD AUTO: 4.61 10*6/MM3 (ref 3.77–5.28)
SODIUM SERPL-SCNC: 140 MMOL/L (ref 136–145)
T4 FREE SERPL-MCNC: 1.52 NG/DL (ref 0.93–1.7)
TRIGL SERPL-MCNC: 283 MG/DL (ref 0–150)
TSH SERPL DL<=0.05 MIU/L-ACNC: 1.54 UIU/ML (ref 0.27–4.2)
VLDLC SERPL-MCNC: 45 MG/DL (ref 5–40)
WBC NRBC COR # BLD AUTO: 11.08 10*3/MM3 (ref 3.4–10.8)

## 2024-02-28 PROCEDURE — 80061 LIPID PANEL: CPT

## 2024-02-28 PROCEDURE — 83036 HEMOGLOBIN GLYCOSYLATED A1C: CPT

## 2024-02-28 PROCEDURE — 84439 ASSAY OF FREE THYROXINE: CPT

## 2024-02-28 PROCEDURE — 36415 COLL VENOUS BLD VENIPUNCTURE: CPT

## 2024-02-28 PROCEDURE — 80050 GENERAL HEALTH PANEL: CPT

## 2024-03-04 ENCOUNTER — OFFICE VISIT (OUTPATIENT)
Dept: INTERNAL MEDICINE | Facility: CLINIC | Age: 64
End: 2024-03-04
Payer: COMMERCIAL

## 2024-03-04 VITALS
WEIGHT: 210 LBS | SYSTOLIC BLOOD PRESSURE: 106 MMHG | BODY MASS INDEX: 37.21 KG/M2 | HEART RATE: 84 BPM | TEMPERATURE: 97.6 F | OXYGEN SATURATION: 95 % | HEIGHT: 63 IN | DIASTOLIC BLOOD PRESSURE: 69 MMHG

## 2024-03-04 DIAGNOSIS — M51.36 LUMBAR DEGENERATIVE DISC DISEASE: Primary | ICD-10-CM

## 2024-03-04 DIAGNOSIS — I10 HYPERTENSION, ESSENTIAL: ICD-10-CM

## 2024-03-04 DIAGNOSIS — M17.11 ARTHRITIS OF RIGHT KNEE: ICD-10-CM

## 2024-03-04 DIAGNOSIS — G89.29 CHRONIC PAIN OF RIGHT KNEE: ICD-10-CM

## 2024-03-04 DIAGNOSIS — I42.2 ASYMMETRIC SEPTAL HYPERTROPHY: ICD-10-CM

## 2024-03-04 DIAGNOSIS — M25.561 CHRONIC PAIN OF RIGHT KNEE: ICD-10-CM

## 2024-03-04 DIAGNOSIS — M54.50 CHRONIC LOW BACK PAIN, UNSPECIFIED BACK PAIN LATERALITY, UNSPECIFIED WHETHER SCIATICA PRESENT: ICD-10-CM

## 2024-03-04 DIAGNOSIS — M17.11 PRIMARY OSTEOARTHRITIS OF RIGHT KNEE: ICD-10-CM

## 2024-03-04 DIAGNOSIS — G89.29 CHRONIC LOW BACK PAIN, UNSPECIFIED BACK PAIN LATERALITY, UNSPECIFIED WHETHER SCIATICA PRESENT: ICD-10-CM

## 2024-03-04 PROCEDURE — 99214 OFFICE O/P EST MOD 30 MIN: CPT | Performed by: INTERNAL MEDICINE

## 2024-03-04 RX ORDER — ROSUVASTATIN CALCIUM 10 MG/1
10 TABLET, COATED ORAL DAILY
Qty: 90 TABLET | Refills: 3 | Status: SHIPPED | OUTPATIENT
Start: 2024-03-04 | End: 2024-03-04

## 2024-03-04 RX ORDER — ROSUVASTATIN CALCIUM 10 MG/1
10 TABLET, COATED ORAL DAILY
Qty: 90 TABLET | Refills: 3 | Status: SHIPPED | OUTPATIENT
Start: 2024-03-04

## 2024-03-04 NOTE — PROGRESS NOTES
"CHIEF COMPLAINT/ HPI:  Hypertension (Routine follow up, Lab follow up. Med refill, Med dose change of Voltaren ( only taking 1 Tab). Hydrocodone to be managed with provider. Left fluid in ear. )              Objective   Vital Signs  Vitals:    03/04/24 1059   BP: 106/69   Pulse: 84   Temp: 97.6 °F (36.4 °C)   SpO2: 95%   Weight: 95.3 kg (210 lb)   Height: 160 cm (62.99\")      Body mass index is 37.21 kg/m².  Review of Systems   Constitutional:  Negative for fever.   Cardiovascular:  Negative for chest pain.   Gastrointestinal:  Negative for abdominal pain.   Genitourinary:  Negative for dysuria, pelvic pain and urinary incontinence.   Musculoskeletal:  Positive for back pain.   Neurological:  Positive for weakness and numbness.      Physical Exam  Constitutional:       General: She is not in acute distress.     Appearance: Normal appearance. She is obese.   HENT:      Head: Normocephalic.      Mouth/Throat:      Mouth: Mucous membranes are moist.   Eyes:      Conjunctiva/sclera: Conjunctivae normal.      Pupils: Pupils are equal, round, and reactive to light.   Cardiovascular:      Rate and Rhythm: Normal rate and regular rhythm.      Pulses: Normal pulses.      Heart sounds: Normal heart sounds.   Pulmonary:      Effort: Pulmonary effort is normal.      Breath sounds: Normal breath sounds.   Abdominal:      General: Bowel sounds are normal.      Palpations: Abdomen is soft.   Musculoskeletal:         General: No swelling. Normal range of motion.      Cervical back: Neck supple.   Skin:     General: Skin is warm and dry.      Coloration: Skin is not jaundiced.   Neurological:      General: No focal deficit present.      Mental Status: She is alert and oriented to person, place, and time. Mental status is at baseline.   Psychiatric:         Mood and Affect: Mood normal.         Behavior: Behavior normal.         Thought Content: Thought content normal.         Judgment: Judgment normal.        Result Review :   No " "results found for: \"PROBNP\", \"BNP\"  CMP          9/19/2023    10:12 10/31/2023    10:38 2/28/2024    11:45   CMP   Glucose 100  104  102    BUN 14  14  12    Creatinine 0.79  0.88  0.90    EGFR 85.2  74.9  72.4    Sodium 140  140  140    Potassium 4.0  3.7  3.9    Chloride 100  103  100    Calcium 9.8  9.1  9.4    Total Protein 7.0   6.8    Albumin 4.2   4.3    Globulin 2.8   2.5    Total Bilirubin 0.3   0.3    Alkaline Phosphatase 66   70    AST (SGOT) 22   24    ALT (SGPT) 15   16    Albumin/Globulin Ratio 1.5   1.7    BUN/Creatinine Ratio 17.7  15.9  13.3    Anion Gap 10.5  9.0  12.0      CBC w/diff          10/31/2023    10:38 11/15/2023    04:30 2/28/2024    11:45   CBC w/Diff   WBC 8.04   11.08    RBC 4.23   4.61    Hemoglobin 12.5  10.2  12.2    Hematocrit 37.5  30.4  38.4    MCV 88.7   83.3    MCH 29.6   26.5    MCHC 33.3   31.8    RDW 13.7   14.7    Platelets 273   318    Neutrophil Rel %   68.1    Immature Granulocyte Rel %   0.4    Lymphocyte Rel %   23.0    Monocyte Rel %   6.0    Eosinophil Rel %   2.0    Basophil Rel %   0.5       Lipid Panel          9/19/2023    10:12 2/28/2024    11:45   Lipid Panel   Total Cholesterol 188  152    Triglycerides 322  283    HDL Cholesterol 42  43    VLDL Cholesterol 54  45    LDL Cholesterol  92  64    LDL/HDL Ratio 1.94  1.22       Lab Results   Component Value Date    TSH 1.540 02/28/2024    TSH 1.550 09/19/2023    TSH 1.170 01/04/2022      Lab Results   Component Value Date    FREET4 1.52 02/28/2024    FREET4 1.49 09/19/2023    FREET4 1.47 01/04/2022      A1C Last 3 Results          9/19/2023    10:12 2/28/2024    11:45   HGBA1C Last 3 Results   Hemoglobin A1C 6.20  6.40                        Visit Diagnoses:    ICD-10-CM ICD-9-CM   1. Lumbar degenerative disc disease  M51.36 722.52   2. Primary osteoarthritis of right knee  M17.11 715.16   3. Arthritis of right knee  M17.11 716.96   4. Chronic pain of right knee  M25.561 719.46    G89.29 338.29   5. Chronic low " back pain, unspecified back pain laterality, unspecified whether sciatica present  M54.50 724.2    G89.29 338.29   6. Hypertension, essential  I10 401.9   7. Asymmetric septal hypertrophy  I42.2 425.18       Assessment and Plan   Diagnoses and all orders for this visit:    1. Lumbar degenerative disc disease (Primary)  -     Discontinue: rosuvastatin (CRESTOR) 10 MG tablet; Take 1 tablet by mouth Daily.  Dispense: 90 tablet; Refill: 3  -     Adult Transthoracic Echo Complete W/ Cont if Necessary Per Protocol; Future  -     Comprehensive Metabolic Panel; Future  -     CBC & Differential; Future  -     Hemoglobin A1c; Future  -     rosuvastatin (CRESTOR) 10 MG tablet; Take 1 tablet by mouth Daily.  Dispense: 90 tablet; Refill: 3    2. Primary osteoarthritis of right knee  -     Discontinue: rosuvastatin (CRESTOR) 10 MG tablet; Take 1 tablet by mouth Daily.  Dispense: 90 tablet; Refill: 3  -     Adult Transthoracic Echo Complete W/ Cont if Necessary Per Protocol; Future  -     Comprehensive Metabolic Panel; Future  -     CBC & Differential; Future  -     Hemoglobin A1c; Future  -     rosuvastatin (CRESTOR) 10 MG tablet; Take 1 tablet by mouth Daily.  Dispense: 90 tablet; Refill: 3    3. Arthritis of right knee  -     Discontinue: rosuvastatin (CRESTOR) 10 MG tablet; Take 1 tablet by mouth Daily.  Dispense: 90 tablet; Refill: 3  -     Adult Transthoracic Echo Complete W/ Cont if Necessary Per Protocol; Future  -     Comprehensive Metabolic Panel; Future  -     CBC & Differential; Future  -     Hemoglobin A1c; Future  -     rosuvastatin (CRESTOR) 10 MG tablet; Take 1 tablet by mouth Daily.  Dispense: 90 tablet; Refill: 3    4. Chronic pain of right knee  -     Discontinue: rosuvastatin (CRESTOR) 10 MG tablet; Take 1 tablet by mouth Daily.  Dispense: 90 tablet; Refill: 3  -     Adult Transthoracic Echo Complete W/ Cont if Necessary Per Protocol; Future  -     Comprehensive Metabolic Panel; Future  -     CBC &  Differential; Future  -     Hemoglobin A1c; Future  -     rosuvastatin (CRESTOR) 10 MG tablet; Take 1 tablet by mouth Daily.  Dispense: 90 tablet; Refill: 3    5. Chronic low back pain, unspecified back pain laterality, unspecified whether sciatica present  -     Discontinue: rosuvastatin (CRESTOR) 10 MG tablet; Take 1 tablet by mouth Daily.  Dispense: 90 tablet; Refill: 3  -     Adult Transthoracic Echo Complete W/ Cont if Necessary Per Protocol; Future  -     Comprehensive Metabolic Panel; Future  -     CBC & Differential; Future  -     Hemoglobin A1c; Future  -     rosuvastatin (CRESTOR) 10 MG tablet; Take 1 tablet by mouth Daily.  Dispense: 90 tablet; Refill: 3    6. Hypertension, essential  -     Discontinue: rosuvastatin (CRESTOR) 10 MG tablet; Take 1 tablet by mouth Daily.  Dispense: 90 tablet; Refill: 3  -     Adult Transthoracic Echo Complete W/ Cont if Necessary Per Protocol; Future  -     Comprehensive Metabolic Panel; Future  -     CBC & Differential; Future  -     Hemoglobin A1c; Future  -     rosuvastatin (CRESTOR) 10 MG tablet; Take 1 tablet by mouth Daily.  Dispense: 90 tablet; Refill: 3    7. Asymmetric septal hypertrophy  -     Discontinue: rosuvastatin (CRESTOR) 10 MG tablet; Take 1 tablet by mouth Daily.  Dispense: 90 tablet; Refill: 3  -     Adult Transthoracic Echo Complete W/ Cont if Necessary Per Protocol; Future  -     Comprehensive Metabolic Panel; Future  -     CBC & Differential; Future  -     Hemoglobin A1c; Future  -     rosuvastatin (CRESTOR) 10 MG tablet; Take 1 tablet by mouth Daily.  Dispense: 90 tablet; Refill: 3    Left ear fullness,--serous otits--, monitor,  march 4, 2024     HEART MURUMUR, ECHO 11/2018, ESSENTIALLY NL, --- echo report discussed with patient November 9, 2023, patient has upcoming right total knee arthroplasty scheduled for November 14, 2023------ , patient has some mild asymmetric septal hypertrophy?  Based on a technically difficult study with poor acoustic  windows on echocardiogram from November 6, 2023, discussed with patient that we would repeat this test in 6 to 9 months, if the hypertrophy looks worsened or any changes consider getting a transesophageal echo, otherwise patient is stable for surgery,    severe arthritis and pain in her right knee ---right total knee arthroplasty Dr. Mccarty in [place],--November 14, 2023, doing well postop, has lost weight again, down to 1 Voltaren-XR 50 mg daily March 4, 2024    IFG , 6.4 February 28, 2024--- will monitor closely,    Back pain, sciatica, r leg, --MRI November 2021 showed a left L5-S1 disc paracentral extrusion with multiple levels of foraminal stenosis on both sides throughout the spine, with degenerative disc disease noted, patient did go see Ortho spine surgeon in [place] was possibly going to have surgery but then they called and said they would not do it due to her increased risk and back issues and stability, discussed May 2022--- we will make referral to [facility] for second opinion given the severity of the discs and the continued back pain that she is having, May 2022, patient is decreased her diclofenac to 50 mg daily, organ to decrease her pain management medication Norco to twice a day dosing as needed monthly March 4, 2024    abdominal pain possible recurrent diverticulitis January 31, 2022--, patient had a colonoscopy August 2022, CT scan abdomen and pelvis June 24, 2022,--diverticulosis no evidence of diverticulitis or serious findings,---- recommend continue Protonix in the morning and Pepcid at bedtime---     vertigo --recurrent kishore 10/ 2022 --- with nausea vomiting, headaches, work-up in the emergency room included MRI of the brain CT of the brain did not show any acute abnormality, chest x-ray no active disease--- she will continue meclizine 3-4 times per day and add scopolamine patch December 6, 2021, and again January 10, 2022,---, referral made for Epley maneuver with PTA,  November 2022, she has been using the scopolamine patch with some help, but she also had possibly gotten some in her hand and eye recently with some changes,--------------- symptoms have gotten much better after the Epley maneuver, October 3, 2023    hypertension  continues ---valsartan HCT 80/12.5 mg daily,,     PULM NODULE ON CXR SEPT 2017, CT SCAN OCT 2017, NO CHANGE IN CT APRIL 2018, (DONE ADIA PANDAG, --CT CHEST OCT 2019 , NO CHANGE IN PULM NODULE OVER 2 YRS , SO NO MORE F/U     LIFE STRESSORS DISCUSSED,  ANNA MARIE AND URINE DRUG SCREEN REIVIEWED nov 2022    Hypothyroidism, cont levothyroxine 0.075 mg qd     obesity --    LLQ Abdominal Pain/Diverticulitis--6/18/16.-- colonoscopy Dr. Alarcon--  NOV, 2019, THI ,, August 2022,, internal hemorrhoids only, otherwise normal exam,    L BREAST MASS--f/u U/S WAS NEG,. 8/2015, , Yearly mammograms    Patient quit smoking 11 years ago    CHRONIC PAIN ISSUES DISCUSSED --WITH PAIN IN NECK , BACK PAIN- ON PRN NORCO--,  decrease the frequency of that to twice daily dosing as necessary, March 4, 2024----------- WE DISCUSSED RISK OF DRIVING AND ADDICTION WITH MED--OCT 2020 patient continues diclofenac 50 mg XR daily    DEPRESSION,--continues Xanax 0.5 twice a day, Prozac 40 mg daily,    ELEVATED CHOL, TRIG,-continues Crestor,                  Follow Up   Return in about 6 months (around 9/4/2024).  Patient was given instructions and counseling regarding her condition or for health maintenance advice. Please see specific information pulled into the AVS if appropriate.         Answers submitted by the patient for this visit:  Primary Reason for Visit (Submitted on 2/28/2024)  What is the primary reason for your visit?: Back Pain  Back Pain Questionnaire (Submitted on 2/28/2024)  Chief Complaint: Back pain  Chronicity: chronic  Onset: more than 1 month ago  Frequency: daily  Progression since onset: worsening  Pain location: sacro-iliac  Pain quality: aching, burning,  shooting  Radiates to: left buttock  Pain - numeric: 7/10  Aggravated by: position, sitting, standing  Stiffness is present: in the morning  bowel incontinence: No  leg pain: Yes  paresthesias: Yes  perianal numbness: No  tingling: Yes  Additional Information: Ongoing condition combined with Neurological issue

## 2024-03-07 ENCOUNTER — TELEPHONE (OUTPATIENT)
Dept: INTERNAL MEDICINE | Facility: CLINIC | Age: 64
End: 2024-03-07
Payer: COMMERCIAL

## 2024-03-07 DIAGNOSIS — G03.9 ADHESIVE ARACHNOIDITIS: Primary | ICD-10-CM

## 2024-03-07 RX ORDER — HYDROCODONE BITARTRATE AND ACETAMINOPHEN 5; 325 MG/1; MG/1
1 TABLET ORAL EVERY 12 HOURS PRN
Qty: 60 TABLET | Refills: 0 | Status: SHIPPED | OUTPATIENT
Start: 2024-03-07

## 2024-03-07 NOTE — TELEPHONE ENCOUNTER
Caller: Kamini Orta    Relationship: Self    Best call back number: [phone]    Requested Prescriptions:   Requested Prescriptions     Pending Prescriptions Disp Refills    HYDROcodone-acetaminophen (NORCO) 5-325 MG per tablet       Sig: Take 1 tablet by mouth Daily.        Pharmacy where request should be sent: [facility] [place] [id number]  - [phone] PH - [phone] FX     Last office visit with prescribing clinician: 3/4/2024   Last telemedicine visit with prescribing clinician: Visit date not found   Next office visit with prescribing clinician: 9/4/2024     Additional details provided by patient: PATIENT STATES THIS WAS SUPPOSED TO BE SENT IN ON 3.4.24. PATIENT STATES SHE REQUESTED THAT ONLY 60 TABLETS BE CALLED IN INSTEAD OF 90 IN HER VISIT.     Does the patient have less than a 3 day supply:  [x] Yes  [] No    Would you like a call back once the refill request has been completed: [x] Yes [] No    If the office needs to give you a call back, can they leave a voicemail: [] Yes [] No    Calixto Barker Rep   03/07/24 11:56 EST

## 2024-03-12 ENCOUNTER — TELEPHONE (OUTPATIENT)
Dept: INTERNAL MEDICINE | Age: 64
End: 2024-03-12

## 2024-03-12 NOTE — TELEPHONE ENCOUNTER
Caller: Kamini Orta    Relationship to patient: Self    Best call back number: [phone]     Patient is needing: PATIENT HAD TO PAY FOR HYDROCODONE MEDICATION OUT OF POCKET DUE TO NOT HAVING PRIOR AUTHORIZATION ON FILE FOR MEDICATION FOR YEAR 2024. PATIENT STATES THAT SHE HAS SENT A PATIENT MESSAGE BUT HAS NOT HAD A RESPONSE YET.

## 2024-05-08 DIAGNOSIS — G03.9 ADHESIVE ARACHNOIDITIS: ICD-10-CM

## 2024-05-08 RX ORDER — HYDROCODONE BITARTRATE AND ACETAMINOPHEN 5; 325 MG/1; MG/1
1 TABLET ORAL EVERY 12 HOURS PRN
Qty: 60 TABLET | Refills: 0 | Status: SHIPPED | OUTPATIENT
Start: 2024-05-08

## 2024-05-10 DIAGNOSIS — M54.50 CHRONIC BILATERAL LOW BACK PAIN, UNSPECIFIED WHETHER SCIATICA PRESENT: ICD-10-CM

## 2024-05-10 DIAGNOSIS — G89.29 CHRONIC BILATERAL LOW BACK PAIN, UNSPECIFIED WHETHER SCIATICA PRESENT: ICD-10-CM

## 2024-05-10 RX ORDER — ALPRAZOLAM 0.5 MG/1
0.5 TABLET ORAL 2 TIMES DAILY
Qty: 60 TABLET | Refills: 5 | Status: SHIPPED | OUTPATIENT
Start: 2024-05-10

## 2024-05-27 DIAGNOSIS — M25.561 CHRONIC PAIN OF RIGHT KNEE: ICD-10-CM

## 2024-05-27 DIAGNOSIS — M51.36 LUMBAR DEGENERATIVE DISC DISEASE: ICD-10-CM

## 2024-05-27 DIAGNOSIS — I10 HYPERTENSION, ESSENTIAL: ICD-10-CM

## 2024-05-27 DIAGNOSIS — G89.29 CHRONIC PAIN OF RIGHT KNEE: ICD-10-CM

## 2024-05-27 DIAGNOSIS — I42.2 ASYMMETRIC SEPTAL HYPERTROPHY: ICD-10-CM

## 2024-05-27 DIAGNOSIS — Z79.890 HORMONE REPLACEMENT THERAPY (HRT): ICD-10-CM

## 2024-05-27 DIAGNOSIS — G89.29 CHRONIC LOW BACK PAIN, UNSPECIFIED BACK PAIN LATERALITY, UNSPECIFIED WHETHER SCIATICA PRESENT: ICD-10-CM

## 2024-05-27 DIAGNOSIS — M54.50 CHRONIC LOW BACK PAIN, UNSPECIFIED BACK PAIN LATERALITY, UNSPECIFIED WHETHER SCIATICA PRESENT: ICD-10-CM

## 2024-05-27 DIAGNOSIS — M17.11 PRIMARY OSTEOARTHRITIS OF RIGHT KNEE: ICD-10-CM

## 2024-05-27 DIAGNOSIS — M17.11 ARTHRITIS OF RIGHT KNEE: ICD-10-CM

## 2024-05-27 RX ORDER — ESTRADIOL 0.05 MG/D
1 PATCH, EXTENDED RELEASE TRANSDERMAL 2 TIMES WEEKLY
Qty: 24 PATCH | Refills: 4 | Status: SHIPPED | OUTPATIENT
Start: 2024-05-27

## 2024-05-28 RX ORDER — ROSUVASTATIN CALCIUM 10 MG/1
10 TABLET, COATED ORAL DAILY
Qty: 90 TABLET | Refills: 3 | Status: SHIPPED | OUTPATIENT
Start: 2024-05-28

## 2024-08-11 DIAGNOSIS — G03.9 ADHESIVE ARACHNOIDITIS: ICD-10-CM

## 2024-08-12 DIAGNOSIS — G03.9 ADHESIVE ARACHNOIDITIS: ICD-10-CM

## 2024-08-12 RX ORDER — HYDROCODONE BITARTRATE AND ACETAMINOPHEN 5; 325 MG/1; MG/1
1 TABLET ORAL EVERY 12 HOURS PRN
Qty: 60 TABLET | Refills: 0 | Status: SHIPPED | OUTPATIENT
Start: 2024-08-12

## 2024-08-12 RX ORDER — HYDROCODONE BITARTRATE AND ACETAMINOPHEN 5; 325 MG/1; MG/1
TABLET ORAL
Qty: 60 TABLET | Refills: 0 | OUTPATIENT
Start: 2024-08-12

## 2024-08-30 ENCOUNTER — LAB (OUTPATIENT)
Dept: LAB | Facility: HOSPITAL | Age: 64
End: 2024-08-30
Payer: COMMERCIAL

## 2024-08-30 DIAGNOSIS — I10 HYPERTENSION, ESSENTIAL: ICD-10-CM

## 2024-08-30 DIAGNOSIS — M54.50 CHRONIC LOW BACK PAIN, UNSPECIFIED BACK PAIN LATERALITY, UNSPECIFIED WHETHER SCIATICA PRESENT: ICD-10-CM

## 2024-08-30 DIAGNOSIS — M51.36 LUMBAR DEGENERATIVE DISC DISEASE: ICD-10-CM

## 2024-08-30 DIAGNOSIS — M17.11 ARTHRITIS OF RIGHT KNEE: ICD-10-CM

## 2024-08-30 DIAGNOSIS — G89.29 CHRONIC PAIN OF RIGHT KNEE: ICD-10-CM

## 2024-08-30 DIAGNOSIS — I42.2 ASYMMETRIC SEPTAL HYPERTROPHY: ICD-10-CM

## 2024-08-30 DIAGNOSIS — M25.561 CHRONIC PAIN OF RIGHT KNEE: ICD-10-CM

## 2024-08-30 DIAGNOSIS — M17.11 PRIMARY OSTEOARTHRITIS OF RIGHT KNEE: ICD-10-CM

## 2024-08-30 DIAGNOSIS — G89.29 CHRONIC LOW BACK PAIN, UNSPECIFIED BACK PAIN LATERALITY, UNSPECIFIED WHETHER SCIATICA PRESENT: ICD-10-CM

## 2024-08-30 LAB
ALBUMIN SERPL-MCNC: 4.3 G/DL (ref 3.5–5.2)
ALBUMIN/GLOB SERPL: 1.7 G/DL
ALP SERPL-CCNC: 72 U/L (ref 39–117)
ALT SERPL W P-5'-P-CCNC: 16 U/L (ref 1–33)
ANION GAP SERPL CALCULATED.3IONS-SCNC: 10 MMOL/L (ref 5–15)
AST SERPL-CCNC: 21 U/L (ref 1–32)
BASOPHILS # BLD AUTO: 0.06 10*3/MM3 (ref 0–0.2)
BASOPHILS NFR BLD AUTO: 0.8 % (ref 0–1.5)
BILIRUB SERPL-MCNC: 0.2 MG/DL (ref 0–1.2)
BUN SERPL-MCNC: 14 MG/DL (ref 8–23)
BUN/CREAT SERPL: 14.6 (ref 7–25)
CALCIUM SPEC-SCNC: 9.9 MG/DL (ref 8.6–10.5)
CHLORIDE SERPL-SCNC: 101 MMOL/L (ref 98–107)
CO2 SERPL-SCNC: 29 MMOL/L (ref 22–29)
CREAT SERPL-MCNC: 0.96 MG/DL (ref 0.57–1)
DEPRECATED RDW RBC AUTO: 45.2 FL (ref 37–54)
EGFRCR SERPLBLD CKD-EPI 2021: 67 ML/MIN/1.73
EOSINOPHIL # BLD AUTO: 0.24 10*3/MM3 (ref 0–0.4)
EOSINOPHIL NFR BLD AUTO: 3.3 % (ref 0.3–6.2)
ERYTHROCYTE [DISTWIDTH] IN BLOOD BY AUTOMATED COUNT: 14.2 % (ref 12.3–15.4)
GLOBULIN UR ELPH-MCNC: 2.5 GM/DL
GLUCOSE SERPL-MCNC: 100 MG/DL (ref 65–99)
HBA1C MFR BLD: 5.9 % (ref 4.8–5.6)
HCT VFR BLD AUTO: 38 % (ref 34–46.6)
HGB BLD-MCNC: 12.5 G/DL (ref 12–15.9)
IMM GRANULOCYTES # BLD AUTO: 0.02 10*3/MM3 (ref 0–0.05)
IMM GRANULOCYTES NFR BLD AUTO: 0.3 % (ref 0–0.5)
LYMPHOCYTES # BLD AUTO: 3.03 10*3/MM3 (ref 0.7–3.1)
LYMPHOCYTES NFR BLD AUTO: 41.5 % (ref 19.6–45.3)
MCH RBC QN AUTO: 29.2 PG (ref 26.6–33)
MCHC RBC AUTO-ENTMCNC: 32.9 G/DL (ref 31.5–35.7)
MCV RBC AUTO: 88.8 FL (ref 79–97)
MONOCYTES # BLD AUTO: 0.56 10*3/MM3 (ref 0.1–0.9)
MONOCYTES NFR BLD AUTO: 7.7 % (ref 5–12)
NEUTROPHILS NFR BLD AUTO: 3.39 10*3/MM3 (ref 1.7–7)
NEUTROPHILS NFR BLD AUTO: 46.4 % (ref 42.7–76)
NRBC BLD AUTO-RTO: 0 /100 WBC (ref 0–0.2)
PLATELET # BLD AUTO: 275 10*3/MM3 (ref 140–450)
PMV BLD AUTO: 10.1 FL (ref 6–12)
POTASSIUM SERPL-SCNC: 4.4 MMOL/L (ref 3.5–5.2)
PROT SERPL-MCNC: 6.8 G/DL (ref 6–8.5)
RBC # BLD AUTO: 4.28 10*6/MM3 (ref 3.77–5.28)
SODIUM SERPL-SCNC: 140 MMOL/L (ref 136–145)
WBC NRBC COR # BLD AUTO: 7.3 10*3/MM3 (ref 3.4–10.8)

## 2024-08-30 PROCEDURE — 80053 COMPREHEN METABOLIC PANEL: CPT

## 2024-08-30 PROCEDURE — 36415 COLL VENOUS BLD VENIPUNCTURE: CPT

## 2024-08-30 PROCEDURE — 85025 COMPLETE CBC W/AUTO DIFF WBC: CPT

## 2024-08-30 PROCEDURE — 83036 HEMOGLOBIN GLYCOSYLATED A1C: CPT

## 2024-09-04 ENCOUNTER — OFFICE VISIT (OUTPATIENT)
Dept: INTERNAL MEDICINE | Age: 64
End: 2024-09-04
Payer: COMMERCIAL

## 2024-09-04 ENCOUNTER — HOSPITAL ENCOUNTER (OUTPATIENT)
Dept: CARDIOLOGY | Facility: HOSPITAL | Age: 64
Discharge: HOME | End: 2024-09-04
Admitting: INTERNAL MEDICINE
Payer: COMMERCIAL

## 2024-09-04 VITALS
OXYGEN SATURATION: 96 % | HEART RATE: 67 BPM | WEIGHT: 203 LBS | HEIGHT: 63 IN | BODY MASS INDEX: 35.97 KG/M2 | TEMPERATURE: 98 F

## 2024-09-04 DIAGNOSIS — I10 HYPERTENSION, ESSENTIAL: ICD-10-CM

## 2024-09-04 DIAGNOSIS — M25.561 CHRONIC PAIN OF RIGHT KNEE: ICD-10-CM

## 2024-09-04 DIAGNOSIS — M75.52 ACUTE SHOULDER BURSITIS, LEFT: ICD-10-CM

## 2024-09-04 DIAGNOSIS — I10 HYPERTENSION, ESSENTIAL: Primary | ICD-10-CM

## 2024-09-04 DIAGNOSIS — G03.9 ADHESIVE ARACHNOIDITIS: ICD-10-CM

## 2024-09-04 DIAGNOSIS — Z00.00 PHYSICAL EXAM, ANNUAL: ICD-10-CM

## 2024-09-04 DIAGNOSIS — M51.36 LUMBAR DEGENERATIVE DISC DISEASE: ICD-10-CM

## 2024-09-04 DIAGNOSIS — G89.29 CHRONIC PAIN OF RIGHT KNEE: ICD-10-CM

## 2024-09-04 DIAGNOSIS — M54.50 CHRONIC BILATERAL LOW BACK PAIN, UNSPECIFIED WHETHER SCIATICA PRESENT: ICD-10-CM

## 2024-09-04 DIAGNOSIS — M17.11 PRIMARY OSTEOARTHRITIS OF RIGHT KNEE: ICD-10-CM

## 2024-09-04 DIAGNOSIS — M54.41 CHRONIC LOW BACK PAIN WITH BILATERAL SCIATICA, UNSPECIFIED BACK PAIN LATERALITY: ICD-10-CM

## 2024-09-04 DIAGNOSIS — M17.11 ARTHRITIS OF RIGHT KNEE: ICD-10-CM

## 2024-09-04 DIAGNOSIS — R51.9 WORSENING HEADACHES: ICD-10-CM

## 2024-09-04 DIAGNOSIS — G89.29 CHRONIC BILATERAL LOW BACK PAIN, UNSPECIFIED WHETHER SCIATICA PRESENT: ICD-10-CM

## 2024-09-04 DIAGNOSIS — G89.29 CHRONIC LOW BACK PAIN WITH BILATERAL SCIATICA, UNSPECIFIED BACK PAIN LATERALITY: ICD-10-CM

## 2024-09-04 DIAGNOSIS — G89.29 CHRONIC LOW BACK PAIN, UNSPECIFIED BACK PAIN LATERALITY, UNSPECIFIED WHETHER SCIATICA PRESENT: ICD-10-CM

## 2024-09-04 DIAGNOSIS — M54.42 CHRONIC LOW BACK PAIN WITH BILATERAL SCIATICA, UNSPECIFIED BACK PAIN LATERALITY: ICD-10-CM

## 2024-09-04 DIAGNOSIS — M54.50 CHRONIC LOW BACK PAIN, UNSPECIFIED BACK PAIN LATERALITY, UNSPECIFIED WHETHER SCIATICA PRESENT: ICD-10-CM

## 2024-09-04 DIAGNOSIS — I42.2 ASYMMETRIC SEPTAL HYPERTROPHY: ICD-10-CM

## 2024-09-04 LAB
ASCENDING AORTA: 3.1 CM
BH CV ECHO MEAS - AO MAX PG: 10.1 MMHG
BH CV ECHO MEAS - AO MEAN PG: 4.5 MMHG
BH CV ECHO MEAS - AO ROOT DIAM: 3.2 CM
BH CV ECHO MEAS - AO V2 MAX: 159 CM/SEC
BH CV ECHO MEAS - AO V2 VTI: 39.4 CM
BH CV ECHO MEAS - EDV(MOD-SP2): 70.7 ML
BH CV ECHO MEAS - EDV(MOD-SP4): 70.7 ML
BH CV ECHO MEAS - EF(MOD-SP2): 60.4 %
BH CV ECHO MEAS - EF(MOD-SP4): 61.7 %
BH CV ECHO MEAS - ESV(MOD-SP2): 28 ML
BH CV ECHO MEAS - ESV(MOD-SP4): 27.1 ML
BH CV ECHO MEAS - IVS/LVPW: 1.1 CM
BH CV ECHO MEAS - IVSD: 1.1 CM
BH CV ECHO MEAS - LA DIMENSION: 3.7 CM
BH CV ECHO MEAS - LAT PEAK E' VEL: 9.1 CM/SEC
BH CV ECHO MEAS - LV DIASTOLIC VOL/BSA (35-75): 35.8 CM2
BH CV ECHO MEAS - LV MAX PG: 5.8 MMHG
BH CV ECHO MEAS - LV MEAN PG: 2.5 MMHG
BH CV ECHO MEAS - LV SYSTOLIC VOL/BSA (12-30): 13.7 CM2
BH CV ECHO MEAS - LV V1 MAX: 120 CM/SEC
BH CV ECHO MEAS - LV V1 VTI: 29.1 CM
BH CV ECHO MEAS - LVIDD: 4.3 CM
BH CV ECHO MEAS - LVIDS: 3.4 CM
BH CV ECHO MEAS - LVOT DIAM: 2 CM
BH CV ECHO MEAS - LVPWD: 1 CM
BH CV ECHO MEAS - MED PEAK E' VEL: 8.1 CM/SEC
BH CV ECHO MEAS - MV A MAX VEL: 102.5 CM/SEC
BH CV ECHO MEAS - MV E MAX VEL: 94.7 CM/SEC
BH CV ECHO MEAS - MV E/A: 0.92
BH CV ECHO MEAS - PA V2 MAX: 85 CM/SEC
BH CV ECHO MEAS - RVDD: 3.5 CM
BH CV ECHO MEAS - SV(MOD-SP2): 42.7 ML
BH CV ECHO MEAS - SV(MOD-SP4): 43.6 ML
BH CV ECHO MEAS - SVI(MOD-SP2): 21.6 ML/M2
BH CV ECHO MEAS - SVI(MOD-SP4): 22.1 ML/M2
BH CV ECHO MEAS - TAPSE (>1.6): 1.65 CM
BH CV ECHO MEASUREMENTS AVERAGE E/E' RATIO: 11.01
BH CV XLRA - TDI S': 9.2 CM/SEC
IVRT: 75 MS

## 2024-09-04 PROCEDURE — 99396 PREV VISIT EST AGE 40-64: CPT | Performed by: INTERNAL MEDICINE

## 2024-09-04 PROCEDURE — 93306 TTE W/DOPPLER COMPLETE: CPT

## 2024-09-04 RX ORDER — HYDROCODONE BITARTRATE AND ACETAMINOPHEN 5; 325 MG/1; MG/1
1 TABLET ORAL EVERY 12 HOURS PRN
Qty: 60 TABLET | Refills: 0 | Status: SHIPPED | OUTPATIENT
Start: 2024-09-04

## 2024-09-04 RX ORDER — FLUTICASONE PROPIONATE 50 MCG
2 SPRAY, SUSPENSION (ML) NASAL DAILY
Qty: 16 G | Refills: 5 | Status: SHIPPED | OUTPATIENT
Start: 2024-09-04

## 2024-09-04 RX ORDER — VALSARTAN AND HYDROCHLOROTHIAZIDE 80; 12.5 MG/1; MG/1
1 TABLET, FILM COATED ORAL EVERY EVENING
Qty: 90 TABLET | Refills: 3 | Status: SHIPPED | OUTPATIENT
Start: 2024-09-04

## 2024-09-04 RX ORDER — FLUTICASONE PROPIONATE 50 MCG
2 SPRAY, SUSPENSION (ML) NASAL DAILY
Qty: 16 G | Refills: 5 | Status: SHIPPED | OUTPATIENT
Start: 2024-09-04 | End: 2024-09-04 | Stop reason: SDUPTHER

## 2024-09-04 NOTE — PROGRESS NOTES
"CHIEF COMPLAINT/ HPI:  Hypertension (Lab follow up, Pt had a Echo done today at the hospital. Left ear pain. )              Objective   Vital Signs  Vitals:    09/04/24 1342   Pulse: 67   Temp: 98 °F (36.7 °C)   SpO2: 96%   Weight: 92.1 kg (203 lb)   Height: 160 cm (62.99\")      Body mass index is 35.97 kg/m².  Review of Systems   Constitutional: Negative.    HENT: Negative.     Eyes: Negative.    Respiratory: Negative.     Cardiovascular: Negative.    Gastrointestinal: Negative.    Endocrine: Negative.    Genitourinary: Negative.    Musculoskeletal: Negative.  Positive for arthralgias.   Allergic/Immunologic: Negative.    Neurological: Negative.    Hematological: Negative.    Psychiatric/Behavioral: Negative.        Physical Exam  Constitutional:       General: She is not in acute distress.     Appearance: Normal appearance. She is obese.   HENT:      Head: Normocephalic.      Mouth/Throat:      Mouth: Mucous membranes are moist.   Eyes:      Conjunctiva/sclera: Conjunctivae normal.      Pupils: Pupils are equal, round, and reactive to light.   Cardiovascular:      Rate and Rhythm: Normal rate and regular rhythm.      Pulses: Normal pulses.      Heart sounds: Normal heart sounds.   Pulmonary:      Effort: Pulmonary effort is normal.      Breath sounds: Normal breath sounds.   Abdominal:      General: Bowel sounds are normal.      Palpations: Abdomen is soft.   Musculoskeletal:         General: Deformity present. No swelling. Normal range of motion.      Cervical back: Neck supple.   Skin:     General: Skin is warm and dry.      Coloration: Skin is not jaundiced.   Neurological:      General: No focal deficit present.      Mental Status: She is alert and oriented to person, place, and time. Mental status is at baseline.   Psychiatric:         Mood and Affect: Mood normal.         Behavior: Behavior normal.         Thought Content: Thought content normal.         Judgment: Judgment normal.        Result Review :   No " "results found for: \"PROBNP\", \"BNP\"  CMP          10/31/2023    10:38 2/28/2024    11:45 8/30/2024    09:32   CMP   Glucose 104  102  100    BUN 14  12  14    Creatinine 0.88  0.90  0.96    EGFR 74.9  72.4  67.0    Sodium 140  140  140    Potassium 3.7  3.9  4.4    Chloride 103  100  101    Calcium 9.1  9.4  9.9    Total Protein  6.8  6.8    Albumin  4.3  4.3    Globulin  2.5  2.5    Total Bilirubin  0.3  0.2    Alkaline Phosphatase  70  72    AST (SGOT)  24  21    ALT (SGPT)  16  16    Albumin/Globulin Ratio  1.7  1.7    BUN/Creatinine Ratio 15.9  13.3  14.6    Anion Gap 9.0  12.0  10.0      CBC w/diff          11/15/2023    04:30 2/28/2024    11:45 8/30/2024    09:32   CBC w/Diff   WBC  11.08  7.30    RBC  4.61  4.28    Hemoglobin 10.2  12.2  12.5    Hematocrit 30.4  38.4  38.0    MCV  83.3  88.8    MCH  26.5  29.2    MCHC  31.8  32.9    RDW  14.7  14.2    Platelets  318  275    Neutrophil Rel %  68.1  46.4    Immature Granulocyte Rel %  0.4  0.3    Lymphocyte Rel %  23.0  41.5    Monocyte Rel %  6.0  7.7    Eosinophil Rel %  2.0  3.3    Basophil Rel %  0.5  0.8       Lipid Panel          9/19/2023    10:12 2/28/2024    11:45   Lipid Panel   Total Cholesterol 188  152    Triglycerides 322  283    HDL Cholesterol 42  43    VLDL Cholesterol 54  45    LDL Cholesterol  92  64    LDL/HDL Ratio 1.94  1.22       Lab Results   Component Value Date    TSH 1.540 02/28/2024    TSH 1.550 09/19/2023    TSH 1.170 01/04/2022      Lab Results   Component Value Date    FREET4 1.52 02/28/2024    FREET4 1.49 09/19/2023    FREET4 1.47 01/04/2022      A1C Last 3 Results          9/19/2023    10:12 2/28/2024    11:45 8/30/2024    09:32   HGBA1C Last 3 Results   Hemoglobin A1C 6.20  6.40  5.90                        Visit Diagnoses:    ICD-10-CM ICD-9-CM   1. Hypertension, essential  I10 401.9   2. Adhesive arachnoiditis  G03.9 322.9   3. Worsening headaches  R51.9 784.0   4. Chronic pain of right knee  M25.561 719.46    G89.29 338.29 "   5. Chronic bilateral low back pain, unspecified whether sciatica present  M54.50 724.2    G89.29 338.29   6. Lumbar degenerative disc disease  M51.36 722.52   7. Chronic low back pain with bilateral sciatica, unspecified back pain laterality  M54.41 724.2    G89.29 724.3    M54.42 338.29   8. Primary osteoarthritis of right knee  M17.11 715.16   9. Arthritis of right knee  M17.11 716.96   10. Physical exam, annual  Z00.00 V70.0   11. Acute shoulder bursitis, left  M75.52 726.10       Assessment and Plan   Diagnoses and all orders for this visit:    1. Hypertension, essential (Primary)  -     fluticasone (FLONASE) 50 MCG/ACT nasal spray; 2 sprays by Each Nare route Daily.  Dispense: 16 g; Refill: 5  -     valsartan-hydrochlorothiazide (DIOVAN-HCT) 80-12.5 MG per tablet; Take 1 tablet by mouth Every Evening.  Dispense: 90 tablet; Refill: 3  -     HYDROcodone-acetaminophen (NORCO) 5-325 MG per tablet; Take 1 tablet by mouth Every 12 (Twelve) Hours As Needed for Moderate Pain.  Dispense: 60 tablet; Refill: 0  -     CBC & Differential; Future  -     Comprehensive Metabolic Panel; Future  -     Lipid Panel; Future  -     CBC & Differential; Future  -     Comprehensive Metabolic Panel; Future  -     Lipid Panel; Future  -     TSH+Free T4; Future  -     Hemoglobin A1c; Future    2. Adhesive arachnoiditis  -     fluticasone (FLONASE) 50 MCG/ACT nasal spray; 2 sprays by Each Nare route Daily.  Dispense: 16 g; Refill: 5  -     valsartan-hydrochlorothiazide (DIOVAN-HCT) 80-12.5 MG per tablet; Take 1 tablet by mouth Every Evening.  Dispense: 90 tablet; Refill: 3  -     HYDROcodone-acetaminophen (NORCO) 5-325 MG per tablet; Take 1 tablet by mouth Every 12 (Twelve) Hours As Needed for Moderate Pain.  Dispense: 60 tablet; Refill: 0  -     CBC & Differential; Future  -     Comprehensive Metabolic Panel; Future  -     Lipid Panel; Future  -     CBC & Differential; Future  -     Comprehensive Metabolic Panel; Future  -     Lipid  Panel; Future  -     TSH+Free T4; Future  -     Hemoglobin A1c; Future    3. Worsening headaches  -     fluticasone (FLONASE) 50 MCG/ACT nasal spray; 2 sprays by Each Nare route Daily.  Dispense: 16 g; Refill: 5  -     valsartan-hydrochlorothiazide (DIOVAN-HCT) 80-12.5 MG per tablet; Take 1 tablet by mouth Every Evening.  Dispense: 90 tablet; Refill: 3  -     HYDROcodone-acetaminophen (NORCO) 5-325 MG per tablet; Take 1 tablet by mouth Every 12 (Twelve) Hours As Needed for Moderate Pain.  Dispense: 60 tablet; Refill: 0  -     CBC & Differential; Future  -     Comprehensive Metabolic Panel; Future  -     Lipid Panel; Future  -     CBC & Differential; Future  -     Comprehensive Metabolic Panel; Future  -     Lipid Panel; Future  -     TSH+Free T4; Future  -     Hemoglobin A1c; Future    4. Chronic pain of right knee  -     fluticasone (FLONASE) 50 MCG/ACT nasal spray; 2 sprays by Each Nare route Daily.  Dispense: 16 g; Refill: 5  -     valsartan-hydrochlorothiazide (DIOVAN-HCT) 80-12.5 MG per tablet; Take 1 tablet by mouth Every Evening.  Dispense: 90 tablet; Refill: 3  -     HYDROcodone-acetaminophen (NORCO) 5-325 MG per tablet; Take 1 tablet by mouth Every 12 (Twelve) Hours As Needed for Moderate Pain.  Dispense: 60 tablet; Refill: 0  -     CBC & Differential; Future  -     Comprehensive Metabolic Panel; Future  -     Lipid Panel; Future  -     CBC & Differential; Future  -     Comprehensive Metabolic Panel; Future  -     Lipid Panel; Future  -     TSH+Free T4; Future  -     Hemoglobin A1c; Future    5. Chronic bilateral low back pain, unspecified whether sciatica present  -     fluticasone (FLONASE) 50 MCG/ACT nasal spray; 2 sprays by Each Nare route Daily.  Dispense: 16 g; Refill: 5  -     valsartan-hydrochlorothiazide (DIOVAN-HCT) 80-12.5 MG per tablet; Take 1 tablet by mouth Every Evening.  Dispense: 90 tablet; Refill: 3  -     HYDROcodone-acetaminophen (NORCO) 5-325 MG per tablet; Take 1 tablet by mouth Every  12 (Twelve) Hours As Needed for Moderate Pain.  Dispense: 60 tablet; Refill: 0  -     CBC & Differential; Future  -     Comprehensive Metabolic Panel; Future  -     Lipid Panel; Future  -     CBC & Differential; Future  -     Comprehensive Metabolic Panel; Future  -     Lipid Panel; Future  -     TSH+Free T4; Future  -     Hemoglobin A1c; Future    6. Lumbar degenerative disc disease  -     fluticasone (FLONASE) 50 MCG/ACT nasal spray; 2 sprays by Each Nare route Daily.  Dispense: 16 g; Refill: 5  -     valsartan-hydrochlorothiazide (DIOVAN-HCT) 80-12.5 MG per tablet; Take 1 tablet by mouth Every Evening.  Dispense: 90 tablet; Refill: 3  -     HYDROcodone-acetaminophen (NORCO) 5-325 MG per tablet; Take 1 tablet by mouth Every 12 (Twelve) Hours As Needed for Moderate Pain.  Dispense: 60 tablet; Refill: 0  -     CBC & Differential; Future  -     Comprehensive Metabolic Panel; Future  -     Lipid Panel; Future  -     CBC & Differential; Future  -     Comprehensive Metabolic Panel; Future  -     Lipid Panel; Future  -     TSH+Free T4; Future  -     Hemoglobin A1c; Future    7. Chronic low back pain with bilateral sciatica, unspecified back pain laterality  -     fluticasone (FLONASE) 50 MCG/ACT nasal spray; 2 sprays by Each Nare route Daily.  Dispense: 16 g; Refill: 5  -     valsartan-hydrochlorothiazide (DIOVAN-HCT) 80-12.5 MG per tablet; Take 1 tablet by mouth Every Evening.  Dispense: 90 tablet; Refill: 3  -     HYDROcodone-acetaminophen (NORCO) 5-325 MG per tablet; Take 1 tablet by mouth Every 12 (Twelve) Hours As Needed for Moderate Pain.  Dispense: 60 tablet; Refill: 0  -     CBC & Differential; Future  -     Comprehensive Metabolic Panel; Future  -     Lipid Panel; Future  -     CBC & Differential; Future  -     Comprehensive Metabolic Panel; Future  -     Lipid Panel; Future  -     TSH+Free T4; Future  -     Hemoglobin A1c; Future    8. Primary osteoarthritis of right knee  -     fluticasone (FLONASE) 50 MCG/ACT  nasal spray; 2 sprays by Each Nare route Daily.  Dispense: 16 g; Refill: 5  -     valsartan-hydrochlorothiazide (DIOVAN-HCT) 80-12.5 MG per tablet; Take 1 tablet by mouth Every Evening.  Dispense: 90 tablet; Refill: 3  -     HYDROcodone-acetaminophen (NORCO) 5-325 MG per tablet; Take 1 tablet by mouth Every 12 (Twelve) Hours As Needed for Moderate Pain.  Dispense: 60 tablet; Refill: 0  -     CBC & Differential; Future  -     Comprehensive Metabolic Panel; Future  -     Lipid Panel; Future  -     CBC & Differential; Future  -     Comprehensive Metabolic Panel; Future  -     Lipid Panel; Future  -     TSH+Free T4; Future  -     Hemoglobin A1c; Future    9. Arthritis of right knee  -     fluticasone (FLONASE) 50 MCG/ACT nasal spray; 2 sprays by Each Nare route Daily.  Dispense: 16 g; Refill: 5  -     valsartan-hydrochlorothiazide (DIOVAN-HCT) 80-12.5 MG per tablet; Take 1 tablet by mouth Every Evening.  Dispense: 90 tablet; Refill: 3  -     HYDROcodone-acetaminophen (NORCO) 5-325 MG per tablet; Take 1 tablet by mouth Every 12 (Twelve) Hours As Needed for Moderate Pain.  Dispense: 60 tablet; Refill: 0  -     CBC & Differential; Future  -     Comprehensive Metabolic Panel; Future  -     Lipid Panel; Future  -     CBC & Differential; Future  -     Comprehensive Metabolic Panel; Future  -     Lipid Panel; Future  -     TSH+Free T4; Future  -     Hemoglobin A1c; Future  -     Ambulatory Referral to Physical Therapy for Evaluation & Treatment  -     Ambulatory Referral to Orthopedic Surgery    10. Physical exam, annual    11. 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  Based on a technically difficult study with poor acoustic windows on echocardiogram from November 6, 2023,     Left shoulder impingement syndrome, bursitis, consider orthopedic referral physical therapy, orders put in, September 4, 2024    severe arthritis and pain in her right knee ---right total knee arthroplasty Dr. Mccarty in [place],--November 14, 2023,     IFG , 5.9, August 2024,    Back pain, sciatica, r leg, --MRI November 2021 showed a left L5-S1 disc paracentral extrusion with multiple levels of foraminal stenosis on both sides throughout the spine, with degenerative disc disease noted, patient did go see Ortho spine surgeon in [place] was possibly going to have surgery but then they called and said they would not do it due to her increased risk and back issues and stability, discussed May 2022--- we will make referral to [facility] [place] for second opinion given the severity of the discs and the continued back pain that she is having, May 2022, patient is decreased her diclofenac to 50 mg daily, organ to decrease her pain management medication Norco to twice a day dosing as needed monthly March 4, 2024    abdominal pain possible recurrent diverticulitis January 31, 2022--colonoscopy August 2022, CT scan abdomen and pelvis June 24, 2022,--diverticulosis no evidence of diverticulitis or serious findings,---- recommend continue Protonix in the morning and Pepcid at bedtime---     vertigo --recurrent kishore 10/ 2022 --- with nausea vomiting, headaches, work-up in the emergency room included MRI of the brain CT of the brain did not show any acute abnormality, chest x-ray no  active disease--- she will continue meclizine 3-4 times per day and add scopolamine patch December 6, 2021, and again January 10, 2022,---, referral made for Epley maneuver with PTA, November 2022, she has been using the scopolamine patch with some help, but she also had possibly gotten some in her hand and eye recently with some changes,--------------- symptoms have gotten much better after the Epley maneuver, October 3, 2023    hypertension  continues ---valsartan HCT 80/12.5 mg daily,,     PULM NODULE ON CXR SEPT 2017, CT SCAN OCT 2017, NO CHANGE IN CT APRIL 2018, (DONE ADIA ROMERO, --CT CHEST OCT 2019 , NO CHANGE IN PULM NODULE OVER 2 YRS , SO NO MORE F/U     LIFE STRESSORS DISCUSSED,  ANNA MARIE AND URINE DRUG SCREEN REIVIEWED     Hypothyroidism, cont levothyroxine 0.075 mg qd     obesity --patient's been losing weight as of September 2024    LLQ Abdominal Pain/Diverticulitis--6/18/16.-- colonoscopy Dr. Alarcon--  NOV, 2019, THI ,, August 2022,, internal hemorrhoids only, otherwise normal exam,    L BREAST MASS--f/u U/S WAS NEG,. 8/2015, , Yearly mammograms    Patient quit smoking 11 years ago    CHRONIC PAIN ISSUES DISCUSSED --WITH PAIN IN NECK , BACK PAIN- ON PRN NORCO--,  decrease the frequency of that to twice daily dosing as necessary, March 4, 2024----------- WE DISCUSSED RISK OF DRIVING AND ADDICTION WITH MED--OCT 2020 and again sept 2024,  patient continues diclofenac 50 mg XR bid     DEPRESSION,--continues Xanax 0.5 twice a day, Prozac 40 mg daily,    ELEVATED CHOL, TRIG,-continues Crestor,      Follow Up   Return in about 6 months (around 3/4/2025).  Patient was given instructions and counseling regarding her condition or for health maintenance advice. Please see specific information pulled into the AVS if appropriate.           Answers submitted by the patient for this visit:  Other (Submitted on 8/29/2024)  Please describe your symptoms.: Arthritis pain  Have you had these symptoms before?:  Yes  How long have you been having these symptoms?: Greater than 2 weeks  Please list any medications you are currently taking for this condition.: Voltaren  Please describe any probable cause for these symptoms. : affecting my hands, back and knees  Primary Reason for Visit (Submitted on 8/29/2024)  What is the primary reason for your visit?: Other

## 2024-09-05 ENCOUNTER — TELEPHONE (OUTPATIENT)
Dept: ORTHOPEDIC SURGERY | Facility: CLINIC | Age: 64
End: 2024-09-05
Payer: COMMERCIAL

## 2024-09-05 RX ORDER — CEPHALEXIN 500 MG/1
CAPSULE ORAL
Qty: 4 CAPSULE | Refills: 3 | Status: SHIPPED | OUTPATIENT
Start: 2024-09-05

## 2024-09-05 NOTE — TELEPHONE ENCOUNTER
Caller: Kamini Orta    Relationship: Self    Best call back number:     What is the best time to reach you: ANY     Who are you requesting to speak with (clinical staff, provider,  specific staff member): CLINICAL      What was the call regarding: CHECKING TO SEE IF NEEDS ANTIBIOTICS FOR DENTAL WORK SINCE SHE HAS HAD KNEE SXS RECENTLY     Is it okay if the provider responds through MyChart: PLEASE CALL

## 2024-09-05 NOTE — TELEPHONE ENCOUNTER
NEW PT - Acute shoulder bursitis, left, Arthritis of right knee - PROG NOTE 9.4.24 - XR 11.14.23 - SX 11.14.23     SX IS THE KNEE, [facility] [place],  NOTE IN CHART    NO KNOWN SURGERY FOR SHOULDER    WILL DR STONE SEE?

## 2024-09-05 NOTE — TELEPHONE ENCOUNTER
Per Dr. Desir he will not treat the knee has she had these replaced in November of 2023. Can see first available for her shoulders as these have not been replaced.

## 2024-09-09 DIAGNOSIS — M17.0 ARTHRITIS OF BOTH KNEES: ICD-10-CM

## 2024-09-17 ENCOUNTER — OFFICE VISIT (OUTPATIENT)
Dept: ORTHOPEDIC SURGERY | Facility: CLINIC | Age: 64
End: 2024-09-17
Payer: COMMERCIAL

## 2024-09-17 VITALS
OXYGEN SATURATION: 94 % | SYSTOLIC BLOOD PRESSURE: 119 MMHG | HEIGHT: 63 IN | HEART RATE: 63 BPM | BODY MASS INDEX: 35.97 KG/M2 | DIASTOLIC BLOOD PRESSURE: 76 MMHG | WEIGHT: 203 LBS

## 2024-09-17 DIAGNOSIS — M25.512 LEFT SHOULDER PAIN, UNSPECIFIED CHRONICITY: Primary | ICD-10-CM

## 2024-09-17 DIAGNOSIS — M75.52 BURSITIS OF LEFT SHOULDER: ICD-10-CM

## 2024-09-17 RX ORDER — LIDOCAINE HYDROCHLORIDE 10 MG/ML
5 INJECTION, SOLUTION INFILTRATION; PERINEURAL
Status: COMPLETED | OUTPATIENT
Start: 2024-09-17 | End: 2024-09-17

## 2024-09-17 RX ORDER — TRIAMCINOLONE ACETONIDE 40 MG/ML
40 INJECTION, SUSPENSION INTRA-ARTICULAR; INTRAMUSCULAR
Status: COMPLETED | OUTPATIENT
Start: 2024-09-17 | End: 2024-09-17

## 2024-09-17 RX ADMIN — TRIAMCINOLONE ACETONIDE 40 MG: 40 INJECTION, SUSPENSION INTRA-ARTICULAR; INTRAMUSCULAR at 16:24

## 2024-09-17 RX ADMIN — LIDOCAINE HYDROCHLORIDE 5 ML: 10 INJECTION, SOLUTION INFILTRATION; PERINEURAL at 16:24

## 2024-09-19 ENCOUNTER — OFFICE VISIT (OUTPATIENT)
Dept: ORTHOPEDIC SURGERY | Facility: CLINIC | Age: 64
End: 2024-09-19
Payer: COMMERCIAL

## 2024-09-19 DIAGNOSIS — M17.12 PRIMARY OSTEOARTHRITIS OF LEFT KNEE: Primary | ICD-10-CM

## 2024-09-19 PROCEDURE — 99214 OFFICE O/P EST MOD 30 MIN: CPT | Performed by: NURSE PRACTITIONER

## 2024-09-19 RX ORDER — PREGABALIN 150 MG/1
150 CAPSULE ORAL ONCE
OUTPATIENT
Start: 2024-09-19 | End: 2024-09-19

## 2024-09-19 RX ORDER — MELOXICAM 7.5 MG/1
15 TABLET ORAL ONCE
OUTPATIENT
Start: 2024-09-19 | End: 2024-09-19

## 2024-09-19 RX ORDER — CHLORHEXIDINE GLUCONATE 500 MG/1
CLOTH TOPICAL ONCE
OUTPATIENT
Start: 2024-09-19

## 2024-09-25 PROBLEM — M17.12 PRIMARY OSTEOARTHRITIS OF LEFT KNEE: Status: ACTIVE | Noted: 2024-09-19

## 2024-09-27 ENCOUNTER — DOCUMENTATION (OUTPATIENT)
Dept: PHYSICAL THERAPY | Facility: CLINIC | Age: 64
End: 2024-09-27
Payer: COMMERCIAL

## 2024-10-03 ENCOUNTER — TELEMEDICINE (OUTPATIENT)
Dept: ORTHOPEDIC SURGERY | Facility: CLINIC | Age: 64
End: 2024-10-03
Payer: COMMERCIAL

## 2024-10-03 NOTE — PROGRESS NOTES
Patient: Kamini Orta  YOB: 1960 63 y.o. female  Medical Record Number: [id number]  You have chosen to receive care through a telehealth visit.  Do you consent to use a video/audio connection for your medical care today? Yes  - 15 minutes spent   Chief Complaints: left knee pain     History of Present Illness:Kamini Orta is a 63 y.o. female who presents with left medial knee pain severe -  limts adls and walking toleranceShe states that she has had previous injections in the past which is only given her minimal relief. She states that she has constant daily pain that is severe in nature and rates it as an 8 or 9 on a scale of 10. She reports that her knee occasionally wants to buckle and give way and she is afraid of falling and injuring herself. She states that this is greatly affecting her quality of life and she is ready to proceed forward with surgical intervention.      Allergies:   Allergies   Allergen Reactions    Codeine Anaphylaxis     Chest pains    Erythromycin Diarrhea     Cramps    Erythromycin Base Other (See Comments)    Morphine Other (See Comments) and Unknown - High Severity     Chest pain          Medications:   Current Outpatient Medications   Medication Sig Dispense Refill    ALPRAZolam (XANAX) 0.5 MG tablet Take 1 tablet by mouth 2 (Two) Times a Day. 60 tablet 5    cephalexin (KEFLEX) 500 MG capsule TAKE 4 CAPS 1 HOUR PRIOR TO DENTAL APPOINTMENT 4 capsule 3    cetirizine (zyrTEC) 10 MG tablet Take 1 tablet by mouth Daily.      diclofenac (VOLTAREN) 50 MG EC tablet Take 1 tablet by mouth 2 (Two) Times a Day. 60 tablet 5    estradiol (Vivelle-Dot) 0.05 MG/24HR patch Place 1 patch on the skin as directed by provider 2 (Two) Times a Week. 24 patch 4    famotidine (PEPCID) 10 MG tablet Take 1 tablet by mouth Every Night.      FLUoxetine (PROzac) 40 MG capsule TAKE ONE CAPSULE BY MOUTH EVERY DAY 90 capsule 3    fluticasone (FLONASE) 50 MCG/ACT nasal spray 2 sprays by Each  Nare route Daily. 16 g 5    HYDROcodone-acetaminophen (NORCO) 5-325 MG per tablet Take 1 tablet by mouth Every 12 (Twelve) Hours As Needed for Moderate Pain. 60 tablet 0    levothyroxine (SYNTHROID, LEVOTHROID) 75 MCG tablet Take 1 tablet by mouth Daily. (Patient taking differently: Take 1 tablet by mouth Every Morning.) 90 tablet 3    Melatonin 10 MG sublingual tablet Place 1 tablet under the tongue Every Night.      pantoprazole (PROTONIX) 40 MG EC tablet Take 1 tablet by mouth Daily. (Patient taking differently: Take 1 tablet by mouth Every Morning.) 90 tablet 3    rosuvastatin (CRESTOR) 10 MG tablet Take 1 tablet by mouth Daily. 90 tablet 3    valsartan-hydrochlorothiazide (DIOVAN-HCT) 80-12.5 MG per tablet Take 1 tablet by mouth Every Evening. 90 tablet 3     No current facility-administered medications for this visit.         The following portions of the patient's history were reviewed and updated as appropriate: allergies, current medications, past family history, past medical history, past social history, past surgical history and problem list.    Review of Systems:   Pertinent positives/negatives listed in HPI above    Physical Exam:   There were no vitals filed for this visit.    General: A and O x 3, ASA, NAD  Knee Exam List: Knee:  left                          ALIGNMENT:     Varus  ,   Patella  tracks  midline                          GAIT:    Antalgic                          SKIN:    No abnormality                          RANGE OF MOTION:   4  -  115   DEG                          STRENGTH:   4  / 5                          LIGAMENTS:    No varus / valgus instability.   Negative  Lachman.                          MENISCUS:     Positive Glenn                             DISTAL PULSES:    Paplable                          DISTAL SENSATION :   Intact                          LYMPHATICS:     No   lymphadenopathy                          OTHER:          - Positive   effusion                                                   - Crepitance with ROM            Radiology:  Xrays 3views left knee  (ap,lateral, sunrise) taken previously demonstrating advanced varus osteoarthritis with bone on bone articulation, subchondral cysts, and periarticular osteophytes    Assessment/Plan: left knee advanced endstage OA  Knee Plan List: Continuation of conservative management vs. TKA discussed.  The patient wishes to proceed with total knee replacement.  At this point the patient has failed the full compliment of conservative treatment and stating complete understanding of the risks/benefits/ anternatives wishes to proceed with surgical treatment.    Risk and benefits of surgery were reviewed.  Including, but not limited to, blood clots or pulmonary embolism, anesthesia risk, infection, fracture, skin/leg numbness, persistent pain/crepitance/popping/catching, failure of the implant, need for future surgeries, hematoma, possible nerve or blood vessel injury, need for transfusion, and potential risk of stroke,heart attack or death, among others.  The patient understands and wishes to proceed.     It was explained that if tissue has been repaired or reconstructed, there is also an increased chance of failure which may require further management.  Following the completion of the discussion, the patient expressed understanding of this planned course of care, all their questions were answered and consent will be obtained preoperatively.    Operative Plan: [facility] [employer] Total Knee Replacement an overnight stay with home health rehab    There are no diagnoses linked to this encounter.     Bulmaro Wooten MD  10/3/2024

## 2024-10-07 DIAGNOSIS — M17.11 PRIMARY OSTEOARTHRITIS OF RIGHT KNEE: ICD-10-CM

## 2024-10-07 DIAGNOSIS — M17.0 ARTHRITIS OF BOTH KNEES: ICD-10-CM

## 2024-10-07 DIAGNOSIS — M54.50 CHRONIC BILATERAL LOW BACK PAIN WITHOUT SCIATICA: ICD-10-CM

## 2024-10-07 DIAGNOSIS — G89.29 CHRONIC BILATERAL LOW BACK PAIN WITHOUT SCIATICA: ICD-10-CM

## 2024-10-07 DIAGNOSIS — G89.29 CHRONIC PAIN OF RIGHT KNEE: ICD-10-CM

## 2024-10-07 DIAGNOSIS — I10 HYPERTENSION, ESSENTIAL: ICD-10-CM

## 2024-10-07 DIAGNOSIS — G03.9 ADHESIVE ARACHNOIDITIS: ICD-10-CM

## 2024-10-07 DIAGNOSIS — M25.561 CHRONIC PAIN OF RIGHT KNEE: ICD-10-CM

## 2024-10-07 DIAGNOSIS — M51.369 LUMBAR DEGENERATIVE DISC DISEASE: ICD-10-CM

## 2024-10-07 RX ORDER — LEVOTHYROXINE SODIUM 75 UG/1
75 TABLET ORAL DAILY
Qty: 90 TABLET | Refills: 3 | Status: SHIPPED | OUTPATIENT
Start: 2024-10-07

## 2024-10-15 ENCOUNTER — TRANSCRIBE ORDERS (OUTPATIENT)
Dept: ADMINISTRATIVE | Facility: HOSPITAL | Age: 64
End: 2024-10-15
Payer: COMMERCIAL

## 2024-10-15 DIAGNOSIS — Z12.31 ENCOUNTER FOR SCREENING MAMMOGRAM FOR BREAST CANCER: Primary | ICD-10-CM

## 2024-11-04 ENCOUNTER — TELEPHONE (OUTPATIENT)
Dept: INTERNAL MEDICINE | Age: 64
End: 2024-11-04
Payer: COMMERCIAL

## 2024-11-04 NOTE — TELEPHONE ENCOUNTER
Caller: Kamini Orta    Relationship: Self    Best call back number: [phone]    Requested Prescriptions:   BACLOFEN 10 MG        Pharmacy where request should be sent: [facility] [address]  - [phone] PH - [phone] FX     Last office visit with prescribing clinician: 9/4/2024   Last telemedicine visit with prescribing clinician: Visit date not found   Next office visit with prescribing clinician: 3/5/2025     Additional details provided by patient:     Does the patient have less than a 3 day supply:  [x] Yes  [] No        Calixto Kramer Rep   11/04/24 11:03 EST

## 2024-11-05 DIAGNOSIS — M54.41 CHRONIC LOW BACK PAIN WITH BILATERAL SCIATICA, UNSPECIFIED BACK PAIN LATERALITY: ICD-10-CM

## 2024-11-05 DIAGNOSIS — G89.29 CHRONIC PAIN OF RIGHT KNEE: ICD-10-CM

## 2024-11-05 DIAGNOSIS — M54.42 CHRONIC LOW BACK PAIN WITH BILATERAL SCIATICA, UNSPECIFIED BACK PAIN LATERALITY: ICD-10-CM

## 2024-11-05 DIAGNOSIS — G89.29 CHRONIC BILATERAL LOW BACK PAIN, UNSPECIFIED WHETHER SCIATICA PRESENT: ICD-10-CM

## 2024-11-05 DIAGNOSIS — M54.50 CHRONIC BILATERAL LOW BACK PAIN WITHOUT SCIATICA: ICD-10-CM

## 2024-11-05 DIAGNOSIS — M54.50 CHRONIC BILATERAL LOW BACK PAIN, UNSPECIFIED WHETHER SCIATICA PRESENT: ICD-10-CM

## 2024-11-05 DIAGNOSIS — I10 HYPERTENSION, ESSENTIAL: ICD-10-CM

## 2024-11-05 DIAGNOSIS — M51.369 LUMBAR DEGENERATIVE DISC DISEASE: ICD-10-CM

## 2024-11-05 DIAGNOSIS — R51.9 WORSENING HEADACHES: ICD-10-CM

## 2024-11-05 DIAGNOSIS — G89.29 CHRONIC LOW BACK PAIN WITH BILATERAL SCIATICA, UNSPECIFIED BACK PAIN LATERALITY: ICD-10-CM

## 2024-11-05 DIAGNOSIS — G89.29 CHRONIC BILATERAL LOW BACK PAIN WITHOUT SCIATICA: ICD-10-CM

## 2024-11-05 DIAGNOSIS — G03.9 ADHESIVE ARACHNOIDITIS: ICD-10-CM

## 2024-11-05 DIAGNOSIS — M17.11 ARTHRITIS OF RIGHT KNEE: ICD-10-CM

## 2024-11-05 DIAGNOSIS — M17.0 ARTHRITIS OF BOTH KNEES: ICD-10-CM

## 2024-11-05 DIAGNOSIS — M17.11 PRIMARY OSTEOARTHRITIS OF RIGHT KNEE: ICD-10-CM

## 2024-11-05 DIAGNOSIS — M25.561 CHRONIC PAIN OF RIGHT KNEE: ICD-10-CM

## 2024-11-05 RX ORDER — HYDROCODONE BITARTRATE AND ACETAMINOPHEN 5; 325 MG/1; MG/1
TABLET ORAL
Qty: 60 TABLET | Refills: 5 | OUTPATIENT
Start: 2024-11-05

## 2024-11-05 RX ORDER — PANTOPRAZOLE SODIUM 40 MG/1
40 TABLET, DELAYED RELEASE ORAL DAILY
Qty: 90 TABLET | Refills: 3 | OUTPATIENT
Start: 2024-11-05

## 2024-11-05 RX ORDER — PANTOPRAZOLE SODIUM 40 MG/1
40 TABLET, DELAYED RELEASE ORAL DAILY
Qty: 90 TABLET | Refills: 3 | Status: SHIPPED | OUTPATIENT
Start: 2024-11-05

## 2024-11-05 RX ORDER — ALPRAZOLAM 0.5 MG
0.5 TABLET ORAL 2 TIMES DAILY
Qty: 60 TABLET | Refills: 5 | OUTPATIENT
Start: 2024-11-05

## 2024-11-05 RX ORDER — BACLOFEN 10 MG/1
10 TABLET ORAL 2 TIMES DAILY
Qty: 60 TABLET | Refills: 3 | Status: SHIPPED | OUTPATIENT
Start: 2024-11-05

## 2024-11-05 RX ORDER — ALPRAZOLAM 0.5 MG
0.5 TABLET ORAL 2 TIMES DAILY
Qty: 60 TABLET | Refills: 5 | Status: SHIPPED | OUTPATIENT
Start: 2024-11-05

## 2024-11-05 RX ORDER — HYDROCODONE BITARTRATE AND ACETAMINOPHEN 5; 325 MG/1; MG/1
1 TABLET ORAL EVERY 12 HOURS PRN
Qty: 60 TABLET | Refills: 0 | Status: SHIPPED | OUTPATIENT
Start: 2024-11-05

## 2024-11-13 ENCOUNTER — TELEPHONE (OUTPATIENT)
Dept: INTERNAL MEDICINE | Age: 64
End: 2024-11-13
Payer: COMMERCIAL

## 2024-11-13 DIAGNOSIS — R42 VERTIGO: Primary | ICD-10-CM

## 2024-11-13 DIAGNOSIS — M25.562 LEFT KNEE PAIN, UNSPECIFIED CHRONICITY: ICD-10-CM

## 2024-11-13 DIAGNOSIS — M54.9 BACK PAIN, UNSPECIFIED BACK LOCATION, UNSPECIFIED BACK PAIN LATERALITY, UNSPECIFIED CHRONICITY: ICD-10-CM

## 2024-11-14 RX ORDER — FLUOXETINE 40 MG/1
40 CAPSULE ORAL DAILY
Qty: 90 CAPSULE | Refills: 3 | Status: SHIPPED | OUTPATIENT
Start: 2024-11-14

## 2024-11-14 NOTE — ADDENDUM NOTE
Addended by: ELIAZAR AMIN on: 11/14/2024 09:37 AM     Modules accepted: Orders    
Current and Past Psychiatric Diagnoses

## 2024-11-14 NOTE — TELEPHONE ENCOUNTER
CAN PT GET AN ORDER FOR PTA TO TREAT AND EVAL FOR VERTIGO,BACK PAIN, AND LEFT KNEE PAIN? fAX [phone]  
Order placed and faxed  
no

## 2024-11-20 ENCOUNTER — HOSPITAL ENCOUNTER (OUTPATIENT)
Dept: MAMMOGRAPHY | Facility: HOSPITAL | Age: 64
Discharge: HOME | End: 2024-11-20
Admitting: INTERNAL MEDICINE
Payer: COMMERCIAL

## 2024-11-20 DIAGNOSIS — Z12.31 ENCOUNTER FOR SCREENING MAMMOGRAM FOR BREAST CANCER: ICD-10-CM

## 2024-11-20 PROCEDURE — 77063 BREAST TOMOSYNTHESIS BI: CPT

## 2024-11-20 PROCEDURE — 77067 SCR MAMMO BI INCL CAD: CPT

## 2024-12-02 ENCOUNTER — PRE-ADMISSION TESTING (OUTPATIENT)
Dept: PREADMISSION TESTING | Facility: HOSPITAL | Age: 64
End: 2024-12-02
Payer: COMMERCIAL

## 2024-12-02 VITALS
HEIGHT: 63 IN | DIASTOLIC BLOOD PRESSURE: 60 MMHG | HEART RATE: 74 BPM | WEIGHT: 210.5 LBS | RESPIRATION RATE: 20 BRPM | SYSTOLIC BLOOD PRESSURE: 119 MMHG | BODY MASS INDEX: 37.3 KG/M2 | TEMPERATURE: 97.5 F | OXYGEN SATURATION: 96 %

## 2024-12-02 LAB
ANION GAP SERPL CALCULATED.3IONS-SCNC: 10 MMOL/L (ref 5–15)
BUN SERPL-MCNC: 14 MG/DL (ref 8–23)
BUN/CREAT SERPL: 14.7 (ref 7–25)
CALCIUM SPEC-SCNC: 8.8 MG/DL (ref 8.6–10.5)
CHLORIDE SERPL-SCNC: 102 MMOL/L (ref 98–107)
CO2 SERPL-SCNC: 27 MMOL/L (ref 22–29)
CREAT SERPL-MCNC: 0.95 MG/DL (ref 0.57–1)
DEPRECATED RDW RBC AUTO: 43.6 FL (ref 37–54)
EGFRCR SERPLBLD CKD-EPI 2021: 67.9 ML/MIN/1.73
ERYTHROCYTE [DISTWIDTH] IN BLOOD BY AUTOMATED COUNT: 13.5 % (ref 12.3–15.4)
GLUCOSE SERPL-MCNC: 125 MG/DL (ref 65–99)
HCT VFR BLD AUTO: 36.8 % (ref 34–46.6)
HGB BLD-MCNC: 12.2 G/DL (ref 12–15.9)
MCH RBC QN AUTO: 29.3 PG (ref 26.6–33)
MCHC RBC AUTO-ENTMCNC: 33.2 G/DL (ref 31.5–35.7)
MCV RBC AUTO: 88.2 FL (ref 79–97)
PLATELET # BLD AUTO: 263 10*3/MM3 (ref 140–450)
PMV BLD AUTO: 9.7 FL (ref 6–12)
POTASSIUM SERPL-SCNC: 3.6 MMOL/L (ref 3.5–5.2)
QT INTERVAL: 401 MS
QTC INTERVAL: 414 MS
RBC # BLD AUTO: 4.17 10*6/MM3 (ref 3.77–5.28)
SODIUM SERPL-SCNC: 139 MMOL/L (ref 136–145)
WBC NRBC COR # BLD AUTO: 6.58 10*3/MM3 (ref 3.4–10.8)

## 2024-12-02 PROCEDURE — 85027 COMPLETE CBC AUTOMATED: CPT

## 2024-12-02 PROCEDURE — 80048 BASIC METABOLIC PNL TOTAL CA: CPT

## 2024-12-02 PROCEDURE — 93005 ELECTROCARDIOGRAM TRACING: CPT

## 2024-12-02 PROCEDURE — 36415 COLL VENOUS BLD VENIPUNCTURE: CPT

## 2024-12-02 NOTE — DISCHARGE INSTRUCTIONS
Take the following medications the morning of surgery:    PROZAC, LEVOTHYROXINE, PROTONIX    If you are on prescription narcotic pain medication to control your pain you may also take that medication the morning of surgery.      General Instructions:     Do not eat solid food after midnight the night before surgery.  Clear liquids day of surgery are allowed but must be stopped at least two hours before your hospital arrival time.       Allowed clear liquids      Water, sodas, and tea or coffee with no cream or milk added.       12 to 20 ounces of a clear liquid that contains carbohydrates is recommended.  If non-diabetic, have Gatorade or Powerade.  If diabetic, have G2 or Powerade Zero.     Do not have liquids red in color.  Do not consume chicken, beef, pork or vegetable broth or bouillon cubes of any variety as they are not considered clear liquids and are not allowed.      Infants may have breast milk up to four hours before surgery.  Infants drinking formula may drink formula up to six hours before surgery.   Patients who avoid smoking, chewing tobacco and alcohol for 4 weeks prior to surgery have a reduced risk of post-operative complications.  Quit smoking as many days before surgery as you can.  Do not smoke, use chewing tobacco or drink alcohol the day of surgery.   If applicable bring your C-PAP/ BI-PAP machine in with you to preop day of surgery.  Bring any papers given to you in the doctor’s office.  Wear clean comfortable clothes.  Do not wear contact lenses, false eyelashes or make-up.  Bring a case for your glasses.   Bring crutches or walker if applicable.  Remove all piercings.  Leave jewelry and any other valuables at home.  Hair extensions with metal clips must be removed prior to surgery.  The Pre-Admission Testing nurse will instruct you to bring medications if unable to obtain an accurate list in Pre-Admission Testing.      Preventing a Surgical Site Infection:  For 2 to 3 days before surgery,  avoid shaving with a razor because the razor can irritate skin and make it easier to develop an infection.    Any areas of open skin can increase the risk of a post-operative wound infection by allowing bacteria to enter and travel throughout the body.  Notify your surgeon if you have any skin wounds / rashes even if it is not near the expected surgical site.  The area will need assessed to determine if surgery should be delayed until it is healed.  The night prior to surgery shower using a fresh bar of anti-bacterial soap (such as Dial) and clean washcloth.  Sleep in a clean bed with clean clothing.  Do not allow pets to sleep with you.  Shower on the morning of surgery using a fresh bar of anti-bacterial soap (such as Dial) and clean washcloth.  Dry with a clean towel and dress in clean clothing.  Ask your surgeon if you will be receiving antibiotics prior to surgery.  Make sure you, your family, and all healthcare providers clean their hands with soap and water or an alcohol based hand  before caring for you or your wound.    Day of surgery:  Your arrival time is approximately two hours before your scheduled surgery time.  Please note if you have an early arrival time the surgery doors do not open before 5:00 AM.  Upon arrival, a Pre-op nurse and Anesthesiologist will review your health history, obtain vital signs, and answer questions you may have.  The only belongings needed at this time will be a list of your home medications and if applicable your C-PAP/BI-PAP machine.  A Pre-op nurse will start an IV and you may receive medication in preparation for surgery, including something to help you relax.     Please be aware that surgery does come with discomfort.  We want to make every effort to control your discomfort so please discuss any uncontrolled symptoms with your nurse.   Your doctor will most likely have prescribed pain medications.      If you are going home after surgery you will receive  individualized written care instructions before being discharged.  A responsible adult must drive you to and from the hospital on the day of your surgery and ideally stay with you through the night.   .  Discharge prescriptions can be filled by the hospital pharmacy during regular pharmacy hours.  If you are having surgery late in the day/evening your prescription may be e-prescribed to your pharmacy.  Please verify your pharmacy hours or chose a 24 hour pharmacy to avoid not having access to your prescription because your pharmacy has closed for the day.    If you are staying overnight following surgery, you will be transported to your hospital room following the recovery period.  [facility] has all private rooms.    If you have any questions please call Pre-Admission Testing at [phone].  Deductibles and co-payments are collected on the day of service. Please be prepared to pay the required co-pay, deductible or deposit on the day of service as defined by your plan.    Call your surgeon immediately if you experience any of the following symptoms:  Sore Throat  Shortness of Breath or difficulty breathing  Cough  Chills  Body soreness or muscle pain  Headache  Fever  New loss of taste or smell  Do not arrive for your surgery ill.  Your procedure will need to be rescheduled to another time.  You will need to call your physician before the day of surgery to avoid any unnecessary exposure to hospital staff as well as other patients.      CHLORHEXIDINE CLOTH INSTRUCTIONS  The morning of surgery follow these instructions using the Chlorhexidine cloths you've been given.  These steps reduce bacteria on the body.  Do not use the cloths near your eyes, ears mouth, genitalia or on open wounds.  Throw the cloths away after use but do not try to flush them down a toilet.      Open and remove one cloth at a time from the package.    Leave the cloth unfolded and begin the bathing.  Massage the skin with the  cloths using gentle pressure to remove bacteria.  Do not scrub harshly.   Follow the steps below with one 2% CHG cloth per area (6 total cloths).  One cloth for neck, shoulders and chest.  One cloth for both arms, hands, fingers and underarms (do underarms last).  One cloth for the abdomen followed by groin.  One cloth for right leg and foot including between the toes.  One cloth for left leg and foot including between the toes.  The last cloth is to be used for the back of the neck, back and buttocks.    Allow the CHG to air dry 3 minutes on the skin which will give it time to work and decrease the chance of irritation.  The skin may feel sticky until it is dry.  Do not rinse with water or any other liquid or you will lose the beneficial effects of the CHG.  If mild skin irritation occurs, do rinse the skin to remove the CHG.  Report this to the nurse at time of admission.  Do not apply lotions, creams, ointments, deodorants or perfumes after using the clothes. Dress in clean clothes before coming to the hospital.

## 2024-12-09 DIAGNOSIS — M54.50 CHRONIC BILATERAL LOW BACK PAIN, UNSPECIFIED WHETHER SCIATICA PRESENT: ICD-10-CM

## 2024-12-09 DIAGNOSIS — M17.11 PRIMARY OSTEOARTHRITIS OF RIGHT KNEE: ICD-10-CM

## 2024-12-09 DIAGNOSIS — M17.11 ARTHRITIS OF RIGHT KNEE: ICD-10-CM

## 2024-12-09 DIAGNOSIS — M54.41 CHRONIC LOW BACK PAIN WITH BILATERAL SCIATICA, UNSPECIFIED BACK PAIN LATERALITY: ICD-10-CM

## 2024-12-09 DIAGNOSIS — M25.561 CHRONIC PAIN OF RIGHT KNEE: ICD-10-CM

## 2024-12-09 DIAGNOSIS — M54.42 CHRONIC LOW BACK PAIN WITH BILATERAL SCIATICA, UNSPECIFIED BACK PAIN LATERALITY: ICD-10-CM

## 2024-12-09 DIAGNOSIS — G89.29 CHRONIC BILATERAL LOW BACK PAIN, UNSPECIFIED WHETHER SCIATICA PRESENT: ICD-10-CM

## 2024-12-09 DIAGNOSIS — G03.9 ADHESIVE ARACHNOIDITIS: ICD-10-CM

## 2024-12-09 DIAGNOSIS — G89.29 CHRONIC LOW BACK PAIN WITH BILATERAL SCIATICA, UNSPECIFIED BACK PAIN LATERALITY: ICD-10-CM

## 2024-12-09 DIAGNOSIS — G89.29 CHRONIC PAIN OF RIGHT KNEE: ICD-10-CM

## 2024-12-09 DIAGNOSIS — R51.9 WORSENING HEADACHES: ICD-10-CM

## 2024-12-09 DIAGNOSIS — M51.369 LUMBAR DEGENERATIVE DISC DISEASE: ICD-10-CM

## 2024-12-09 DIAGNOSIS — I10 HYPERTENSION, ESSENTIAL: ICD-10-CM

## 2024-12-10 ENCOUNTER — OFFICE VISIT (OUTPATIENT)
Dept: INTERNAL MEDICINE | Age: 64
End: 2024-12-10
Payer: COMMERCIAL

## 2024-12-10 VITALS
WEIGHT: 212.4 LBS | DIASTOLIC BLOOD PRESSURE: 80 MMHG | TEMPERATURE: 98.9 F | BODY MASS INDEX: 37.63 KG/M2 | SYSTOLIC BLOOD PRESSURE: 130 MMHG | OXYGEN SATURATION: 96 % | HEART RATE: 80 BPM | HEIGHT: 63 IN

## 2024-12-10 DIAGNOSIS — J02.9 SORE THROAT: Primary | ICD-10-CM

## 2024-12-10 DIAGNOSIS — B37.9 CANDIDIASIS: ICD-10-CM

## 2024-12-10 DIAGNOSIS — J01.00 ACUTE NON-RECURRENT MAXILLARY SINUSITIS: ICD-10-CM

## 2024-12-10 LAB
EXPIRATION DATE: NORMAL
EXPIRATION DATE: NORMAL
FLUAV AG UPPER RESP QL IA.RAPID: NOT DETECTED
FLUBV AG UPPER RESP QL IA.RAPID: NOT DETECTED
INTERNAL CONTROL: NORMAL
INTERNAL CONTROL: NORMAL
Lab: NORMAL
Lab: NORMAL
S PYO AG THROAT QL: NEGATIVE
SARS-COV-2 AG UPPER RESP QL IA.RAPID: NOT DETECTED

## 2024-12-10 PROCEDURE — 99213 OFFICE O/P EST LOW 20 MIN: CPT | Performed by: NURSE PRACTITIONER

## 2024-12-10 PROCEDURE — 87428 SARSCOV & INF VIR A&B AG IA: CPT | Performed by: NURSE PRACTITIONER

## 2024-12-10 PROCEDURE — 87880 STREP A ASSAY W/OPTIC: CPT | Performed by: NURSE PRACTITIONER

## 2024-12-10 RX ORDER — VALSARTAN AND HYDROCHLOROTHIAZIDE 80; 12.5 MG/1; MG/1
1 TABLET, FILM COATED ORAL EVERY EVENING
Qty: 90 TABLET | Refills: 3 | Status: ON HOLD | OUTPATIENT
Start: 2024-12-10

## 2024-12-10 RX ORDER — FLUCONAZOLE 100 MG/1
TABLET ORAL
Qty: 2 TABLET | Refills: 0 | Status: ON HOLD | OUTPATIENT
Start: 2024-12-10

## 2024-12-10 RX ORDER — CHLORCYCLIZINE HYDROCHLORIDE AND PSEUDOEPHEDRINE HYDROCHLORIDE 25; 60 MG/1; MG/1
1 TABLET ORAL EVERY 12 HOURS
Qty: 30 TABLET | Refills: 0 | Status: ON HOLD | OUTPATIENT
Start: 2024-12-10

## 2024-12-10 NOTE — PROGRESS NOTES
"Chief Complaint  Sore Throat (Pt reports having sore throat and congestion for about 5 days . Pt says she suppose to get a full knee replacement on Monday)      Subjective      Kamini Orta is a 63 y.o. female who presents to [facility]     History of Present Illness  The patient presents for evaluation of sore throat, cough, and congestion.    She began experiencing symptoms approximately 5 nights ago., following a visit to her mother in a nursing home in [place]. Her mother had exhibited symptoms of illness during their visit. She initially experienced a tingling sensation in her nose, which has since escalated. She reports no fever or shortness of breath but notes a loss of appetite. She also reports swollen glands in her throat. She is not diabetic. She is scheduled for a preoperative test on Thursday. She has been self-medicating with zinc (3 doses), 1000 mg of vitamin C, and a probiotic with vitamin C. She is currently taking Zyrtec and has previously used Flonase nasal spray.  She request Diflucan due to frequent yeast infections with any antibiotics.    She is scheduled for a knee replacement surgery.      SOCIAL HISTORY  She quit smoking 13 years ago.    ALLERGIES  She is allergic to ERYTHROMYCIN.    MEDICATIONS  Current: Zyrtec, Flonase         Objective   Vital Signs:   Vitals:    12/10/24 1301   BP: 130/80   BP Location: Left arm   Patient Position: Sitting   Cuff Size: Adult   Pulse: 80   Temp: 98.9 °F (37.2 °C)   TempSrc: Skin   SpO2: 96%   Weight: 96.3 kg (212 lb 6.4 oz)   Height: 158.8 cm (62.52\")     Body mass index is 38.2 kg/m².    Wt Readings from Last 3 Encounters:   12/10/24 96.3 kg (212 lb 6.4 oz)   12/02/24 95.5 kg (210 lb 8 oz)   09/17/24 92.1 kg (203 lb)     BP Readings from Last 3 Encounters:   12/10/24 130/80   12/02/24 119/60   09/17/24 119/76       Health Maintenance   Topic Date Due    TDAP/TD VACCINES (1 - Tdap) Never done    ZOSTER VACCINE " "(1 of 2) 2011    LUNG CANCER SCREENING  Never done    PAP SMEAR  Never done    COVID-19 Vaccine ( season) Never done    LIPID PANEL  2025    ANNUAL PHYSICAL  2025    BMI FOLLOWUP  2025    MAMMOGRAM  2026    COLORECTAL CANCER SCREENING  2027    HEPATITIS C SCREENING  Completed    INFLUENZA VACCINE  Completed    Pneumococcal Vaccine 0-64  Aged Out       Past Medical History:   Diagnosis Date    Anxiety and depression     Arachnoiditis     PT STATES DISABLED FOR MANY YEARS    Arthritis of back hard to pinpoint    GERD (gastroesophageal reflux disease)     Hip arthrosis     History of transfusion     no reaction   age 15    Hyperlipidemia     Hypertension     Lumbosacral disc disease this last time,     OA (osteoarthritis) of knee     LEFT:  PAIN, WEAKNESS, LIMITED MOBILITY    PONV (postoperative nausea and vomiting)     Scoliosis     Spondylolisthesis     Tear of meniscus of knee     Thyroid disease     Vertigo     STATES CURRENTLY GOING TO PT FOR THIS     Past Surgical History:   Procedure Laterality Date     SECTION      ALSO 1987    CHOLECYSTECTOMY      COLONOSCOPY N/A 2022    Procedure: COLONOSCOPY;  Surgeon: Peter Tejada MD;  Location: [facility] END[facility];  Service: Gastroenterology;  Laterality: N/A;  HEMMORHOIDS    ESOPHAGEAL DILATATION      HYSTERECTOMY      menorrhagia    JOINT REPLACEMENT  24    right knee    KNEE ARTHROSCOPY Right     \"CLEAN UP\"    KNEE ARTHROSCOPY W/ MENISCAL REPAIR Right     LASIK Bilateral     TONSILLECTOMY      TOTAL KNEE ARTHROPLASTY Right 2023    Procedure: RIGHT TOTAL KNEE ARTHROPLASTY WITH SHANTELLE NAVIGATION;  Surgeon: Fabian Moore MD;  Location: [facility];  Service: Orthopedics;  Laterality: Right;    UPPER GASTROINTESTINAL ENDOSCOPY       Family History   Problem Relation Age of Onset    Cancer Father         Mesothelioma    Colon polyps Father     Cancer " Brother         Prostate    Cancer Brother         Colon & Liver    Colon cancer Brother 64    Scoliosis Sister     Heart disease Other     Lung disease Other     Diabetes Maternal Grandmother     Malig Hyperthermia Neg Hx     Breast cancer Neg Hx     Ovarian cancer Neg Hx     Uterine cancer Neg Hx     Melanoma Neg Hx     Prostate cancer Neg Hx     Deep vein thrombosis Neg Hx     Pulmonary embolism Neg Hx     Coronary artery disease Neg Hx      Social History     Socioeconomic History    Marital status:    Tobacco Use    Smoking status: Former     Current packs/day: 0.00     Average packs/day: 2.0 packs/day for 13.1 years (26.2 ttl pk-yrs)     Types: Cigarettes     Start date: 1999     Quit date: 2012     Years since quittin.8     Passive exposure: Past    Smokeless tobacco: Never   Vaping Use    Vaping status: Former    Start date: 2013    Quit date: 2012    Substances: Nicotine, stepped down gradually to no nicotine    Devices: Pre-filled or refillable cartridge   Substance and Sexual Activity    Alcohol use: Not Currently    Drug use: Yes     Frequency: 7.0 times per week     Types: Marijuana     Comment: To help control nerve pain    Sexual activity: Not Currently     Birth control/protection: Hysterectomy     Comment: Full hysterectomy        Current Outpatient Medications   Medication Instructions    ALPRAZolam (XANAX) 0.5 mg, Oral, 2 Times Daily    amoxicillin-clavulanate (AUGMENTIN) 875-125 MG per tablet 1 tablet, Oral, 2 Times Daily    baclofen (LIORESAL) 10 mg, Oral, 2 Times Daily    Chlorcyclizine-Pseudoephed (Stahist AD) 25-60 MG tablet 1 tablet, Oral, Every 12 Hours    Coenzyme Q10 (CO Q 10 PO) 1 tablet, After Lunch    diclofenac (VOLTAREN) 50 mg, Oral, 2 Times Daily    estradiol (Vivelle-Dot) 0.05 MG/24HR patch 1 patch, Transdermal, 2 Times Weekly    famotidine (PEPCID) 10 mg, Nightly    fluconazole (Diflucan) 100 MG tablet Take one tablet now and repeat in 3 days     FLUoxetine (PROZAC) 40 mg, Oral, Daily    fluticasone (FLONASE) 50 MCG/ACT nasal spray 2 sprays, Each Nare, Daily    HYDROcodone-acetaminophen (NORCO) 5-325 MG per tablet 1 tablet, Oral, Every 12 Hours PRN    levothyroxine (SYNTHROID, LEVOTHROID) 75 mcg, Oral, Daily    Melatonin 10 MG sublingual tablet 1 tablet, Nightly    Multiple Vitamins-Minerals (ONE A DAY WOMEN 50 PLUS PO) 1 tablet, Daily    pantoprazole (PROTONIX) 40 mg, Oral, Daily    Probiotic Product (PROBIOTIC ADVANCED PO) After Lunch    rosuvastatin (CRESTOR) 10 mg, Oral, Daily    Sodium Hyaluronate, oral, (HYALURONIC ACID PO) 250 mg, After Lunch    valsartan-hydrochlorothiazide (DIOVAN-HCT) 80-12.5 MG per tablet 1 tablet, Oral, Every Evening       Medications Discontinued During This Encounter   Medication Reason    cetirizine (zyrTEC) 10 MG tablet Historical Med - Therapy completed        Physical Exam  Constitutional:       Appearance: Normal appearance.   HENT:      Head: Normocephalic.      Ears:      Comments: Bilateral tympanic membranes dull     Nose: Nose normal.      Comments: Enlarged turbinates bilateral mild erythematous pharynx without extra or edema     Mouth/Throat:      Mouth: Mucous membranes are moist.   Eyes:      Conjunctiva/sclera: Conjunctivae normal.      Pupils: Pupils are equal, round, and reactive to light.   Cardiovascular:      Rate and Rhythm: Normal rate and regular rhythm.   Pulmonary:      Effort: Pulmonary effort is normal.      Breath sounds: Normal breath sounds.   Abdominal:      General: Bowel sounds are normal.      Palpations: Abdomen is soft.   Musculoskeletal:         General: Normal range of motion.      Cervical back: Normal range of motion.   Skin:     General: Skin is warm and dry.   Neurological:      General: No focal deficit present.      Mental Status: She is alert and oriented to person, place, and time.   Psychiatric:         Mood and Affect: Mood normal.         Behavior: Behavior normal.          Thought Content: Thought content normal.          Physical Exam         Result Review :  The following data was reviewed by: CAROLINE Palomino on 12/10/2024:    Labs  Pre-Admission Testing on 12/02/2024   Component Date Value Ref Range Status    Glucose 12/02/2024 125 (H)  65 - 99 mg/dL Final    BUN 12/02/2024 14  8 - 23 mg/dL Final    Creatinine 12/02/2024 0.95  0.57 - 1.00 mg/dL Final    Sodium 12/02/2024 139  136 - 145 mmol/L Final    Potassium 12/02/2024 3.6  3.5 - 5.2 mmol/L Final    Chloride 12/02/2024 102  98 - 107 mmol/L Final    CO2 12/02/2024 27.0  22.0 - 29.0 mmol/L Final    Calcium 12/02/2024 8.8  8.6 - 10.5 mg/dL Final    BUN/Creatinine Ratio 12/02/2024 14.7  7.0 - 25.0 Final    Anion Gap 12/02/2024 10.0  5.0 - 15.0 mmol/L Final    eGFR 12/02/2024 67.9  >60.0 mL/min/1.73 Final    WBC 12/02/2024 6.58  3.40 - 10.80 10*3/mm3 Final    RBC 12/02/2024 4.17  3.77 - 5.28 10*6/mm3 Final    Hemoglobin 12/02/2024 12.2  12.0 - 15.9 g/dL Final    Hematocrit 12/02/2024 36.8  34.0 - 46.6 % Final    MCV 12/02/2024 88.2  79.0 - 97.0 fL Final    MCH 12/02/2024 29.3  26.6 - 33.0 pg Final    MCHC 12/02/2024 33.2  31.5 - 35.7 g/dL Final    RDW 12/02/2024 13.5  12.3 - 15.4 % Final    RDW-SD 12/02/2024 43.6  37.0 - 54.0 fl Final    MPV 12/02/2024 9.7  6.0 - 12.0 fL Final    Platelets 12/02/2024 263  140 - 450 10*3/mm3 Final    QT Interval 12/02/2024 401  ms Final    QTC Interval 12/02/2024 414  ms Final       Imaging  Mammo Screening Digital Tomosynthesis Bilateral With CAD    Result Date: 11/20/2024  No mammographic evidence of malignancy.  Recommend annual screening mammography.  BI-RADS ASSESSMENT: BI-RADS 2. Benign findings.  Note:  It has been reported that there is approximately a 15% false negative rate in mammography.  Therefore, management of a palpable abnormality should not be deferred because of a negative mammogram.  Electronically Signed By-BIBIANA PAULINO MD On:11/20/2024 6:16 PM         Results  Laboratory Studies  All tests conducted so far are negative.       Procedures       ASSESSMENT & PLAN  Diagnoses and all orders for this visit:    1. Sore throat (Primary)  -     POCT SARS-CoV-2 Antigen LINK + Flu  -     POCT rapid strep A  -     Chlorcyclizine-Pseudoephed (Stahist AD) 25-60 MG tablet; Take 1 tablet by mouth Every 12 (Twelve) Hours.  Dispense: 30 tablet; Refill: 0  -     amoxicillin-clavulanate (AUGMENTIN) 875-125 MG per tablet; Take 1 tablet by mouth 2 (Two) Times a Day.  Dispense: 20 tablet; Refill: 0    2. Candidiasis  -     fluconazole (Diflucan) 100 MG tablet; Take one tablet now and repeat in 3 days  Dispense: 2 tablet; Refill: 0    3. Acute non-recurrent maxillary sinusitis  -     Chlorcyclizine-Pseudoephed (Stahist AD) 25-60 MG tablet; Take 1 tablet by mouth Every 12 (Twelve) Hours.  Dispense: 30 tablet; Refill: 0  -     amoxicillin-clavulanate (AUGMENTIN) 875-125 MG per tablet; Take 1 tablet by mouth 2 (Two) Times a Day.  Dispense: 20 tablet; Refill: 0         Assessment & Plan  1. Upper respiratory infection.  Symptoms include sore throat, cough, congestion, and swollen glands in the throat. The condition appears to be more related to the upper airway than the lower, suggesting a sinus-related issue. All tests conducted so far are negative, indicating a possible viral etiology. An antibiotic regimen will be initiated to cover all bases due to an upcoming knee replacement surgery. She is advised to perform warm salt gargles, replace her toothbrush, and ensure adequate rest. She should continue her daily intake of vitamin C and probiotics. A prescription for Diflucan 150 mg has been provided, with instructions to take one tablet at the onset of the antibiotic course and another after 3 days. Over-the-counter medications such as Monistat can be used as needed. She is also advised to continue her Zyrtec regimen and use Flonase nasal spray. It is recommended to avoid all  respiratory irritants and maintain high fluid intake. If there is no improvement in her condition, she should return for further evaluation.    2. Upcoming knee replacement surgery.  She is scheduled for another preoperative test on Thursday. It is crucial to manage her current symptoms to avoid any interference with the surgery.                  FOLLOW UP  Return if symptoms worsen or fail to improve.  Patient was given instructions and counseling regarding her condition or for health maintenance advice. Please see specific information pulled into the AVS if appropriate.     Patient or patient representative verbalized consent for the use of Ambient Listening during the visit with  CAROLINE Palomino for chart documentation. 12/10/2024  13:28 EST    CAROLINE Palomino  12/10/24  13:29 EST

## 2024-12-11 ENCOUNTER — TELEPHONE (OUTPATIENT)
Dept: ORTHOPEDIC SURGERY | Facility: CLINIC | Age: 64
End: 2024-12-11
Payer: COMMERCIAL

## 2024-12-11 NOTE — TELEPHONE ENCOUNTER
Patient is scheduled for SX 12/16/24, called to relay that she has been sick for about a week w/ cold symptoms. She saw PCP yesterday, 12/10/24, and diagnosed w/ viral infection. Symptoms have significantly improved, but she still has some congestion. No fever. Patient is scheduled for H&P tomorrow, 12/12/24.

## 2024-12-11 NOTE — TELEPHONE ENCOUNTER
Spoke to patient and relayed message from Dr. Wooten. Patient confirmed only mild congestion at this point, will proceed as planned.

## 2024-12-12 ENCOUNTER — OFFICE VISIT (OUTPATIENT)
Dept: ORTHOPEDIC SURGERY | Facility: CLINIC | Age: 64
End: 2024-12-12
Payer: COMMERCIAL

## 2024-12-12 VITALS
SYSTOLIC BLOOD PRESSURE: 131 MMHG | WEIGHT: 211.1 LBS | BODY MASS INDEX: 37.4 KG/M2 | DIASTOLIC BLOOD PRESSURE: 81 MMHG | HEIGHT: 63 IN | TEMPERATURE: 96.6 F

## 2024-12-12 DIAGNOSIS — R52 PAIN: Primary | ICD-10-CM

## 2024-12-12 NOTE — H&P
Patient: Kamini Orta    Date of Admission: 1/16/2024    YOB: 1960    Medical Record Number: [id number]    Admitting Physician: Dr. Bulmaro Wooten    Reason for Admission: End Stage Left Knee OA    History of Present Illness: 63 y.o. female presents with severe end stage knee osteoarthritis which has not been responsive to the full compliment of conservative measures. Despite conservative attempts, there is still severe, constant activity-limiting pain. Given the severity of the pain, the patient has elected to proceed with knee replacement.    Allergies:   Allergies   Allergen Reactions    Codeine Anaphylaxis     Chest pains    Erythromycin Diarrhea     Cramps    Morphine Other (See Comments) and Unknown - High Severity     Chest pain            Current Medications:  Home Medications:    Current Outpatient Medications on File Prior to Visit   Medication Sig    ALPRAZolam (XANAX) 0.5 MG tablet Take 1 tablet by mouth 2 (Two) Times a Day. (Patient taking differently: Take 2 tablets by mouth Every Night.)    amoxicillin-clavulanate (AUGMENTIN) 875-125 MG per tablet Take 1 tablet by mouth 2 (Two) Times a Day.    baclofen (LIORESAL) 10 MG tablet Take 1 tablet by mouth 2 (Two) Times a Day. (Patient taking differently: Take 1 tablet by mouth 2 (Two) Times a Day As Needed.)    Chlorcyclizine-Pseudoephed (Stahist AD) 25-60 MG tablet Take 1 tablet by mouth Every 12 (Twelve) Hours.    estradiol (Vivelle-Dot) 0.05 MG/24HR patch Place 1 patch on the skin as directed by provider 2 (Two) Times a Week.    famotidine (PEPCID) 10 MG tablet Take 1 tablet by mouth Every Night.    fluconazole (Diflucan) 100 MG tablet Take one tablet now and repeat in 3 days    FLUoxetine (PROzac) 40 MG capsule TAKE ONE CAPSULE BY MOUTH EVERY DAY    fluticasone (FLONASE) 50 MCG/ACT nasal spray 2 sprays by Each Nare route Daily. (Patient taking differently: 2 sprays by Each Nare route 2 (Two) Times a Day.)    levothyroxine (SYNTHROID,  LEVOTHROID) 75 MCG tablet TAKE ONE TABLET BY MOUTH DAILY    Melatonin 10 MG sublingual tablet Take 1 tablet by mouth Every Night.    pantoprazole (PROTONIX) 40 MG EC tablet Take 1 tablet by mouth Daily.    Probiotic Product (PROBIOTIC ADVANCED PO) Take  by mouth After Lunch.    rosuvastatin (CRESTOR) 10 MG tablet Take 1 tablet by mouth Daily. (Patient taking differently: Take 1 tablet by mouth Every Night.)    Sodium Hyaluronate, oral, (HYALURONIC ACID PO) Take 250 mg by mouth After Lunch. HOLD FOR SURGERY ONE WEEK    Coenzyme Q10 (CO Q 10 PO) Take 1 tablet by mouth After Lunch. HOLD ONE WEEK PRIOR TO SURGERY (Patient not taking: Reported on 12/12/2024)    diclofenac (VOLTAREN) 50 MG EC tablet Take 1 tablet by mouth 2 (Two) Times a Day. (Patient not taking: Reported on 12/12/2024)    HYDROcodone-acetaminophen (NORCO) 5-325 MG per tablet Take 1 tablet by mouth Every 12 (Twelve) Hours As Needed for Moderate Pain. (Patient not taking: Reported on 12/12/2024)    Multiple Vitamins-Minerals (ONE A DAY WOMEN 50 PLUS PO) Take 1 tablet by mouth Daily. HOLD ONE WEEK PRIOR SURGERY (Patient not taking: Reported on 12/12/2024)    valsartan-hydrochlorothiazide (DIOVAN-HCT) 80-12.5 MG per tablet Take 1 tablet by mouth Every Evening. (Patient not taking: Reported on 12/12/2024)     No current facility-administered medications on file prior to visit.     PRN Meds:.    PMH:     Past Medical History:   Diagnosis Date    Ankle sprain 40 years ago    Anxiety and depression     Arachnoiditis     PT STATES DISABLED FOR MANY YEARS    Arthritis of back hard to pinpoint    Cervical disc disorder     CTS (carpal tunnel syndrome) 2002    GERD (gastroesophageal reflux disease)     Hip arthrosis 2021    History of transfusion     no reaction   age 15    Hyperlipidemia     Hypertension     Low back strain years    Have ARACHNOIDITIS    Lumbosacral disc disease this last time, 2021    OA (osteoarthritis) of knee     LEFT:  PAIN, WEAKNESS, LIMITED  "MOBILITY    PONV (postoperative nausea and vomiting)     Scoliosis 1986    Spondylolisthesis     Tear of meniscus of knee     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        Thyroid disease     Vertigo     STATES CURRENTLY GOING TO PT FOR THIS       PF/Surg/Soc Hx:     Past Surgical History:   Procedure Laterality Date     SECTION  1986    ALSO 1987    CHOLECYSTECTOMY  1986    COLONOSCOPY N/A 2022    Procedure: COLONOSCOPY;  Surgeon: Peter Tejada MD;  Location:  [facility];  Service: Gastroenterology;  Laterality: N/A;  HEMMORHOIDS    ESOPHAGEAL DILATATION      HYSTERECTOMY      menorrhagia    JOINT REPLACEMENT  23    right knee    KNEE ARTHROSCOPY Right     \"CLEAN UP\"    KNEE ARTHROSCOPY W/ MENISCAL REPAIR Right     LASIK Bilateral     TONSILLECTOMY      TOTAL KNEE ARTHROPLASTY Right 2023    Procedure: RIGHT TOTAL KNEE ARTHROPLASTY WITH SHANTELLE NAVIGATION;  Surgeon: Fabian Moore MD;  Location:  [facility] [place];  Service: Orthopedics;  Laterality: Right;    TRIGGER POINT INJECTION  CRESENCIO's for back from     WILL NOT AGAIN    UPPER GASTROINTESTINAL ENDOSCOPY          Social History     Occupational History    Not on file   Tobacco Use    Smoking status: Former     Current packs/day: 0.00     Average packs/day: 2.0 packs/day for 13.1 years (26.2 ttl pk-yrs)     Types: Cigarettes     Start date: 1999     Quit date: 2012     Years since quittin.8     Passive exposure: Past    Smokeless tobacco: Never   Vaping Use    Vaping status: Former    Start date: 2013    Quit date: 2012    Substances: Nicotine, stepped down gradually to no nicotine    Devices: Pre-filled or refillable cartridge   Substance and Sexual Activity    Alcohol use: Not Currently    Drug use: Yes     Frequency: 7.0 times per week     Types: Marijuana     Comment: To help control nerve pain    Sexual activity: Not Currently     Birth control/protection: Hysterectomy     " "Comment: Full hysterectomy 1995      Social History     Social History Narrative    Not on file        Family History   Problem Relation Age of Onset    Cancer Father         Mesothelioma    Colon polyps Father     Cancer Brother         Prostate    Cancer Brother         Colon & Liver    Colon cancer Brother 64    Scoliosis Sister     Heart disease Other     Lung disease Other     Diabetes Maternal Grandmother     Malig Hyperthermia Neg Hx     Breast cancer Neg Hx     Ovarian cancer Neg Hx     Uterine cancer Neg Hx     Melanoma Neg Hx     Prostate cancer Neg Hx     Deep vein thrombosis Neg Hx     Pulmonary embolism Neg Hx     Coronary artery disease Neg Hx          Review of Systems:   A 14 point review of systems was performed, pertinent positives discussed above, all other systems are negative    Physical Exam: 63 y.o. female  Vital Signs :   Vitals:    12/12/24 1402   BP: 131/81   Temp: 96.6 °F (35.9 °C)   Weight: 95.8 kg (211 lb 1.6 oz)   Height: 160 cm (63\")   PainSc:   7   PainLoc: Knee     General: Alert and Oriented x 3, No acute distress.  Psych: mood and affect appropriate; recent and remote memory intact  Eyes: conjunctivae clear; pupils equally round and reactive, sclerae anicteric  CV: RRR  Resp: normal respiratory effort  Skin: no rashes or wounds; normal turgor      Xrays:  -3 views (AP, lateral, and sunrise) were ordered and reviewed demonstrating end-stage OA with bone on bone articulation.  -A full length AP xray was ordered and reviewed today for purposes of operative alignment demonstrating end stage arthritic findings. There are no previous full length films for review    Assessment:  End-stage Left knee osteoarthritis. Conservative measures have failed.      Plan:  The plan is to proceed with Left Total Knee Replacement. The patient voiced understanding of the risks, benefits, and alternative forms of treatment that were discussed with Dr Wooten at the time of scheduling.  23-hour home " health    Mabel Escalante, APRN  12/12/2024

## 2024-12-12 NOTE — H&P (VIEW-ONLY)
Patient: Kamini Orta    Date of Admission: 1/16/2024    YOB: 1960    Medical Record Number: [id number]    Admitting Physician: Dr. Bulmaro Wooten    Reason for Admission: End Stage Left Knee OA    History of Present Illness: 63 y.o. female presents with severe end stage knee osteoarthritis which has not been responsive to the full compliment of conservative measures. Despite conservative attempts, there is still severe, constant activity-limiting pain. Given the severity of the pain, the patient has elected to proceed with knee replacement.    Allergies:   Allergies   Allergen Reactions    Codeine Anaphylaxis     Chest pains    Erythromycin Diarrhea     Cramps    Morphine Other (See Comments) and Unknown - High Severity     Chest pain            Current Medications:  Home Medications:    Current Outpatient Medications on File Prior to Visit   Medication Sig    ALPRAZolam (XANAX) 0.5 MG tablet Take 1 tablet by mouth 2 (Two) Times a Day. (Patient taking differently: Take 2 tablets by mouth Every Night.)    amoxicillin-clavulanate (AUGMENTIN) 875-125 MG per tablet Take 1 tablet by mouth 2 (Two) Times a Day.    baclofen (LIORESAL) 10 MG tablet Take 1 tablet by mouth 2 (Two) Times a Day. (Patient taking differently: Take 1 tablet by mouth 2 (Two) Times a Day As Needed.)    Chlorcyclizine-Pseudoephed (Stahist AD) 25-60 MG tablet Take 1 tablet by mouth Every 12 (Twelve) Hours.    estradiol (Vivelle-Dot) 0.05 MG/24HR patch Place 1 patch on the skin as directed by provider 2 (Two) Times a Week.    famotidine (PEPCID) 10 MG tablet Take 1 tablet by mouth Every Night.    fluconazole (Diflucan) 100 MG tablet Take one tablet now and repeat in 3 days    FLUoxetine (PROzac) 40 MG capsule TAKE ONE CAPSULE BY MOUTH EVERY DAY    fluticasone (FLONASE) 50 MCG/ACT nasal spray 2 sprays by Each Nare route Daily. (Patient taking differently: 2 sprays by Each Nare route 2 (Two) Times a Day.)    levothyroxine (SYNTHROID,  LEVOTHROID) 75 MCG tablet TAKE ONE TABLET BY MOUTH DAILY    Melatonin 10 MG sublingual tablet Take 1 tablet by mouth Every Night.    pantoprazole (PROTONIX) 40 MG EC tablet Take 1 tablet by mouth Daily.    Probiotic Product (PROBIOTIC ADVANCED PO) Take  by mouth After Lunch.    rosuvastatin (CRESTOR) 10 MG tablet Take 1 tablet by mouth Daily. (Patient taking differently: Take 1 tablet by mouth Every Night.)    Sodium Hyaluronate, oral, (HYALURONIC ACID PO) Take 250 mg by mouth After Lunch. HOLD FOR SURGERY ONE WEEK    Coenzyme Q10 (CO Q 10 PO) Take 1 tablet by mouth After Lunch. HOLD ONE WEEK PRIOR TO SURGERY (Patient not taking: Reported on 12/12/2024)    diclofenac (VOLTAREN) 50 MG EC tablet Take 1 tablet by mouth 2 (Two) Times a Day. (Patient not taking: Reported on 12/12/2024)    HYDROcodone-acetaminophen (NORCO) 5-325 MG per tablet Take 1 tablet by mouth Every 12 (Twelve) Hours As Needed for Moderate Pain. (Patient not taking: Reported on 12/12/2024)    Multiple Vitamins-Minerals (ONE A DAY WOMEN 50 PLUS PO) Take 1 tablet by mouth Daily. HOLD ONE WEEK PRIOR SURGERY (Patient not taking: Reported on 12/12/2024)    valsartan-hydrochlorothiazide (DIOVAN-HCT) 80-12.5 MG per tablet Take 1 tablet by mouth Every Evening. (Patient not taking: Reported on 12/12/2024)     No current facility-administered medications on file prior to visit.     PRN Meds:.    PMH:     Past Medical History:   Diagnosis Date    Ankle sprain 40 years ago    Anxiety and depression     Arachnoiditis     PT STATES DISABLED FOR MANY YEARS    Arthritis of back hard to pinpoint    Cervical disc disorder     CTS (carpal tunnel syndrome) 2002    GERD (gastroesophageal reflux disease)     Hip arthrosis 2021    History of transfusion     no reaction   age 15    Hyperlipidemia     Hypertension     Low back strain years    Have ARACHNOIDITIS    Lumbosacral disc disease this last time, 2021    OA (osteoarthritis) of knee     LEFT:  PAIN, WEAKNESS, LIMITED  "MOBILITY    PONV (postoperative nausea and vomiting)     Scoliosis 1986    Spondylolisthesis     Tear of meniscus of knee     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        Thyroid disease     Vertigo     STATES CURRENTLY GOING TO PT FOR THIS       PF/Surg/Soc Hx:     Past Surgical History:   Procedure Laterality Date     SECTION  1986    ALSO 1987    CHOLECYSTECTOMY  1986    COLONOSCOPY N/A 2022    Procedure: COLONOSCOPY;  Surgeon: Peter Tejada MD;  Location:  [facility];  Service: Gastroenterology;  Laterality: N/A;  HEMMORHOIDS    ESOPHAGEAL DILATATION      HYSTERECTOMY      menorrhagia    JOINT REPLACEMENT  23    right knee    KNEE ARTHROSCOPY Right     \"CLEAN UP\"    KNEE ARTHROSCOPY W/ MENISCAL REPAIR Right     LASIK Bilateral     TONSILLECTOMY      TOTAL KNEE ARTHROPLASTY Right 2023    Procedure: RIGHT TOTAL KNEE ARTHROPLASTY WITH SHANTELLE NAVIGATION;  Surgeon: Fabian Moore MD;  [place]  [facility] [place];  Service: Orthopedics;  Laterality: Right;    TRIGGER POINT INJECTION  CRESENCIO's for back from     WILL NOT AGAIN    UPPER GASTROINTESTINAL ENDOSCOPY          Social History     Occupational History    Not on file   Tobacco Use    Smoking status: Former     Current packs/day: 0.00     Average packs/day: 2.0 packs/day for 13.1 years (26.2 ttl pk-yrs)     Types: Cigarettes     Start date: 1999     Quit date: 2012     Years since quittin.8     Passive exposure: Past    Smokeless tobacco: Never   Vaping Use    Vaping status: Former    Start date: 2013    Quit date: 2012    Substances: Nicotine, stepped down gradually to no nicotine    Devices: Pre-filled or refillable cartridge   Substance and Sexual Activity    Alcohol use: Not Currently    Drug use: Yes     Frequency: 7.0 times per week     Types: Marijuana     Comment: To help control nerve pain    Sexual activity: Not Currently     Birth control/protection: Hysterectomy     " "Comment: Full hysterectomy 1995      Social History     Social History Narrative    Not on file        Family History   Problem Relation Age of Onset    Cancer Father         Mesothelioma    Colon polyps Father     Cancer Brother         Prostate    Cancer Brother         Colon & Liver    Colon cancer Brother 64    Scoliosis Sister     Heart disease Other     Lung disease Other     Diabetes Maternal Grandmother     Malig Hyperthermia Neg Hx     Breast cancer Neg Hx     Ovarian cancer Neg Hx     Uterine cancer Neg Hx     Melanoma Neg Hx     Prostate cancer Neg Hx     Deep vein thrombosis Neg Hx     Pulmonary embolism Neg Hx     Coronary artery disease Neg Hx          Review of Systems:   A 14 point review of systems was performed, pertinent positives discussed above, all other systems are negative    Physical Exam: 63 y.o. female  Vital Signs :   Vitals:    12/12/24 1402   BP: 131/81   Temp: 96.6 °F (35.9 °C)   Weight: 95.8 kg (211 lb 1.6 oz)   Height: 160 cm (63\")   PainSc:   7   PainLoc: Knee     General: Alert and Oriented x 3, No acute distress.  Psych: mood and affect appropriate; recent and remote memory intact  Eyes: conjunctivae clear; pupils equally round and reactive, sclerae anicteric  CV: RRR  Resp: normal respiratory effort  Skin: no rashes or wounds; normal turgor      Xrays:  -3 views (AP, lateral, and sunrise) were ordered and reviewed demonstrating end-stage OA with bone on bone articulation.  -A full length AP xray was ordered and reviewed today for purposes of operative alignment demonstrating end stage arthritic findings. There are no previous full length films for review    Assessment:  End-stage Left knee osteoarthritis. Conservative measures have failed.      Plan:  The plan is to proceed with Left Total Knee Replacement. The patient voiced understanding of the risks, benefits, and alternative forms of treatment that were discussed with Dr Wooten at the time of scheduling.  23-hour home " health    Mabel Escalante, APRN  12/12/2024

## 2024-12-13 ENCOUNTER — TELEPHONE (OUTPATIENT)
Dept: ORTHOPEDIC SURGERY | Facility: CLINIC | Age: 64
End: 2024-12-13
Payer: COMMERCIAL

## 2024-12-16 ENCOUNTER — ANESTHESIA (OUTPATIENT)
Dept: PERIOP | Facility: HOSPITAL | Age: 64
End: 2024-12-16
Payer: COMMERCIAL

## 2024-12-16 ENCOUNTER — APPOINTMENT (OUTPATIENT)
Dept: GENERAL RADIOLOGY | Facility: HOSPITAL | Age: 64
End: 2024-12-16
Payer: COMMERCIAL

## 2024-12-16 ENCOUNTER — ANESTHESIA EVENT (OUTPATIENT)
Dept: PERIOP | Facility: HOSPITAL | Age: 64
End: 2024-12-16
Payer: COMMERCIAL

## 2024-12-16 ENCOUNTER — HOSPITAL ENCOUNTER (OUTPATIENT)
Facility: HOSPITAL | Age: 64
Discharge: HOME HEALTH | End: 2024-12-17
Attending: ORTHOPAEDIC SURGERY | Admitting: ORTHOPAEDIC SURGERY
Payer: COMMERCIAL

## 2024-12-16 DIAGNOSIS — M17.12 PRIMARY OSTEOARTHRITIS OF LEFT KNEE: ICD-10-CM

## 2024-12-16 DIAGNOSIS — Z98.890 S/P KNEE SURGERY: Primary | ICD-10-CM

## 2024-12-16 PROBLEM — M17.9 OA (OSTEOARTHRITIS) OF KNEE: Status: ACTIVE | Noted: 2024-12-16

## 2024-12-16 PROCEDURE — 25010000002 ONDANSETRON PER 1 MG: Performed by: NURSE ANESTHETIST, CERTIFIED REGISTERED

## 2024-12-16 PROCEDURE — 25010000002 HYDRALAZINE PER 20 MG: Performed by: NURSE ANESTHETIST, CERTIFIED REGISTERED

## 2024-12-16 PROCEDURE — 25010000003 BUPIVACAINE LIPOSOME 1.3 % SUSPENSION 20 ML VIAL: Performed by: ORTHOPAEDIC SURGERY

## 2024-12-16 PROCEDURE — 25010000002 CEFAZOLIN PER 500 MG: Performed by: NURSE PRACTITIONER

## 2024-12-16 PROCEDURE — 25010000002 ACETAMINOPHEN 10 MG/ML SOLUTION: Performed by: NURSE ANESTHETIST, CERTIFIED REGISTERED

## 2024-12-16 PROCEDURE — 25010000002 MIDAZOLAM PER 1 MG: Performed by: STUDENT IN AN ORGANIZED HEALTH CARE EDUCATION/TRAINING PROGRAM

## 2024-12-16 PROCEDURE — 25010000002 PROPOFOL 10 MG/ML EMULSION: Performed by: NURSE ANESTHETIST, CERTIFIED REGISTERED

## 2024-12-16 PROCEDURE — 25810000003 LACTATED RINGERS PER 1000 ML: Performed by: STUDENT IN AN ORGANIZED HEALTH CARE EDUCATION/TRAINING PROGRAM

## 2024-12-16 PROCEDURE — C1776 JOINT DEVICE (IMPLANTABLE): HCPCS | Performed by: ORTHOPAEDIC SURGERY

## 2024-12-16 PROCEDURE — 25010000002 HYDROMORPHONE PER 4 MG: Performed by: NURSE ANESTHETIST, CERTIFIED REGISTERED

## 2024-12-16 PROCEDURE — 27447 TOTAL KNEE ARTHROPLASTY: CPT | Performed by: NURSE PRACTITIONER

## 2024-12-16 PROCEDURE — 25010000002 VANCOMYCIN 10 G RECONSTITUTED SOLUTION: Performed by: NURSE PRACTITIONER

## 2024-12-16 PROCEDURE — 25010000002 FENTANYL CITRATE (PF) 50 MCG/ML SOLUTION: Performed by: NURSE ANESTHETIST, CERTIFIED REGISTERED

## 2024-12-16 PROCEDURE — 25010000002 FENTANYL CITRATE (PF) 50 MCG/ML SOLUTION: Performed by: STUDENT IN AN ORGANIZED HEALTH CARE EDUCATION/TRAINING PROGRAM

## 2024-12-16 PROCEDURE — 25010000002 DEXAMETHASONE SODIUM PHOSPHATE 20 MG/5ML SOLUTION: Performed by: STUDENT IN AN ORGANIZED HEALTH CARE EDUCATION/TRAINING PROGRAM

## 2024-12-16 PROCEDURE — 25010000002 ROPIVACAINE PER 1 MG: Performed by: STUDENT IN AN ORGANIZED HEALTH CARE EDUCATION/TRAINING PROGRAM

## 2024-12-16 PROCEDURE — C9290 INJ, BUPIVACAINE LIPOSOME: HCPCS | Performed by: ORTHOPAEDIC SURGERY

## 2024-12-16 PROCEDURE — 73560 X-RAY EXAM OF KNEE 1 OR 2: CPT

## 2024-12-16 PROCEDURE — 25810000003 SODIUM CHLORIDE 0.9 % SOLUTION: Performed by: NURSE PRACTITIONER

## 2024-12-16 PROCEDURE — 25010000002 LIDOCAINE PF 2% 2 % SOLUTION: Performed by: NURSE ANESTHETIST, CERTIFIED REGISTERED

## 2024-12-16 PROCEDURE — C1713 ANCHOR/SCREW BN/BN,TIS/BN: HCPCS | Performed by: ORTHOPAEDIC SURGERY

## 2024-12-16 PROCEDURE — 25010000002 SUGAMMADEX 200 MG/2ML SOLUTION: Performed by: NURSE ANESTHETIST, CERTIFIED REGISTERED

## 2024-12-16 PROCEDURE — G0378 HOSPITAL OBSERVATION PER HR: HCPCS

## 2024-12-16 PROCEDURE — 27447 TOTAL KNEE ARTHROPLASTY: CPT | Performed by: ORTHOPAEDIC SURGERY

## 2024-12-16 PROCEDURE — 25010000002 FENTANYL CITRATE (PF) 100 MCG/2ML SOLUTION: Performed by: NURSE ANESTHETIST, CERTIFIED REGISTERED

## 2024-12-16 PROCEDURE — 25010000002 MAGNESIUM SULFATE PER 500 MG OF MAGNESIUM: Performed by: NURSE ANESTHETIST, CERTIFIED REGISTERED

## 2024-12-16 DEVICE — IMPLANTABLE DEVICE: Type: IMPLANTABLE DEVICE | Status: FUNCTIONAL

## 2024-12-16 DEVICE — LEGION CRUCIATE RETAINING HIGH                                    FLEX HIGHLY CROSS LINKED                                    POLYETHYLENE SIZE 3-4 9MM
Type: IMPLANTABLE DEVICE | Site: KNEE | Status: FUNCTIONAL
Brand: LEGION

## 2024-12-16 DEVICE — LEGION NARROW CRUCIATE RETAINING                                    OXINIUM SIZE 5N LEFT
Type: IMPLANTABLE DEVICE | Site: KNEE | Status: FUNCTIONAL
Brand: LEGION

## 2024-12-16 DEVICE — DEV CONTRL TISS STRATAFIX SYMM PDS PLUS VIL CT-1 60CM: Type: IMPLANTABLE DEVICE | Site: KNEE | Status: FUNCTIONAL

## 2024-12-16 DEVICE — GENESIS II NON-POROUS TIBIAL                                    BASEPLATE SIZE 3 LEFT
Type: IMPLANTABLE DEVICE | Site: KNEE | Status: FUNCTIONAL
Brand: GENESIS II

## 2024-12-16 DEVICE — DEV CONTRL TISS STRATAFIXSPIRALMNCRYL PLSPS2 REV3/0 45CM: Type: IMPLANTABLE DEVICE | Site: KNEE | Status: FUNCTIONAL

## 2024-12-16 DEVICE — GENESIS II BICONVEX PATELLA 26MM
Type: IMPLANTABLE DEVICE | Site: KNEE | Status: FUNCTIONAL
Brand: GENESIS II

## 2024-12-16 DEVICE — PALACOS® R IS A FAST-CURING, RADIOPAQUE, POLY(METHYL METHACRYLATE)-BASED BONE CEMENT.PALACOS ® R CONTAINS THE X-RAY CONTRAST MEDIUM ZIRCONIUM DIOXIDE. TO IMPROVE VISIBILITY IN THE SURGICAL FIELD PALACOS ® R HAS BEEN COLOURED WITH CHLOROPHYLL (E141). THE BONE CEMENT IS PREPARED DIRECTLY BEFORE USE BY MIXING A POLYMER POWDER COMPONENT WITH A LIQUID MONOMER COMPONENT. A DUCTILE DOUGH FORMS WHICH CURES WITHIN A FEW MINUTES.
Type: IMPLANTABLE DEVICE | Site: KNEE | Status: FUNCTIONAL
Brand: PALACOS®

## 2024-12-16 RX ORDER — PROPOFOL 10 MG/ML
VIAL (ML) INTRAVENOUS AS NEEDED
Status: DISCONTINUED | OUTPATIENT
Start: 2024-12-16 | End: 2024-12-16 | Stop reason: SURG

## 2024-12-16 RX ORDER — DEXAMETHASONE SODIUM PHOSPHATE 4 MG/ML
INJECTION, SOLUTION INTRA-ARTICULAR; INTRALESIONAL; INTRAMUSCULAR; INTRAVENOUS; SOFT TISSUE AS NEEDED
Status: DISCONTINUED | OUTPATIENT
Start: 2024-12-16 | End: 2024-12-16 | Stop reason: SURG

## 2024-12-16 RX ORDER — IPRATROPIUM BROMIDE AND ALBUTEROL SULFATE 2.5; .5 MG/3ML; MG/3ML
3 SOLUTION RESPIRATORY (INHALATION) ONCE AS NEEDED
Status: DISCONTINUED | OUTPATIENT
Start: 2024-12-16 | End: 2024-12-16 | Stop reason: HOSPADM

## 2024-12-16 RX ORDER — SUCCINYLCHOLINE/SOD CL,ISO/PF 200MG/10ML
SYRINGE (ML) INTRAVENOUS AS NEEDED
Status: DISCONTINUED | OUTPATIENT
Start: 2024-12-16 | End: 2024-12-16 | Stop reason: SURG

## 2024-12-16 RX ORDER — ACETAMINOPHEN 325 MG/1
650 TABLET ORAL EVERY 6 HOURS PRN
Status: DISCONTINUED | OUTPATIENT
Start: 2024-12-16 | End: 2024-12-17 | Stop reason: HOSPADM

## 2024-12-16 RX ORDER — ATROPINE SULFATE 0.4 MG/ML
0.4 INJECTION, SOLUTION INTRAMUSCULAR; INTRAVENOUS; SUBCUTANEOUS ONCE AS NEEDED
Status: DISCONTINUED | OUTPATIENT
Start: 2024-12-16 | End: 2024-12-16 | Stop reason: HOSPADM

## 2024-12-16 RX ORDER — MELOXICAM 7.5 MG/1
15 TABLET ORAL ONCE
Status: DISCONTINUED | OUTPATIENT
Start: 2024-12-16 | End: 2024-12-16

## 2024-12-16 RX ORDER — FENTANYL CITRATE 50 UG/ML
50 INJECTION, SOLUTION INTRAMUSCULAR; INTRAVENOUS ONCE AS NEEDED
Status: COMPLETED | OUTPATIENT
Start: 2024-12-16 | End: 2024-12-16

## 2024-12-16 RX ORDER — LIDOCAINE HYDROCHLORIDE 10 MG/ML
0.5 INJECTION, SOLUTION INFILTRATION; PERINEURAL ONCE AS NEEDED
Status: DISCONTINUED | OUTPATIENT
Start: 2024-12-16 | End: 2024-12-16 | Stop reason: HOSPADM

## 2024-12-16 RX ORDER — VALSARTAN 160 MG/1
80 TABLET ORAL
Status: DISCONTINUED | OUTPATIENT
Start: 2024-12-16 | End: 2024-12-17 | Stop reason: HOSPADM

## 2024-12-16 RX ORDER — PROMETHAZINE HYDROCHLORIDE 25 MG/1
25 TABLET ORAL ONCE AS NEEDED
Status: DISCONTINUED | OUTPATIENT
Start: 2024-12-16 | End: 2024-12-16 | Stop reason: HOSPADM

## 2024-12-16 RX ORDER — PROMETHAZINE HYDROCHLORIDE 12.5 MG/1
12.5 TABLET ORAL EVERY 4 HOURS PRN
Status: DISCONTINUED | OUTPATIENT
Start: 2024-12-16 | End: 2024-12-17 | Stop reason: HOSPADM

## 2024-12-16 RX ORDER — FENTANYL CITRATE 50 UG/ML
INJECTION, SOLUTION INTRAMUSCULAR; INTRAVENOUS AS NEEDED
Status: DISCONTINUED | OUTPATIENT
Start: 2024-12-16 | End: 2024-12-16 | Stop reason: SURG

## 2024-12-16 RX ORDER — ONDANSETRON 2 MG/ML
4 INJECTION INTRAMUSCULAR; INTRAVENOUS ONCE AS NEEDED
Status: DISCONTINUED | OUTPATIENT
Start: 2024-12-16 | End: 2024-12-17 | Stop reason: HOSPADM

## 2024-12-16 RX ORDER — PREGABALIN 75 MG/1
150 CAPSULE ORAL ONCE
Status: COMPLETED | OUTPATIENT
Start: 2024-12-16 | End: 2024-12-16

## 2024-12-16 RX ORDER — SODIUM CHLORIDE, SODIUM LACTATE, POTASSIUM CHLORIDE, CALCIUM CHLORIDE 600; 310; 30; 20 MG/100ML; MG/100ML; MG/100ML; MG/100ML
9 INJECTION, SOLUTION INTRAVENOUS CONTINUOUS
Status: DISCONTINUED | OUTPATIENT
Start: 2024-12-16 | End: 2024-12-16

## 2024-12-16 RX ORDER — HYDROCODONE BITARTRATE AND ACETAMINOPHEN 7.5; 325 MG/1; MG/1
1 TABLET ORAL EVERY 4 HOURS PRN
Qty: 30 TABLET | Refills: 0 | Status: SHIPPED | OUTPATIENT
Start: 2024-12-16 | End: 2024-12-17 | Stop reason: HOSPADM

## 2024-12-16 RX ORDER — LABETALOL HYDROCHLORIDE 5 MG/ML
5 INJECTION, SOLUTION INTRAVENOUS
Status: DISCONTINUED | OUTPATIENT
Start: 2024-12-16 | End: 2024-12-16 | Stop reason: HOSPADM

## 2024-12-16 RX ORDER — ROPIVACAINE HYDROCHLORIDE 5 MG/ML
INJECTION, SOLUTION EPIDURAL; INFILTRATION; PERINEURAL
Status: COMPLETED | OUTPATIENT
Start: 2024-12-16 | End: 2024-12-16

## 2024-12-16 RX ORDER — POLYETHYLENE GLYCOL 3350 17 G/17G
17 POWDER, FOR SOLUTION ORAL 2 TIMES DAILY
Qty: 238 G | Refills: 0 | Status: SHIPPED | OUTPATIENT
Start: 2024-12-16 | End: 2024-12-24

## 2024-12-16 RX ORDER — MAGNESIUM SULFATE HEPTAHYDRATE 500 MG/ML
INJECTION, SOLUTION INTRAMUSCULAR; INTRAVENOUS AS NEEDED
Status: DISCONTINUED | OUTPATIENT
Start: 2024-12-16 | End: 2024-12-16 | Stop reason: SURG

## 2024-12-16 RX ORDER — HYDROCODONE BITARTRATE AND ACETAMINOPHEN 5; 325 MG/1; MG/1
1 TABLET ORAL ONCE AS NEEDED
Status: DISCONTINUED | OUTPATIENT
Start: 2024-12-16 | End: 2024-12-16 | Stop reason: HOSPADM

## 2024-12-16 RX ORDER — ASPIRIN 81 MG/1
81 TABLET ORAL EVERY 12 HOURS SCHEDULED
Status: DISCONTINUED | OUTPATIENT
Start: 2024-12-17 | End: 2024-12-17 | Stop reason: HOSPADM

## 2024-12-16 RX ORDER — FENTANYL CITRATE 50 UG/ML
50 INJECTION, SOLUTION INTRAMUSCULAR; INTRAVENOUS
Status: DISCONTINUED | OUTPATIENT
Start: 2024-12-16 | End: 2024-12-16 | Stop reason: HOSPADM

## 2024-12-16 RX ORDER — LIDOCAINE HYDROCHLORIDE 20 MG/ML
INJECTION, SOLUTION EPIDURAL; INFILTRATION; INTRACAUDAL; PERINEURAL AS NEEDED
Status: DISCONTINUED | OUTPATIENT
Start: 2024-12-16 | End: 2024-12-16 | Stop reason: SURG

## 2024-12-16 RX ORDER — ASPIRIN 81 MG/1
81 TABLET ORAL 2 TIMES DAILY
Status: DISCONTINUED | OUTPATIENT
Start: 2024-12-17 | End: 2024-12-17 | Stop reason: HOSPADM

## 2024-12-16 RX ORDER — NALOXONE HCL 0.4 MG/ML
0.2 VIAL (ML) INJECTION AS NEEDED
Status: DISCONTINUED | OUTPATIENT
Start: 2024-12-16 | End: 2024-12-16 | Stop reason: HOSPADM

## 2024-12-16 RX ORDER — ROCURONIUM BROMIDE 10 MG/ML
INJECTION, SOLUTION INTRAVENOUS AS NEEDED
Status: DISCONTINUED | OUTPATIENT
Start: 2024-12-16 | End: 2024-12-16 | Stop reason: SURG

## 2024-12-16 RX ORDER — DEXAMETHASONE SODIUM PHOSPHATE 4 MG/ML
INJECTION, SOLUTION INTRA-ARTICULAR; INTRALESIONAL; INTRAMUSCULAR; INTRAVENOUS; SOFT TISSUE
Status: COMPLETED | OUTPATIENT
Start: 2024-12-16 | End: 2024-12-16

## 2024-12-16 RX ORDER — ONDANSETRON 2 MG/ML
4 INJECTION INTRAMUSCULAR; INTRAVENOUS ONCE AS NEEDED
Status: DISCONTINUED | OUTPATIENT
Start: 2024-12-16 | End: 2024-12-16 | Stop reason: HOSPADM

## 2024-12-16 RX ORDER — ASPIRIN 81 MG/1
TABLET ORAL
Qty: 60 TABLET | Refills: 0 | Status: SHIPPED | OUTPATIENT
Start: 2024-12-16

## 2024-12-16 RX ORDER — HYDROCODONE BITARTRATE AND ACETAMINOPHEN 7.5; 325 MG/1; MG/1
1 TABLET ORAL EVERY 4 HOURS PRN
Status: DISCONTINUED | OUTPATIENT
Start: 2024-12-16 | End: 2024-12-16

## 2024-12-16 RX ORDER — HYDROCODONE BITARTRATE AND ACETAMINOPHEN 7.5; 325 MG/1; MG/1
2 TABLET ORAL EVERY 4 HOURS PRN
Status: DISCONTINUED | OUTPATIENT
Start: 2024-12-16 | End: 2024-12-17 | Stop reason: HOSPADM

## 2024-12-16 RX ORDER — VANCOMYCIN/0.9 % SOD CHLORIDE 1.5G/250ML
15 PLASTIC BAG, INJECTION (ML) INTRAVENOUS ONCE
Status: COMPLETED | OUTPATIENT
Start: 2024-12-16 | End: 2024-12-16

## 2024-12-16 RX ORDER — PROMETHAZINE HYDROCHLORIDE 25 MG/1
25 SUPPOSITORY RECTAL ONCE AS NEEDED
Status: DISCONTINUED | OUTPATIENT
Start: 2024-12-16 | End: 2024-12-16 | Stop reason: HOSPADM

## 2024-12-16 RX ORDER — HYDROCODONE BITARTRATE AND ACETAMINOPHEN 10; 325 MG/1; MG/1
1 TABLET ORAL EVERY 6 HOURS PRN
Status: DISCONTINUED | OUTPATIENT
Start: 2024-12-16 | End: 2024-12-17 | Stop reason: HOSPADM

## 2024-12-16 RX ORDER — LEVOTHYROXINE SODIUM 75 UG/1
75 TABLET ORAL
Status: DISCONTINUED | OUTPATIENT
Start: 2024-12-17 | End: 2024-12-17 | Stop reason: HOSPADM

## 2024-12-16 RX ORDER — HYDROCODONE BITARTRATE AND ACETAMINOPHEN 10; 325 MG/1; MG/1
1 TABLET ORAL EVERY 4 HOURS PRN
Status: DISCONTINUED | OUTPATIENT
Start: 2024-12-16 | End: 2024-12-16 | Stop reason: HOSPADM

## 2024-12-16 RX ORDER — OXYCODONE AND ACETAMINOPHEN 7.5; 325 MG/1; MG/1
1 TABLET ORAL EVERY 4 HOURS PRN
Qty: 40 TABLET | Refills: 0 | Status: SHIPPED | OUTPATIENT
Start: 2024-12-16

## 2024-12-16 RX ORDER — FLUMAZENIL 0.1 MG/ML
0.2 INJECTION INTRAVENOUS AS NEEDED
Status: DISCONTINUED | OUTPATIENT
Start: 2024-12-16 | End: 2024-12-16 | Stop reason: HOSPADM

## 2024-12-16 RX ORDER — MELOXICAM 15 MG/1
15 TABLET ORAL ONCE
Status: COMPLETED | OUTPATIENT
Start: 2024-12-16 | End: 2024-12-16

## 2024-12-16 RX ORDER — ALPRAZOLAM 1 MG/1
1 TABLET ORAL NIGHTLY PRN
Status: DISCONTINUED | OUTPATIENT
Start: 2024-12-16 | End: 2024-12-17 | Stop reason: HOSPADM

## 2024-12-16 RX ORDER — HYDROMORPHONE HYDROCHLORIDE 1 MG/ML
0.5 INJECTION, SOLUTION INTRAMUSCULAR; INTRAVENOUS; SUBCUTANEOUS
Status: DISCONTINUED | OUTPATIENT
Start: 2024-12-16 | End: 2024-12-16 | Stop reason: HOSPADM

## 2024-12-16 RX ORDER — SODIUM CHLORIDE 0.9 % (FLUSH) 0.9 %
3-10 SYRINGE (ML) INJECTION AS NEEDED
Status: DISCONTINUED | OUTPATIENT
Start: 2024-12-16 | End: 2024-12-16 | Stop reason: HOSPADM

## 2024-12-16 RX ORDER — MIDAZOLAM HYDROCHLORIDE 1 MG/ML
1 INJECTION, SOLUTION INTRAMUSCULAR; INTRAVENOUS
Status: DISCONTINUED | OUTPATIENT
Start: 2024-12-16 | End: 2024-12-16 | Stop reason: HOSPADM

## 2024-12-16 RX ORDER — ACETAMINOPHEN 10 MG/ML
INJECTION, SOLUTION INTRAVENOUS AS NEEDED
Status: DISCONTINUED | OUTPATIENT
Start: 2024-12-16 | End: 2024-12-16 | Stop reason: SURG

## 2024-12-16 RX ORDER — EPHEDRINE SULFATE 50 MG/ML
5 INJECTION, SOLUTION INTRAVENOUS ONCE AS NEEDED
Status: DISCONTINUED | OUTPATIENT
Start: 2024-12-16 | End: 2024-12-16 | Stop reason: HOSPADM

## 2024-12-16 RX ORDER — ONDANSETRON 2 MG/ML
INJECTION INTRAMUSCULAR; INTRAVENOUS AS NEEDED
Status: DISCONTINUED | OUTPATIENT
Start: 2024-12-16 | End: 2024-12-16 | Stop reason: SURG

## 2024-12-16 RX ORDER — TRANEXAMIC ACID 100 MG/ML
INJECTION, SOLUTION INTRAVENOUS AS NEEDED
Status: DISCONTINUED | OUTPATIENT
Start: 2024-12-16 | End: 2024-12-16 | Stop reason: SURG

## 2024-12-16 RX ORDER — ONDANSETRON 4 MG/1
4 TABLET, ORALLY DISINTEGRATING ORAL EVERY 6 HOURS PRN
Status: DISCONTINUED | OUTPATIENT
Start: 2024-12-16 | End: 2024-12-17 | Stop reason: HOSPADM

## 2024-12-16 RX ORDER — DIPHENHYDRAMINE HYDROCHLORIDE 50 MG/ML
12.5 INJECTION INTRAMUSCULAR; INTRAVENOUS
Status: DISCONTINUED | OUTPATIENT
Start: 2024-12-16 | End: 2024-12-16 | Stop reason: HOSPADM

## 2024-12-16 RX ORDER — MAGNESIUM HYDROXIDE 1200 MG/15ML
LIQUID ORAL AS NEEDED
Status: DISCONTINUED | OUTPATIENT
Start: 2024-12-16 | End: 2024-12-16 | Stop reason: HOSPADM

## 2024-12-16 RX ORDER — HYDRALAZINE HYDROCHLORIDE 20 MG/ML
5 INJECTION INTRAMUSCULAR; INTRAVENOUS
Status: DISCONTINUED | OUTPATIENT
Start: 2024-12-16 | End: 2024-12-16 | Stop reason: HOSPADM

## 2024-12-16 RX ORDER — HYDROCHLOROTHIAZIDE 12.5 MG/1
12.5 TABLET ORAL
Status: DISCONTINUED | OUTPATIENT
Start: 2024-12-16 | End: 2024-12-17 | Stop reason: HOSPADM

## 2024-12-16 RX ORDER — SODIUM CHLORIDE 0.9 % (FLUSH) 0.9 %
3 SYRINGE (ML) INJECTION EVERY 12 HOURS SCHEDULED
Status: DISCONTINUED | OUTPATIENT
Start: 2024-12-16 | End: 2024-12-16 | Stop reason: HOSPADM

## 2024-12-16 RX ORDER — HYDRALAZINE HYDROCHLORIDE 20 MG/ML
INJECTION INTRAMUSCULAR; INTRAVENOUS AS NEEDED
Status: DISCONTINUED | OUTPATIENT
Start: 2024-12-16 | End: 2024-12-16 | Stop reason: SURG

## 2024-12-16 RX ADMIN — HYDRALAZINE HYDROCHLORIDE 5 MG: 20 INJECTION INTRAMUSCULAR; INTRAVENOUS at 13:30

## 2024-12-16 RX ADMIN — DEXAMETHASONE SODIUM PHOSPHATE 10 MG: 4 INJECTION, SOLUTION INTRA-ARTICULAR; INTRALESIONAL; INTRAMUSCULAR; INTRAVENOUS; SOFT TISSUE at 12:57

## 2024-12-16 RX ADMIN — TRANEXAMIC ACID 1000 MG: 100 INJECTION, SOLUTION INTRAVENOUS at 13:11

## 2024-12-16 RX ADMIN — DEXAMETHASONE SODIUM PHOSPHATE 4 MG: 4 INJECTION, SOLUTION INTRAMUSCULAR; INTRAVENOUS at 12:00

## 2024-12-16 RX ADMIN — HYDROCHLOROTHIAZIDE 12.5 MG: 12.5 TABLET ORAL at 21:03

## 2024-12-16 RX ADMIN — FENTANYL CITRATE 50 MCG: 50 INJECTION, SOLUTION INTRAMUSCULAR; INTRAVENOUS at 13:23

## 2024-12-16 RX ADMIN — TRANEXAMIC ACID 1000 MG: 100 INJECTION, SOLUTION INTRAVENOUS at 13:49

## 2024-12-16 RX ADMIN — ONDANSETRON 4 MG: 2 INJECTION INTRAMUSCULAR; INTRAVENOUS at 13:49

## 2024-12-16 RX ADMIN — HYDROMORPHONE HYDROCHLORIDE 0.5 MG: 1 INJECTION, SOLUTION INTRAMUSCULAR; INTRAVENOUS; SUBCUTANEOUS at 15:15

## 2024-12-16 RX ADMIN — SUGAMMADEX 200 MG: 100 INJECTION, SOLUTION INTRAVENOUS at 14:05

## 2024-12-16 RX ADMIN — VANCOMYCIN HYDROCHLORIDE 1500 MG: 10 INJECTION, POWDER, LYOPHILIZED, FOR SOLUTION INTRAVENOUS at 11:48

## 2024-12-16 RX ADMIN — ROPIVACAINE HYDROCHLORIDE 15 ML: 5 INJECTION EPIDURAL; INFILTRATION; PERINEURAL at 12:00

## 2024-12-16 RX ADMIN — HYDROCODONE BITARTRATE AND ACETAMINOPHEN 1 TABLET: 10; 325 TABLET ORAL at 14:50

## 2024-12-16 RX ADMIN — SODIUM CHLORIDE 2 G: 900 INJECTION INTRAVENOUS at 12:27

## 2024-12-16 RX ADMIN — HYDROMORPHONE HYDROCHLORIDE 0.5 MG: 1 INJECTION, SOLUTION INTRAMUSCULAR; INTRAVENOUS; SUBCUTANEOUS at 14:34

## 2024-12-16 RX ADMIN — LIDOCAINE HYDROCHLORIDE 100 MG: 20 INJECTION, SOLUTION EPIDURAL; INFILTRATION; INTRACAUDAL; PERINEURAL at 12:43

## 2024-12-16 RX ADMIN — FENTANYL CITRATE 50 MCG: 50 INJECTION, SOLUTION INTRAMUSCULAR; INTRAVENOUS at 13:14

## 2024-12-16 RX ADMIN — ALPRAZOLAM 1 MG: 1 TABLET ORAL at 23:51

## 2024-12-16 RX ADMIN — SODIUM CHLORIDE 2000 MG: 900 INJECTION INTRAVENOUS at 20:48

## 2024-12-16 RX ADMIN — VALSARTAN 80 MG: 160 TABLET, FILM COATED ORAL at 21:03

## 2024-12-16 RX ADMIN — FENTANYL CITRATE 50 MCG: 50 INJECTION, SOLUTION INTRAMUSCULAR; INTRAVENOUS at 14:50

## 2024-12-16 RX ADMIN — FENTANYL CITRATE 50 MCG: 50 INJECTION, SOLUTION INTRAMUSCULAR; INTRAVENOUS at 13:57

## 2024-12-16 RX ADMIN — MIDAZOLAM 1 MG: 1 INJECTION INTRAMUSCULAR; INTRAVENOUS at 11:57

## 2024-12-16 RX ADMIN — PROPOFOL 130 MG: 10 INJECTION, EMULSION INTRAVENOUS at 12:43

## 2024-12-16 RX ADMIN — ACETAMINOPHEN 1000 MG: 1000 INJECTION INTRAVENOUS at 13:02

## 2024-12-16 RX ADMIN — PREGABALIN 150 MG: 75 CAPSULE ORAL at 10:55

## 2024-12-16 RX ADMIN — Medication 100 MG: at 12:44

## 2024-12-16 RX ADMIN — MELOXICAM 15 MG: 15 TABLET ORAL at 10:55

## 2024-12-16 RX ADMIN — ROCURONIUM BROMIDE 50 MG: 10 INJECTION, SOLUTION INTRAVENOUS at 12:51

## 2024-12-16 RX ADMIN — SODIUM CHLORIDE, POTASSIUM CHLORIDE, SODIUM LACTATE AND CALCIUM CHLORIDE 9 ML/HR: 600; 310; 30; 20 INJECTION, SOLUTION INTRAVENOUS at 10:55

## 2024-12-16 RX ADMIN — MAGNESIUM SULFATE HEPTAHYDRATE 2 G: 500 INJECTION, SOLUTION INTRAMUSCULAR; INTRAVENOUS at 12:59

## 2024-12-16 RX ADMIN — HYDROCODONE BITARTRATE AND ACETAMINOPHEN 1 TABLET: 7.5; 325 TABLET ORAL at 20:54

## 2024-12-16 RX ADMIN — PROPOFOL 135 MCG/KG/MIN: 10 INJECTION, EMULSION INTRAVENOUS at 12:44

## 2024-12-16 RX ADMIN — FENTANYL CITRATE 50 MCG: 50 INJECTION, SOLUTION INTRAMUSCULAR; INTRAVENOUS at 11:57

## 2024-12-16 NOTE — ANESTHESIA POSTPROCEDURE EVALUATION
Patient: Kamini Orta    Procedure Summary       Date: 12/16/24 Room / Location:  [facility] [address] [place] [address] [facility]    Anesthesia Start: 1240 Anesthesia Stop: 1415    Procedure: TOTAL KNEE ARTHROPLASTY (Left: Knee) Diagnosis:       Primary osteoarthritis of left knee      (Primary osteoarthritis of left knee [M17.12])    Surgeons: Bulmaro Wooten MD Provider: Mykel Ballard MD    Anesthesia Type: general ASA Status: 3            Anesthesia Type: general    Vitals  Vitals Value Taken Time   /49 12/16/24 1500   Temp 36.6 °C (97.9 °F) 12/16/24 1415   Pulse 74 12/16/24 1509   Resp 15 12/16/24 1500   SpO2 94 % 12/16/24 1509   Vitals shown include unfiled device data.        Post Anesthesia Care and Evaluation    Patient location during evaluation: bedside  Patient participation: complete - patient participated  Level of consciousness: awake and alert  Pain management: adequate    Airway patency: patent  Anesthetic complications: No anesthetic complications  PONV Status: controlled  Cardiovascular status: blood pressure returned to baseline and acceptable  Respiratory status: acceptable  Hydration status: acceptable

## 2024-12-16 NOTE — OP NOTE
Name: Kamini Orta    YOB: 1960    DATE OF SURGERY: 12/16/2024    PREOPERATIVE DIAGNOSIS: Left knee end-stage osteoarthritis    POSTOPERATIVE DIAGNOSIS: Left knee end-stage osteoarthritis    PROCEDURE PERFORMED: Left total knee replacement     SURGEON: Bulmaro Wooten M.D.    ASSISTANT: CAITIE CHAPARRO    A surgical assistant was integral in ensuring a successful outcome with this procedure.  The assistant was utilized to assist in positioning the patient, draping the patient, was used throughout the case to provide with retraction of tissues, suctioning of blood and body fluids for visualization, positioning of the extremity to allow for proper exposure so that I could perform the procedure.  Without the use of a surgical assistant during this procedure I feel that the outcome may have been compromised or would have been suboptimal or at risk for complications.    IMPLANTS: [employer]:     Implant Name Type Inv. Item Serial No.  Lot No. LRB No. Used Action   CMT BONE PALACOS R HI/VISC 1X40 - [id number] Implant CMT BONE PALACOS R HI/VISC 1X40  [facility] [id number] Left 2 Implanted   DEV CONTRL TISS STRATAFIX SYMM PDS PLUS CORWIN CT-1 60CM - [id number] Implant DEV CONTRL TISS STRATAFIX SYMM PDS PLUS CORWIN CT-1 60CM  [employer]  [employer] J . Left 1 Implanted   DEV CONTRL TISS STRATAFIXSPIRALMNCRYL PLSPS2 REV3/0 45CM - [id number] Implant DEV CONTRL TISS STRATAFIXSPIRALMNCRYL PLSPS2 REV3/0 45CM  [employer]  DIV [employer] J [employer] J . Left 1 Implanted   BASE TIB/KN GEN2 NONPOR TI SZ3 LT - [id number] Implant BASE TIB/KN GEN2 NONPOR TI SZ3 LT  [employer] [id number] Left 1 Implanted   PAT GEN2 BICONVEX [id number] - [id number] Implant PAT GEN2 BICONVEX [id number]  [employer] [id number] Left 1 Implanted   COMP FEM LEGION OXINIUM CR NRW SZ5 LT - [id number] Implant COMP FEM LEGION OXINIUM CR NRW SZ5 LT  [employer] [id number] Left 1 Implanted   INSRT ART/KN LEGION CR HF XLPE SZ3TO4 9MM -  [id number] Implant INSRT ART/KN LEGION CR HF XLPE SZ3TO4 9MM  [employer] [id number] Left 1 Implanted       Estimated Blood Loss: 200cc  Specimens : none  Complications: none    DESCRIPTION OF PROCEDURE: The patient was taken to the operating room and placed in the supine position. A sequential compression device was carefully placed on the non-operative leg. Preoperative antibiotics were administered. Surgical time out was performed. After adequate induction of anesthesia, the leg was prepped and draped in the usual sterile fashion, exsanguinated with an Esmarch bandage and the tourniquet inflated to 250 mmHg. A midline incision was performed followed by a medial parapatellar arthrotomy. The patella was subluxed laterally.  A portion of the fat pad, ACL, and anterior horns of the meniscus were excised.  A drill hole was then placed in the center of the femoral canal in line with the canal.  It was irrigated and suctioned.  The intramedullary stephanie was then placed and a 5 degree distal valgus cut was performed after the block pinned in place appropriately.  Cut surface was then removed.  The sizing and rotation guide was then placed and seated appropriately.  It was sized and then the drill holes for the 4-in-1 cutting guide were placed in 3 degrees of external rotation based off of the posterior condyles.  The 4-in-1 cutting block was then placed and the femoral cuts were performed.  The excess bone was removed and the cut surfaces looked good.  At this point we placed the retractors around the proximal tibia and a slight release of the PCL fibers off of the posterior proximal tibia was performed.  We used the extramedullary tibial alignment guide and it was aligned appropriately and then the depth was set and the block pinned in place.  The tibial cut was then performed and the alignment guide was removed.  The tibial cut was removed and the cut surface looked good.  The posterior horns of the menisci were then  removed as well as the posterior osteophytes.  Flexion extension blocks were then used to check the balance of the knee. The tibial cut surface was then sized with the sizing templates and the tibial and femoral trial were then placed. The knee was placed in full extension and then the tibial tray rotation was then matched to the femoral rotation and marked.    Attention was then placed to the patella. The patella was noted to track centrally through range of motion. The patella was then sized with the trials. The thickness of the patella was then measured. The patella was resurfaced and the surrounding osteophytes were removed. The preoperative thickness was reproduced. The patella tracked centrally through range of motion.  We then checked the balance with the trial implants in place and there was excellent medial lateral and flexion-extension balance.     At this point all trial components were removed, the knee was copiously irrigated with pulsed lavage, and the knee was injected with anesthetic cocktail solution. The cut surfaces were then dried with clean lap sponges, and the components were cemented tibia, followed by femur, then patella. The knee was held in full extension and all excess cement was removed. The knee was held still until the cement had completely hardened. We then placed the trial polyethylene spacer which resulted in full extension and excellent flexion-extension balance. We placed the final polyethylene spacer.   The knee was then copiously irrigated. The tourniquet was then released. There was excellent hemostasis. We placed a one-eighth inch Hemovac drain. We closed the knee in multiple layers in standard fashion. Sterile dressing were applied. At the end of the case, the sponge and needle counts were reported as being correct. There were no known complications. The patient was then transported to the recovery room.      Bulmaro Wooten M.D.

## 2024-12-16 NOTE — ANESTHESIA PREPROCEDURE EVALUATION
Anesthesia Evaluation     Patient summary reviewed and Nursing notes reviewed   history of anesthetic complications:  PONV  NPO Solid Status: > 8 hours  NPO Liquid Status: > 2 hours           Airway   Mallampati: II  TM distance: >3 FB  Neck ROM: full  Dental      Pulmonary    Cardiovascular     ECG reviewed    (+) hypertension, hyperlipidemia    ROS comment: Echo reviewed    Neuro/Psych  GI/Hepatic/Renal/Endo    (+) obesity, GERD, thyroid problem     Musculoskeletal     (+) chronic pain  Abdominal    Substance History   (+) drug use      Comment: Daily THC use   OB/GYN          Other                          Anesthesia Plan    ASA 3     general     intravenous induction     Anesthetic plan, risks, benefits, and alternatives have been provided, discussed and informed consent has been obtained with: patient.        CODE STATUS:

## 2024-12-16 NOTE — ANESTHESIA PROCEDURE NOTES
Airway  Urgency: elective    Date/Time: 12/16/2024 12:46 PM  Airway not difficult    General Information and Staff    Patient location during procedure: OR  CRNA/CAA: Jacqueline Hernandez CRNA    Indications and Patient Condition    Preoxygenated: yes  Mask difficulty assessment: 1 - vent by mask    Final Airway Details  Final airway type: endotracheal airway      Successful airway: ETT  Cuffed: yes   Successful intubation technique: direct laryngoscopy  Endotracheal tube insertion site: oral  Blade: Nati  Blade size: 3  ETT size (mm): 7.0  Cormack-Lehane Classification: grade IIa - partial view of glottis  Placement verified by: chest auscultation and capnometry   Measured from: teeth  ETT/EBT  to teeth (cm): 21  Number of attempts at approach: 1  Assessment: lips, teeth, and gum same as pre-op and atraumatic intubation    Additional Comments  Teeth, tongue, lips, and gums in preop condition. VSS throughout. Easy mask/smooth easy airway. Tube visualized through cords.

## 2024-12-16 NOTE — PLAN OF CARE
Goal Outcome Evaluation:  Plan of Care Reviewed With: patient                         PtPOD0 L total knee, princess, hemovac, asstx1, 1LO2 PRN, plans to dc home tomorrow, does not need walker.  Voided.

## 2024-12-17 ENCOUNTER — READMISSION MANAGEMENT (OUTPATIENT)
Dept: CALL CENTER | Facility: HOSPITAL | Age: 64
End: 2024-12-17
Payer: COMMERCIAL

## 2024-12-17 VITALS
HEART RATE: 67 BPM | TEMPERATURE: 97.8 F | RESPIRATION RATE: 17 BRPM | DIASTOLIC BLOOD PRESSURE: 68 MMHG | OXYGEN SATURATION: 98 % | SYSTOLIC BLOOD PRESSURE: 118 MMHG

## 2024-12-17 LAB
HCT VFR BLD AUTO: 29.9 % (ref 34–46.6)
HGB BLD-MCNC: 10 G/DL (ref 12–15.9)

## 2024-12-17 PROCEDURE — G0378 HOSPITAL OBSERVATION PER HR: HCPCS

## 2024-12-17 PROCEDURE — 85014 HEMATOCRIT: CPT | Performed by: NURSE PRACTITIONER

## 2024-12-17 PROCEDURE — 99024 POSTOP FOLLOW-UP VISIT: CPT | Performed by: NURSE PRACTITIONER

## 2024-12-17 PROCEDURE — 25010000002 CEFAZOLIN PER 500 MG: Performed by: NURSE PRACTITIONER

## 2024-12-17 PROCEDURE — 97530 THERAPEUTIC ACTIVITIES: CPT

## 2024-12-17 PROCEDURE — 97161 PT EVAL LOW COMPLEX 20 MIN: CPT

## 2024-12-17 PROCEDURE — 85018 HEMOGLOBIN: CPT | Performed by: NURSE PRACTITIONER

## 2024-12-17 RX ADMIN — VALSARTAN 80 MG: 160 TABLET, FILM COATED ORAL at 08:15

## 2024-12-17 RX ADMIN — ASPIRIN 81 MG: 81 TABLET, COATED ORAL at 08:15

## 2024-12-17 RX ADMIN — HYDROCODONE BITARTRATE AND ACETAMINOPHEN 1 TABLET: 10; 325 TABLET ORAL at 08:15

## 2024-12-17 RX ADMIN — HYDROCODONE BITARTRATE AND ACETAMINOPHEN 1 TABLET: 10; 325 TABLET ORAL at 01:06

## 2024-12-17 RX ADMIN — LEVOTHYROXINE SODIUM 75 MCG: 75 TABLET ORAL at 05:13

## 2024-12-17 RX ADMIN — HYDROCHLOROTHIAZIDE 12.5 MG: 12.5 TABLET ORAL at 08:15

## 2024-12-17 RX ADMIN — FLUOXETINE HYDROCHLORIDE 40 MG: 20 CAPSULE ORAL at 08:15

## 2024-12-17 RX ADMIN — SODIUM CHLORIDE 2000 MG: 900 INJECTION INTRAVENOUS at 05:13

## 2024-12-17 NOTE — PLAN OF CARE
Goal Outcome Evaluation:                    All goals met, patient safe for discharge.

## 2024-12-17 NOTE — PLAN OF CARE
Goal Outcome Evaluation:  Plan of Care Reviewed With: patient        Progress: improving  Outcome Evaluation: VSS. Pt amb with a walker and assistx1 and been voiding per brp. Dressing is c/d/i. PRN meds given as per order. Educated on falls precaution, medication management, b/p meds and monitoring. Hopeful to d/c home today. Will take note of any changes.

## 2024-12-17 NOTE — OUTREACH NOTE
Prep Survey      Flowsheet Row Responses   [facility] facility patient discharged from? [facility]   Is LACE score < 7 ? Yes   Eligibility [facility]   Date of Admission 12/16/24   Date of Discharge 12/17/24   Discharge Disposition Home-Health Care Sv   Discharge diagnosis Total knee arthroplasty   Does the patient have one of the following disease processes/diagnoses(primary or secondary)? Total Joint Replacement   Does the patient have Home health ordered? Yes   What is the Home health agency?  [facility]    Is there a DME ordered? No   Prep survey completed? Yes            GATITO VÁSQUEZ - Registered Nurse

## 2024-12-17 NOTE — CASE MANAGEMENT/SOCIAL WORK
Case Management Discharge Note      Final Note: home with [facility] aware of d/c         Selected Continued Care - Discharged on 12/17/2024 Admission date: 12/16/2024 - Discharge disposition: Home-Health Care Svc      Destination    No services have been selected for the patient.                Durable Medical Equipment    No services have been selected for the patient.                Dialysis/Infusion    No services have been selected for the patient.                Home Medical Care Coordination complete.      Service Provider Services Address Phone Fax Patient Preferred    [facility] [address] [place] [address] [phone] --              Therapy    No services have been selected for the patient.                Community Resources    No services have been selected for the patient.                Community & DME    No services have been selected for the patient.                         Final Discharge Disposition Code: 06 - home with home health care

## 2024-12-17 NOTE — DISCHARGE SUMMARY
Patient Name: Kamini Orta  Patient YOB: 1960    Date of Admission:  12/16/2024  Date of Discharge:  12/17/2024  Discharge Diagnosis: ID ARTHRP KNE CONDYLE&PLATU MEDIAL&LAT COMPARTMENTS [[id number]] (TOTAL KNEE ARTHROPLASTY)  Presenting Problem/History of Present Illness: Primary osteoarthritis of left knee [M17.12]  OA (osteoarthritis) of knee [M17.9]  Admitting Physician: Dr Bulmaro Wooten  Consults:   Consults       No orders found for last 30 day(s).            DETAILS OF HOSPITAL STAY:  Patient is a 64 y.o. female was admitted to the floor following the above procedure and underwent an uncomplicated hospital stay.  Patient did well with physical therapy and was ambulating well without problems.  On the day of discharge the wound was clean, dry and intact and calf was soft and nontender and Homans sign was negative.  Patient was tolerating  without problems.  Patient will be discharged home.    Condition on Discharge:  Stable    Vital Signs  Temp:  [97.1 °F (36.2 °C)-97.9 °F (36.6 °C)] 97.5 °F (36.4 °C)  Heart Rate:  [58-96] 96  Resp:  [15-19] 16  BP: ()/(41-78) 135/78    LABS:      Admission on 12/16/2024   Component Date Value Ref Range Status    Hemoglobin 12/17/2024 10.0 (L)  12.0 - 15.9 g/dL Final    Hematocrit 12/17/2024 29.9 (L)  34.0 - 46.6 % Final       No results found.    Discharge Medications     Discharge Medications        New Medications        Instructions Start Date   aspirin 81 MG EC tablet   Take 1 tablet by mouth twice daily for 14 days, then take 1 daily for 30 days.      oxyCODONE-acetaminophen 7.5-325 MG per tablet  Commonly known as: PERCOCET   Take 1 tablet by mouth Every 4 (Four) Hours As Needed for Moderate Pain.      polyethylene glycol 17 GM/SCOOP powder  Commonly known as: MIRALAX   Take 17 g (one capful) by mouth 2 (Two) Times a Day for 7 days.             Changes to Medications        Instructions Start Date   rosuvastatin 10 MG tablet  Commonly known as:  RAMIRO  What changed: when to take this   10 mg, Oral, Daily             Continue These Medications        Instructions Start Date   ALPRAZolam 0.5 MG tablet  Commonly known as: XANAX   0.5 mg, Oral, 2 Times Daily      baclofen 10 MG tablet  Commonly known as: LIORESAL   10 mg, Oral, 2 Times Daily      estradiol 0.05 MG/24HR patch  Commonly known as: Vivelle-Dot   1 patch, Transdermal, 2 Times Weekly      famotidine 10 MG tablet  Commonly known as: PEPCID   10 mg, Nightly      fluconazole 100 MG tablet  Commonly known as: Diflucan   Take one tablet now and repeat in 3 days      FLUoxetine 40 MG capsule  Commonly known as: PROzac   40 mg, Oral, Daily      fluticasone 50 MCG/ACT nasal spray  Commonly known as: FLONASE   2 sprays, Each Nare, Daily      levothyroxine 75 MCG tablet  Commonly known as: SYNTHROID, LEVOTHROID   75 mcg, Oral, Daily      Stahist AD 25-60 MG tablet  Generic drug: Chlorcyclizine-Pseudoephed   1 tablet, Oral, Every 12 Hours      valsartan-hydrochlorothiazide 80-12.5 MG per tablet  Commonly known as: DIOVAN-HCT   1 tablet, Oral, Every Evening             Stop These Medications      amoxicillin-clavulanate 875-125 MG per tablet  Commonly known as: AUGMENTIN     CO Q 10 PO     diclofenac 50 MG EC tablet  Commonly known as: VOLTAREN     HYALURONIC ACID PO     HYDROcodone-acetaminophen 5-325 MG per tablet  Commonly known as: NORCO     Melatonin 10 MG sublingual tablet     ONE A DAY WOMEN 50 PLUS PO     pantoprazole 40 MG EC tablet  Commonly known as: PROTONIX     PROBIOTIC ADVANCED PO              Discharge Instructions: Patient is to continue with physical therapy exercises daily and continue working with the physical therapist as ordered. Patient may weight bear as tolerated. Apply ice regularly. Patient may ice for long periods of time as long as ice is not directly on the skin. Patient instructed on frequent calf pumping exercises.  Patient also instructed on incentive spirometer during  hospitalization and encouraged to continue to use at home regularly.    Dressing: The dressing is designed to be left in place until you return to the office in 2 weeks.  The suction unit should stop functioning at 7 days and the green light will switch to yellow.  At that point the suction unit and tubing can be disconnected at the port closest to the dressing.  The suction unit and tubing may be discarded.  You may shower immediately upon return home, you will need to turn the pump off by depressing the orange button once and then you may disconnect the pump and tubing at the connection port.  After showering, shake off the excess water and reattach the tubing.  Restart the pump by depressing the orange button one time and you will notice the green light flashing again.  If the dressing becomes dislodged or saturated it should be changed. Please refer to the BALJINDER information sheet if you have any questions about the dressing.  If you have a home health nurse or therapist they can be contacted to assist with dressing change or repair. You may also call the BALJINDER dressing hotline for questions related to the dressing ([phone]). If there are still other problems or questions related to the dressing despite these measures then you can contact Grace at our office [phone].  If for some reason the BALJINDER dressing is removed, after 7 days the wound can be gently cleaned with antibacterial soap then allowed to dry and covered with a dry sterile dressing. The wound should be covered at all times except while showering.  Patient may change dressings daily and prn using sterile 4x4 and paper tape, and should call if any unusual drainage, redness or swelling.*  Follow up appointment in 2 weeks - patient to call the office at [phone] to schedule.  Patient will be discharged on aspirin 81mg BID x 2 weeks, then daily x 4 weeks    Discharge Diagnosis:    Primary osteoarthritis of left knee    OA (osteoarthritis) of  knee      Follow-up Appointments  Future Appointments   Date Time Provider Department Center   1/7/2025  2:10 PM Bulmaro Wooten MD [facility] [address]   2/11/2025  2:30 PM Kunal Perry APRN [facility] [address]   2/17/2025 11:00 AM Shelley Drew APRN [facility]   3/5/2025  1:15 PM Davin Hall MD [facility]          CAROLINE Parsons  12/17/24  08:43 EST

## 2024-12-17 NOTE — PLAN OF CARE
Goal Outcome Evaluation:  Plan of Care Reviewed With: patient           Outcome Evaluation: Pt is a 65 y/o F POD 1 s/p L TKA. Pt reports she lives with her spouse with 1 FARZANEH and was (I) with mobility prior to admission. Pt presents to PT with expected post-op pain and weakness. Pt performed supine to sit, STS txf, and ambulated 150' c RW requiring SPV. Pt demo's a slow antalgic gait but able to progress to a step-through pattern without a LOB. PT provided education regarding TKA protocol, home safety, and post-op care. Pt is safe to D/C home today with spouse and f/u with HHPT.    Anticipated Discharge Disposition (PT): home with assist, home with home health

## 2024-12-17 NOTE — CASE MANAGEMENT/SOCIAL WORK
Continued Stay Note  [facility]     Patient Name: Kamini Orta  MRN: [id number]  Today's Date: 12/17/2024    Admit Date: 12/16/2024    Plan: home with [facility]   Discharge Plan       Row Name 12/17/24 0825       Plan    Plan home with [facility]    Patient/Family in Agreement with Plan yes    Plan Comments Spoke with pt bedside. Pt reports she has used [facility] in past and would like to use again. She plans to use [facility] outpatient after two weeks. She has walker and spouse will be with her at d/c. CCP to follow.                   Discharge Codes    No documentation.                 Expected Discharge Date and Time       Expected Discharge Date Expected Discharge Time    Dec 17, 2024               DAE Moeller

## 2024-12-17 NOTE — DISCHARGE PLACEMENT REQUEST
"Kamini Orta (64 y.o. Female)       Date of Birth   1960    Social Security Number       [address] [facility] [place] [address]    Home Phone   [phone]    MRN   [id number]       [facility]    Marital Status                               Admission Date   12/16/24    Admission Type   Elective    Admitting Provider   Bulmaro Wooten MD    Attending Provider   Bulmaro Wooten MD    Department, Room/Bed   [address], [address]95/1       Discharge Date       Discharge Disposition       Discharge Destination                                 Attending Provider: Bulmaro Wooten MD    Allergies: Codeine, Erythromycin, Morphine    Isolation: None   Infection: None   Code Status: Prior    Ht: 160 cm (63\")   Wt: 95.8 kg (211 lb 1.6 oz)    Admission Cmt: None   Principal Problem: Primary osteoarthritis of left knee [M17.12]                   Active Insurance as of 12/16/2024       Primary Coverage       Payor Plan Insurance Group Employer/Plan Group    [employer] BLUE CROSS [employer] BLUE CROSS BLUE [employer] PPO [id number]       Payor Plan Address Payor Plan Phone Number Payor Plan Fax Number Effective Dates    [address] [phone]  1/1/2022 - None Entered    [facility] [id number]         Subscriber Name Subscriber Birth Date Member ID       KO ORTA 1960 [id number]                     Emergency Contacts        (Rel.) Home Phone Work Phone Mobile Phone    Ko Orta (Spouse) -- -- [phone]          "

## 2024-12-17 NOTE — THERAPY EVALUATION
Patient Name: Kamini Orta  : 1960    MRN: [id number]                              Today's Date: 2024       Admit Date: 2024    Visit Dx:     ICD-10-CM ICD-9-CM   1. S/P knee surgery  Z98.890 V45.89   2. Primary osteoarthritis of left knee  M17.12 715.16     Patient Active Problem List   Diagnosis    Arthritis of right knee    Adhesive arachnoiditis    Arachnoiditis    Lumbar degenerative disc disease    Chronic pain of right knee    Arthritis of both knees    Primary osteoarthritis of right knee    Acute non-recurrent pansinusitis    Vertigo    Hypertension, essential    Projectile vomiting with nausea    Worsening headaches    Acute non-recurrent maxillary sinusitis    Diverticulitis of large intestine without perforation or abscess without bleeding    Chronic bilateral low back pain    COVID-19 virus infection    Change in bowel habits    Family history of colon cancer    Rectal bleeding    History of colon polyps    Acute pain of left knee    Asymmetric septal hypertrophy    Chronic low back pain    Physical exam, annual    Acute shoulder bursitis, left    Primary osteoarthritis of left knee    OA (osteoarthritis) of knee     Past Medical History:   Diagnosis Date    Ankle sprain 40 years ago    Anxiety and depression     Arachnoiditis     PT STATES DISABLED FOR MANY YEARS    Arthritis of back hard to pinpoint    Cervical disc disorder     CTS (carpal tunnel syndrome)     GERD (gastroesophageal reflux disease)     Hip arthrosis     History of transfusion     no reaction   age 15    Hyperlipidemia     Hypertension     Low back strain years    Have ARACHNOIDITIS    Lumbosacral disc disease this last time,     OA (osteoarthritis) of knee     LEFT:  PAIN, WEAKNESS, LIMITED MOBILITY    PONV (postoperative nausea and vomiting)     Scoliosis 1986    Spondylolisthesis     Tear of meniscus of knee     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        Thyroid disease     Vertigo  "    STATES CURRENTLY GOING TO PT FOR THIS     Past Surgical History:   Procedure Laterality Date     SECTION  1986    ALSO 1987    CHOLECYSTECTOMY      COLONOSCOPY N/A 2022    Procedure: COLONOSCOPY;  Surgeon: Peter Tejada MD;  Location: [facility];  Service: Gastroenterology;  Laterality: N/A;  HEMMORHOIDS    ESOPHAGEAL DILATATION      HYSTERECTOMY      menorrhagia    JOINT REPLACEMENT  23    right knee    KNEE ARTHROSCOPY Right     \"CLEAN UP\"    KNEE ARTHROSCOPY W/ MENISCAL REPAIR Right     LASIK Bilateral     TONSILLECTOMY      TOTAL KNEE ARTHROPLASTY Right 2023    Procedure: RIGHT TOTAL KNEE ARTHROPLASTY WITH SHANTELLE NAVIGATION;  Surgeon: Fabian Moore MD;  Location:  [facility];  Service: Orthopedics;  Laterality: Right;    TRIGGER POINT INJECTION  CRESENCIO's for back from     WILL NOT AGAIN    UPPER GASTROINTESTINAL ENDOSCOPY        General Information       Row Name 24 0852          Physical Therapy Time and Intention    Document Type discharge evaluation/summary  -CS     Mode of Treatment individual therapy;physical therapy  -CS       Row Name 24 0852          General Information    Patient Profile Reviewed yes  -CS     Prior Level of Function independent:;gait;transfer;bed mobility  -CS     Existing Precautions/Restrictions fall  -CS     Barriers to Rehab none identified  -CS       Row Name 24 0852          Living Environment    People in Home spouse  -CS       Row Name 24 0852          Home Main Entrance    Number of Stairs, Main Entrance one  -CS       Row Name 24 0852          Stairs Within Home, Primary    Number of Stairs, Within Home, Primary none  -CS       Row Name 24 0852          Cognition    Orientation Status (Cognition) oriented x 4  -CS       Row Name 24 0852          Safety Issues/Impairments Affecting Functional Mobility    Impairments Affecting Function (Mobility) pain;range of motion " (ROM);strength  -CS               User Key  (r) = Recorded By, (t) = Taken By, (c) = Cosigned By      Initials Name Provider Type    CS Lucia Harris, PT Physical Therapist                   Mobility       Row Name 12/17/24 0852          Bed Mobility    Bed Mobility supine-sit  -CS     Supine-Sit Rhea (Bed Mobility) supervision  -CS     Assistive Device (Bed Mobility) head of bed elevated  -CS     Comment, (Bed Mobility) UIC at end of session  -       Row Name 12/17/24 0852          Sit-Stand Transfer    Sit-Stand Rhea (Transfers) supervision  -CS     Assistive Device (Sit-Stand Transfers) walker, front-wheeled  -CS       Row Name 12/17/24 0852          Gait/Stairs (Locomotion)    Rhea Level (Gait) supervision  -CS     Assistive Device (Gait) walker, front-wheeled  -CS     Distance in Feet (Gait) 150  -CS     Deviations/Abnormal Patterns (Gait) antalgic;valerie decreased  -CS     Left Sided Gait Deviations weight shift ability decreased  -CS     Rhea Level (Stairs) not tested  -       Row Name 12/17/24 0852          Mobility    Extremity Weight-bearing Status left lower extremity  -CS     Left Lower Extremity (Weight-bearing Status) weight-bearing as tolerated (WBAT)  -               User Key  (r) = Recorded By, (t) = Taken By, (c) = Cosigned By      Initials Name Provider Type    Lucia Ratliff, PT Physical Therapist                   Obj/Interventions       Row Name 12/17/24 0853          Range of Motion Comprehensive    General Range of Motion bilateral lower extremity ROM WFL  -CS     Comment, General Range of Motion L LE able to reach 90 deg knee flexion  -       Row Name 12/17/24 0853          Strength Comprehensive (MMT)    General Manual Muscle Testing (MMT) Assessment other (see comments)  -CS     Comment, General Manual Muscle Testing (MMT) Assessment L LE limited secondary to post-op; R LE WFL  -CS       Row Name 12/17/24 0853          Motor Skills     Therapeutic Exercise other (see comments)  TKA protocol  -CS       Row Name 12/17/24 0853          Balance    Comment, Balance WFL  -CS               User Key  (r) = Recorded By, (t) = Taken By, (c) = Cosigned By      Initials Name Provider Type    CS Lucia Harris, PT Physical Therapist                   Goals/Plan    No documentation.                  Clinical Impression       Row Name 12/17/24 0853          Pain    Pretreatment Pain Rating 7/10  -CS     Posttreatment Pain Rating 7/10  -CS     Pain Location knee  -CS     Pain Side/Orientation left  -CS     Pain Management Interventions exercise or physical activity utilized  -CS     Response to Pain Interventions activity participation with tolerable pain  -CS       Row Name 12/17/24 0853          Plan of Care Review    Plan of Care Reviewed With patient  -CS     Outcome Evaluation Pt is a 65 y/o F POD 1 s/p L TKA. Pt reports she lives with her spouse with 1 FARZANEH and was (I) with mobility prior to admission. Pt presents to PT with expected post-op pain and weakness. Pt performed supine to sit, STS txf, and ambulated 150' c RW requiring SPV. Pt demo's a slow antalgic gait but able to progress to a step-through pattern without a LOB. PT provided education regarding TKA protocol, home safety, and post-op care. Pt is safe to D/C home today with spouse and f/u with HHPT.  -CS       Row Name 12/17/24 0853          Therapy Assessment/Plan (PT)    Criteria for Skilled Interventions Met (PT) yes;meets criteria  -CS     Therapy Frequency (PT) evaluation only  -CS       Row Name 12/17/24 0853          Positioning and Restraints    Pre-Treatment Position in bed  -CS     Post Treatment Position chair  -CS     In Chair reclined;call light within reach;encouraged to call for assist;heels elevated  -CS               User Key  (r) = Recorded By, (t) = Taken By, (c) = Cosigned By      Initials Name Provider Type    CS Lucia Harris, PT Physical Therapist                    Outcome Measures       Row Name 12/17/24 0855          How much help from another person do you currently need...    Turning from your back to your side while in flat bed without using bedrails? 4  -CS     Moving from lying on back to sitting on the side of a flat bed without bedrails? 4  -CS     Moving to and from a bed to a chair (including a wheelchair)? 4  -CS     Standing up from a chair using your arms (e.g., wheelchair, bedside chair)? 4  -CS     Climbing 3-5 steps with a railing? 3  -CS     To walk in hospital room? 4  -CS     AM-PAC 6 Clicks Score (PT) 23  -CS     Highest Level of Mobility Goal 7 --> Walk 25 feet or more  -CS       Row Name 12/17/24 0855          Functional Assessment    Outcome Measure Options AM-PAC 6 Clicks Basic Mobility (PT)  -               User Key  (r) = Recorded By, (t) = Taken By, (c) = Cosigned By      Initials Name Provider Type    CS Lucia Harris, JENNIFER Physical Therapist                                 Physical Therapy Education       Title: PT OT SLP Therapies (Done)       Topic: Physical Therapy (Done)       Point: Mobility training (Done)       Learning Progress Summary            Patient Acceptance, E,TB, VU,DU by  at 12/17/2024 0855                      Point: Home exercise program (Done)       Learning Progress Summary            Patient Acceptance, E,TB, VU,DU by  at 12/17/2024 0855                      Point: Body mechanics (Done)       Learning Progress Summary            Patient Acceptance, E,TB, VU,DU by  at 12/17/2024 0855                      Point: Precautions (Done)       Learning Progress Summary            Patient Acceptance, E,TB, VU,DU by  at 12/17/2024 0855                                      User Key       Initials Effective Dates Name Provider Type Discipline     09/22/22 -  Lucia Harris, PT Physical Therapist PT                  PT Recommendation and Plan     Outcome Evaluation: Pt is a 65 y/o F POD 1 s/p L TKA. Pt reports she lives with  her spouse with 1 FARZANEH and was (I) with mobility prior to admission. Pt presents to PT with expected post-op pain and weakness. Pt performed supine to sit, STS txf, and ambulated 150' c RW requiring SPV. Pt demo's a slow antalgic gait but able to progress to a step-through pattern without a LOB. PT provided education regarding TKA protocol, home safety, and post-op care. Pt is safe to D/C home today with spouse and f/u with HHPT.     Time Calculation:         PT Charges       Row Name 12/17/24 0855             Time Calculation    Start Time 0829  -CS      Stop Time 0851  -CS      Time Calculation (min) 22 min  -CS      PT Received On 12/17/24  -CS         Time Calculation- PT    Total Timed Code Minutes- PT 18 minute(s)  -CS         Timed Charges    40749 - Gait Training Minutes  8  -CS      42041 - PT Therapeutic Activity Minutes 10  -CS         Total Minutes    Timed Charges Total Minutes 18  -CS       Total Minutes 18  -CS                User Key  (r) = Recorded By, (t) = Taken By, (c) = Cosigned By      Initials Name Provider Type    CS Lucia Harris, PT Physical Therapist                  Therapy Charges for Today       Code Description Service Date Service Provider Modifiers Qty    19538695009 HC PT THERAPEUTIC ACT EA 15 MIN 12/17/2024 Lucia Harris, PT GP 1    [id number]  PT EVAL LOW COMPLEXITY 2 12/17/2024 Lucia Harris, PT GP 1            PT G-Codes  Outcome Measure Options: AM-PAC 6 Clicks Basic Mobility (PT)  AM-PAC 6 Clicks Score (PT): 23  PT Discharge Summary  Anticipated Discharge Disposition (PT): home with assist, home with home health    Lucia Harris PT  12/17/2024

## 2024-12-18 ENCOUNTER — TRANSITIONAL CARE MANAGEMENT TELEPHONE ENCOUNTER (OUTPATIENT)
Dept: CALL CENTER | Facility: HOSPITAL | Age: 64
End: 2024-12-18
Payer: COMMERCIAL

## 2024-12-18 NOTE — OUTREACH NOTE
Call Center TCM Note      Flowsheet Row Responses   [facility], [place] by [facility] patient discharged from? [facility]   Does the patient have one of the following disease processes/diagnoses(primary or secondary)? Total Joint Replacement   Joint surgery performed? Knee   TCM attempt successful? Yes  [VR listing Raj]   Call start time 1203   Call end time 1218   Has the patient been back in either the hospital or Emergency Department since discharge? No   Discharge diagnosis Total knee arthroplasty   Does the patient have all medications related to this admission filled (includes all antibiotics, pain medications, etc.) Yes   Prescription comments Pt reports pain was 8-9/10 prior to Lortab, not 5/10,  block is worn off.   Is the patient taking all medications as directed (includes completed medication regime)? No   What is preventing the patient from taking all medications as directed? Other   Is the patient able to teach back alternate methods of pain control? Ice, Reposition   Nursing Interventions Nurse provided patient education, Advised patient to call provider   Medication comments Pt reports that she is using 5mg Lortabs from home, using total of 7.5mg lortabs every 5hr for pain control instead of the prescribed pain med. Per pt she is currently taking pepcid daily.   Comments Pt is eligible for TCM f/u appt within 14 days of DC.   Does the patient have an appointment with their PCP within 7-14 days of discharge? No   Nursing Interventions Patient declined scheduling/rescheduling appointment at this time, Patient desires to follow up with specialty only   What is the Home health agency?  [facility]    Has home health visited the patient within 72 hours of discharge? Yes   Home health comments Due 12-19-24.   DME comments Using walker for ambulation   Has the patient began therapy sessions (either in the home or as an out patient)? No   If the patient has started attending therapy, what post op day did they begin to attend  (either in home or as an out patient)?   12-19-24 will begin HH PT   Does the patient have a wound vac in place? No   Has the patient fallen since discharge? No   If the patient has fallen, were there any injuries? No   Comments BALJINDER dressing intact, no s/sx of infection, per pt report. Pt reports no issues tolerating po intake or voiding. Pt is ambulating with assist of walker, she reports. Pt reports that the prescribed pain meds are not controlling her pain, RN educated pt to contact surgeon's office for options. Pt reports that she had a few Lortabs left over from previous prescription that seem to be doing better at the pain control. RN educated pt on importance of notifying Ortho of this issue for options on pain control, pt v/u.   Did the patient receive a copy of their discharge instructions? Yes   Nursing interventions Reviewed instructions with patient   What is the patient's perception of their functional status since discharge? Improving   Is the patient able to teach back signs and symptoms of infection? Incisional drainage, Temp >100.4 for 24h or longer, Increased swelling or redness around incision (not associated with surgical edema), Severe discomfort or pain   Is the patient able to teach back how to prevent infection? Check incision daily, Wash hands before and after touching incision, Keep incision covered if drainage, Eat well-balanced diet   Is the patient able to teach back signs and symptoms of DVT? Redness in calf, Area hot to touch, Shortness of breath or chest pain   Is the patient able to teach back home safety measures? Modifications with ADLs such as dressing, cooking, toileting, Modifications to reach items, Accessibility to necessary areas in home   Did the patient implement home safety suggestions from pre-surgery classes if attended? No   If the patient is a current smoker, are they able to teach back resources for cessation? Not a smoker   Is the patient/caregiver able to teach  back the hierarchy of who to call/visit for symptoms/problems? PCP, Specialist, Home health nurse, Urgent Care, ED, 911 Yes   TCM call completed? Yes   Call end time 1218            Britt Zhong RN    12/18/2024, 12:18 EST

## 2024-12-23 ENCOUNTER — TELEPHONE (OUTPATIENT)
Dept: ORTHOPEDIC SURGERY | Facility: CLINIC | Age: 64
End: 2024-12-23
Payer: COMMERCIAL

## 2024-12-23 DIAGNOSIS — Z96.651 S/P TKR (TOTAL KNEE REPLACEMENT), RIGHT: Primary | ICD-10-CM

## 2024-12-23 RX ORDER — HYDROCODONE BITARTRATE AND ACETAMINOPHEN 7.5; 325 MG/1; MG/1
1 TABLET ORAL EVERY 6 HOURS PRN
Qty: 30 TABLET | Refills: 0 | Status: SHIPPED | OUTPATIENT
Start: 2024-12-23

## 2024-12-23 NOTE — TELEPHONE ENCOUNTER
Called patient and let her know that Kunal VELAZQUEZ that he has sent in hydrocodone 7.5 to her pharmacy.

## 2024-12-23 NOTE — TELEPHONE ENCOUNTER
Caller: LATASHA    Relationship: SELF    Best call back number: [phone]    What is the best time to reach you: ANY    Who are you requesting to speak with (clinical staff, provider,  specific staff member): CLINICAL    What was the call regarding: SHE WAS CALLED IN PERCOCET AFTER SX BUT THAT IS NOT HELPING HER PAIN- SHE IS TAKING HER HYDROCODONE 5 MG- TAKING ANOTHER HALF TO EQUAL 7.5 MG FOR HER SURGERY PAIN- SHE HAS 37 PERCOCET LEFT- PLEASE CALL

## 2024-12-24 ENCOUNTER — TELEPHONE (OUTPATIENT)
Dept: ORTHOPEDIC SURGERY | Facility: CLINIC | Age: 64
End: 2024-12-24

## 2024-12-30 ENCOUNTER — TELEPHONE (OUTPATIENT)
Dept: ORTHOPEDIC SURGERY | Facility: CLINIC | Age: 64
End: 2024-12-30
Payer: COMMERCIAL

## 2024-12-30 NOTE — TELEPHONE ENCOUNTER
Caller: LATASHA   Relationship to Patient: SLF  Phone Number: [phone] (home)   What form or medical record are you requesting:  ORDER FOR PT TO [facility] ON [address] - CAN SEE HER LATER THIS WEEK       How would you like to receive the form or medical records (pick-up, mail, fax): FAX  If fax, what is the fax number: [phone]

## 2025-01-06 DIAGNOSIS — M54.50 CHRONIC BILATERAL LOW BACK PAIN, UNSPECIFIED WHETHER SCIATICA PRESENT: ICD-10-CM

## 2025-01-06 DIAGNOSIS — R51.9 WORSENING HEADACHES: ICD-10-CM

## 2025-01-06 DIAGNOSIS — G03.9 ADHESIVE ARACHNOIDITIS: ICD-10-CM

## 2025-01-06 DIAGNOSIS — M54.42 CHRONIC LOW BACK PAIN WITH BILATERAL SCIATICA, UNSPECIFIED BACK PAIN LATERALITY: ICD-10-CM

## 2025-01-06 DIAGNOSIS — M17.11 ARTHRITIS OF RIGHT KNEE: ICD-10-CM

## 2025-01-06 DIAGNOSIS — G89.29 CHRONIC BILATERAL LOW BACK PAIN, UNSPECIFIED WHETHER SCIATICA PRESENT: ICD-10-CM

## 2025-01-06 DIAGNOSIS — M17.11 PRIMARY OSTEOARTHRITIS OF RIGHT KNEE: ICD-10-CM

## 2025-01-06 DIAGNOSIS — M54.41 CHRONIC LOW BACK PAIN WITH BILATERAL SCIATICA, UNSPECIFIED BACK PAIN LATERALITY: ICD-10-CM

## 2025-01-06 DIAGNOSIS — M51.369 LUMBAR DEGENERATIVE DISC DISEASE: ICD-10-CM

## 2025-01-06 DIAGNOSIS — G89.29 CHRONIC LOW BACK PAIN WITH BILATERAL SCIATICA, UNSPECIFIED BACK PAIN LATERALITY: ICD-10-CM

## 2025-01-06 DIAGNOSIS — G89.29 CHRONIC PAIN OF RIGHT KNEE: ICD-10-CM

## 2025-01-06 DIAGNOSIS — I10 HYPERTENSION, ESSENTIAL: ICD-10-CM

## 2025-01-06 DIAGNOSIS — M25.561 CHRONIC PAIN OF RIGHT KNEE: ICD-10-CM

## 2025-01-07 ENCOUNTER — TELEPHONE (OUTPATIENT)
Dept: ORTHOPEDIC SURGERY | Facility: CLINIC | Age: 65
End: 2025-01-07

## 2025-01-07 ENCOUNTER — OFFICE VISIT (OUTPATIENT)
Dept: ORTHOPEDIC SURGERY | Facility: CLINIC | Age: 65
End: 2025-01-07
Payer: COMMERCIAL

## 2025-01-07 VITALS — TEMPERATURE: 98.2 F | BODY MASS INDEX: 37.03 KG/M2 | HEIGHT: 63 IN | WEIGHT: 209 LBS

## 2025-01-07 DIAGNOSIS — Z96.651 S/P TKR (TOTAL KNEE REPLACEMENT), RIGHT: ICD-10-CM

## 2025-01-07 PROCEDURE — 99024 POSTOP FOLLOW-UP VISIT: CPT | Performed by: ORTHOPAEDIC SURGERY

## 2025-01-07 RX ORDER — HYDROCODONE BITARTRATE AND ACETAMINOPHEN 7.5; 325 MG/1; MG/1
1 TABLET ORAL EVERY 6 HOURS PRN
Qty: 12 TABLET | Refills: 0 | Status: SHIPPED | OUTPATIENT
Start: 2025-01-07

## 2025-01-07 RX ORDER — HYDROCODONE BITARTRATE AND ACETAMINOPHEN 5; 325 MG/1; MG/1
TABLET ORAL
Qty: 60 TABLET | Refills: 0 | OUTPATIENT
Start: 2025-01-07

## 2025-01-07 NOTE — PROGRESS NOTES
Kamini Orta : 1960 MRN: [id number] DATE: 2025    DIAGNOSIS: 2 week follow up left total knee      SUBJECTIVE:Patient returns today for 2 week follow up of left total knee replacement. Patient reports doing well with no unusual complaints. Appears to be progressing appropriately.    OBJECTIVE:   Exam:. The incision is healing appropriately. No sign of infection. Range of motion is progressing as expected. The calf is soft and nontender with a negative Homans sign.    ASSESSMENT: 2 week status post left knee replacement.    PLAN: 1) Staples removed and steri strips applied   2) Order given for PT   3) Discontinue HAYDEE hose   4) Continue ice PRN   5) aspirin 81 mg orally every day for 1 month   6) Follow up in 6 weeks with repeat Xrays of left knee (3views)    Bulmaro Wooten MD  2025

## 2025-01-07 NOTE — TELEPHONE ENCOUNTER
Caller: LATASHA   Relationship to Patient: SELF  Phone Number: [phone] (home)     Reason for Call: PATIENT CALLING DUE TO HER 1ST POST OP BEING TODAY, SHE IS UNABLE TO GET TO THE APPT DUE TO BEING [place], BUT SHE IS SUPPOSED TO GET HER BANDAGES TAKEN OFF TODAY, SHE IS WANTING TO KNOW IF SHE CAN HAVE SOMEONE CALL HER AND HAVE HER  HELP HER TAKE THEM OFF - ATTEMPTED TO WT

## 2025-01-28 ENCOUNTER — TELEPHONE (OUTPATIENT)
Dept: INTERNAL MEDICINE | Age: 65
End: 2025-01-28
Payer: COMMERCIAL

## 2025-01-28 DIAGNOSIS — Z96.651 S/P TKR (TOTAL KNEE REPLACEMENT), RIGHT: ICD-10-CM

## 2025-01-28 RX ORDER — PANTOPRAZOLE SODIUM 40 MG/1
40 TABLET, DELAYED RELEASE ORAL DAILY
Qty: 90 TABLET | Refills: 3 | Status: SHIPPED | OUTPATIENT
Start: 2025-01-28

## 2025-01-28 RX ORDER — HYDROCODONE BITARTRATE AND ACETAMINOPHEN 5; 325 MG/1; MG/1
1 TABLET ORAL EVERY 12 HOURS PRN
Qty: 60 TABLET | Refills: 0 | Status: SHIPPED | OUTPATIENT
Start: 2025-01-28

## 2025-01-28 NOTE — TELEPHONE ENCOUNTER
Last Norco sent by different physician  I will do the script for Norco and you can adjust   Pantoprazole r/f

## 2025-01-28 NOTE — TELEPHONE ENCOUNTER
Caller: You Kaminikeaton Garcia    Relationship: Self    Best call back number: [phone]     What medication are you requesting:     PANTOPRAZOLE (PROTONIX) 40 MG EC TABLET - 1 TABLET BY MOUTH DAILY.    HYDROCODONE-ACETAMINOPHEN (NORCO) 5-325 MG PER TABLET - 1 TABLET BY MOUTH EVERY 12 HOURS AS NEEDED.    What are your current symptoms: HYDROCODONE IS FOR BACK PAIN. PROTONIX IS FOR ACID REFLUX    Have you had these symptoms before:    [x] Yes  [] No    Have you been treated for these symptoms before:   [x] Yes  [] No    If a prescription is needed, what is your preferred pharmacy and phone number: [facility] [place] [id number]  - [phone] [facility] [phone]      Additional notes: PATIENT WAS TAKEN OFF OF MEDICATIONS WHEN SHE HAD SURGERY ON HER KNEE ON 12.16.2024. PATIENT NEEDING NEW PRESCRIPTIONS CALLED IN.

## 2025-02-16 NOTE — PROGRESS NOTES
Patient Name: Kamini Orta   YOB: 1960  Referring Primary Care Physician: Davin Hall MD  BMI: Body mass index is 35.43 kg/m².    Chief Complaint:    Chief Complaint   Patient presents with   • Left Knee - Follow-up, Pain, Edema, Injections        HPI:     Kamini Orta is a 60 y.o. female who presents today for evaluation of   Chief Complaint   Patient presents with   • Left Knee - Follow-up, Pain, Edema, Injections   . Patient presents for evaluation of bilateral knee pain. She has known history of osteoarthritis in bilateral knees. She reports an achy pain in both knees with intermittent stiffness and swelling. Her right knee hurts more than her left. She denies any locking or catching of either knee. She takes Tylenol, anti-inflammatories, and uses ice/heat/liniments as needed for pain control. Has had cortisone injections in the past which were initially very effective in controlling her pain. However, recently they wear off quickly and she is having increased pain. The pain is starting to affect her quality of life. In April she went to her primary care physician for her worsening knee pain and they obtained an MRI which essentially showed severe arthritis. She is interested in having a total knee arthroplasty but would like to wait until October because her brother is on palliative care and she is traveling to [place] to see him frequently.      Subjective   Medications:   Home Medications:  Current Outpatient Medications on File Prior to Visit   Medication Sig   • ALPRAZolam (XANAX) 0.5 MG tablet Take 0.5 mg by mouth Daily.   • chlordiazePOXIDE-Clidinium (LIBRAX PO) prn   • diclofenac sodium (VOTAREN XR) 100 MG 24 hr tablet TAKE ONE TABLET BY MOUTH EVERY DAY   • diclofenac sodium (VOTAREN XR) 100 MG 24 hr tablet TAKE ONE TABLET BY MOUTH EVERY DAY   • dicyclomine (BENTYL) 10 MG capsule Take 10 mg by mouth 2 (Two) Times a Day.   • esomeprazole (NexIUM) 40 MG capsule Take  "40 mg by mouth.   • esomeprazole (nexIUM) 40 MG capsule Daily.   • estradiol (MINIVELLE, VIVELLE-DOT) 0.075 MG/24HR patch    • estradiol (VIVELLE-DOT) 0.05 MG/24HR patch one film extended release transdermal 2 times a week   • FLUoxetine (PROzac) 10 MG capsule Take 10 mg by mouth Daily.   • FLUoxetine (PROzac) 40 MG capsule Take 40 mg by mouth Daily.   • fluticasone (FLONASE) 50 MCG/ACT nasal spray 2 sprays by Each Nare route Daily.   • HYDROcodone-acetaminophen (NORCO) 5-325 MG per tablet Every 12 (Twelve) Hours.   • HYDROcodone-acetaminophen (NORCO) 5-325 MG per tablet Every 12 (Twelve) Hours.   • HYDROcodone-acetaminophen (NORCO) 7.5-325 MG per tablet TAKE ONE TABLET BY MOUTH TWICE DAILY AS NEEDED   • levothyroxine (SYNTHROID, LEVOTHROID) 75 MCG tablet Take 75 mcg by mouth Daily.   • levothyroxine sodium (TIROSINT) 75 MCG capsule once a day   • lidocaine (LIDODERM) 5 % APPLY 1 PATCH EVERY DAY AS NEEDED 12 HOURS AND 12 HOURS OFF   • meclizine (ANTIVERT) 25 MG tablet TAKE ONE TABLET BY MOUTH THREE TIMES DAILY AS NEEDED FOR dizziness   • methylPREDNISolone (MEDROL) 4 MG dose pack Take as directed on package instructions.   • pantoprazole (PROTONIX) 40 MG EC tablet TAKE ONE TABLET BY MOUTH EVERY DAY AT 7   • rosuvastatin (CRESTOR) 10 MG tablet Take 10 mg by mouth Daily.     No current facility-administered medications on file prior to visit.     Current Medications:  Scheduled Meds:  Continuous Infusions:No current facility-administered medications for this visit.    PRN Meds:.    I have reviewed the patient's medical history in detail and updated the computerized patient record.  Review and summarization of old records includes:    Past Medical History:   Diagnosis Date   • Depression    • Diverticulitis    • Thyroid disease         Past Surgical History:   Procedure Laterality Date   •  SECTION      ALSO    • CHOLECYSTECTOMY     • HYSTERECTOMY     • KNEE ARTHROSCOPY Right     \"CLEAN UP\" " "  • KNEE ARTHROSCOPY W/ MENISCAL REPAIR Right 2004        Social History     Occupational History   • Not on file   Tobacco Use   • Smoking status: Former Smoker   • Smokeless tobacco: Never Used   Substance and Sexual Activity   • Alcohol use: No   • Drug use: No   • Sexual activity: Defer      Social History     Social History Narrative   • Not on file        Family History   Problem Relation Age of Onset   • Heart disease Other    • Lung disease Other        ROS: 14 point review of systems was performed and all other systems were reviewed and are negative except for documented findings in HPI and today's encounter.     Allergies:   Allergies   Allergen Reactions   • Codeine Anaphylaxis     Chest pains   • Erythromycin Diarrhea     Cramps     Constitutional:  Denies fever, shaking or chills   Eyes:  Denies change in visual acuity   HENT:  Denies nasal congestion or sore throat   Respiratory:  Denies cough or shortness of breath   Cardiovascular:  Denies chest pain or severe LE edema   GI:  Denies abdominal pain, nausea, vomiting, bloody stools or diarrhea   Musculoskeletal:  Numbness, tingling, pain, or loss of motor function only as noted above in history of present illness.  : Denies painful urination or hematuria  Integument:  Denies rash, lesion or ulceration   Neurologic:  Denies headache or focal weakness  Endocrine:  Denies lymphadenopathy  Psych:  Denies confusion or change in mental status   Hem:  Denies active bleeding    OBJECTIVE:  Physical Exam: 60 y.o. female  Wt Readings from Last 3 Encounters:   06/25/21 90.7 kg (200 lb)   04/02/21 90.7 kg (200 lb)   03/19/21 90.7 kg (200 lb)     Ht Readings from Last 1 Encounters:   06/25/21 160 cm (63\")     Body mass index is 35.43 kg/m².  Vitals:    06/25/21 1323   Temp: 98.4 °F (36.9 °C)     Vital signs reviewed.     General Appearance:    Alert, cooperative, in no acute distress                  Eyes: conjunctiva clear  ENT: external ears and nose " atraumatic  CV: no peripheral edema  Resp: normal respiratory effort  Skin: no rashes or wounds; normal turgor  Psych: mood and affect appropriate  Lymph: no nodes appreciated  Neuro: gross sensation intact  Vascular:  Palpable peripheral pulse in noted extremity  Musculoskeletal Extremities: Bilateral knees with medial joint line tenderness, swelling, synovitis, crepitation, effusion. Small Baker's cyst noted on the right. Calves are soft and nontender. Skin clean and intact with no rash or lesions    Radiology:   No x-rays obtained today. Reviewed AP, lateral, 40 degree PA of the right knee from 4/2/2021 which show severe tricompartmental osteoarthritis of bilateral knees, right worse than left graphically    Assessment:     ICD-10-CM ICD-9-CM   1. Arthritis of both knees  M17.0 716.96       Large Joint Arthrocentesis: R knee  Date/Time: 6/25/2021 1:53 PM  Consent given by: patient  Site marked: site marked  Timeout: Immediately prior to procedure a time out was called to verify the correct patient, procedure, equipment, support staff and site/side marked as required   Supporting Documentation  Indications: pain   Procedure Details  Location: knee - R knee  Preparation: Patient was prepped and draped in the usual sterile fashion  Needle gauge: 21G.  Approach: anterolateral  Medications administered: 80 mg methylPREDNISolone acetate 80 MG/ML; 4 mL lidocaine (cardiac)  Patient tolerance: patient tolerated the procedure well with no immediate complications    Large Joint Arthrocentesis: L knee  Date/Time: 6/25/2021 1:53 PM  Consent given by: patient  Site marked: site marked  Timeout: Immediately prior to procedure a time out was called to verify the correct patient, procedure, equipment, support staff and site/side marked as required   Supporting Documentation  Indications: pain   Procedure Details  Location: knee - L knee  Preparation: Patient was prepped and draped in the usual sterile fashion  Needle gauge:  21G.  Approach: anterolateral  Medications administered: 4 mL lidocaine (cardiac); 80 mg methylPREDNISolone acetate 80 MG/ML  Patient tolerance: patient tolerated the procedure well with no immediate complications            MDM/Plan:   The diagnosis(es), natural history, pathophysiology and treatment for diagnosis(es) were discussed. Opportunity given and questions answered.  Biomechanics of pertinent body areas discussed.  When appropriate, the use of ambulatory aids discussed.  EXERCISES:  Advice on benefits of, and types of regular/moderate exercise pertaining to orthopedic diagnosis(es).  MEDICATIONS:  The risks, benefits, warnings,side effects and alternatives of medications discussed.  Inflammation/pain control; with cold, heat, elevation and/or liniments discussed as appropriate  Cortisone Injection. See procedure note.  HOME EXERCISE/PT program encouraged  TKA: Continuation of conservative management vs. TKA: I reviewed anatomy of a total knee arthroplasty in laymen's terms, as well as typical postoperative recovery and possibly 6-12 months for maximal recovery, and possible need for rehabilitation stay after hospitalization. We also discussed risks, benefits, alternatives, and limitations of procedure with risks including but not limited to neurovascular damage, bleeding, infection, malalignment, chronic pain, failure of implants, osteolysis, loosening of implants, loss of motion, weakness, stiffness, instability, DVT, pulmonary embolus, death, stroke, complex regional pain syndrome, myocardial infarction, and need for additional procedures. Concept of substitution vs. replacement discussed.  No guarantees were given regarding results of surgery.  Patient verbalized understanding, and was given the opportunity to ask and have all questions answered today.    We discussed treatment options for arthritis of the knees. Patient elects to have a cortisone injection to bilateral knees today. She wants to have a  right total knee arthroplasty in the fall, possibly October. We discussed surgery and I placed a case request today. She understands she has to wait 3 months from her injections today to schedule surgery. Encouraged her to continue quad strengthening exercises.      6/25/2021    Much of this encounter note is an electronic transcription/translation of spoken language to printed text. The electronic translation of spoken language may permit erroneous, or at times, nonsensical words or phrases to be inadvertently transcribed; Although I have reviewed the note for such errors, some may still exist     None

## 2025-02-17 ENCOUNTER — TELEPHONE (OUTPATIENT)
Dept: OBSTETRICS AND GYNECOLOGY | Facility: CLINIC | Age: 65
End: 2025-02-17

## 2025-02-17 NOTE — TELEPHONE ENCOUNTER
Caller: Kamini Orta    Relationship:  Self    Best call back number: [phone]      PATIENT CALLED REQUESTING TO CANCEL SAME DAY APPT.    Did the patient call AFTER the start time of their scheduled appointment?  []YES  [x]NO    Was the patient's appointment rescheduled? [x]YES  []NO    Any additional information: RECENT KNEE SURGERY / WEATHER

## 2025-02-27 ENCOUNTER — LAB (OUTPATIENT)
Dept: LAB | Facility: HOSPITAL | Age: 65
End: 2025-02-27
Payer: COMMERCIAL

## 2025-02-27 DIAGNOSIS — M51.369 LUMBAR DEGENERATIVE DISC DISEASE: ICD-10-CM

## 2025-02-27 DIAGNOSIS — M25.561 CHRONIC PAIN OF RIGHT KNEE: ICD-10-CM

## 2025-02-27 DIAGNOSIS — M17.11 ARTHRITIS OF RIGHT KNEE: ICD-10-CM

## 2025-02-27 DIAGNOSIS — G89.29 CHRONIC PAIN OF RIGHT KNEE: ICD-10-CM

## 2025-02-27 DIAGNOSIS — I10 HYPERTENSION, ESSENTIAL: ICD-10-CM

## 2025-02-27 DIAGNOSIS — G89.29 CHRONIC LOW BACK PAIN WITH BILATERAL SCIATICA, UNSPECIFIED BACK PAIN LATERALITY: ICD-10-CM

## 2025-02-27 DIAGNOSIS — M54.42 CHRONIC LOW BACK PAIN WITH BILATERAL SCIATICA, UNSPECIFIED BACK PAIN LATERALITY: ICD-10-CM

## 2025-02-27 DIAGNOSIS — G89.29 CHRONIC BILATERAL LOW BACK PAIN, UNSPECIFIED WHETHER SCIATICA PRESENT: ICD-10-CM

## 2025-02-27 DIAGNOSIS — R51.9 WORSENING HEADACHES: ICD-10-CM

## 2025-02-27 DIAGNOSIS — G03.9 ADHESIVE ARACHNOIDITIS: ICD-10-CM

## 2025-02-27 DIAGNOSIS — M54.41 CHRONIC LOW BACK PAIN WITH BILATERAL SCIATICA, UNSPECIFIED BACK PAIN LATERALITY: ICD-10-CM

## 2025-02-27 DIAGNOSIS — M17.11 PRIMARY OSTEOARTHRITIS OF RIGHT KNEE: ICD-10-CM

## 2025-02-27 DIAGNOSIS — M54.50 CHRONIC BILATERAL LOW BACK PAIN, UNSPECIFIED WHETHER SCIATICA PRESENT: ICD-10-CM

## 2025-02-27 LAB
ALBUMIN SERPL-MCNC: 4.1 G/DL (ref 3.5–5.2)
ALBUMIN/GLOB SERPL: 1.4 G/DL
ALP SERPL-CCNC: 77 U/L (ref 39–117)
ALT SERPL W P-5'-P-CCNC: 15 U/L (ref 1–33)
ANION GAP SERPL CALCULATED.3IONS-SCNC: 10.5 MMOL/L (ref 5–15)
AST SERPL-CCNC: 33 U/L (ref 1–32)
BASOPHILS # BLD AUTO: 0.07 10*3/MM3 (ref 0–0.2)
BASOPHILS NFR BLD AUTO: 0.9 % (ref 0–1.5)
BILIRUB SERPL-MCNC: <0.2 MG/DL (ref 0–1.2)
BUN SERPL-MCNC: 9 MG/DL (ref 8–23)
BUN/CREAT SERPL: 9.4 (ref 7–25)
CALCIUM SPEC-SCNC: 9.5 MG/DL (ref 8.6–10.5)
CHLORIDE SERPL-SCNC: 100 MMOL/L (ref 98–107)
CHOLEST SERPL-MCNC: 167 MG/DL (ref 0–200)
CO2 SERPL-SCNC: 28.5 MMOL/L (ref 22–29)
CREAT SERPL-MCNC: 0.96 MG/DL (ref 0.57–1)
DEPRECATED RDW RBC AUTO: 44.5 FL (ref 37–54)
EGFRCR SERPLBLD CKD-EPI 2021: 66.6 ML/MIN/1.73
EOSINOPHIL # BLD AUTO: 0.21 10*3/MM3 (ref 0–0.4)
EOSINOPHIL NFR BLD AUTO: 2.7 % (ref 0.3–6.2)
ERYTHROCYTE [DISTWIDTH] IN BLOOD BY AUTOMATED COUNT: 14.7 % (ref 12.3–15.4)
GLOBULIN UR ELPH-MCNC: 2.9 GM/DL
GLUCOSE SERPL-MCNC: 94 MG/DL (ref 65–99)
HBA1C MFR BLD: 6 % (ref 4.8–5.6)
HCT VFR BLD AUTO: 37.9 % (ref 34–46.6)
HDLC SERPL-MCNC: 43 MG/DL (ref 40–60)
HGB BLD-MCNC: 11.9 G/DL (ref 12–15.9)
IMM GRANULOCYTES # BLD AUTO: 0.02 10*3/MM3 (ref 0–0.05)
IMM GRANULOCYTES NFR BLD AUTO: 0.3 % (ref 0–0.5)
LDLC SERPL CALC-MCNC: 80 MG/DL (ref 0–100)
LDLC/HDLC SERPL: 1.65 {RATIO}
LYMPHOCYTES # BLD AUTO: 3 10*3/MM3 (ref 0.7–3.1)
LYMPHOCYTES NFR BLD AUTO: 39 % (ref 19.6–45.3)
MCH RBC QN AUTO: 26.2 PG (ref 26.6–33)
MCHC RBC AUTO-ENTMCNC: 31.4 G/DL (ref 31.5–35.7)
MCV RBC AUTO: 83.3 FL (ref 79–97)
MONOCYTES # BLD AUTO: 0.54 10*3/MM3 (ref 0.1–0.9)
MONOCYTES NFR BLD AUTO: 7 % (ref 5–12)
NEUTROPHILS NFR BLD AUTO: 3.86 10*3/MM3 (ref 1.7–7)
NEUTROPHILS NFR BLD AUTO: 50.1 % (ref 42.7–76)
NRBC BLD AUTO-RTO: 0 /100 WBC (ref 0–0.2)
PLATELET # BLD AUTO: 327 10*3/MM3 (ref 140–450)
PMV BLD AUTO: 10.3 FL (ref 6–12)
POTASSIUM SERPL-SCNC: 4.3 MMOL/L (ref 3.5–5.2)
PROT SERPL-MCNC: 7 G/DL (ref 6–8.5)
RBC # BLD AUTO: 4.55 10*6/MM3 (ref 3.77–5.28)
SODIUM SERPL-SCNC: 139 MMOL/L (ref 136–145)
T4 FREE SERPL-MCNC: 1.38 NG/DL (ref 0.92–1.68)
TRIGL SERPL-MCNC: 266 MG/DL (ref 0–150)
TSH SERPL DL<=0.05 MIU/L-ACNC: 0.99 UIU/ML (ref 0.27–4.2)
VLDLC SERPL-MCNC: 44 MG/DL (ref 5–40)
WBC NRBC COR # BLD AUTO: 7.7 10*3/MM3 (ref 3.4–10.8)

## 2025-02-27 PROCEDURE — 36415 COLL VENOUS BLD VENIPUNCTURE: CPT

## 2025-02-27 PROCEDURE — 84439 ASSAY OF FREE THYROXINE: CPT

## 2025-02-27 PROCEDURE — 80050 GENERAL HEALTH PANEL: CPT

## 2025-02-27 PROCEDURE — 80061 LIPID PANEL: CPT

## 2025-02-27 PROCEDURE — 83036 HEMOGLOBIN GLYCOSYLATED A1C: CPT

## 2025-02-28 ENCOUNTER — OFFICE VISIT (OUTPATIENT)
Dept: ORTHOPEDIC SURGERY | Facility: CLINIC | Age: 65
End: 2025-02-28
Payer: COMMERCIAL

## 2025-02-28 VITALS — BODY MASS INDEX: 38.09 KG/M2 | TEMPERATURE: 97.8 F | HEIGHT: 63 IN | WEIGHT: 215 LBS

## 2025-02-28 DIAGNOSIS — Z96.652 STATUS POST TOTAL KNEE REPLACEMENT, LEFT: Primary | ICD-10-CM

## 2025-02-28 DIAGNOSIS — Z98.890 S/P KNEE SURGERY: ICD-10-CM

## 2025-02-28 PROCEDURE — 99024 POSTOP FOLLOW-UP VISIT: CPT | Performed by: NURSE PRACTITIONER

## 2025-02-28 NOTE — PROGRESS NOTES
Kamini Orta : 1960 MRN: [id number] DATE: 2025    DIAGNOSIS: 8 week follow up left total knee      SUBJECTIVE:Patient returns today for 8 week follow up of left total knee replacement. Patient reports doing well with no unusual complaints.  She reports that her pain level is very minimal and currently rates it today as a scale of 0 out of 10.  States that she still going to outpatient physical therapy at [facility] as well as doing home exercises.  Appears to be progressing appropriately as she is ambulating full weightbearing and walks unassisted in clinic today.  She denies any instability or buckling issues.  Denies any sign or symptoms of infection, and is without any other significant complaints today.    OBJECTIVE:   Exam:. The incision is well healed. No sign of infection. Range of motion is measured at 0 to 120. The calf is soft and nontender with a negative Homans sign. Strength is progressing and the patient is ambulating appropriately.    DIAGNOSTIC STUDIES  Xrays: 3 views of the left knee (AP, lateral, and sunrise) were ordered and reviewed for evaluation of recent knee replacement. They demonstrate a well positioned, well aligned knee replacement without complicating factors noted. In comparison with previous films there has been no change.    ASSESSMENT: 8 week status post left knee replacement.    PLAN: 1) Continue with PT exercises as prescribed   2) Follow up in 10 months for annual visit    CAROLINE Rios  2025

## 2025-03-05 ENCOUNTER — OFFICE VISIT (OUTPATIENT)
Dept: INTERNAL MEDICINE | Age: 65
End: 2025-03-05
Payer: COMMERCIAL

## 2025-03-05 ENCOUNTER — OFFICE VISIT (OUTPATIENT)
Dept: OBSTETRICS AND GYNECOLOGY | Facility: CLINIC | Age: 65
End: 2025-03-05
Payer: COMMERCIAL

## 2025-03-05 VITALS
HEART RATE: 79 BPM | HEIGHT: 63 IN | BODY MASS INDEX: 37.56 KG/M2 | DIASTOLIC BLOOD PRESSURE: 76 MMHG | WEIGHT: 212 LBS | SYSTOLIC BLOOD PRESSURE: 119 MMHG

## 2025-03-05 VITALS
HEART RATE: 64 BPM | WEIGHT: 212.8 LBS | TEMPERATURE: 97.5 F | DIASTOLIC BLOOD PRESSURE: 85 MMHG | HEIGHT: 63 IN | BODY MASS INDEX: 37.7 KG/M2 | OXYGEN SATURATION: 93 % | SYSTOLIC BLOOD PRESSURE: 140 MMHG

## 2025-03-05 DIAGNOSIS — Z01.419 ENCOUNTER FOR GYNECOLOGICAL EXAMINATION WITHOUT ABNORMAL FINDING: Primary | ICD-10-CM

## 2025-03-05 DIAGNOSIS — L98.9 SKIN LESION OF NECK: ICD-10-CM

## 2025-03-05 DIAGNOSIS — Z79.890 HORMONE REPLACEMENT THERAPY (HRT): ICD-10-CM

## 2025-03-05 DIAGNOSIS — E66.09 CLASS 2 OBESITY DUE TO EXCESS CALORIES WITHOUT SERIOUS COMORBIDITY WITH BODY MASS INDEX (BMI) OF 37.0 TO 37.9 IN ADULT: ICD-10-CM

## 2025-03-05 DIAGNOSIS — M51.360 DEGENERATION OF INTERVERTEBRAL DISC OF LUMBAR REGION WITH DISCOGENIC BACK PAIN: ICD-10-CM

## 2025-03-05 DIAGNOSIS — K21.9 GASTROESOPHAGEAL REFLUX DISEASE WITHOUT ESOPHAGITIS: ICD-10-CM

## 2025-03-05 DIAGNOSIS — E66.812 CLASS 2 OBESITY DUE TO EXCESS CALORIES WITHOUT SERIOUS COMORBIDITY WITH BODY MASS INDEX (BMI) OF 37.0 TO 37.9 IN ADULT: ICD-10-CM

## 2025-03-05 DIAGNOSIS — I10 HYPERTENSION, ESSENTIAL: ICD-10-CM

## 2025-03-05 DIAGNOSIS — M17.0 ARTHRITIS OF BOTH KNEES: Primary | ICD-10-CM

## 2025-03-05 DIAGNOSIS — E06.3 HYPOTHYROIDISM DUE TO HASHIMOTO THYROIDITIS: ICD-10-CM

## 2025-03-05 DIAGNOSIS — M17.12 PRIMARY OSTEOARTHRITIS OF LEFT KNEE: ICD-10-CM

## 2025-03-05 DIAGNOSIS — R73.01 IFG (IMPAIRED FASTING GLUCOSE): ICD-10-CM

## 2025-03-05 RX ORDER — ESTRADIOL 0.05 MG/D
1 PATCH, EXTENDED RELEASE TRANSDERMAL 2 TIMES WEEKLY
Qty: 24 PATCH | Refills: 4 | Status: SHIPPED | OUTPATIENT
Start: 2025-03-06

## 2025-03-05 NOTE — PROGRESS NOTES
"Chief Complaint   Patient presents with    Follow-up     63 yo female presents for 6 month f/u. Pt has had labs. Pt wishes to discuss red spot on back of right side neck.     Hypertension        Objective   Vital Signs  Vitals:    03/05/25 1301   BP: 140/85   BP Location: Right arm   Patient Position: Sitting   Cuff Size: Adult   Pulse: 64   Temp: 97.5 °F (36.4 °C)   TempSrc: Skin   SpO2: 93%   Weight: 96.5 kg (212 lb 12.8 oz)   Height: 160 cm (63\")      Body mass index is 37.7 kg/m².  Review of Systems   Constitutional: Negative.    HENT: Negative.     Eyes: Negative.    Respiratory: Negative.     Cardiovascular: Negative.    Gastrointestinal: Negative.    Endocrine: Negative.    Genitourinary: Negative.    Musculoskeletal: Negative.    Allergic/Immunologic: Negative.    Neurological: Negative.    Hematological: Negative.    Psychiatric/Behavioral: Negative.        Physical Exam  Constitutional:       General: She is not in acute distress.     Appearance: Normal appearance. She is obese.   HENT:      Head: Normocephalic.      Mouth/Throat:      Mouth: Mucous membranes are moist.   Eyes:      Conjunctiva/sclera: Conjunctivae normal.      Pupils: Pupils are equal, round, and reactive to light.   Cardiovascular:      Rate and Rhythm: Normal rate and regular rhythm.      Pulses: Normal pulses.      Heart sounds: Normal heart sounds.   Pulmonary:      Effort: Pulmonary effort is normal.      Breath sounds: Normal breath sounds.   Abdominal:      General: Bowel sounds are normal.      Palpations: Abdomen is soft.   Musculoskeletal:         General: No swelling. Normal range of motion.      Cervical back: Neck supple.   Skin:     General: Skin is warm and dry.      Coloration: Skin is not jaundiced.   Neurological:      General: No focal deficit present.      Mental Status: She is alert and oriented to person, place, and time. Mental status is at baseline.   Psychiatric:         Mood and Affect: Mood normal.         " "Behavior: Behavior normal.         Thought Content: Thought content normal.         Judgment: Judgment normal.        Result Review :   No results found for: \"PROBNP\", \"BNP\"  CMP          8/30/2024    09:32 12/2/2024    09:58 2/27/2025    11:22   CMP   Glucose 100  125  94    BUN 14  14  9    Creatinine 0.96  0.95  0.96    EGFR 67.0  67.9  66.6    Sodium 140  139  139    Potassium 4.4  3.6  4.3    Chloride 101  102  100    Calcium 9.9  8.8  9.5    Total Protein 6.8   7.0    Albumin 4.3   4.1    Globulin 2.5   2.9    Total Bilirubin 0.2   <0.2    Alkaline Phosphatase 72   77    AST (SGOT) 21   33    ALT (SGPT) 16   15    Albumin/Globulin Ratio 1.7   1.4    BUN/Creatinine Ratio 14.6  14.7  9.4    Anion Gap 10.0  10.0  10.5      CBC w/diff          12/2/2024    09:58 12/17/2024    05:47 2/27/2025    11:22   CBC w/Diff   WBC 6.58   7.70    RBC 4.17   4.55    Hemoglobin 12.2  10.0  11.9    Hematocrit 36.8  29.9  37.9    MCV 88.2   83.3    MCH 29.3   26.2    MCHC 33.2   31.4    RDW 13.5   14.7    Platelets 263   327    Neutrophil Rel %   50.1    Immature Granulocyte Rel %   0.3    Lymphocyte Rel %   39.0    Monocyte Rel %   7.0    Eosinophil Rel %   2.7    Basophil Rel %   0.9       Lipid Panel          2/27/2025    11:22   Lipid Panel   Total Cholesterol 167    Triglycerides 266    HDL Cholesterol 43    VLDL Cholesterol 44    LDL Cholesterol  80    LDL/HDL Ratio 1.65       Lab Results   Component Value Date    TSH 0.993 02/27/2025    TSH 1.540 02/28/2024    TSH 1.550 09/19/2023      Lab Results   Component Value Date    FREET4 1.38 02/27/2025    FREET4 1.52 02/28/2024    FREET4 1.49 09/19/2023      A1C Last 3 Results          8/30/2024    09:32 2/27/2025    11:22   HGBA1C Last 3 Results   Hemoglobin A1C 5.90  6.00                        Visit Diagnoses:    ICD-10-CM ICD-9-CM   1. Arthritis of both knees  M17.0 716.96   2. Degeneration of intervertebral disc of lumbar region with discogenic back pain  M51.360 722.52   3. " Primary osteoarthritis of left knee  M17.12 715.16   4. Hypertension, essential  I10 401.9   5. IFG (impaired fasting glucose)  R73.01 790.21   6. Hypothyroidism due to Hashimoto thyroiditis  E06.3 245.2   7. Gastroesophageal reflux disease without esophagitis  K21.9 530.81   8. Class 2 obesity due to excess calories without serious comorbidity with body mass index (BMI) of 37.0 to 37.9 in adult  E66.812 278.00    E66.09 V85.37    Z68.37    9. Skin lesion of neck  L98.9 709.9       Assessment and Plan   Diagnoses and all orders for this visit:    1. Arthritis of both knees (Primary)  -     Comprehensive Metabolic Panel; Future  -     CBC & Differential; Future  -     Hemoglobin A1c; Future  -     TSH+Free T4; Future    2. Degeneration of intervertebral disc of lumbar region with discogenic back pain  -     Comprehensive Metabolic Panel; Future  -     CBC & Differential; Future  -     Hemoglobin A1c; Future  -     TSH+Free T4; Future    3. Primary osteoarthritis of left knee  -     Comprehensive Metabolic Panel; Future  -     CBC & Differential; Future  -     Hemoglobin A1c; Future  -     TSH+Free T4; Future    4. Hypertension, essential  -     Comprehensive Metabolic Panel; Future  -     CBC & Differential; Future  -     Hemoglobin A1c; Future  -     TSH+Free T4; Future    5. IFG (impaired fasting glucose)  -     Comprehensive Metabolic Panel; Future  -     CBC & Differential; Future  -     Hemoglobin A1c; Future  -     TSH+Free T4; Future    6. Hypothyroidism due to Hashimoto thyroiditis  -     Comprehensive Metabolic Panel; Future  -     CBC & Differential; Future  -     Hemoglobin A1c; Future  -     TSH+Free T4; Future    7. Gastroesophageal reflux disease without esophagitis  -     Comprehensive Metabolic Panel; Future  -     CBC & Differential; Future  -     Hemoglobin A1c; Future  -     TSH+Free T4; Future    8. Class 2 obesity due to excess calories without serious comorbidity with body mass index (BMI) of  37.0 to 37.9 in adult  -     Comprehensive Metabolic Panel; Future  -     CBC & Differential; Future  -     Hemoglobin A1c; Future  -     TSH+Free T4; Future    9. 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 Mccarty in [place],--November 14, 2023,, left total knee arthroplasty December 16, 2024 Dr. Bulmaro Wooten    Postop acute blood loss anemia improving slowly February 27, 2025 okay to use iron supplements biotin over-the-counter for hair loss also,    IFG , 6.0 February 27, 2025    Back pain, sciatica, r leg, --MRI November 2021 showed a left L5-S1 disc paracentral extrusion with multiple levels of foraminal stenosis on both sides throughout the spine, with degenerative disc disease noted, patient did go see Ortho spine surgeon in [place] ---was possibly going to have surgery but then they called and said they would not do it due to her increased risk and back issues and stability, discussed May 2022--- we will make referral to [facility] [place] for second opinion given the severity of the discs and the continued back pain that she is having, May 2022, patient is decreased her diclofenac to 50 mg daily, organ to decrease her pain management medication Norco to twice a day dosing as needed monthly March 4, 2024    colonoscopy August 2022, CT scan abdomen and pelvis June 24, 2022,--diverticulosis no  evidence of diverticulitis or serious findings--- dr oshea    hypertension  continues ---valsartan HCT 80/12.5 mg daily,,     Hypothyroidism, cont levothyroxine 0.075 mg qd     obesity -    Mammograms, November 20, 2024 benign    Patient quit smoking 12 years ago    Menopause symptoms, continues on estradiol Vivelle dot patch 0.05 mg 2 times weekly,    GERD, continues Pepcid 10 mg nightly, protonix 40 mg q am    CHRONIC PAIN ISSUES DISCUSSED --WITH PAIN IN NECK , BACK PAIN===  continues diclofenac 50 mg XR bid, baclofen 10 mg twice a day as needed, Norco 5-3 25 every 12 hours as needed    DEPRESSION,--continues Xanax 0.5 twice a day, Prozac 40 mg daily,    ELEVATED CHOL, TRIG,-continues Crestor,               Follow Up   Return in about 6 months (around 9/5/2025) for Next scheduled follow up.  Patient was given instructions and counseling regarding her condition or for health maintenance advice. Please see specific information pulled into the AVS if appropriate.           Answers submitted by the patient for this visit:  Problem not listed (Submitted on 3/3/2025)  Chief Complaint: Other medical problem  Reason for appointment: routine check-up  Additional information: routine check-up

## 2025-03-05 NOTE — PROGRESS NOTES
"Well Woman Visit    CC: Annual well woman exam       HPI:   64 y.o. Contraception or HRT: s/p HYST BSO, benign indications  Menses:   none  Pain:  None  Incontinence concerns: No  Hx of abnormal pap:  No  Pt has no complaints today. Doing well on estrogen patches and desires refills.       History: PMHx, Meds, Allergies, PSHx, Social Hx, and POBHx all reviewed and updated.      PHYSICAL EXAM:  /76   Pulse 79   Ht 160 cm (63\")   Wt 96.2 kg (212 lb)   BMI 37.55 kg/m²   General- NAD, alert and oriented, appropriate  Psych- Normal mood, good memory  Neck- No masses, no thyroid enlargement  CV- Regular rhythm, no murnurs  Resp- CTA to bases, no wheezes  Abdomen- Soft, non distended, non tender, no masses    Breast left-  Bilaterally symmetrical, no masses, non tender, no nipple discharge  Breast right- Bilaterally symmetrical, no masses, non tender, no nipple discharge    External genitalia- Normal female, no lesions  Urethra/meatus- Normal, no masses, non tender, no prolapse  Bladder- Normal, no masses, non tender, no prolapse  Vagina- + atrophy, no lesions, no discharge, no prolapse  Cvx- Absent  Uterus- Absent  Adnexa- Absent  Anus/Rectum/Perineum- Small hemorrhoids, non thrombosed, Hemoccult neg    Lymphatic- No palpable neck, axillary, or groin nodes  Ext- No edema, no cyanosis    Skin- No lesions, no rashes, no acanthosis nigricans        ASSESSMENT and PLAN:  WWE and HRT/ERT Surveillance    Diagnoses and all orders for this visit:    1. Encounter for gynecological examination without abnormal finding (Primary)  -     POC Occult Blood Stool    2. Hormone replacement therapy (HRT)  -     estradiol (Vivelle-Dot) 0.05 MG/24HR patch; Place 1 patch on the skin as directed by provider 2 (Two) Times a Week.  Dispense: 24 patch; Refill: 4        Counseling:     OCP/Hormone use risk HAYDEE    Domestic violence/abuse screen: negative    Depression screen: no SI    Preventative:   BREAST HEALTH- Monthly self " breast exam importance and how to reviewed. MMG and/or MRI (prn) reviewed per society guidelines and her individual history. Mammo/MRI screen: Already up to date.  CERVICAL CANCER Screening- Reviewed current ASCCP guidelines for screening w and wo cotest HPV, age specific.  Screen: Not medically needed.  COLON CANCER Screening- Reviewed current medical society guidelines and options.  Colonoscopy screen:  Already up to date.  SEXUAL HEALTH: Declines STD screening.  BONE HEALTH- Reviewed current medical society guidelines and options for testing, calcium and vit D intake.  Weight bearing exercise.  DEXA: Pt will call to schedule.  VACCINATIONS Recommended: Flu annually, Zoster (49yo and older).  Importance discussed, risk being unvaccinated reviewed.  Questions answered  Smoking status- NON SMOKER.  Importance of avoiding second hand smoke.  Follow up PCP/Specialist PMHx and Labs  Myriad : pt reports she was tested in the past and it was negative      She understands the importance of having any ordered tests to be performed in a timely fashion.  She is encouraged to review her results online and/or contact or office if she has questions.     Follow Up:  Return in about 1 year (around 3/5/2026) for Annual physical.      Shelley Drew, CAROLINE  03/05/2025

## 2025-03-06 ENCOUNTER — TELEPHONE (OUTPATIENT)
Dept: INTERNAL MEDICINE | Age: 65
End: 2025-03-06
Payer: COMMERCIAL

## 2025-03-06 NOTE — TELEPHONE ENCOUNTER
Caller: You Kamini Jose    Relationship: Self    Best call back number: [phone]     What form or medical record are you requesting: PERMANENT MEDICAL EXEMPTION FORM FOR JURY DUTY    Who is requesting this form or medical record from you: [facility]    How would you like to receive the form or medical records (pick-up, mail, fax): MAIL    If mail, what is the address: [address] [place] [address]    Timeframe paperwork needed: AS SOON AS POSSIBLE    Additional notes: PATIENT NEEDING A LETTER STATING THAT DUE TO HER EXISTING MEDICAL CONDITIONS, SHE IS UNABLE TO SERVE HER TERM OF JURY DUTY. PATIENT REQUESTING A PERMANENT MEDICAL EXEMPTION.

## 2025-03-13 DIAGNOSIS — Z96.651 S/P TKR (TOTAL KNEE REPLACEMENT), RIGHT: ICD-10-CM

## 2025-03-13 RX ORDER — HYDROCODONE BITARTRATE AND ACETAMINOPHEN 5; 325 MG/1; MG/1
1 TABLET ORAL EVERY 12 HOURS PRN
Qty: 60 TABLET | Refills: 0 | Status: SHIPPED | OUTPATIENT
Start: 2025-03-13

## 2025-03-19 ENCOUNTER — TELEPHONE (OUTPATIENT)
Dept: INTERNAL MEDICINE | Age: 65
End: 2025-03-19
Payer: COMMERCIAL

## 2025-03-19 NOTE — TELEPHONE ENCOUNTER
Name: Kamini Orta    Relationship: Self    Best Callback Number:   Telephone Information:   Mobile [phone]         HUB PROVIDED THE RELAY MESSAGE FROM THE OFFICE   PATIENT VOICED UNDERSTANDING AND HAS NO FURTHER QUESTIONS AT THIS TIME    ADDITIONAL INFORMATION:

## 2025-03-19 NOTE — TELEPHONE ENCOUNTER
Spoke w/ Sirena,  for  [employer] regarding jury duty exemption letter.    States they received the letter, but will need pt to fill out the front and back of her Juror qualification form and submit that to their office in order to be exempt from jury duty.    Sirena's Ph #:[phone] if pt has any questions.    Lvm for return call, Hub may relay.

## 2025-04-24 DIAGNOSIS — M25.561 CHRONIC PAIN OF RIGHT KNEE: ICD-10-CM

## 2025-04-24 DIAGNOSIS — G89.29 CHRONIC BILATERAL LOW BACK PAIN, UNSPECIFIED WHETHER SCIATICA PRESENT: ICD-10-CM

## 2025-04-24 DIAGNOSIS — G89.29 CHRONIC LOW BACK PAIN, UNSPECIFIED BACK PAIN LATERALITY, UNSPECIFIED WHETHER SCIATICA PRESENT: ICD-10-CM

## 2025-04-24 DIAGNOSIS — I10 HYPERTENSION, ESSENTIAL: ICD-10-CM

## 2025-04-24 DIAGNOSIS — M54.50 CHRONIC LOW BACK PAIN, UNSPECIFIED BACK PAIN LATERALITY, UNSPECIFIED WHETHER SCIATICA PRESENT: ICD-10-CM

## 2025-04-24 DIAGNOSIS — I51.7 ASYMMETRIC SEPTAL HYPERTROPHY: ICD-10-CM

## 2025-04-24 DIAGNOSIS — M51.369 LUMBAR DEGENERATIVE DISC DISEASE: ICD-10-CM

## 2025-04-24 DIAGNOSIS — G89.29 CHRONIC PAIN OF RIGHT KNEE: ICD-10-CM

## 2025-04-24 DIAGNOSIS — M17.11 ARTHRITIS OF RIGHT KNEE: ICD-10-CM

## 2025-04-24 DIAGNOSIS — M17.11 PRIMARY OSTEOARTHRITIS OF RIGHT KNEE: ICD-10-CM

## 2025-04-24 DIAGNOSIS — M54.50 CHRONIC BILATERAL LOW BACK PAIN, UNSPECIFIED WHETHER SCIATICA PRESENT: ICD-10-CM

## 2025-04-24 RX ORDER — ROSUVASTATIN CALCIUM 10 MG/1
10 TABLET, COATED ORAL DAILY
Qty: 90 TABLET | Refills: 3 | Status: SHIPPED | OUTPATIENT
Start: 2025-04-24

## 2025-04-24 RX ORDER — ALPRAZOLAM 0.5 MG
0.5 TABLET ORAL 2 TIMES DAILY
Qty: 60 TABLET | Refills: 5 | Status: CANCELLED | OUTPATIENT
Start: 2025-04-24

## 2025-04-25 DIAGNOSIS — G89.29 CHRONIC BILATERAL LOW BACK PAIN, UNSPECIFIED WHETHER SCIATICA PRESENT: ICD-10-CM

## 2025-04-25 DIAGNOSIS — M54.50 CHRONIC BILATERAL LOW BACK PAIN, UNSPECIFIED WHETHER SCIATICA PRESENT: ICD-10-CM

## 2025-04-25 RX ORDER — ALPRAZOLAM 0.5 MG
0.5 TABLET ORAL 2 TIMES DAILY
Qty: 60 TABLET | Refills: 5 | Status: SHIPPED | OUTPATIENT
Start: 2025-04-25

## 2025-04-25 NOTE — TELEPHONE ENCOUNTER
Rx Refill Note  Requested Prescriptions     Pending Prescriptions Disp Refills    diclofenac (VOLTAREN) 50 MG EC tablet       Sig: Take 1 tablet by mouth Every 12 (Twelve) Hours.      Last office visit with prescribing clinician: 3/5/2025   Last telemedicine visit with prescribing clinician: Visit date not found   Next office visit with prescribing clinician: 9/10/2025                         Would you like a call back once the refill request has been completed: [] Yes [] No    If the office needs to give you a call back, can they leave a voicemail: [] Yes [] No    Aleida Parada MA  04/25/25, 11:12 EDT

## 2025-04-25 NOTE — TELEPHONE ENCOUNTER
Rx Refill Note  Requested Prescriptions     Pending Prescriptions Disp Refills    ALPRAZolam (XANAX) 0.5 MG tablet [Pharmacy Med Name: alprazolam 0.5 mg tablet] 60 tablet 5     Sig: TAKE ONE TABLET BY MOUTH TWICE DAILY      Last office visit with prescribing clinician: 3/5/2025   Last telemedicine visit with prescribing clinician: Visit date not found   Next office visit with prescribing clinician: 4/25/2025                         Would you like a call back once the refill request has been completed: [] Yes [] No    If the office needs to give you a call back, can they leave a voicemail: [] Yes [] No    Aleida Pardaa MA  04/25/25, 11:09 EDT

## 2025-05-08 DIAGNOSIS — Z96.651 S/P TKR (TOTAL KNEE REPLACEMENT), RIGHT: ICD-10-CM

## 2025-05-08 RX ORDER — HYDROCODONE BITARTRATE AND ACETAMINOPHEN 5; 325 MG/1; MG/1
1 TABLET ORAL EVERY 12 HOURS PRN
Qty: 60 TABLET | Refills: 0 | Status: SHIPPED | OUTPATIENT
Start: 2025-05-08

## 2025-07-12 DIAGNOSIS — Z96.651 S/P TKR (TOTAL KNEE REPLACEMENT), RIGHT: ICD-10-CM

## 2025-07-14 RX ORDER — HYDROCODONE BITARTRATE AND ACETAMINOPHEN 5; 325 MG/1; MG/1
1 TABLET ORAL EVERY 12 HOURS PRN
Qty: 60 TABLET | Refills: 0 | Status: SHIPPED | OUTPATIENT
Start: 2025-07-14

## 2025-07-15 ENCOUNTER — TELEPHONE (OUTPATIENT)
Dept: INTERNAL MEDICINE | Age: 65
End: 2025-07-15

## 2025-08-14 DIAGNOSIS — Z96.651 S/P TKR (TOTAL KNEE REPLACEMENT), RIGHT: ICD-10-CM

## 2025-08-14 RX ORDER — HYDROCODONE BITARTRATE AND ACETAMINOPHEN 5; 325 MG/1; MG/1
1 TABLET ORAL EVERY 12 HOURS PRN
Qty: 60 TABLET | Refills: 0 | Status: SHIPPED | OUTPATIENT
Start: 2025-08-14

## (undated) DEVICE — NEEDLE, QUINCKE 22GX3.5": Brand: MEDLINE INDUSTRIES, INC.

## (undated) DEVICE — SUT VIC 1 CT1 36IN J947H

## (undated) DEVICE — SOL NACL 0.9PCT 100ML SGL

## (undated) DEVICE — 450 ML BOTTLE OF 0.05% CHLORHEXIDINE GLUCONATE IN 99.95% STERILE WATER FOR IRRIGATION, USP AND APPLICATOR.: Brand: IRRISEPT ANTIMICROBIAL WOUND LAVAGE

## (undated) DEVICE — Device: Brand: XPERIENCE

## (undated) DEVICE — UNDERGLV SURG BIOGEL INDICATOR LF PF 7.5

## (undated) DEVICE — GLV SURG SENSICARE W/ALOE PF LF 8 STRL

## (undated) DEVICE — THE STERILE LIGHT HANDLE COVER IS USED WITH STERIS SURGICAL LIGHTING AND VISUALIZATION SYSTEMS.

## (undated) DEVICE — PATIENT RETURN ELECTRODE, SINGLE-USE, CONTACT QUALITY MONITORING, ADULT, WITH 9FT CORD, FOR PATIENTS WEIGING OVER 33LBS. (15KG): Brand: MEGADYNE

## (undated) DEVICE — TRAP FLD MINIVAC MEGADYNE 100ML

## (undated) DEVICE — GLV SURG SENSICARE W/ALOE PF LF 7.5 STRL

## (undated) DEVICE — DUAL CUT SAGITTAL BLADE

## (undated) DEVICE — 2108 SERIES SAGITTAL BLADE, NO OFFSET  (12.4 X 1.19 X 82.1MM)

## (undated) DEVICE — STERILE PATIENT PROTECTIVE PAD FOR IMP® KNEE POSITIONERS & COHESIVE WRAP (10 / CASE): Brand: DE MAYO KNEE POSITIONER®

## (undated) DEVICE — PREP SOL POVIDONE/IODINE BT 4OZ

## (undated) DEVICE — SOL IRR NACL 0.9PCT 3000ML

## (undated) DEVICE — SYS CLS SKIN PREMIERPRO EXOFINFUSION 22CM

## (undated) DEVICE — MAT FLR ABSORBENT LG 4FT 10 2.5FT

## (undated) DEVICE — SOL IRR NACL 0.9PCT ARTHROMATIC 3000ML

## (undated) DEVICE — GLV SURG PREMIERPRO ORTHO LTX PF SZ8.5 BRN

## (undated) DEVICE — GLV SURG SENSICARE W/ALOE PF LF 9 STRL

## (undated) DEVICE — SUT VICRYL 1 CT1 27IN  JJ40G

## (undated) DEVICE — YANKAUER,BULB TIP,W/O VENT,RIGID,STERILE: Brand: MEDLINE

## (undated) DEVICE — BNDG ELAS ELITE V/CLOSE 6IN 5YD LF STRL

## (undated) DEVICE — SOL IRRG H2O PL/BG 1000ML STRL

## (undated) DEVICE — INTENDED TO SUPPORT AND MAINTAIN THE POSITION OF AN ANESTHETIZED PATIENT DURING SURGERY: Brand: HERMANTOR XL PINK KNEE POSITIONING PAD

## (undated) DEVICE — PK KN TOTL 40

## (undated) DEVICE — INSTRUMENT BATTERY

## (undated) DEVICE — APPL CHLORAPREP HI/LITE 26ML ORNG

## (undated) DEVICE — COVER,MAYO STAND,STERILE: Brand: MEDLINE

## (undated) DEVICE — BNDG,ELSTC,MATRIX,STRL,6"X5YD,LF,HOOK&LP: Brand: MEDLINE

## (undated) DEVICE — SKIN PREP TRAY 4 COMPARTM TRAY: Brand: MEDLINE INDUSTRIES, INC.

## (undated) DEVICE — OPTIFOAM GENTLE SA, POSTOP, 4X12: Brand: MEDLINE

## (undated) DEVICE — KT DRN EVAC WND PVC PCH WTROC RND 10F400

## (undated) DEVICE — TBG PENCL TELESCP MEGADYNE SMOKE EVAC 10FT

## (undated) DEVICE — Device: Brand: DEFENDO AIR/WATER/SUCTION AND BIOPSY VALVE

## (undated) DEVICE — GLV SURG BIOGEL M LTX PF 7 1/2

## (undated) DEVICE — APPL DURAPREP IODOPHOR APL 26ML

## (undated) DEVICE — SYR LUERLOK 30CC

## (undated) DEVICE — UNDERCAST PADDING: Brand: DEROYAL

## (undated) DEVICE — GLV SURG SENSICARE POLYISPRN W/ALOE PF LF 9 GRN STRL

## (undated) DEVICE — ANTIBACTERIAL UNDYED BRAIDED (POLYGLACTIN 910), SYNTHETIC ABSORBABLE SUTURE: Brand: COATED VICRYL

## (undated) DEVICE — DECANTER BAG 9": Brand: MEDLINE INDUSTRIES, INC.

## (undated) DEVICE — GLV SURG SENSICARE PI PF LF 7 GRN STRL

## (undated) DEVICE — 3M™ IOBAN™ 2 ANTIMICROBIAL INCISE DRAPE 6640EZ: Brand: IOBAN™ 2

## (undated) DEVICE — GLV SURG SENSICARE PI MIC PF SZ7 LF STRL

## (undated) DEVICE — PREMIUM WET SKIN PREP TRAY: Brand: MEDLINE INDUSTRIES, INC.

## (undated) DEVICE — COLON KIT: Brand: MEDLINE INDUSTRIES, INC.

## (undated) DEVICE — DRSNG SURESITE WNDW 2.38X2.75

## (undated) DEVICE — NEEDLE, QUINCKE, 18GX3.5": Brand: MEDLINE